# Patient Record
Sex: FEMALE | Race: WHITE | Employment: OTHER
[De-identification: names, ages, dates, MRNs, and addresses within clinical notes are randomized per-mention and may not be internally consistent; named-entity substitution may affect disease eponyms.]

---

## 2017-01-03 RX ORDER — VENLAFAXINE HYDROCHLORIDE 150 MG/1
CAPSULE, EXTENDED RELEASE ORAL
Qty: 90 CAPSULE | Refills: 1 | Status: SHIPPED | OUTPATIENT
Start: 2017-01-03 | End: 2017-03-31 | Stop reason: SDUPTHER

## 2017-01-03 RX ORDER — LEVOTHYROXINE SODIUM 0.05 MG/1
TABLET ORAL
Qty: 90 TABLET | Refills: 1 | Status: SHIPPED | OUTPATIENT
Start: 2017-01-03 | End: 2017-03-31 | Stop reason: SDUPTHER

## 2017-01-04 ENCOUNTER — OFFICE VISIT (OUTPATIENT)
Facility: CLINIC | Age: 62
End: 2017-01-04

## 2017-01-04 VITALS
HEART RATE: 72 BPM | SYSTOLIC BLOOD PRESSURE: 122 MMHG | DIASTOLIC BLOOD PRESSURE: 82 MMHG | WEIGHT: 217.6 LBS | OXYGEN SATURATION: 98 % | BODY MASS INDEX: 39.8 KG/M2 | TEMPERATURE: 98 F

## 2017-01-04 DIAGNOSIS — Z09 HOSPITAL DISCHARGE FOLLOW-UP: Primary | ICD-10-CM

## 2017-01-04 DIAGNOSIS — G47.30 SLEEP APNEA, UNSPECIFIED TYPE: ICD-10-CM

## 2017-01-04 DIAGNOSIS — G47.00 INSOMNIA, UNSPECIFIED TYPE: ICD-10-CM

## 2017-01-04 DIAGNOSIS — M15.9 PRIMARY OSTEOARTHRITIS INVOLVING MULTIPLE JOINTS: ICD-10-CM

## 2017-01-04 DIAGNOSIS — E66.9 OBESITY (BMI 30-39.9): ICD-10-CM

## 2017-01-04 DIAGNOSIS — E03.9 ACQUIRED HYPOTHYROIDISM: ICD-10-CM

## 2017-01-04 DIAGNOSIS — K59.03 THERAPEUTIC OPIOID-INDUCED CONSTIPATION (OIC): ICD-10-CM

## 2017-01-04 DIAGNOSIS — M79.7 FIBROMYALGIA: ICD-10-CM

## 2017-01-04 DIAGNOSIS — K56.600 PARTIAL BOWEL OBSTRUCTION (HCC): ICD-10-CM

## 2017-01-04 DIAGNOSIS — T40.2X5A THERAPEUTIC OPIOID-INDUCED CONSTIPATION (OIC): ICD-10-CM

## 2017-01-04 DIAGNOSIS — F41.9 ANXIETY: ICD-10-CM

## 2017-01-26 PROCEDURE — 99496 TRANSJ CARE MGMT HIGH F2F 7D: CPT | Performed by: FAMILY MEDICINE

## 2017-03-08 ENCOUNTER — TELEPHONE (OUTPATIENT)
Dept: PAIN MANAGEMENT | Age: 62
End: 2017-03-08

## 2017-03-09 ENCOUNTER — TELEPHONE (OUTPATIENT)
Dept: PAIN MANAGEMENT | Age: 62
End: 2017-03-09

## 2017-04-11 RX ORDER — MELOXICAM 15 MG/1
TABLET ORAL
Qty: 90 TABLET | Refills: 1 | Status: SHIPPED | OUTPATIENT
Start: 2017-04-11 | End: 2017-04-28 | Stop reason: SDUPTHER

## 2017-04-21 ENCOUNTER — HOSPITAL ENCOUNTER (OUTPATIENT)
Dept: ULTRASOUND IMAGING | Age: 62
Discharge: HOME | End: 2017-04-21
Payer: MEDICARE

## 2017-04-21 DIAGNOSIS — E04.1 THYROID NODULE: ICD-10-CM

## 2017-04-21 PROCEDURE — 76536 US EXAM OF HEAD AND NECK: CPT

## 2017-04-28 ENCOUNTER — HOSPITAL ENCOUNTER (OUTPATIENT)
Dept: PAIN MANAGEMENT | Age: 62
Discharge: HOME | End: 2017-04-28
Payer: MEDICARE

## 2017-04-28 DIAGNOSIS — E66.01 OBESITY, CLASS III, BMI 40-49.9 (MORBID OBESITY) (HCC): ICD-10-CM

## 2017-04-28 DIAGNOSIS — M79.18 MUSCLE PAIN, LUMBAR: ICD-10-CM

## 2017-04-28 DIAGNOSIS — M79.7 FIBROMYALGIA: ICD-10-CM

## 2017-04-28 DIAGNOSIS — M46.1 SACROILIITIS (HCC): ICD-10-CM

## 2017-04-28 DIAGNOSIS — M54.41 CHRONIC BILATERAL LOW BACK PAIN WITH BILATERAL SCIATICA: Primary | ICD-10-CM

## 2017-04-28 DIAGNOSIS — G89.29 CHRONIC BILATERAL LOW BACK PAIN WITH BILATERAL SCIATICA: Primary | ICD-10-CM

## 2017-04-28 DIAGNOSIS — M54.42 CHRONIC BILATERAL LOW BACK PAIN WITH BILATERAL SCIATICA: Primary | ICD-10-CM

## 2017-04-28 PROCEDURE — 99213 OFFICE O/P EST LOW 20 MIN: CPT | Performed by: NURSE PRACTITIONER

## 2017-04-28 PROCEDURE — 99213 OFFICE O/P EST LOW 20 MIN: CPT

## 2017-04-28 RX ORDER — HYDROCODONE BITARTRATE AND ACETAMINOPHEN 5; 325 MG/1; MG/1
1 TABLET ORAL 2 TIMES DAILY PRN
Qty: 60 TABLET | Refills: 0 | Status: SHIPPED | OUTPATIENT
Start: 2017-04-28 | End: 2017-04-28 | Stop reason: SDUPTHER

## 2017-04-28 RX ORDER — HYDROCODONE BITARTRATE AND ACETAMINOPHEN 5; 325 MG/1; MG/1
1 TABLET ORAL 2 TIMES DAILY PRN
Qty: 60 TABLET | Refills: 0 | Status: SHIPPED | OUTPATIENT
Start: 2017-05-07 | End: 2017-06-02 | Stop reason: SDUPTHER

## 2017-06-02 ENCOUNTER — HOSPITAL ENCOUNTER (OUTPATIENT)
Dept: PAIN MANAGEMENT | Age: 62
Discharge: HOME | End: 2017-06-02
Payer: MEDICARE

## 2017-06-02 DIAGNOSIS — G89.29 CHRONIC BILATERAL LOW BACK PAIN WITH BILATERAL SCIATICA: Primary | ICD-10-CM

## 2017-06-02 DIAGNOSIS — M46.1 SACROILIITIS (HCC): ICD-10-CM

## 2017-06-02 DIAGNOSIS — M54.41 CHRONIC BILATERAL LOW BACK PAIN WITH BILATERAL SCIATICA: Primary | ICD-10-CM

## 2017-06-02 DIAGNOSIS — M79.18 MUSCLE PAIN, LUMBAR: ICD-10-CM

## 2017-06-02 DIAGNOSIS — M79.7 FIBROMYALGIA: ICD-10-CM

## 2017-06-02 DIAGNOSIS — M47.816 LUMBAR FACET ARTHROPATHY: ICD-10-CM

## 2017-06-02 DIAGNOSIS — M54.42 CHRONIC BILATERAL LOW BACK PAIN WITH BILATERAL SCIATICA: Primary | ICD-10-CM

## 2017-06-02 DIAGNOSIS — E66.01 OBESITY, CLASS III, BMI 40-49.9 (MORBID OBESITY) (HCC): ICD-10-CM

## 2017-06-02 PROCEDURE — 99213 OFFICE O/P EST LOW 20 MIN: CPT | Performed by: NURSE PRACTITIONER

## 2017-06-02 PROCEDURE — 99213 OFFICE O/P EST LOW 20 MIN: CPT

## 2017-06-02 PROCEDURE — G0463 HOSPITAL OUTPT CLINIC VISIT: HCPCS

## 2017-06-02 RX ORDER — HYDROCODONE BITARTRATE AND ACETAMINOPHEN 5; 325 MG/1; MG/1
1 TABLET ORAL 2 TIMES DAILY PRN
Qty: 60 TABLET | Refills: 0 | Status: SHIPPED | OUTPATIENT
Start: 2017-06-06 | End: 2017-06-02 | Stop reason: SDUPTHER

## 2017-06-02 RX ORDER — CYCLOBENZAPRINE HCL 5 MG
5 TABLET ORAL DAILY PRN
Qty: 15 TABLET | Refills: 0 | Status: SHIPPED | OUTPATIENT
Start: 2017-06-02 | End: 2019-05-23

## 2017-06-02 RX ORDER — HYDROCODONE BITARTRATE AND ACETAMINOPHEN 5; 325 MG/1; MG/1
1 TABLET ORAL 2 TIMES DAILY PRN
Qty: 60 TABLET | Refills: 0 | Status: SHIPPED | OUTPATIENT
Start: 2017-06-08 | End: 2017-07-07 | Stop reason: SDUPTHER

## 2017-06-13 ENCOUNTER — OFFICE VISIT (OUTPATIENT)
Dept: OBGYN CLINIC | Age: 62
End: 2017-06-13
Payer: MEDICARE

## 2017-06-13 VITALS
SYSTOLIC BLOOD PRESSURE: 122 MMHG | WEIGHT: 220 LBS | DIASTOLIC BLOOD PRESSURE: 76 MMHG | BODY MASS INDEX: 40.48 KG/M2 | HEIGHT: 62 IN | HEART RATE: 78 BPM

## 2017-06-13 DIAGNOSIS — C80.1 CANCER (HCC): ICD-10-CM

## 2017-06-13 DIAGNOSIS — Z01.419 WELL FEMALE EXAM WITH ROUTINE GYNECOLOGICAL EXAM: Primary | ICD-10-CM

## 2017-06-13 PROCEDURE — G0101 CA SCREEN;PELVIC/BREAST EXAM: HCPCS | Performed by: OBSTETRICS & GYNECOLOGY

## 2017-06-13 ASSESSMENT — PATIENT HEALTH QUESTIONNAIRE - PHQ9
2. FEELING DOWN, DEPRESSED OR HOPELESS: 0
SUM OF ALL RESPONSES TO PHQ9 QUESTIONS 1 & 2: 0
SUM OF ALL RESPONSES TO PHQ QUESTIONS 1-9: 0
1. LITTLE INTEREST OR PLEASURE IN DOING THINGS: 0

## 2017-07-07 ENCOUNTER — HOSPITAL ENCOUNTER (OUTPATIENT)
Dept: PAIN MANAGEMENT | Age: 62
Discharge: HOME | End: 2017-07-07
Payer: MEDICARE

## 2017-07-07 DIAGNOSIS — M47.816 LUMBAR FACET ARTHROPATHY: ICD-10-CM

## 2017-07-07 DIAGNOSIS — E66.01 OBESITY, CLASS III, BMI 40-49.9 (MORBID OBESITY) (HCC): ICD-10-CM

## 2017-07-07 DIAGNOSIS — M79.18 MUSCLE PAIN, LUMBAR: ICD-10-CM

## 2017-07-07 DIAGNOSIS — M54.42 CHRONIC BILATERAL LOW BACK PAIN WITH BILATERAL SCIATICA: Primary | ICD-10-CM

## 2017-07-07 DIAGNOSIS — G89.29 CHRONIC BILATERAL LOW BACK PAIN WITH BILATERAL SCIATICA: Primary | ICD-10-CM

## 2017-07-07 DIAGNOSIS — M79.7 FIBROMYALGIA: ICD-10-CM

## 2017-07-07 DIAGNOSIS — M54.41 CHRONIC BILATERAL LOW BACK PAIN WITH BILATERAL SCIATICA: Primary | ICD-10-CM

## 2017-07-07 DIAGNOSIS — M46.1 SACROILIITIS (HCC): ICD-10-CM

## 2017-07-07 PROCEDURE — 80307 DRUG TEST PRSMV CHEM ANLYZR: CPT

## 2017-07-07 PROCEDURE — 99213 OFFICE O/P EST LOW 20 MIN: CPT

## 2017-07-07 PROCEDURE — G0463 HOSPITAL OUTPT CLINIC VISIT: HCPCS

## 2017-07-07 PROCEDURE — 99213 OFFICE O/P EST LOW 20 MIN: CPT | Performed by: NURSE PRACTITIONER

## 2017-07-07 RX ORDER — HYDROCODONE BITARTRATE AND ACETAMINOPHEN 5; 325 MG/1; MG/1
1 TABLET ORAL 2 TIMES DAILY PRN
Qty: 60 TABLET | Refills: 0 | Status: SHIPPED | OUTPATIENT
Start: 2017-07-08 | End: 2017-08-01 | Stop reason: SDUPTHER

## 2017-07-10 LAB
6-ACETYLMORPHINE, UR: NOT DETECTED
7-AMINOCLONAZEPAM, URINE: NOT DETECTED
ALPHA-OH-ALPRAZ, URINE: NOT DETECTED
ALPRAZOLAM, URINE: NOT DETECTED
AMPHETAMINES, URINE: NOT DETECTED
BARBITURATES, URINE: NOT DETECTED
BENZOYLECGONINE, UR: NOT DETECTED
BUPRENORPHINE URINE: NOT DETECTED
CARISOPRODOL, UR: NOT DETECTED
CLONAZEPAM, URINE: NOT DETECTED
CODEINE, URINE: NOT DETECTED
CREATININE URINE: 215.2 MG/DL (ref 20–400)
DIAZEPAM, URINE: NOT DETECTED
DRUGS EXPECTED, UR: NORMAL
EER HI RES INTERP UR: NORMAL
ETHYL GLUCURONIDE UR: NOT DETECTED
FENTANYL URINE: NOT DETECTED
HYDROCODONE, URINE: PRESENT
HYDROMORPHONE, URINE: NOT DETECTED
LORAZEPAM, URINE: NOT DETECTED
MARIJUANA METAB, UR: NOT DETECTED
MDA, UR: NOT DETECTED
MDEA, EVE, UR: NOT DETECTED
MDMA URINE: NOT DETECTED
MEPERIDINE METAB, UR: NOT DETECTED
METHADONE, URINE: NOT DETECTED
METHAMPHETAMINE, URINE: NOT DETECTED
METHYLPHENIDATE: NOT DETECTED
MIDAZOLAM, URINE: NOT DETECTED
MORPHINE URINE: NOT DETECTED
NORBUPRENORPHINE, URINE: NOT DETECTED
NORDIAZEPAM, URINE: NOT DETECTED
NORFENTANYL, URINE: NOT DETECTED
NORHYDROCODONE, URINE: PRESENT
NOROXYCODONE, URINE: NOT DETECTED
NOROXYMORPHONE, URINE: NOT DETECTED
OXAZEPAM, URINE: NOT DETECTED
OXYCODONE URINE: NOT DETECTED
OXYMORPHONE, URINE: NOT DETECTED
PAIN MANAGEMENT DRUG PANEL INTERP, URINE: NORMAL
PAIN MGT DRUG PANEL, HI RES, UR: NORMAL
PCP,URINE: NOT DETECTED
PHENTERMINE, UR: NOT DETECTED
PROPOXYPHENE, URINE: NOT DETECTED
TAPENTADOL, URINE: NOT DETECTED
TAPENTADOL-O-SULFATE, URINE: NOT DETECTED
TEMAZEPAM, URINE: NOT DETECTED
TRAMADOL, URINE: NOT DETECTED
ZOLPIDEM, URINE: NOT DETECTED

## 2017-08-01 ENCOUNTER — HOSPITAL ENCOUNTER (OUTPATIENT)
Dept: PAIN MANAGEMENT | Age: 62
Discharge: HOME | End: 2017-08-01
Payer: MEDICARE

## 2017-08-01 VITALS
HEIGHT: 62 IN | SYSTOLIC BLOOD PRESSURE: 116 MMHG | TEMPERATURE: 98.7 F | HEART RATE: 78 BPM | WEIGHT: 219 LBS | DIASTOLIC BLOOD PRESSURE: 66 MMHG | BODY MASS INDEX: 40.3 KG/M2 | OXYGEN SATURATION: 97 % | RESPIRATION RATE: 16 BRPM

## 2017-08-01 DIAGNOSIS — M47.816 LUMBAR FACET ARTHROPATHY: ICD-10-CM

## 2017-08-01 DIAGNOSIS — M46.1 SACROILIITIS (HCC): ICD-10-CM

## 2017-08-01 DIAGNOSIS — E66.01 OBESITY, CLASS III, BMI 40-49.9 (MORBID OBESITY) (HCC): ICD-10-CM

## 2017-08-01 DIAGNOSIS — M79.7 FIBROMYALGIA: ICD-10-CM

## 2017-08-01 DIAGNOSIS — G89.29 CHRONIC BILATERAL LOW BACK PAIN WITH BILATERAL SCIATICA: Primary | ICD-10-CM

## 2017-08-01 DIAGNOSIS — M54.42 CHRONIC BILATERAL LOW BACK PAIN WITH BILATERAL SCIATICA: Primary | ICD-10-CM

## 2017-08-01 DIAGNOSIS — M54.41 CHRONIC BILATERAL LOW BACK PAIN WITH BILATERAL SCIATICA: Primary | ICD-10-CM

## 2017-08-01 DIAGNOSIS — M79.18 MUSCLE PAIN, LUMBAR: ICD-10-CM

## 2017-08-01 PROCEDURE — 99213 OFFICE O/P EST LOW 20 MIN: CPT

## 2017-08-01 PROCEDURE — G0463 HOSPITAL OUTPT CLINIC VISIT: HCPCS

## 2017-08-01 PROCEDURE — 99213 OFFICE O/P EST LOW 20 MIN: CPT | Performed by: NURSE PRACTITIONER

## 2017-08-01 RX ORDER — HYDROCODONE BITARTRATE AND ACETAMINOPHEN 5; 325 MG/1; MG/1
1 TABLET ORAL 2 TIMES DAILY PRN
Qty: 60 TABLET | Refills: 0 | Status: SHIPPED | OUTPATIENT
Start: 2017-08-13 | End: 2017-09-12 | Stop reason: SDUPTHER

## 2017-08-01 RX ORDER — HYDROCODONE BITARTRATE AND ACETAMINOPHEN 5; 325 MG/1; MG/1
1 TABLET ORAL 2 TIMES DAILY PRN
Qty: 60 TABLET | Refills: 0 | Status: SHIPPED | OUTPATIENT
Start: 2017-08-10 | End: 2017-08-01 | Stop reason: SDUPTHER

## 2017-08-01 ASSESSMENT — ENCOUNTER SYMPTOMS
EYES NEGATIVE: 1
RESPIRATORY NEGATIVE: 1
DIARRHEA: 1
ABDOMINAL PAIN: 1
BACK PAIN: 1

## 2017-09-12 ENCOUNTER — HOSPITAL ENCOUNTER (OUTPATIENT)
Dept: PAIN MANAGEMENT | Age: 62
Discharge: HOME | End: 2017-09-12
Payer: MEDICARE

## 2017-09-12 VITALS
HEIGHT: 62 IN | OXYGEN SATURATION: 99 % | BODY MASS INDEX: 40.3 KG/M2 | SYSTOLIC BLOOD PRESSURE: 135 MMHG | HEART RATE: 81 BPM | DIASTOLIC BLOOD PRESSURE: 86 MMHG | TEMPERATURE: 98.6 F | RESPIRATION RATE: 20 BRPM | WEIGHT: 219 LBS

## 2017-09-12 DIAGNOSIS — M47.816 LUMBAR FACET ARTHROPATHY: ICD-10-CM

## 2017-09-12 DIAGNOSIS — M46.1 SACROILIITIS (HCC): Primary | ICD-10-CM

## 2017-09-12 DIAGNOSIS — E66.01 OBESITY, CLASS III, BMI 40-49.9 (MORBID OBESITY) (HCC): ICD-10-CM

## 2017-09-12 DIAGNOSIS — M51.36 DDD (DEGENERATIVE DISC DISEASE), LUMBAR: ICD-10-CM

## 2017-09-12 PROCEDURE — 99213 OFFICE O/P EST LOW 20 MIN: CPT

## 2017-09-12 PROCEDURE — 99214 OFFICE O/P EST MOD 30 MIN: CPT | Performed by: PAIN MEDICINE

## 2017-09-12 RX ORDER — HYDROCODONE BITARTRATE AND ACETAMINOPHEN 5; 325 MG/1; MG/1
1 TABLET ORAL 2 TIMES DAILY PRN
Qty: 60 TABLET | Refills: 0 | Status: SHIPPED | OUTPATIENT
Start: 2017-09-16 | End: 2017-10-16 | Stop reason: SDUPTHER

## 2017-09-12 ASSESSMENT — ENCOUNTER SYMPTOMS
WHEEZING: 0
SORE THROAT: 0
BOWEL INCONTINENCE: 0
DIARRHEA: 0
SHORTNESS OF BREATH: 0
BACK PAIN: 1

## 2017-09-12 ASSESSMENT — PAIN DESCRIPTION - LOCATION: LOCATION: BACK

## 2017-09-12 ASSESSMENT — ACTIVITIES OF DAILY LIVING (ADL): EFFECT OF PAIN ON DAILY ACTIVITIES: PAIN INCREASES WITH WALKING

## 2017-09-12 ASSESSMENT — PAIN DESCRIPTION - PROGRESSION: CLINICAL_PROGRESSION: NOT CHANGED

## 2017-09-12 ASSESSMENT — PAIN SCALES - GENERAL: PAINLEVEL_OUTOF10: 5

## 2017-09-12 ASSESSMENT — PAIN DESCRIPTION - FREQUENCY: FREQUENCY: CONTINUOUS

## 2017-09-12 ASSESSMENT — PAIN DESCRIPTION - ONSET: ONSET: ON-GOING

## 2017-09-12 ASSESSMENT — PAIN DESCRIPTION - DIRECTION: RADIATING_TOWARDS: LEGS BILATERAL WORSE ON RIGHT

## 2017-09-12 ASSESSMENT — PAIN DESCRIPTION - PAIN TYPE: TYPE: CHRONIC PAIN

## 2017-09-12 ASSESSMENT — PAIN DESCRIPTION - ORIENTATION: ORIENTATION: LOWER;RIGHT;LEFT

## 2017-09-13 ASSESSMENT — ENCOUNTER SYMPTOMS
ABDOMINAL PAIN: 1
CONSTIPATION: 1
SPUTUM PRODUCTION: 0
DOUBLE VISION: 0
COUGH: 0
ORTHOPNEA: 0
BLURRED VISION: 0

## 2017-10-16 ENCOUNTER — HOSPITAL ENCOUNTER (OUTPATIENT)
Dept: PAIN MANAGEMENT | Age: 62
Discharge: HOME | End: 2017-10-16
Payer: MEDICARE

## 2017-10-16 VITALS
RESPIRATION RATE: 20 BRPM | TEMPERATURE: 98.9 F | OXYGEN SATURATION: 98 % | SYSTOLIC BLOOD PRESSURE: 141 MMHG | BODY MASS INDEX: 40.3 KG/M2 | DIASTOLIC BLOOD PRESSURE: 85 MMHG | WEIGHT: 219 LBS | HEIGHT: 62 IN | HEART RATE: 71 BPM

## 2017-10-16 DIAGNOSIS — M79.18 MUSCLE PAIN, LUMBAR: ICD-10-CM

## 2017-10-16 DIAGNOSIS — M79.7 FIBROMYALGIA: ICD-10-CM

## 2017-10-16 DIAGNOSIS — M47.816 LUMBAR FACET ARTHROPATHY: ICD-10-CM

## 2017-10-16 DIAGNOSIS — M54.42 CHRONIC BILATERAL LOW BACK PAIN WITH BILATERAL SCIATICA: ICD-10-CM

## 2017-10-16 DIAGNOSIS — M51.36 DDD (DEGENERATIVE DISC DISEASE), LUMBAR: ICD-10-CM

## 2017-10-16 DIAGNOSIS — M54.41 CHRONIC BILATERAL LOW BACK PAIN WITH BILATERAL SCIATICA: ICD-10-CM

## 2017-10-16 DIAGNOSIS — G89.29 CHRONIC BILATERAL LOW BACK PAIN WITH BILATERAL SCIATICA: ICD-10-CM

## 2017-10-16 DIAGNOSIS — M46.1 SACROILIITIS (HCC): Primary | ICD-10-CM

## 2017-10-16 PROCEDURE — 99213 OFFICE O/P EST LOW 20 MIN: CPT

## 2017-10-16 PROCEDURE — 99213 OFFICE O/P EST LOW 20 MIN: CPT | Performed by: NURSE PRACTITIONER

## 2017-10-16 RX ORDER — MAGNESIUM OXIDE 400 MG/1
TABLET ORAL
Refills: 0 | COMMUNITY
Start: 2017-09-20 | End: 2018-01-29

## 2017-10-16 RX ORDER — HYDROCODONE BITARTRATE AND ACETAMINOPHEN 5; 325 MG/1; MG/1
1 TABLET ORAL 2 TIMES DAILY PRN
Qty: 60 TABLET | Refills: 0 | Status: SHIPPED | OUTPATIENT
Start: 2017-10-21 | End: 2017-11-14 | Stop reason: SDUPTHER

## 2017-10-16 ASSESSMENT — ENCOUNTER SYMPTOMS
GASTROINTESTINAL NEGATIVE: 1
BACK PAIN: 1
RESPIRATORY NEGATIVE: 1
EYES NEGATIVE: 1

## 2017-10-16 NOTE — PROGRESS NOTES
[facility]  Progress  Note  Patient is here today to review medication contract. Chief Complaint: low back pain, right si joint pain    PMH      HPI: Patient is a 63 yo female with low back pain. This is a chronic problem. She has only had one SI joint injection with us on the left side in 9/2016 with 30% relief for 1 month. Considered doing right SI but patient thought it will not be worth it. Before, she received bilateral RFAs from prior pain management clinic with not much relief. She has not started Aquatics yet. She currently takes norco and mobic for pain. She only takes 1-2 norco a day. Patient has  Less constipation and sees GI which prescribed miralax and dulcolax. She is getting 1 BM every other day. Had a bowel obstruction 12/25/2017 which was treated conservatively with bowel rest.  Was attributed to adhesions from prior surgery. Colonoscopy was within normal limits. Last SI joint, patient had flushing for a couple day and is scheduled forright SI joint injection again. Back Pain   This is a chronic problem. The current episode started more than 1 year ago. The problem occurs constantly. The pain is present in the lumbar spine and sacro-iliac. The quality of the pain is described as aching. The pain does not radiate. The pain is at a severity of 4/10. The pain is moderate. The pain is the same all the time. The symptoms are aggravated by standing (walking, bending back ). Associated symptoms include numbness. (Legs) Risk factors include menopause and obesity. She has tried analgesics and heat for the symptoms. The treatment provided mild relief.        Possible side effects, risk of tolerance and or dependence and alternative treatments discussed    Obtaining appropriate analgesic effect of treatment   No signs of potential drug abuse or diversion identified    Treatment goals:  Functional status:  Manage pain, walk better      Aberrancy none  Analgesia  Pain 4  Adverse No    Sexual activity: Yes     Partners: Male     Birth control/ protection: Surgical      Comment: hyst     Other Topics Concern    Not on file     Social History Narrative    No narrative on file       Review of Systems:  Review of Systems   Constitution: Negative. HENT: Negative. Eyes: Negative. Cardiovascular: Negative. Respiratory: Negative. Skin: Negative. Musculoskeletal: Positive for back pain, joint pain and myalgias. Gastrointestinal: Negative. Genitourinary: Negative. Neurological: Positive for numbness. Psychiatric/Behavioral: Positive for depression. The patient is nervous/anxious. Manageable         Physical Exam:  BP (!) 141/85   Pulse 71   Temp 98.9 °F (37.2 °C)   Resp 20   Ht 5' 2\" (1.575 m)   Wt 219 lb (99.3 kg)   LMP 06/13/2010 (Within Months)   SpO2 98%   BMI 40.06 kg/m²     Physical Exam   Constitutional: She is well-developed, well-nourished, and in no distress. obese   Neck: Normal range of motion. Neck supple. Pulmonary/Chest: Effort normal.   Musculoskeletal:        Lumbar back: She exhibits decreased range of motion. Tender right si joint   Neurological:   Reflex Scores:       Patellar reflexes are 0 on the right side and 2+ on the left side. Achilles reflexes are 2+ on the right side and 2+ on the left side. Skin: Skin is warm, dry and intact.    Psychiatric: Affect and judgment normal.       Record/Diagnostics Review:    As above, I did review the imaging  7/10/2017  3:37 PM - Michael, pn Incoming Lab Results From [facility]     Component Results     Component Value Ref Range & Units Status Collected Lab   Pain Management Drug Panel Interp, Urine Consistent   Final 07/07/2017  1:30 PM [facility]   (NOTE)   ________________________________________________________________   DRUGS EXPECTED:   HYDROCODONE [7/6/17]   ________________________________________________________________   CONSISTENT with medications provided:   HYDROCODONE : based on hydrocodone, norhydrocodone   ________________________________________________________________   INTERPRETIVE INFORMATION:Pain Mgt Morris, High Res/EMIT, Ur, Interp   Interpretation depends on accuracy and completeness of patient   medication information submitted by client. 6-Acetylmorphine, Ur Not Detected   Final 07/07/2017  1:30 PM [facility]   7-Aminoclonazepam, Urine Not Detected   Final 07/07/2017  1:30 PM [facility]   Alpha-OH-Alpraz, Urine Not Detected   Final 07/07/2017  1:30 PM [facility]   Alprazolam, Urine Not Detected   Final 07/07/2017  1:30 PM [facility]   Amphetamines, urine Not Detected   Final 07/07/2017  1:30 PM [facility]   Barbiturates, Ur Not Detected   Final 07/07/2017  1:30 PM [facility]   Benzoylecgonine, Ur Not Detected   Final 07/07/2017  1:30 PM [facility]   Buprenorphine Urine Not Detected   Final 07/07/2017  1:30 PM [facility]   Carisoprodol, Ur Not Detected   Final 07/07/2017  1:30 PM [facility]   (NOTE)   The carisoprodol immunoassay has cross-reactivity to carisoprodol   and meprobamate.     Clonazepam, Urine Not Detected   Final 07/07/2017  1:30 PM [facility]   Codeine, Urine Not Detected   Final 07/07/2017  1:30 PM [facility]   MDA, Ur Not Detected   Final 07/07/2017  1:30 PM [facility]   Diazepam, Urine Not Detected   Final 07/07/2017  1:30 PM [facility]   Ethyl Glucuronide Ur Not Detected   Final 07/07/2017  1:30 PM [facility]   Fentanyl, Ur Not Detected   Final 07/07/2017  1:30 PM [facility]   Hydrocodone, Urine Present   Final 07/07/2017  1:30 PM [facility]   Hydromorphone, Urine Not Detected   Final 07/07/2017  1:30 PM [facility]   Lorazepam, Urine Not Detected   Final 07/07/2017  1:30 PM [facility]   Marijuana Metab, Ur Not Detected   Final 07/07/2017  1:30 PM [facility]   MDEA, MENDY, Ur Not Detected   Final 07/07/2017  1:30 PM [facility]   MDMA URINE Not Detected   Final 07/07/2017  1:30 PM [facility]   Meperidine Metab, Ur Not Detected   Final 07/07/2017  1:30 PM [facility]   Methadone, Urine Not Detected   Final 07/07/2017  1:30 PM [facility]   Methamphetamine, Urine Not Detected   Final 07/07/2017  1:30 PM [facility]   Methylphenidate Not Detected   Final 07/07/2017  1:30 PM [facility]   Midazolam, Urine Not Detected   Final 07/07/2017  1:30 PM [facility]   Morphine Urine Not Detected   Final 07/07/2017  1:30 PM [facility]   Norbuprenorphine, Urine Not Detected   Final 07/07/2017  1:30 PM [facility]   Nordiazepam, Urine Not Detected   Final 07/07/2017  1:30 PM [facility]   Norfentanyl, Urine Not Detected   Final 07/07/2017  1:30 PM [facility]   NORHYDROCODONE, URINE Present   Final 07/07/2017  1:30 PM [facility]   Noroxycodone, Urine Not Detected   Final 07/07/2017  1:30 PM [facility]   NOROXYMORPHONE, URINE Not Detected   Final 07/07/2017  1:30 PM [facility]   Oxazepam, Urine Not Detected   Final 07/07/2017  1:30 PM [facility]   Oxycodone Urine Not Detected   Final 07/07/2017  1:30 PM [facility]   Oxymorphone, Urine Not Detected   Final 07/07/2017  1:30 PM [facility]   PCP, Urine Not Detected   Final 07/07/2017  1:30 PM [facility]   Phentermine, Ur Not Detected   Final 07/07/2017  1:30 PM [facility]   Propoxyphene, Urine Not Detected   Final 07/07/2017  1:30 PM [facility]   Tapentadol-O-Sulfate, Urine Not Detected   Final 07/07/2017  1:30 PM [facility]   Tapentadol, Urine Not Detected   Final 07/07/2017  1:30 PM [facility]   Temazepam, Urine Not Detected   Final 07/07/2017  1:30 PM [facility]   Tramadol, Urine Not Detected   Final 07/07/2017  1:30 PM [facility]   Zolpidem, Urine Not Detected   Final 07/07/2017  1:30 PM [facility]   Drugs Expected, Ur   Final 07/07/2017  1:30 PM [facility]   HYDROCODONE EVENING OF [id number]    Creatinine, Ur 215.2  20.0 - 400.0 mg/dL Final 07/07/2017  1:30 PM [facility]   Pain Mgt Drug Panel, Hi Res, Ur See Below   Final 07/07/2017  1:30 PM [facility]   (NOTE)   Methodology: Qualitative Enzyme Immunoassay and Qualitative Liquid   Chromatography-Time of Flight-Mass Spectrometry, Quantitative   Spectrophotometry   The absence of expected drug(s) and/or drug metabolite(s) may   indicate

## 2017-10-27 ENCOUNTER — HOSPITAL ENCOUNTER (OUTPATIENT)
Dept: GENERAL RADIOLOGY | Age: 62
Discharge: HOME | End: 2017-10-27
Payer: MEDICARE

## 2017-10-27 ENCOUNTER — HOSPITAL ENCOUNTER (OUTPATIENT)
Dept: PAIN MANAGEMENT | Age: 62
Discharge: HOME | End: 2017-10-27
Payer: MEDICARE

## 2017-10-27 VITALS
BODY MASS INDEX: 40.3 KG/M2 | RESPIRATION RATE: 16 BRPM | DIASTOLIC BLOOD PRESSURE: 60 MMHG | SYSTOLIC BLOOD PRESSURE: 145 MMHG | WEIGHT: 219 LBS | OXYGEN SATURATION: 99 % | TEMPERATURE: 98 F | HEIGHT: 62 IN | HEART RATE: 68 BPM

## 2017-10-27 DIAGNOSIS — R52 PAIN: ICD-10-CM

## 2017-10-27 DIAGNOSIS — M46.1 SACROILIITIS (HCC): Primary | ICD-10-CM

## 2017-10-27 PROCEDURE — 6360000002 HC RX W HCPCS

## 2017-10-27 PROCEDURE — 3209999900 FLUORO FOR SURGICAL PROCEDURES

## 2017-10-27 PROCEDURE — G0260 INJ FOR SACROILIAC JT ANESTH: HCPCS

## 2017-10-27 PROCEDURE — 6360000004 HC RX CONTRAST MEDICATION

## 2017-10-27 PROCEDURE — 27096 INJECT SACROILIAC JOINT: CPT | Performed by: PAIN MEDICINE

## 2017-10-27 ASSESSMENT — PAIN - FUNCTIONAL ASSESSMENT: PAIN_FUNCTIONAL_ASSESSMENT: 0-10

## 2017-10-27 ASSESSMENT — ACTIVITIES OF DAILY LIVING (ADL): EFFECT OF PAIN ON DAILY ACTIVITIES: PAIN INCREASES W/WALKING & STANDING

## 2017-10-27 ASSESSMENT — PAIN SCALES - GENERAL: PAINLEVEL_OUTOF10: 4

## 2017-10-27 NOTE — PROCEDURES
sedation. Indication for the Procedure: The patient failed conservative management  for pain in low back. The patient is tender over the Right SI joint. Renan's test is positive on the Right side. As patient is not responding to conservative management and pain is interfering with activities of daily living we decided to proceed with SI joint injection. The procedure and risks were discussed with the patient and an informed consent was obtained. Current Pain Assessment  Pain Assessment  Pain Assessment: 0-10  Pain Level: 6  Pain Type: Chronic pain  Pain Location: Back, Leg  Pain Orientation: Right  Pain Radiating Towards: right worse than left leg  Pain Descriptors: Constant, Aching, Sharp, Burning, Radiating  Pain Frequency: Continuous  Pain Onset: On-going  Clinical Progression: Gradually worsening  Effect of Pain on Daily Activities: Pain increases w/walking & standing  Patient's Stated Pain Goal: 2 (To be able to walk & stand w/less pain)  Pain Intervention(s): Medication (see eMar), Rest, Repositioned, Heat applied  Response to Pain Intervention:  (Left SI 2016 30%)   Procedure: The patient's vital signs including BP, EKG and SaO2 were monitored by the RN and they remained stable during the procedure. A meaningful communication was kept up with the patient throughout the procedure. The patient is placed in prone position. Skin over the back was prepped and draped in sterile manner. Then using fluoroscopy the Right sacroiliac joint was identified. Then the angle of the fluoroscopy was adjusted such that the view of the caudal aspect of the joint space was optimized. Then skin and deep tissues over the caudal aspect of the joint were infiltrated with 3 ml of 0.5% Naropin. The #22-gauge, 3-1/2 inch spinal needle was introduced through the skin wheal and directed such that the tip of the needle lies in the joint space.    This was confirmed by injecting 1 ml of Omnipaque-180 through the needle and observing the spread of the contrast along the joint space. Then after negative aspiration a total of 40 mg of triamcinolone with 4 ml of  0.5%  Naropin was injected through the needle. The needle is removed and a Band-Aid was placed over the needle insertion site. The patient tolerated the procedure well and vital signs remained stable. The patient was discharged home in stable condition and will be followed in the pain clinic in the next few weeks or further planning.     Electronically signed by Ashley Lopez MD on 10/27/2017 at 10:33 AM

## 2017-10-30 ENCOUNTER — TELEPHONE (OUTPATIENT)
Dept: PAIN MANAGEMENT | Age: 62
End: 2017-10-30

## 2017-11-14 ENCOUNTER — HOSPITAL ENCOUNTER (OUTPATIENT)
Dept: PAIN MANAGEMENT | Age: 62
Discharge: HOME | End: 2017-11-14
Payer: MEDICARE

## 2017-11-14 VITALS
OXYGEN SATURATION: 97 % | RESPIRATION RATE: 16 BRPM | DIASTOLIC BLOOD PRESSURE: 78 MMHG | SYSTOLIC BLOOD PRESSURE: 122 MMHG | WEIGHT: 219 LBS | BODY MASS INDEX: 40.3 KG/M2 | HEART RATE: 79 BPM | HEIGHT: 62 IN | TEMPERATURE: 98.2 F

## 2017-11-14 DIAGNOSIS — M79.7 FIBROMYALGIA: ICD-10-CM

## 2017-11-14 DIAGNOSIS — M79.18 MUSCLE PAIN, LUMBAR: ICD-10-CM

## 2017-11-14 DIAGNOSIS — M54.41 CHRONIC BILATERAL LOW BACK PAIN WITH BILATERAL SCIATICA: ICD-10-CM

## 2017-11-14 DIAGNOSIS — G89.29 CHRONIC BILATERAL LOW BACK PAIN WITH BILATERAL SCIATICA: ICD-10-CM

## 2017-11-14 DIAGNOSIS — M51.36 DDD (DEGENERATIVE DISC DISEASE), LUMBAR: ICD-10-CM

## 2017-11-14 DIAGNOSIS — M46.1 SACROILIITIS (HCC): Primary | ICD-10-CM

## 2017-11-14 DIAGNOSIS — M47.816 LUMBAR FACET ARTHROPATHY: ICD-10-CM

## 2017-11-14 DIAGNOSIS — M54.42 CHRONIC BILATERAL LOW BACK PAIN WITH BILATERAL SCIATICA: ICD-10-CM

## 2017-11-14 DIAGNOSIS — E66.01 OBESITY, CLASS III, BMI 40-49.9 (MORBID OBESITY) (HCC): ICD-10-CM

## 2017-11-14 PROCEDURE — 99213 OFFICE O/P EST LOW 20 MIN: CPT

## 2017-11-14 PROCEDURE — 99214 OFFICE O/P EST MOD 30 MIN: CPT | Performed by: PAIN MEDICINE

## 2017-11-14 RX ORDER — HYDROCODONE BITARTRATE AND ACETAMINOPHEN 5; 325 MG/1; MG/1
1 TABLET ORAL 2 TIMES DAILY PRN
Qty: 60 TABLET | Refills: 0 | Status: SHIPPED | OUTPATIENT
Start: 2017-11-28 | End: 2017-12-14 | Stop reason: SDUPTHER

## 2017-11-14 ASSESSMENT — ENCOUNTER SYMPTOMS
EYES NEGATIVE: 1
DIARRHEA: 0
CONSTIPATION: 1
DOUBLE VISION: 0
SPUTUM PRODUCTION: 0
BLURRED VISION: 0
ORTHOPNEA: 0
BOWEL INCONTINENCE: 0
ABDOMINAL PAIN: 1
SHORTNESS OF BREATH: 0
COUGH: 0
WHEEZING: 0
BACK PAIN: 1
RESPIRATORY NEGATIVE: 1
SORE THROAT: 0

## 2017-11-14 ASSESSMENT — PAIN DESCRIPTION - PAIN TYPE: TYPE: CHRONIC PAIN

## 2017-11-14 ASSESSMENT — ACTIVITIES OF DAILY LIVING (ADL): EFFECT OF PAIN ON DAILY ACTIVITIES: PAIN INCREASES WITH WALKING AND STANDING

## 2017-11-14 ASSESSMENT — PAIN DESCRIPTION - ORIENTATION: ORIENTATION: RIGHT;LEFT;LOWER

## 2017-11-14 ASSESSMENT — PAIN DESCRIPTION - ONSET: ONSET: ON-GOING

## 2017-11-14 ASSESSMENT — PAIN SCALES - GENERAL: PAINLEVEL_OUTOF10: 4

## 2017-11-14 ASSESSMENT — PAIN DESCRIPTION - FREQUENCY: FREQUENCY: CONTINUOUS

## 2017-11-14 ASSESSMENT — PAIN DESCRIPTION - DESCRIPTORS: DESCRIPTORS: ACHING;BURNING;CONSTANT;RADIATING;SHARP

## 2017-11-14 ASSESSMENT — PAIN DESCRIPTION - PROGRESSION: CLINICAL_PROGRESSION: GRADUALLY IMPROVING

## 2017-11-14 ASSESSMENT — PAIN DESCRIPTION - LOCATION: LOCATION: BACK;LEG

## 2017-11-14 NOTE — PROGRESS NOTES
Negative for bowel incontinence, diarrhea, heartburn and nausea. History of bowel obstruction recently   Genitourinary: Negative. Negative for bladder incontinence, dysuria, frequency and urgency. Musculoskeletal: Positive for back pain. Negative for falls. Skin: Negative. Negative for rash. Neurological: Positive for dizziness. Negative for sensory change, focal weakness, seizures, weakness, numbness and headaches. Endo/Heme/Allergies: Negative. Psychiatric/Behavioral: Positive for depression. Negative for substance abuse and suicidal ideas. The patient is nervous/anxious and has insomnia. GENERAL PHYSICAL EXAM:  Vitals: /78   Pulse 79   Temp 98.2 °F (36.8 °C) (Oral)   Resp 16   Ht 5' 2\" (1.575 m)   Wt 219 lb (99.3 kg)   LMP 06/13/2010 (Within Months)   SpO2 97%   BMI 40.06 kg/m² , Body mass index is 40.06 kg/m². Physical Exam   Constitutional: She is oriented to person, place, and time. She appears well-developed and well-nourished. HENT:   Head: Normocephalic and atraumatic. Eyes: Conjunctivae and EOM are normal. Pupils are equal, round, and reactive to light. Neck: Normal range of motion. Neck supple. Cardiovascular: Normal rate and regular rhythm. Pulmonary/Chest: No respiratory distress. Abdominal: She exhibits no distension. Musculoskeletal: She exhibits tenderness (mostly right SI joint). Neurological: She is alert and oriented to person, place, and time. She displays no atrophy and no tremor. No cranial nerve deficit or sensory deficit. She exhibits normal muscle tone. Coordination normal.   Reflex Scores:       Patellar reflexes are 2+ on the right side and 2+ on the left side. Achilles reflexes are 1+ on the right side and 1+ on the left side. Skin: Skin is warm and dry. No rash noted. No erythema. Psychiatric: She has a normal mood and affect.  Her speech is normal and behavior is normal. Judgment and thought content normal. Making Process : Patient's health history and referral records thoroughly reviewed before focused physical examination and discussion with patient. Over 50% of today's visit is spent on examining the patient and counseling. Level of complexity of date to be reviewed is Moderate. The chart date reviewed include the following: Imaging Reports. Summary of Care. Time spent reviewing with patient the below reports:   Medication safety, Treatment options. Level of diagnosis and management options of this case is multiple: involving the following management options: Interventions as needed, medication management as appropriate, future visits, activity modification, heat/ice as needed, Urine drug screen as required. [x]The patient's questions were answered to the best of my abilities. This note was created using voice recognition software. There may be inaccuracies of transcription  that are inadvertently overlooked prior to the signature. There is any questions about the transcription please contact me. Return in  4 weeks  with  Jemal Jha CNP  for further plan of treatment.          Electronically signed by Lizy Wallace MD on 11/15/2017 at 6:26 AM

## 2017-11-15 ASSESSMENT — ENCOUNTER SYMPTOMS
NAUSEA: 0
HEARTBURN: 0

## 2017-12-07 ENCOUNTER — HOSPITAL ENCOUNTER (OUTPATIENT)
Age: 62
Discharge: HOME | End: 2017-12-07
Payer: MEDICARE

## 2017-12-07 DIAGNOSIS — M15.9 PRIMARY OSTEOARTHRITIS INVOLVING MULTIPLE JOINTS: ICD-10-CM

## 2017-12-07 DIAGNOSIS — E03.9 ACQUIRED HYPOTHYROIDISM: ICD-10-CM

## 2017-12-07 LAB
ALBUMIN SERPL-MCNC: 4.3 G/DL (ref 3.5–5.2)
ALBUMIN/GLOBULIN RATIO: ABNORMAL (ref 1–2.5)
ALP BLD-CCNC: 83 U/L (ref 35–104)
ALT SERPL-CCNC: 17 U/L (ref 5–33)
ANION GAP SERPL CALCULATED.3IONS-SCNC: 14 MMOL/L (ref 9–17)
AST SERPL-CCNC: 18 U/L
BILIRUB SERPL-MCNC: 0.28 MG/DL (ref 0.3–1.2)
BUN BLDV-MCNC: 20 MG/DL (ref 8–23)
BUN/CREAT BLD: 36 (ref 9–20)
CALCIUM SERPL-MCNC: 9.2 MG/DL (ref 8.6–10.4)
CHLORIDE BLD-SCNC: 106 MMOL/L (ref 98–107)
CHOLESTEROL/HDL RATIO: 2.8
CHOLESTEROL: 224 MG/DL
CO2: 26 MMOL/L (ref 20–31)
CREAT SERPL-MCNC: 0.55 MG/DL (ref 0.5–0.9)
GFR AFRICAN AMERICAN: >60 ML/MIN
GFR NON-AFRICAN AMERICAN: >60 ML/MIN
GFR SERPL CREATININE-BSD FRML MDRD: ABNORMAL ML/MIN/{1.73_M2}
GFR SERPL CREATININE-BSD FRML MDRD: ABNORMAL ML/MIN/{1.73_M2}
GLUCOSE BLD-MCNC: 99 MG/DL (ref 70–99)
HCT VFR BLD CALC: 45 % (ref 36–46)
HDLC SERPL-MCNC: 80 MG/DL
HEMOGLOBIN: 14.8 G/DL (ref 12–16)
LDL CHOLESTEROL: 134 MG/DL (ref 0–130)
MCH RBC QN AUTO: 29.9 PG (ref 26–34)
MCHC RBC AUTO-ENTMCNC: 32.8 G/DL (ref 31–37)
MCV RBC AUTO: 91 FL (ref 80–100)
PDW BLD-RTO: 14.5 % (ref 11.5–14.5)
PLATELET # BLD: 234 K/UL (ref 130–400)
PMV BLD AUTO: 7.1 FL (ref 6–12)
POTASSIUM SERPL-SCNC: 4.3 MMOL/L (ref 3.7–5.3)
RBC # BLD: 4.94 M/UL (ref 4–5.2)
SODIUM BLD-SCNC: 146 MMOL/L (ref 135–144)
THYROXINE, FREE: 0.99 NG/DL (ref 0.93–1.7)
TOTAL PROTEIN: 7.1 G/DL (ref 6.4–8.3)
TRIGL SERPL-MCNC: 49 MG/DL
TSH SERPL DL<=0.05 MIU/L-ACNC: 2.84 MIU/L (ref 0.3–5)
VLDLC SERPL CALC-MCNC: ABNORMAL MG/DL (ref 1–30)
WBC # BLD: 4.7 K/UL (ref 3.5–11)

## 2017-12-07 PROCEDURE — 80053 COMPREHEN METABOLIC PANEL: CPT

## 2017-12-07 PROCEDURE — 36415 COLL VENOUS BLD VENIPUNCTURE: CPT

## 2017-12-07 PROCEDURE — 84439 ASSAY OF FREE THYROXINE: CPT

## 2017-12-07 PROCEDURE — 84443 ASSAY THYROID STIM HORMONE: CPT

## 2017-12-07 PROCEDURE — 80061 LIPID PANEL: CPT

## 2017-12-07 PROCEDURE — 85027 COMPLETE CBC AUTOMATED: CPT

## 2017-12-14 ENCOUNTER — HOSPITAL ENCOUNTER (OUTPATIENT)
Dept: PAIN MANAGEMENT | Age: 62
Discharge: HOME | End: 2017-12-14
Payer: MEDICARE

## 2017-12-14 VITALS
HEIGHT: 62 IN | HEART RATE: 77 BPM | RESPIRATION RATE: 16 BRPM | SYSTOLIC BLOOD PRESSURE: 121 MMHG | OXYGEN SATURATION: 97 % | WEIGHT: 220 LBS | TEMPERATURE: 98.6 F | BODY MASS INDEX: 40.48 KG/M2 | DIASTOLIC BLOOD PRESSURE: 81 MMHG

## 2017-12-14 DIAGNOSIS — M51.36 DDD (DEGENERATIVE DISC DISEASE), LUMBAR: ICD-10-CM

## 2017-12-14 DIAGNOSIS — M79.18 MUSCLE PAIN, LUMBAR: ICD-10-CM

## 2017-12-14 DIAGNOSIS — M79.7 FIBROMYALGIA: ICD-10-CM

## 2017-12-14 DIAGNOSIS — M54.41 CHRONIC BILATERAL LOW BACK PAIN WITH BILATERAL SCIATICA: ICD-10-CM

## 2017-12-14 DIAGNOSIS — E66.01 OBESITY, CLASS III, BMI 40-49.9 (MORBID OBESITY) (HCC): ICD-10-CM

## 2017-12-14 DIAGNOSIS — M54.42 CHRONIC BILATERAL LOW BACK PAIN WITH BILATERAL SCIATICA: ICD-10-CM

## 2017-12-14 DIAGNOSIS — M47.816 LUMBAR FACET ARTHROPATHY: ICD-10-CM

## 2017-12-14 DIAGNOSIS — M46.1 SACROILIITIS (HCC): Primary | ICD-10-CM

## 2017-12-14 DIAGNOSIS — G89.29 CHRONIC BILATERAL LOW BACK PAIN WITH BILATERAL SCIATICA: ICD-10-CM

## 2017-12-14 PROCEDURE — 99213 OFFICE O/P EST LOW 20 MIN: CPT | Performed by: NURSE PRACTITIONER

## 2017-12-14 PROCEDURE — 99213 OFFICE O/P EST LOW 20 MIN: CPT

## 2017-12-14 RX ORDER — HYDROCODONE BITARTRATE AND ACETAMINOPHEN 5; 325 MG/1; MG/1
1 TABLET ORAL 2 TIMES DAILY PRN
Qty: 60 TABLET | Refills: 0 | Status: SHIPPED | OUTPATIENT
Start: 2017-12-28 | End: 2018-01-29 | Stop reason: SDUPTHER

## 2017-12-14 ASSESSMENT — ENCOUNTER SYMPTOMS
RESPIRATORY NEGATIVE: 1
CONSTIPATION: 1
BACK PAIN: 1

## 2017-12-14 NOTE — PROGRESS NOTES
 Alcohol use No      Comment: one to two drinks a month    Drug use: No    Sexual activity: Yes     Partners: Male     Birth control/ protection: Surgical      Comment: hyst     Other Topics Concern    Not on file     Social History Narrative    No narrative on file       Review of Systems:  Review of Systems   Constitution: Negative. HENT: Negative. Eyes:        Galsses   Cardiovascular: Negative. Respiratory: Negative. Skin: Negative. Musculoskeletal: Positive for back pain, joint swelling and myalgias. Gastrointestinal: Positive for constipation. Genitourinary: Negative. Neurological: Positive for loss of balance. Psychiatric/Behavioral: Negative. Physical Exam:  /81   Pulse 77   Temp 98.6 °F (37 °C) (Oral)   Resp 16   Ht 5' 2\" (1.575 m)   Wt 220 lb (99.8 kg)   LMP 06/13/2010 (Within Months)   SpO2 97%   BMI 40.24 kg/m²     Physical Exam   Constitutional: She is oriented to person, place, and time and well-developed, well-nourished, and in no distress. obese   Neck: Normal range of motion. Neck supple. Musculoskeletal:        Lumbar back: She exhibits decreased range of motion and tenderness. Neurological: She is alert and oriented to person, place, and time. She has normal strength. Gait normal.   Reflex Scores:       Patellar reflexes are 2+ on the right side and 2+ on the left side. Achilles reflexes are 2+ on the right side and 2+ on the left side. Skin: Skin is warm, dry and intact.    Psychiatric: Judgment normal.       Record/Diagnostics Review:    As above, I did review the imaging  7/10/2017  3:37 PM - Michael, pn Incoming Lab Results From [facility]     Component Results     Component Value Ref Range & Units Status Collected Lab   Pain Management Drug Panel Interp, Urine Consistent   [facility]07[facility]2017  [facility]   ([facility]   ________________________________________________________________   DRUGS EXPECTED:   HYDROCODONE [7/6/17] Meperidine Metab, Ur Not Detected   Final 07/07/2017  1:30 PM [facility]   Methadone, Urine Not Detected   Final 07/07/2017  1:30 PM [facility]   Methamphetamine, Urine Not Detected   Final 07/07/2017  1:30 PM [facility]   Methylphenidate Not Detected   Final 07/07/2017  1:30 PM [facility]   Midazolam, Urine Not Detected   Final 07/07/2017  1:30 PM [facility]   Morphine Urine Not Detected   Final 07/07/2017  1:30 PM [facility]   Norbuprenorphine, Urine Not Detected   Final 07/07/2017  1:30 PM [facility]   Nordiazepam, Urine Not Detected   Final 07/07/2017  1:30 PM [facility]   Norfentanyl, Urine Not Detected   Final 07/07/2017  1:30 PM [facility]   NORHYDROCODONE, URINE Present   Final 07/07/2017  1:30 PM [facility]   Noroxycodone, Urine Not Detected   Final 07/07/2017  1:30 PM [facility]   NOROXYMORPHONE, URINE Not Detected   Final 07/07/2017  1:30 PM [facility]   Oxazepam, Urine Not Detected   Final 07/07/2017  1:30 PM [facility]   Oxycodone Urine Not Detected   Final 07/07/2017  1:30 PM [facility]   Oxymorphone, Urine Not Detected   Final 07/07/2017  1:30 PM [facility]   PCP, Urine Not Detected   Final 07/07/2017  1:30 PM [facility]   Phentermine, Ur Not Detected   Final 07/07/2017  1:30 PM [facility]   Propoxyphene, Urine Not Detected   Final 07/07/2017  1:30 PM [facility]   Tapentadol-O-Sulfate, Urine Not Detected   Final 07/07/2017  1:30 PM [facility]   Tapentadol, Urine Not Detected   Final 07/07/2017  1:30 PM [facility]   Temazepam, Urine Not Detected   Final 07/07/2017  1:30 PM [facility]   Tramadol, Urine Not Detected   Final 07/07/2017  1:30 PM [facility]   Zolpidem, Urine Not Detected   Final 07/07/2017  1:30 PM [facility]   Drugs Expected, Ur   Final 07/07/2017  1:30 PM [facility]   HYDROCODONE EVENING OF [id number]    Creatinine, Ur 215.2  20.0 - 400.0 mg/dL Final 07/07/2017  1:30 PM [facility]   Pain Mgt Drug Panel, Hi Res, Ur See Below   Final 07/07/2017  1:30 PM [facility]   (NOTE)   Methodology: Qualitative Enzyme Immunoassay and Qualitative Liquid   Chromatography-Time of

## 2018-01-23 ENCOUNTER — TELEPHONE (OUTPATIENT)
Dept: ONCOLOGY | Age: 63
End: 2018-01-23

## 2018-01-23 NOTE — TELEPHONE ENCOUNTER
Phone call received from [facility] @ [facility] breast clinic. [facility] stated that they faxed a genetic counselor consult in October and wanted to know if patient has had appt with Rio Lindsay, genetics counselor. After reviewing chart, I do not see that patient had consultation and I do not see order for consult. I called and notified Rio Lindsay of above and she confirmed she never received fax. Hilda Velásquez called [facility] back at [phone].  Wilkins Rinne

## 2018-01-29 ENCOUNTER — HOSPITAL ENCOUNTER (OUTPATIENT)
Dept: PAIN MANAGEMENT | Age: 63
Discharge: HOME | End: 2018-01-29
Payer: MEDICARE

## 2018-01-29 VITALS
RESPIRATION RATE: 16 BRPM | DIASTOLIC BLOOD PRESSURE: 76 MMHG | HEART RATE: 79 BPM | BODY MASS INDEX: 40.48 KG/M2 | TEMPERATURE: 98.1 F | OXYGEN SATURATION: 98 % | WEIGHT: 220 LBS | HEIGHT: 62 IN | SYSTOLIC BLOOD PRESSURE: 132 MMHG

## 2018-01-29 DIAGNOSIS — M54.41 CHRONIC BILATERAL LOW BACK PAIN WITH BILATERAL SCIATICA: ICD-10-CM

## 2018-01-29 DIAGNOSIS — M46.1 SACROILIITIS (HCC): ICD-10-CM

## 2018-01-29 DIAGNOSIS — G89.29 CHRONIC BILATERAL LOW BACK PAIN WITH BILATERAL SCIATICA: ICD-10-CM

## 2018-01-29 DIAGNOSIS — M79.18 MUSCLE PAIN, LUMBAR: ICD-10-CM

## 2018-01-29 DIAGNOSIS — M51.36 DDD (DEGENERATIVE DISC DISEASE), LUMBAR: ICD-10-CM

## 2018-01-29 DIAGNOSIS — M47.816 LUMBAR FACET ARTHROPATHY: ICD-10-CM

## 2018-01-29 DIAGNOSIS — E66.01 OBESITY, CLASS III, BMI 40-49.9 (MORBID OBESITY) (HCC): ICD-10-CM

## 2018-01-29 DIAGNOSIS — M79.7 FIBROMYALGIA: Primary | ICD-10-CM

## 2018-01-29 DIAGNOSIS — M54.42 CHRONIC BILATERAL LOW BACK PAIN WITH BILATERAL SCIATICA: ICD-10-CM

## 2018-01-29 PROCEDURE — 99213 OFFICE O/P EST LOW 20 MIN: CPT | Performed by: NURSE PRACTITIONER

## 2018-01-29 PROCEDURE — 99213 OFFICE O/P EST LOW 20 MIN: CPT

## 2018-01-29 RX ORDER — HYDROCODONE BITARTRATE AND ACETAMINOPHEN 5; 325 MG/1; MG/1
1 TABLET ORAL 2 TIMES DAILY PRN
Qty: 60 TABLET | Refills: 0 | Status: SHIPPED | OUTPATIENT
Start: 2018-01-29 | End: 2018-02-27 | Stop reason: SDUPTHER

## 2018-01-29 ASSESSMENT — ENCOUNTER SYMPTOMS
CONSTIPATION: 1
BACK PAIN: 1
SHORTNESS OF BREATH: 1
COUGH: 1

## 2018-01-29 NOTE — PROGRESS NOTES
identified. (Oarrs done 1-26-18 reviewed 1-29-18 no discrepancy mmeq 10 ESWOPE CNP) KAYLEE Chi)  Medication Contracts: Existing medication contract. KAYLEE Goldberg)  Morphine equivalent dose as reported on OARRS:10Review of OARRS does not show any aberrant prescription behavior. Medication is helping the patient stay active. Patient denies any side effects and reports adequate analgesia. No sign of misuse/abuse. Past Medical History:   Diagnosis Date    Anxiety     Breast cancer ([address]     Breast cancer ([address]     Cancer ([address]     breast, in remission    Chronic back pain 12/3/2013    Depression     DJD (degenerative joint disease)     Family history of breast cancer     MGM    Fibromyalgia     History of hysterectomy     Hypothyroidism     Obesity, Class III, BMI 40-49.9 (morbid obesity) ([facility] 7/22/2014    Osteoarthritis     SVT (supraventricular tachycardia) ([facility]     Unspecified sleep apnea        Past Surgical History:   Procedure Laterality Date    ABDOMEN SURGERY      APPENDECTOMY      BREAST SURGERY      lumpectomy w radiation 2009    CARDIAC CATHETERIZATION      with ablation    COLONOSCOPY  2009    10 years    COLONOSCOPY  08/2017   [facility] FINGER SURGERY  10/2017    trigger finger right hand    HERNIA REPAIR      umbilical    HYSTERECTOMY      total    JOINT REPLACEMENT      Right Knee 2014    KNEE ARTHROSCOPY      bilateral    KNEE SURGERY      x2    LAPAROSCOPIC APPENDECTOMY Right     TONSILLECTOMY      UMBILICAL HERNIA REPAIR N/A        Allergies   Allergen Reactions    Augmentin [Amoxicillin-Pot Clavulanate] Nausea And Vomiting     Projectile vomiting    Sulfa Antibiotics Anaphylaxis    Amoxapine And Related Diarrhea and Nausea And Vomiting     AMOX TR-K- 875-125 mg         Current Outpatient Prescriptions:     HYDROcodone-acetaminophen (NORCO) 5-325 MG per tablet, Take 1 tablet by mouth 2 times daily as needed for Pain for up to 30 days. , Disp: 60 tablet, Rfl: 0    Pseudoephedrine-Guaifenesin (MUCINEX D PO), Take by mouth daily, Disp: , Rfl:     meloxicam (MOBIC) 15 MG tablet, take 1 tablet by mouth once daily, Disp: 90 tablet, Rfl: 2    levothyroxine (SYNTHROID) 50 MCG tablet, take 1 tablet by mouth once daily, Disp: 90 tablet, Rfl: 1    venlafaxine (EFFEXOR XR) 150 MG extended release capsule, take 1 capsule by mouth once daily, Disp: 90 capsule, Rfl: 1    loratadine (CLARITIN) 10 MG tablet, Take 10 mg by mouth daily. , Disp: , Rfl:     docusate sodium (COLACE) 100 MG capsule, Take 100 mg by mouth 2 times daily. , Disp: , Rfl:     flecainide (TAMBOCOR) 50 MG tablet, Take 50 mg by mouth 2 times daily. , Disp: , Rfl:     magnesium oxide (MAG-OX) 400 (241.3 Mg) MG TABS tablet, take 1 tablet by mouth twice a day, Disp: , Rfl: 0    ALPRAZolam (XANAX) 0.25 MG tablet, Take 1 tablet by mouth daily as needed for Anxiety . , Disp: 60 tablet, Rfl: 0    bisacodyl (DULCOLAX) 5 MG EC tablet, Take 1 tablet by mouth daily as needed for Constipation Taking daily, Disp: 90 tablet, Rfl: 1    Polyethylene Glycol 3350 (MIRALAX PO), Take by mouth Taking daily, Disp: , Rfl:     cyclobenzaprine (FLEXERIL) 5 MG tablet, Take 1 tablet by mouth daily as needed for Muscle spasms, Disp: 15 tablet, Rfl: 0    fluticasone (FLONASE) 50 MCG/ACT nasal spray, 1 spray by Nasal route daily, Disp: 1 Bottle, Rfl: 3    Blood Pressure Monitoring (ADULT BLOOD PRESSURE CUFF LG) KIT, 1 kit by Does not apply route as needed (palpitations, bp check, pulse check), Disp: 1 kit, Rfl: 0    diclofenac (FLECTOR) 1.3 % patch, Place 1 patch onto the skin 2 times daily (Patient taking differently: Place 1 patch onto the skin 2 times daily as needed Indications: pain ), Disp: 2 patch, Rfl: 2    Family History   Problem Relation Age of Onset    Cancer Mother     Heart Disease Mother     COPD Father     COPD Brother     Breast Cancer Maternal Grandmother        Social History     Social History    Marital [id number]    Creatinine, Ur 215.2  20.0 - 400.0 mg/dL [facility]07[facility]2017  [facility]   Pain Mgt Drug Panel, Hi Res, Ur See Below   Final 07/07/2017  1:30 PM [facility]   (NOTE)   Methodology: Qualitative Enzyme Immunoassay and Qualitative Liquid   Chromatography-Time of Flight-Mass Spectrometry, Quantitative   Spectrophotometry   The absence of expected drug(s) and/or drug metabolite(s) may   indicate non-compliance, inappropriate timing of specimen   collection relative to drug administration, poor drug absorption,   diluted/adulterated urine, or limitations of testing. The   concentration must be greater than or equal to the cutoff to be   reported as present.  If specific drug concentrations are   required, contact the laboratory within two weeks of specimen   collection to request quantification by a second analytical   technique. Interpretive questions should be directed to the   laboratory. Results based on immunoassay detection that do not match clinical   expectations should be   interpreted with caution. Confirmatory testing by mass   spectrometry for immunoassay-based results is available, if   ordered within two weeks of specimen collection. Additional   charges apply. For medical purposes only; not valid for forensic use. This test was developed and its performance characteristics   determined by [employer]. The U.S. Food and Drug   Administration has not approved or cleared this test; however, FDA   clearance or approval is not currently required for clinical use. The results are not intended to be used as the sole means for   clinical diagnosis or patient management decisions. EER Hi Res Interp Ur See Note   Final 07/07/2017  1:30 PM [facility]   (NOTE)   Access MedTest DX Enhanced Report using either link below:   -Direct access: [email or web address]   -Enter Username, Password: [email or web address]   Username: [email or web address]   Password: [email or web address]   Performed by [employer]

## 2018-02-27 ENCOUNTER — INITIAL CONSULT (OUTPATIENT)
Dept: ONCOLOGY | Age: 63
End: 2018-02-27
Payer: MEDICARE

## 2018-02-27 ENCOUNTER — HOSPITAL ENCOUNTER (OUTPATIENT)
Dept: PAIN MANAGEMENT | Age: 63
Discharge: HOME | End: 2018-02-27
Payer: MEDICARE

## 2018-02-27 VITALS
SYSTOLIC BLOOD PRESSURE: 122 MMHG | WEIGHT: 222 LBS | HEART RATE: 82 BPM | BODY MASS INDEX: 40.85 KG/M2 | HEIGHT: 62 IN | DIASTOLIC BLOOD PRESSURE: 72 MMHG

## 2018-02-27 DIAGNOSIS — M47.816 LUMBAR FACET ARTHROPATHY: ICD-10-CM

## 2018-02-27 DIAGNOSIS — M51.36 DDD (DEGENERATIVE DISC DISEASE), LUMBAR: Primary | ICD-10-CM

## 2018-02-27 DIAGNOSIS — M79.7 FIBROMYALGIA: ICD-10-CM

## 2018-02-27 DIAGNOSIS — Z85.3 HISTORY OF BREAST CANCER: Primary | ICD-10-CM

## 2018-02-27 DIAGNOSIS — Z80.3 FAMILY HISTORY OF BREAST CANCER: ICD-10-CM

## 2018-02-27 DIAGNOSIS — Z80.0 FAMILY HISTORY OF PANCREATIC CANCER: ICD-10-CM

## 2018-02-27 DIAGNOSIS — G89.29 CHRONIC BILATERAL LOW BACK PAIN WITH BILATERAL SCIATICA: ICD-10-CM

## 2018-02-27 DIAGNOSIS — M54.41 CHRONIC BILATERAL LOW BACK PAIN WITH BILATERAL SCIATICA: ICD-10-CM

## 2018-02-27 DIAGNOSIS — M54.42 CHRONIC BILATERAL LOW BACK PAIN WITH BILATERAL SCIATICA: ICD-10-CM

## 2018-02-27 PROCEDURE — 99214 OFFICE O/P EST MOD 30 MIN: CPT | Performed by: NURSE PRACTITIONER

## 2018-02-27 PROCEDURE — 96040 PR GENETIC COUNSELING, EACH 30 MIN: CPT | Performed by: GENETIC COUNSELOR, MS

## 2018-02-27 PROCEDURE — 99213 OFFICE O/P EST LOW 20 MIN: CPT

## 2018-02-27 RX ORDER — HYDROCODONE BITARTRATE AND ACETAMINOPHEN 5; 325 MG/1; MG/1
1 TABLET ORAL 2 TIMES DAILY PRN
Qty: 60 TABLET | Refills: 0 | Status: SHIPPED | OUTPATIENT
Start: 2018-02-28 | End: 2018-05-02 | Stop reason: SDUPTHER

## 2018-02-27 ASSESSMENT — ENCOUNTER SYMPTOMS: BACK PAIN: 1

## 2018-02-27 NOTE — PROGRESS NOTES
, Rfl: 0    Pseudoephedrine-Guaifenesin (MUCINEX D PO), Take by mouth daily, Disp: , Rfl:     bisacodyl (DULCOLAX) 5 MG EC tablet, Take 1 tablet by mouth daily as needed for Constipation Taking daily, Disp: 90 tablet, Rfl: 1    meloxicam (MOBIC) 15 MG tablet, take 1 tablet by mouth once daily, Disp: 90 tablet, Rfl: 2    levothyroxine (SYNTHROID) 50 MCG tablet, take 1 tablet by mouth once daily, Disp: 90 tablet, Rfl: 1    venlafaxine (EFFEXOR XR) 150 MG extended release capsule, take 1 capsule by mouth once daily, Disp: 90 capsule, Rfl: 1    Polyethylene Glycol 3350 (MIRALAX PO), Take by mouth Taking daily, Disp: , Rfl:     cyclobenzaprine (FLEXERIL) 5 MG tablet, Take 1 tablet by mouth daily as needed for Muscle spasms, Disp: 15 tablet, Rfl: 0    fluticasone (FLONASE) 50 MCG/ACT nasal spray, 1 spray by Nasal route daily, Disp: 1 Bottle, Rfl: 3    Blood Pressure Monitoring (ADULT BLOOD PRESSURE CUFF LG) KIT, 1 kit by Does not apply route as needed (palpitations, bp check, pulse check), Disp: 1 kit, Rfl: 0    diclofenac (FLECTOR) 1.3 % patch, Place 1 patch onto the skin 2 times daily (Patient taking differently: Place 1 patch onto the skin 2 times daily as needed Indications: pain ), Disp: 2 patch, Rfl: 2    loratadine (CLARITIN) 10 MG tablet, Take 10 mg by mouth daily. , Disp: , Rfl:     docusate sodium (COLACE) 100 MG capsule, Take 100 mg by mouth 2 times daily. , Disp: , Rfl:     flecainide (TAMBOCOR) 50 MG tablet, Take 50 mg by mouth 2 times daily. , Disp: , Rfl:     Family History   Problem Relation Age of Onset   [facility] Cancer Mother     Heart Disease Mother     COPD Father     COPD Brother     Breast Cancer Maternal Grandmother        Social History     Social History    Marital status:       Spouse name: N/A    Number of children: N/A    Years of education: N/A     Occupational History    SS disability       Social History Main Topics    Smoking status: Never Smoker    Smokeless tobacco:

## 2018-02-28 NOTE — PROGRESS NOTES
Multiple individuals with the same type of cancer (example: breast) or uncommon cancers (example: ovarian, pancreatic, male breast cancer) are also features of hereditary cancers. We discussed that Ms. Sanches's personal history is somewhat concerning for a hereditary predisposition to cancer given that her breast cancer was diagnosed at age 49. In addition, there are several generations of breast and pancreatic cancer in her maternal family. In summary, Ms. [address] (NCCN) guidelines for genetic testing based on her personal history of breast cancer at age 49 and having two maternal relatives with breast and pancreatic cancer. DISCUSSION  We discussed that the BRCA1/2 genes are the most common genes associated with hereditary breast and ovarian cancer. We also discussed that genetic testing is available for multiple other genes related to hereditary breast cancer. Some of these genes are known to carry a significant increased risk for several cancers including colon, breast, uterine, ovarian, stomach, and pancreatic cancer, while some of these genes are believed to have a moderate increased risk for breast and other cancers. We discussed the possibility of finding a mutation in genes with limited information to guide medical management, as well we as the possibility of identifying variants of uncertain significance (VUS). We discussed the risks, benefits, and limitations of genetic testing. Possible test results were discussed as well as potential screening and prevention strategies. Specifically, we discussed increased breast cancer surveillance by mammogram and breast MRI as well as the option for prophylactic mastectomy. Lastly, we discussed that the results of Ms. Sanches's genetic testing may be beneficial in defining her risk for cancer as well as for her family members. SUMMARY & PLAN  1) Ms. Sanches meets the NCCN criteria for genetic

## 2018-04-07 ENCOUNTER — APPOINTMENT (OUTPATIENT)
Dept: GENERAL RADIOLOGY | Age: 63
End: 2018-04-07
Payer: MEDICARE

## 2018-04-07 ENCOUNTER — HOSPITAL ENCOUNTER (EMERGENCY)
Age: 63
Discharge: HOME | End: 2018-04-07
Attending: EMERGENCY MEDICINE
Payer: MEDICARE

## 2018-04-07 VITALS
SYSTOLIC BLOOD PRESSURE: 150 MMHG | HEIGHT: 62 IN | RESPIRATION RATE: 14 BRPM | BODY MASS INDEX: 40.85 KG/M2 | TEMPERATURE: 98.9 F | OXYGEN SATURATION: 100 % | HEART RATE: 85 BPM | WEIGHT: 222 LBS | DIASTOLIC BLOOD PRESSURE: 95 MMHG

## 2018-04-07 DIAGNOSIS — S52.501A CLOSED FRACTURE OF DISTAL END OF RIGHT RADIUS, UNSPECIFIED FRACTURE MORPHOLOGY, INITIAL ENCOUNTER: Primary | ICD-10-CM

## 2018-04-07 PROCEDURE — 99283 EMERGENCY DEPT VISIT LOW MDM: CPT

## 2018-04-07 PROCEDURE — 29125 APPL SHORT ARM SPLINT STATIC: CPT

## 2018-04-07 PROCEDURE — 6370000000 HC RX 637 (ALT 250 FOR IP): Performed by: PHYSICIAN ASSISTANT

## 2018-04-07 PROCEDURE — 73110 X-RAY EXAM OF WRIST: CPT

## 2018-04-07 PROCEDURE — 6360000002 HC RX W HCPCS: Performed by: PHYSICIAN ASSISTANT

## 2018-04-07 PROCEDURE — 96372 THER/PROPH/DIAG INJ SC/IM: CPT

## 2018-04-07 RX ORDER — OXYCODONE HYDROCHLORIDE AND ACETAMINOPHEN 5; 325 MG/1; MG/1
1-2 TABLET ORAL EVERY 6 HOURS PRN
Qty: 15 TABLET | Refills: 0 | Status: SHIPPED | OUTPATIENT
Start: 2018-04-07 | End: 2018-04-14

## 2018-04-07 RX ORDER — LORAZEPAM 1 MG/1
0.5 TABLET ORAL ONCE
Status: COMPLETED | OUTPATIENT
Start: 2018-04-07 | End: 2018-04-07

## 2018-04-07 RX ORDER — MORPHINE SULFATE 4 MG/ML
4 INJECTION, SOLUTION INTRAMUSCULAR; INTRAVENOUS ONCE
Status: COMPLETED | OUTPATIENT
Start: 2018-04-07 | End: 2018-04-07

## 2018-04-07 RX ADMIN — LORAZEPAM 0.5 MG: 1 TABLET ORAL at 17:45

## 2018-04-07 RX ADMIN — MORPHINE SULFATE 4 MG: 4 INJECTION, SOLUTION INTRAMUSCULAR; INTRAVENOUS at 16:52

## 2018-04-07 ASSESSMENT — PAIN DESCRIPTION - ORIENTATION: ORIENTATION: RIGHT

## 2018-04-07 ASSESSMENT — PAIN DESCRIPTION - DESCRIPTORS: DESCRIPTORS: STABBING;SHOOTING

## 2018-04-07 ASSESSMENT — PAIN SCALES - GENERAL
PAINLEVEL_OUTOF10: 10
PAINLEVEL_OUTOF10: 5

## 2018-04-07 ASSESSMENT — PAIN DESCRIPTION - PAIN TYPE: TYPE: ACUTE PAIN

## 2018-04-07 ASSESSMENT — PAIN DESCRIPTION - LOCATION: LOCATION: WRIST

## 2018-04-07 ASSESSMENT — PAIN DESCRIPTION - FREQUENCY: FREQUENCY: CONTINUOUS

## 2018-05-02 ENCOUNTER — HOSPITAL ENCOUNTER (OUTPATIENT)
Dept: PAIN MANAGEMENT | Age: 63
Discharge: HOME | End: 2018-05-02
Payer: MEDICARE

## 2018-05-02 VITALS
DIASTOLIC BLOOD PRESSURE: 84 MMHG | WEIGHT: 222 LBS | TEMPERATURE: 98.8 F | BODY MASS INDEX: 40.85 KG/M2 | HEIGHT: 62 IN | SYSTOLIC BLOOD PRESSURE: 143 MMHG | OXYGEN SATURATION: 96 % | HEART RATE: 80 BPM | RESPIRATION RATE: 16 BRPM

## 2018-05-02 DIAGNOSIS — M47.816 LUMBAR FACET ARTHROPATHY: ICD-10-CM

## 2018-05-02 DIAGNOSIS — M54.42 CHRONIC BILATERAL LOW BACK PAIN WITH BILATERAL SCIATICA: ICD-10-CM

## 2018-05-02 DIAGNOSIS — M54.41 CHRONIC BILATERAL LOW BACK PAIN WITH BILATERAL SCIATICA: ICD-10-CM

## 2018-05-02 DIAGNOSIS — M51.36 DDD (DEGENERATIVE DISC DISEASE), LUMBAR: Primary | ICD-10-CM

## 2018-05-02 DIAGNOSIS — M79.18 MUSCLE PAIN, LUMBAR: ICD-10-CM

## 2018-05-02 DIAGNOSIS — M79.7 FIBROMYALGIA: ICD-10-CM

## 2018-05-02 DIAGNOSIS — G89.29 CHRONIC BILATERAL LOW BACK PAIN WITH BILATERAL SCIATICA: ICD-10-CM

## 2018-05-02 DIAGNOSIS — M46.1 SACROILIITIS (HCC): ICD-10-CM

## 2018-05-02 DIAGNOSIS — E66.01 OBESITY, CLASS III, BMI 40-49.9 (MORBID OBESITY) (HCC): ICD-10-CM

## 2018-05-02 PROCEDURE — 99213 OFFICE O/P EST LOW 20 MIN: CPT | Performed by: NURSE PRACTITIONER

## 2018-05-02 PROCEDURE — 99213 OFFICE O/P EST LOW 20 MIN: CPT

## 2018-05-02 RX ORDER — HYDROCODONE BITARTRATE AND ACETAMINOPHEN 5; 325 MG/1; MG/1
1 TABLET ORAL 2 TIMES DAILY PRN
Qty: 60 TABLET | Refills: 0 | Status: SHIPPED | OUTPATIENT
Start: 2018-05-03 | End: 2018-06-01 | Stop reason: SDUPTHER

## 2018-05-02 RX ORDER — MAGNESIUM OXIDE 400 MG/1
TABLET ORAL
Refills: 0 | COMMUNITY
Start: 2018-04-26 | End: 2020-05-28

## 2018-05-02 ASSESSMENT — ENCOUNTER SYMPTOMS
BACK PAIN: 1
CONSTIPATION: 1

## 2018-06-01 ENCOUNTER — HOSPITAL ENCOUNTER (OUTPATIENT)
Dept: PAIN MANAGEMENT | Age: 63
Discharge: HOME | End: 2018-06-01
Payer: MEDICARE

## 2018-06-01 VITALS
RESPIRATION RATE: 16 BRPM | HEIGHT: 62 IN | OXYGEN SATURATION: 97 % | TEMPERATURE: 98.1 F | SYSTOLIC BLOOD PRESSURE: 148 MMHG | DIASTOLIC BLOOD PRESSURE: 75 MMHG | WEIGHT: 222 LBS | HEART RATE: 71 BPM | BODY MASS INDEX: 40.85 KG/M2

## 2018-06-01 DIAGNOSIS — M54.41 CHRONIC BILATERAL LOW BACK PAIN WITH BILATERAL SCIATICA: ICD-10-CM

## 2018-06-01 DIAGNOSIS — M46.1 SACROILIITIS (HCC): ICD-10-CM

## 2018-06-01 DIAGNOSIS — M54.42 CHRONIC BILATERAL LOW BACK PAIN WITH BILATERAL SCIATICA: ICD-10-CM

## 2018-06-01 DIAGNOSIS — M51.36 DDD (DEGENERATIVE DISC DISEASE), LUMBAR: Primary | ICD-10-CM

## 2018-06-01 DIAGNOSIS — M47.816 LUMBAR FACET ARTHROPATHY: ICD-10-CM

## 2018-06-01 DIAGNOSIS — M79.18 MUSCLE PAIN, LUMBAR: ICD-10-CM

## 2018-06-01 DIAGNOSIS — M79.7 FIBROMYALGIA: ICD-10-CM

## 2018-06-01 DIAGNOSIS — G89.29 CHRONIC BILATERAL LOW BACK PAIN WITH BILATERAL SCIATICA: ICD-10-CM

## 2018-06-01 PROCEDURE — 99213 OFFICE O/P EST LOW 20 MIN: CPT

## 2018-06-01 PROCEDURE — 99213 OFFICE O/P EST LOW 20 MIN: CPT | Performed by: NURSE PRACTITIONER

## 2018-06-01 RX ORDER — HYDROCODONE BITARTRATE AND ACETAMINOPHEN 5; 325 MG/1; MG/1
1 TABLET ORAL 2 TIMES DAILY PRN
Qty: 60 TABLET | Refills: 0 | Status: SHIPPED | OUTPATIENT
Start: 2018-06-02 | End: 2018-06-29 | Stop reason: SDUPTHER

## 2018-06-01 ASSESSMENT — ENCOUNTER SYMPTOMS
RESPIRATORY NEGATIVE: 1
BACK PAIN: 1
CONSTIPATION: 1

## 2018-06-29 ENCOUNTER — HOSPITAL ENCOUNTER (OUTPATIENT)
Dept: PAIN MANAGEMENT | Age: 63
Discharge: HOME | End: 2018-06-29
Payer: MEDICARE

## 2018-06-29 VITALS
TEMPERATURE: 98.8 F | SYSTOLIC BLOOD PRESSURE: 140 MMHG | WEIGHT: 222 LBS | OXYGEN SATURATION: 97 % | HEIGHT: 62 IN | BODY MASS INDEX: 40.85 KG/M2 | HEART RATE: 72 BPM | RESPIRATION RATE: 18 BRPM | DIASTOLIC BLOOD PRESSURE: 72 MMHG

## 2018-06-29 DIAGNOSIS — G89.29 CHRONIC BILATERAL LOW BACK PAIN WITH BILATERAL SCIATICA: Primary | ICD-10-CM

## 2018-06-29 DIAGNOSIS — Z51.81 ENCOUNTER FOR MEDICATION MONITORING: ICD-10-CM

## 2018-06-29 DIAGNOSIS — M46.1 SACROILIITIS (HCC): ICD-10-CM

## 2018-06-29 DIAGNOSIS — M79.18 MUSCLE PAIN, LUMBAR: ICD-10-CM

## 2018-06-29 DIAGNOSIS — M54.42 CHRONIC BILATERAL LOW BACK PAIN WITH BILATERAL SCIATICA: Primary | ICD-10-CM

## 2018-06-29 DIAGNOSIS — E66.01 OBESITY, CLASS III, BMI 40-49.9 (MORBID OBESITY) (HCC): ICD-10-CM

## 2018-06-29 DIAGNOSIS — M54.41 CHRONIC BILATERAL LOW BACK PAIN WITH BILATERAL SCIATICA: Primary | ICD-10-CM

## 2018-06-29 DIAGNOSIS — M47.816 LUMBAR FACET ARTHROPATHY: ICD-10-CM

## 2018-06-29 DIAGNOSIS — M79.7 FIBROMYALGIA: ICD-10-CM

## 2018-06-29 DIAGNOSIS — M51.36 DDD (DEGENERATIVE DISC DISEASE), LUMBAR: ICD-10-CM

## 2018-06-29 PROCEDURE — 80307 DRUG TEST PRSMV CHEM ANLYZR: CPT

## 2018-06-29 PROCEDURE — 99214 OFFICE O/P EST MOD 30 MIN: CPT | Performed by: PAIN MEDICINE

## 2018-06-29 PROCEDURE — 99213 OFFICE O/P EST LOW 20 MIN: CPT

## 2018-06-29 RX ORDER — HYDROCODONE BITARTRATE AND ACETAMINOPHEN 5; 325 MG/1; MG/1
1 TABLET ORAL 2 TIMES DAILY PRN
Qty: 60 TABLET | Refills: 0 | Status: SHIPPED | OUTPATIENT
Start: 2018-07-02 | End: 2018-07-31 | Stop reason: SDUPTHER

## 2018-06-29 ASSESSMENT — PAIN DESCRIPTION - DESCRIPTORS: DESCRIPTORS: ACHING;BURNING;CONSTANT;RADIATING;SHARP

## 2018-06-29 ASSESSMENT — PAIN DESCRIPTION - ORIENTATION: ORIENTATION: RIGHT;LEFT;LOWER

## 2018-06-29 ASSESSMENT — ENCOUNTER SYMPTOMS
EYE DISCHARGE: 0
SINUS PAIN: 1
BOWEL INCONTINENCE: 0
BLURRED VISION: 0
VOMITING: 0
SHORTNESS OF BREATH: 0
PHOTOPHOBIA: 0
ORTHOPNEA: 0
BLOOD IN STOOL: 0
EYE REDNESS: 0
STRIDOR: 0
HEMOPTYSIS: 0
HEARTBURN: 0
CONSTIPATION: 1
SORE THROAT: 0
EYE PAIN: 0
SPUTUM PRODUCTION: 0
DIARRHEA: 0
DOUBLE VISION: 0
BACK PAIN: 1
COUGH: 0
ABDOMINAL PAIN: 0
WHEEZING: 0
NAUSEA: 0

## 2018-06-29 ASSESSMENT — PAIN DESCRIPTION - FREQUENCY: FREQUENCY: CONTINUOUS

## 2018-06-29 ASSESSMENT — ACTIVITIES OF DAILY LIVING (ADL): EFFECT OF PAIN ON DAILY ACTIVITIES: PAIN INCREASES WITH WALKING AND STANDING

## 2018-06-29 ASSESSMENT — PAIN DESCRIPTION - LOCATION: LOCATION: BACK;LEG

## 2018-06-29 ASSESSMENT — PAIN DESCRIPTION - PROGRESSION: CLINICAL_PROGRESSION: GRADUALLY IMPROVING

## 2018-06-29 ASSESSMENT — PAIN DESCRIPTION - PAIN TYPE: TYPE: CHRONIC PAIN

## 2018-06-29 ASSESSMENT — PAIN SCALES - GENERAL: PAINLEVEL_OUTOF10: 4

## 2018-06-29 ASSESSMENT — PAIN DESCRIPTION - ONSET: ONSET: ON-GOING

## 2018-07-04 LAB
6-ACETYLMORPHINE, UR: NOT DETECTED
7-AMINOCLONAZEPAM, URINE: NOT DETECTED
ALPHA-OH-ALPRAZ, URINE: PRESENT
ALPRAZOLAM, URINE: NOT DETECTED
AMPHETAMINES, URINE: NOT DETECTED
BARBITURATES, URINE: NOT DETECTED
BENZOYLECGONINE, UR: NOT DETECTED
BUPRENORPHINE URINE: NOT DETECTED
CARISOPRODOL, UR: NOT DETECTED
CLONAZEPAM, URINE: NOT DETECTED
CODEINE, URINE: NOT DETECTED
CREATININE URINE: 155.6 MG/DL (ref 20–400)
DIAZEPAM, URINE: NOT DETECTED
DRUGS EXPECTED, UR: NORMAL
EER HI RES INTERP UR: NORMAL
ETHYL GLUCURONIDE UR: PRESENT
FENTANYL URINE: NOT DETECTED
HYDROCODONE, URINE: PRESENT
HYDROMORPHONE, URINE: NOT DETECTED
LORAZEPAM, URINE: NOT DETECTED
MARIJUANA METAB, UR: NOT DETECTED
MDA, UR: NOT DETECTED
MDEA, EVE, UR: NOT DETECTED
MDMA URINE: NOT DETECTED
MEPERIDINE METAB, UR: NOT DETECTED
METHADONE, URINE: NOT DETECTED
METHAMPHETAMINE, URINE: NOT DETECTED
METHYLPHENIDATE: NOT DETECTED
MIDAZOLAM, URINE: NOT DETECTED
MORPHINE URINE: NOT DETECTED
NORBUPRENORPHINE, URINE: NOT DETECTED
NORDIAZEPAM, URINE: NOT DETECTED
NORFENTANYL, URINE: NOT DETECTED
NORHYDROCODONE, URINE: PRESENT
NOROXYCODONE, URINE: NOT DETECTED
NOROXYMORPHONE, URINE: NOT DETECTED
OXAZEPAM, URINE: NOT DETECTED
OXYCODONE URINE: NOT DETECTED
OXYMORPHONE, URINE: NOT DETECTED
PAIN MANAGEMENT DRUG PANEL INTERP, URINE: NORMAL
PAIN MGT DRUG PANEL, HI RES, UR: NORMAL
PCP,URINE: NOT DETECTED
PHENTERMINE, UR: NOT DETECTED
PROPOXYPHENE, URINE: NOT DETECTED
TAPENTADOL, URINE: NOT DETECTED
TAPENTADOL-O-SULFATE, URINE: NOT DETECTED
TEMAZEPAM, URINE: NOT DETECTED
TRAMADOL, URINE: NOT DETECTED
ZOLPIDEM, URINE: NOT DETECTED

## 2018-07-31 ENCOUNTER — HOSPITAL ENCOUNTER (OUTPATIENT)
Dept: PAIN MANAGEMENT | Age: 63
Discharge: HOME | End: 2018-07-31
Payer: MEDICARE

## 2018-07-31 VITALS
RESPIRATION RATE: 16 BRPM | SYSTOLIC BLOOD PRESSURE: 138 MMHG | OXYGEN SATURATION: 96 % | HEART RATE: 67 BPM | HEIGHT: 62 IN | BODY MASS INDEX: 40.85 KG/M2 | WEIGHT: 222 LBS | DIASTOLIC BLOOD PRESSURE: 72 MMHG

## 2018-07-31 DIAGNOSIS — Z51.81 ENCOUNTER FOR MEDICATION MONITORING: ICD-10-CM

## 2018-07-31 DIAGNOSIS — M54.42 CHRONIC BILATERAL LOW BACK PAIN WITH BILATERAL SCIATICA: ICD-10-CM

## 2018-07-31 DIAGNOSIS — M47.816 LUMBAR FACET ARTHROPATHY: ICD-10-CM

## 2018-07-31 DIAGNOSIS — M79.7 FIBROMYALGIA: Primary | ICD-10-CM

## 2018-07-31 DIAGNOSIS — M79.18 MUSCLE PAIN, LUMBAR: ICD-10-CM

## 2018-07-31 DIAGNOSIS — M51.36 DDD (DEGENERATIVE DISC DISEASE), LUMBAR: ICD-10-CM

## 2018-07-31 DIAGNOSIS — M54.41 CHRONIC BILATERAL LOW BACK PAIN WITH BILATERAL SCIATICA: ICD-10-CM

## 2018-07-31 DIAGNOSIS — G89.29 CHRONIC BILATERAL LOW BACK PAIN WITH BILATERAL SCIATICA: ICD-10-CM

## 2018-07-31 PROCEDURE — 99213 OFFICE O/P EST LOW 20 MIN: CPT | Performed by: NURSE PRACTITIONER

## 2018-07-31 PROCEDURE — 99213 OFFICE O/P EST LOW 20 MIN: CPT

## 2018-07-31 RX ORDER — HYDROCODONE BITARTRATE AND ACETAMINOPHEN 5; 325 MG/1; MG/1
1 TABLET ORAL 2 TIMES DAILY PRN
Qty: 60 TABLET | Refills: 0 | Status: SHIPPED | OUTPATIENT
Start: 2018-08-10 | End: 2018-09-05 | Stop reason: SDUPTHER

## 2018-07-31 RX ORDER — ALPRAZOLAM 0.25 MG/1
0.25 TABLET ORAL NIGHTLY PRN
COMMUNITY
End: 2019-08-30 | Stop reason: SDUPTHER

## 2018-07-31 ASSESSMENT — ENCOUNTER SYMPTOMS
RESPIRATORY NEGATIVE: 1
CONSTIPATION: 1
BACK PAIN: 1

## 2018-07-31 NOTE — PROGRESS NOTES
Kendal Ramirez PROGRESS NOTE      Patient here today to review MEDICATION CONTRACT    Chief Complaint:low back pain    HPI: She c/o low back pain which has not changed. She has no history lumbar surgery. She had a right SI joint injection done on 10/27/2017 with about 40-45% improvement in the pain. Aquatic therapy did. help, She has fibromylagia, She c/o feet pain and sees a podiatrist. She is not sleeping well lately, She has had no Ed visits. Back Pain   This is a chronic problem. The current episode started more than 1 year ago. The problem occurs constantly. The problem is unchanged. The pain is present in the lumbar spine. The quality of the pain is described as aching (sharp at times). Radiates to: down legs. The pain is at a severity of 5/10. The pain is worse during the day. The symptoms are aggravated by standing and bending (walking). Risk factors include menopause and obesity. She has tried heat for the symptoms. The treatment provided mild relief. Patient denies any new neurological symptoms. No bowel or bladder incontinence, no weakness, and no falling. Treatment goals:live a normal life,   Functional status:       Aberrancy abnormal UDT   Analgesia  Pain 5  Adverse  Effects :BM QOD on miralax  ADL;s :light housework, stretches in am        Pill count: appropriate    Morphine equivalent dose as reported on OARRS:10  Attestation: The Prescription Monitoring Report for this patient was reviewed today. Daralyn Gosselin, APRN - CNP)  Medication Contracts: Existing medication contract. Daralyn Gosselin, APRN - CNP)  Review of OARRS does not show any aberrant prescription behavior. Medication is helping the patient stay active. Patient denies any side effects and reports adequate analgesia. No sign of misuse/abuse.         When was the last UDS:   6-29-18          Was the UDS appropriate:no    INCONSISTENT with medications provided:   Alpha-OH-Alprazolam  patient on xanax, did not prn ., Disp: , Rfl:     [START ON 8/10/2018] HYDROcodone-acetaminophen (NORCO) 5-325 MG per tablet, Take 1 tablet by mouth 2 times daily as needed for Pain for up to 30 days. ., Disp: 60 tablet, Rfl: 0    meloxicam (MOBIC) 15 MG tablet, take 1 tablet by mouth once daily, Disp: 90 tablet, Rfl: 2    magnesium oxide (MAG-OX) 400 MG tablet, take 1 tablet by mouth twice a day if needed, Disp: , Rfl: 0    venlafaxine (EFFEXOR XR) 150 MG extended release capsule, take 1 capsule by mouth once daily, Disp: 90 capsule, Rfl: 1    levothyroxine (SYNTHROID) 50 MCG tablet, take 1 tablet by mouth once daily, Disp: 90 tablet, Rfl: 1    Polyethylene Glycol 3350 (MIRALAX PO), Take by mouth Taking daily, Disp: , Rfl:     loratadine (CLARITIN) 10 MG tablet, Take 10 mg by mouth daily. , Disp: , Rfl:     flecainide (TAMBOCOR) 50 MG tablet, Take 50 mg by mouth 2 times daily. , Disp: , Rfl:     Carboxymeth-Glyc-Polysorb PF (REFRESH OPTIVE SHWETA-3) 0.5-1-0.5 % SOLN, Instill 1 drop to eye 2 (two) times a day., Disp: , Rfl:     Elastic Bandages & Supports (LUMBAR BACK BRACE/SUPPORT PAD) MISC, 1 each by Does not apply route daily as needed (lumbar support), Disp: 1 each, Rfl: 0    Pseudoephedrine-Guaifenesin (MUCINEX D PO), Take by mouth daily, Disp: , Rfl:     bisacodyl (DULCOLAX) 5 MG EC tablet, Take 1 tablet by mouth daily as needed for Constipation Taking daily, Disp: 90 tablet, Rfl: 1    cyclobenzaprine (FLEXERIL) 5 MG tablet, Take 1 tablet by mouth daily as needed for Muscle spasms, Disp: 15 tablet, Rfl: 0    fluticasone (FLONASE) 50 MCG/ACT nasal spray, 1 spray by Nasal route daily, Disp: 1 Bottle, Rfl: 3    Blood Pressure Monitoring (ADULT BLOOD PRESSURE CUFF LG) KIT, 1 kit by Does not apply route as needed (palpitations, bp check, pulse check), Disp: 1 kit, Rfl: 0    diclofenac (FLECTOR) 1.3 % patch, Place 1 patch onto the skin 2 times daily (Patient taking differently: Place 1 patch onto the skin 2 times daily as needed back: She exhibits decreased range of motion and tenderness. Back:    Tender right lumbar paraspinal muscles   Neurological: She is alert and oriented to person, place, and time. Gait abnormal.   Reflex Scores:       Patellar reflexes are 2+ on the right side and 2+ on the left side. Achilles reflexes are 2+ on the right side and 2+ on the left side. Skin: Skin is warm, dry and intact. Pain all over   Psychiatric: Affect and judgment normal. She exhibits a depressed mood. Assessment:  Problem List Items Addressed This Visit     Fibromyalgia - Primary    Relevant Medications    HYDROcodone-acetaminophen (NORCO) 5-325 MG per tablet (Start on 8/10/2018)    Lumbar facet arthropathy    Relevant Medications    HYDROcodone-acetaminophen (NORCO) 5-325 MG per tablet (Start on 8/10/2018)    Muscle pain, lumbar    Relevant Medications    HYDROcodone-acetaminophen (NORCO) 5-325 MG per tablet (Start on 8/10/2018)    DDD (degenerative disc disease), lumbar    Relevant Medications    HYDROcodone-acetaminophen (NORCO) 5-325 MG per tablet (Start on 8/10/2018)    Encounter for medication monitoring      Other Visit Diagnoses     Chronic bilateral low back pain with bilateral sciatica        Relevant Medications    HYDROcodone-acetaminophen (NORCO) 5-325 MG per tablet (Start on 8/10/2018)          Treatment Plan:  DISCUSSION: Treatment options discussed with patient and all questions answered to patient's satisfaction.      Possible side effects, risk of tolerance and or dependence and alternative treatments discussed    Obtaining appropriate analgesic effect of treatment   No signs of potential drug abuse or diversion identified    [x] Ill effects of being on chronic pain medications such as sleep disturbances, respiratory depression, hormonal changes, withdrawal symptoms, chronic opioid dependence and tolerance as well as risk of taking opioids with Benzodiazepines and taking opioids along with alcohol,

## 2018-09-05 ENCOUNTER — HOSPITAL ENCOUNTER (OUTPATIENT)
Dept: PAIN MANAGEMENT | Age: 63
Discharge: HOME | End: 2018-09-05
Payer: MEDICARE

## 2018-09-05 VITALS
DIASTOLIC BLOOD PRESSURE: 92 MMHG | BODY MASS INDEX: 40.85 KG/M2 | SYSTOLIC BLOOD PRESSURE: 147 MMHG | HEART RATE: 74 BPM | WEIGHT: 222 LBS | HEIGHT: 62 IN | OXYGEN SATURATION: 98 % | TEMPERATURE: 97.9 F | RESPIRATION RATE: 16 BRPM

## 2018-09-05 DIAGNOSIS — M51.36 DDD (DEGENERATIVE DISC DISEASE), LUMBAR: ICD-10-CM

## 2018-09-05 DIAGNOSIS — M54.42 CHRONIC BILATERAL LOW BACK PAIN WITH BILATERAL SCIATICA: ICD-10-CM

## 2018-09-05 DIAGNOSIS — M46.1 SACROILIITIS (HCC): ICD-10-CM

## 2018-09-05 DIAGNOSIS — M47.816 LUMBAR FACET ARTHROPATHY: ICD-10-CM

## 2018-09-05 DIAGNOSIS — M79.18 MUSCLE PAIN, LUMBAR: ICD-10-CM

## 2018-09-05 DIAGNOSIS — M54.41 CHRONIC BILATERAL LOW BACK PAIN WITH BILATERAL SCIATICA: ICD-10-CM

## 2018-09-05 DIAGNOSIS — Z51.81 ENCOUNTER FOR MEDICATION MONITORING: ICD-10-CM

## 2018-09-05 DIAGNOSIS — M79.7 FIBROMYALGIA: Primary | ICD-10-CM

## 2018-09-05 DIAGNOSIS — G89.29 CHRONIC BILATERAL LOW BACK PAIN WITH BILATERAL SCIATICA: ICD-10-CM

## 2018-09-05 PROCEDURE — 99213 OFFICE O/P EST LOW 20 MIN: CPT

## 2018-09-05 PROCEDURE — 99213 OFFICE O/P EST LOW 20 MIN: CPT | Performed by: NURSE PRACTITIONER

## 2018-09-05 RX ORDER — HYDROCODONE BITARTRATE AND ACETAMINOPHEN 5; 325 MG/1; MG/1
1 TABLET ORAL 2 TIMES DAILY PRN
Qty: 24 TABLET | Refills: 0 | Status: SHIPPED | OUTPATIENT
Start: 2018-09-12 | End: 2018-09-24 | Stop reason: SDUPTHER

## 2018-09-05 ASSESSMENT — ENCOUNTER SYMPTOMS
RESPIRATORY NEGATIVE: 1
BACK PAIN: 1

## 2018-09-05 NOTE — PROGRESS NOTES
cross-reactivity to carisoprodol   and meprobamate.     Clonazepam, Urine Not Detected   Final 06/29/2018  2:42 PM [facility]   Codeine, Urine Not Detected   Final 06/29/2018  2:42 PM [facility]   MDA, Ur Not Detected   Final 06/29/2018  2:42 PM [facility]   Diazepam, Urine Not Detected   Final 06/29/2018  2:42 PM [facility]   Ethyl Glucuronide Ur Present   Final 06/29/2018  2:42 PM [facility]   Fentanyl, Ur Not Detected   Final 06/29/2018  2:42 PM [facility]   Hydrocodone, Urine Present   Final 06/29/2018  2:42 PM [facility]   Hydromorphone, Urine Not Detected   Final 06/29/2018  2:42 PM [facility]   Lorazepam, Urine Not Detected   Final 06/29/2018  2:42 PM [facility]   Marijuana Metab, Ur Not Detected   Final 06/29/2018  2:42 PM [facility]   MDEA, MENDY, Ur Not Detected   Final 06/29/2018  2:42 PM [facility]   MDMA, Urine Not Detected   Final 06/29/2018  2:42 PM [facility]   Meperidine Metab, Ur Not Detected   Final 06/29/2018  2:42 PM [facility]   Methadone, Urine Not Detected   Final 06/29/2018  2:42 PM [facility]   Methamphetamine, Urine Not Detected   Final 06/29/2018  2:42 PM [facility]   Methylphenidate Not Detected   Final 06/29/2018  2:42 PM [facility]   Midazolam, Urine Not Detected   Final 06/29/2018  2:42 PM [facility]   Morphine Urine Not Detected   Final 06/29/2018  2:42 PM [facility]   Norbuprenorphine, Urine Not Detected   Final 06/29/2018  2:42 PM [facility]   Nordiazepam, Urine Not Detected   Final 06/29/2018  2:42 PM [facility]   Norfentanyl, Urine Not Detected   Final 06/29/2018  2:42 PM [facility]   NORHYDROCODONE, URINE Present   Final 06/29/2018  2:42 PM [facility]   Noroxycodone, Urine Not Detected   Final 06/29/2018  2:42 PM [facility]   NOROXYMORPHONE, URINE Not Detected   Final 06/29/2018  2:42 PM [facility]   Oxazepam, Urine Not Detected   Final 06/29/2018  2:42 PM [facility]   Oxycodone Urine Not Detected   Final 06/29/2018  2:42 PM [facility]   Oxymorphone, Urine Not Detected   Final 06/29/2018  2:42 PM [facility]   PCP, Urine Not Detected   Final 06/29/2018  2:42 PM [facility]   Phentermine, Ur Not Detected   Final 06/29/2018  2:42 PM [facility] Propoxyphene, Urine Not Detected   Final 06/29/2018  2:42 PM [facility]   Tapentadol-O-Sulfate, Urine Not Detected   Final 06/29/2018  2:42 PM [facility]   Tapentadol, Urine Not Detected   Final 06/29/2018  2:42 PM [facility]   Temazepam, Urine Not Detected   Final 06/29/2018  2:42 PM [facility]   Tramadol, Urine Not Detected   Final 06/29/2018  2:42 PM [facility]   Zolpidem, Urine Not Detected   Final 06/29/2018  2:42 PM [facility]   Drugs Expected, Ur   Final 06/29/2018  2:42  [facility]   HYDROCODONE 5/325 AT [address]    Creatinine, Ur 155.6  20.0 - 400.0 mg/dL Final 06/29/2018  2:42 PM [facility]   Pain Mgt Drug Panel, Hi Res, Ur See Below   Final 06/29/2018  2:42 PM [facility]   (NOTE)   Methodology: Qualitative Enzyme Immunoassay and Qualitative Liquid   Chromatography-Time of Flight-Mass Spectrometry or Tandem Mass   Spectrometry, Quantitative Spectrophotometry   The absence of expected drug(s) and/or drug metabolite(s) may   indicate non-compliance, inappropriate timing of specimen   collection relative to drug administration, poor drug absorption,   diluted/adulterated urine, or limitations of testing. The   concentration must be greater than or equal to the cutoff to be   reported as present.  If specific drug concentrations are   required, contact the laboratory within two weeks of specimen   collection to request quantification by a second analytical   technique. Interpretive questions should be directed to the   laboratory. Results based on immunoassay detection that do not match clinical   expectations should be   interpreted with caution. Confirmatory testing by mass   spectrometry for immunoassay-based results is available, if   ordered within two weeks of specimen collection. Additional   charges apply. For medical purposes only; not valid for forensic use. This test was developed and its performance characteristics   determined by [employer]. The [place]  Food and Drug   Administration has not approved or cleared this place, and time and well-developed, well-nourished, and in no distress. overweight   HENT:   Head: Normocephalic. Eyes: Pupils are equal, round, and reactive to light. Neck: Normal range of motion. Neck supple. Pulmonary/Chest: Effort normal.   Musculoskeletal:        Cervical back: She exhibits tenderness. Lumbar back: She exhibits tenderness. Legs:  Scar right knee, replacement    Neurological: She is alert and oriented to person, place, and time. She has normal strength. Gait normal.   Reflex Scores:       Patellar reflexes are 0 on the right side and 2+ on the left side. Achilles reflexes are 2+ on the right side and 2+ on the left side. Skin: Skin is warm, dry and intact. Psychiatric: Affect and judgment normal.         Assessment:  Problem List Items Addressed This Visit     Chronic back pain    Relevant Medications    HYDROcodone-acetaminophen (NORCO) 5-325 MG per tablet (Start on 9/12/2018)    Fibromyalgia - Primary    Relevant Medications    HYDROcodone-acetaminophen (NORCO) 5-325 MG per tablet (Start on 9/12/2018)    Lumbar facet arthropathy (HCC)    Relevant Medications    HYDROcodone-acetaminophen (NORCO) 5-325 MG per tablet (Start on 9/12/2018)    Muscle pain, lumbar    Relevant Medications    HYDROcodone-acetaminophen (NORCO) 5-325 MG per tablet (Start on 9/12/2018)    Sacroiliitis ([address]    Relevant Medications    HYDROcodone-acetaminophen ([address]) 5-325 MG per tablet (Start on 9/12/2018)    DDD (degenerative disc disease), lumbar    Relevant Medications    HYDROcodone-acetaminophen (NORCO) 5-325 MG per tablet (Start on 9/12/2018)    Encounter for medication monitoring          Treatment Plan:  DISCUSSION: Treatment options discussed with patient and all questions answered to patient's satisfaction.      Possible side effects, risk of tolerance and or dependence and alternative treatments discussed    Obtaining appropriate analgesic effect of treatment   No signs of

## 2018-10-23 ENCOUNTER — HOSPITAL ENCOUNTER (OUTPATIENT)
Dept: PAIN MANAGEMENT | Age: 63
Discharge: HOME | End: 2018-10-23
Payer: MEDICARE

## 2018-10-23 VITALS
WEIGHT: 222 LBS | OXYGEN SATURATION: 96 % | BODY MASS INDEX: 40.85 KG/M2 | RESPIRATION RATE: 20 BRPM | HEIGHT: 62 IN | HEART RATE: 81 BPM | TEMPERATURE: 98.3 F

## 2018-10-23 DIAGNOSIS — G89.29 CHRONIC BILATERAL LOW BACK PAIN WITH BILATERAL SCIATICA: ICD-10-CM

## 2018-10-23 DIAGNOSIS — M79.7 FIBROMYALGIA: Primary | ICD-10-CM

## 2018-10-23 DIAGNOSIS — M46.1 SACROILIITIS (HCC): ICD-10-CM

## 2018-10-23 DIAGNOSIS — M54.42 CHRONIC BILATERAL LOW BACK PAIN WITH BILATERAL SCIATICA: ICD-10-CM

## 2018-10-23 DIAGNOSIS — M47.816 LUMBAR FACET ARTHROPATHY: ICD-10-CM

## 2018-10-23 DIAGNOSIS — M51.36 DDD (DEGENERATIVE DISC DISEASE), LUMBAR: ICD-10-CM

## 2018-10-23 DIAGNOSIS — M54.41 CHRONIC BILATERAL LOW BACK PAIN WITH BILATERAL SCIATICA: ICD-10-CM

## 2018-10-23 DIAGNOSIS — Z51.81 ENCOUNTER FOR MEDICATION MONITORING: ICD-10-CM

## 2018-10-23 PROCEDURE — 80307 DRUG TEST PRSMV CHEM ANLYZR: CPT

## 2018-10-23 PROCEDURE — 99213 OFFICE O/P EST LOW 20 MIN: CPT | Performed by: NURSE PRACTITIONER

## 2018-10-23 PROCEDURE — 99213 OFFICE O/P EST LOW 20 MIN: CPT

## 2018-10-23 RX ORDER — AMOXICILLIN 500 MG/1
CAPSULE ORAL
Refills: 0 | COMMUNITY
Start: 2018-10-19 | End: 2018-11-28

## 2018-10-23 RX ORDER — HYDROCODONE BITARTRATE AND ACETAMINOPHEN 5; 325 MG/1; MG/1
1 TABLET ORAL 2 TIMES DAILY PRN
Qty: 60 TABLET | Refills: 0 | Status: SHIPPED | OUTPATIENT
Start: 2018-10-24 | End: 2018-11-20 | Stop reason: SDUPTHER

## 2018-10-23 ASSESSMENT — ENCOUNTER SYMPTOMS
BACK PAIN: 1
SORE THROAT: 1
CONSTIPATION: 1
COUGH: 1
SHORTNESS OF BREATH: 1

## 2018-10-23 NOTE — PROGRESS NOTES
cleared this test; however, FDA   clearance or approval is not currently required for clinical use. The results are not intended to be used as the sole means for   clinical diagnosis or patient management decisions. EER Hi Res Interp Ur See Note    Final 06/29/2018  2:42 PM [facility]   (NOTE)   Access [facility] Enhanced Report using either link below:   -Direct access: [email or web address]   -Enter Username, Password: [email or web address]   Username: [email or web address]   Password: [email or web address]   Performed by [address] [phone]   www. Shawn De Jesus MD, Lab.  Director            Past Medical History:   Diagnosis Date    Anxiety     Breast cancer ([address]     Breast cancer ([address]     Cancer ([address]     breast, in remission    Chronic back pain 12/3/2013    Depression     DJD (degenerative joint disease)     Family history of breast cancer     MGM    Fibromyalgia     Fibromyalgia     History of hysterectomy     Hypothyroidism     Obesity, Class III, BMI 40-49.9 (morbid obesity) ([address] 7/22/2014    Osteoarthritis     SVT (supraventricular tachycardia) ([address]     Unspecified sleep apnea        Past Surgical History:   Procedure Laterality Date    ABDOMEN SURGERY      APPENDECTOMY      BREAST SURGERY      lumpectomy w radiation 2009    CARDIAC CATHETERIZATION      with ablation    COLONOSCOPY  2009    10 years    COLONOSCOPY  08/2017   [facility] FINGER SURGERY  10/2017    trigger finger right hand    HERNIA REPAIR      umbilical    HYSTERECTOMY      total    JOINT REPLACEMENT      Right Knee 2014    KNEE ARTHROSCOPY      bilateral    KNEE SURGERY      x2    LAPAROSCOPIC APPENDECTOMY Right     TONSILLECTOMY      UMBILICAL HERNIA REPAIR N/A     WRIST SURGERY  04/12/2018    plate and screws right wrist       Allergies   Allergen Reactions    Augmentin [Amoxicillin-Pot Clavulanate] Nausea And Vomiting     Projectile vomiting    Sulfa nocturia. Neurological: Positive for loss of balance. Psychiatric/Behavioral: Negative. Physical Exam:  Pulse 81   Temp 98.3 °F (36.8 °C) (Oral)   Resp 20   Ht 5' 2\" (1.575 m)   Wt 222 lb (100.7 kg)   LMP 06/13/2010 (Within Months)   SpO2 96%   BMI 40.60 kg/m²     Physical Exam   Constitutional: She appears well-developed. overweight   Neck: Normal range of motion. Neck supple. Pulmonary/Chest: Effort normal.   Musculoskeletal:        Lumbar back: She exhibits decreased range of motion and tenderness. Neurological: She is alert. Reflex Scores:       Patellar reflexes are 0 on the right side and 2+ on the left side. Achilles reflexes are 2+ on the right side and 2+ on the left side. Skin: Skin is warm, dry and intact. Area of pain   Psychiatric: Her speech is normal and behavior is normal. Judgment and thought content normal. Cognition and memory are normal.         Assessment:    Problem List Items Addressed This Visit     Fibromyalgia - Primary    Relevant Medications    HYDROcodone-acetaminophen (NORCO) 5-325 MG per tablet (Start on 10/24/2018)    Lumbar facet arthropathy    Relevant Medications    HYDROcodone-acetaminophen (NORCO) 5-325 MG per tablet (Start on 10/24/2018)    Sacroiliitis ([address]    Relevant Medications    HYDROcodone-acetaminophen (Psychiatric) 5-325 MG per tablet (Start on 10/24/2018)    DDD (degenerative disc disease), lumbar    Relevant Medications    HYDROcodone-acetaminophen (NORCO) 5-325 MG per tablet (Start on 10/24/2018)    Encounter for medication monitoring      Other Visit Diagnoses     Chronic bilateral low back pain with bilateral sciatica        Relevant Medications    HYDROcodone-acetaminophen (Psychiatric) 5-325 MG per tablet (Start on 10/24/2018)            Treatment Plan:  DISCUSSION: Treatment options discussed withpatient and all questions answered to patient's satisfaction.      Possible side effects, risk of tolerance and or dependence and alternative treatments discussed    Obtaining appropriate analgesic effect of treatment   No signs of potential drug abuse or diversion identified    [x] Ill effects of being on chronic pain medications such as sleepdisturbances, respiratory depression, hormonal changes, withdrawal symptoms, chronic opioid dependence and tolerance as well as risk of taking opioids with Benzodiazepines and taking opioids along with alcohol,  werediscussed with patient. I had asked the patient to minimize medication use and utilize pain medications only for uncontrolled rest pain or pain with exertional activities. I advised patient not to self-escalate painmedications without consulting with us. At each of patient's future visits we will try to taper pain medications, while adjusting the adjunct medications, and re-evaluating for Physical Therapy to improve spinal andjoint strength. We will continue to have discussions to decrease pain medications as tolerated. Counseled patient on effects their pain medication and /or their medical condition mayhave on their  ability to drive or operate machinery. Instructed not to drive or operate machinery if drowsy     I also discussed with the patient regarding the dangers of combining narcotic pain medication with tranquilizers, alcohol or illegal drugs or taking the medication any way other thanprescribed. The dangers were discussed  including respiratory depression and death. Patient was told to tell  all  physicians regarding the medications he is getting from pain clinic. Patient is warned not to take any unprescribed medications over-the-countermedications that can depress breathing . Patient is advised to talk to the pharmacist or physicians if planning to take any over-the-counter medications before  takeing them.  Patient is strongly advised to avoidtranquilizers or  relaxants, illegal drugs  or any medications that can depress breathing  Patient is also advised to tell us if there

## 2018-10-27 LAB
6-ACETYLMORPHINE, UR: NOT DETECTED
7-AMINOCLONAZEPAM, URINE: NOT DETECTED
ALPHA-OH-ALPRAZ, URINE: NOT DETECTED
ALPRAZOLAM, URINE: NOT DETECTED
AMPHETAMINES, URINE: NOT DETECTED
BARBITURATES, URINE: NOT DETECTED
BENZOYLECGONINE, UR: NOT DETECTED
BUPRENORPHINE URINE: NOT DETECTED
CARISOPRODOL, UR: NOT DETECTED
CLONAZEPAM, URINE: NOT DETECTED
CODEINE, URINE: NOT DETECTED
CREATININE URINE: 224.9 MG/DL (ref 20–400)
DIAZEPAM, URINE: NOT DETECTED
DRUGS EXPECTED, UR: NORMAL
EER HI RES INTERP UR: NORMAL
ETHYL GLUCURONIDE UR: NOT DETECTED
FENTANYL URINE: NOT DETECTED
HYDROCODONE, URINE: PRESENT
HYDROMORPHONE, URINE: NOT DETECTED
LORAZEPAM, URINE: NOT DETECTED
MARIJUANA METAB, UR: NOT DETECTED
MDA, UR: NOT DETECTED
MDEA, EVE, UR: NOT DETECTED
MDMA URINE: NOT DETECTED
MEPERIDINE METAB, UR: NOT DETECTED
METHADONE, URINE: NOT DETECTED
METHAMPHETAMINE, URINE: NOT DETECTED
METHYLPHENIDATE: NOT DETECTED
MIDAZOLAM, URINE: NOT DETECTED
MORPHINE URINE: NOT DETECTED
NORBUPRENORPHINE, URINE: NOT DETECTED
NORDIAZEPAM, URINE: NOT DETECTED
NORFENTANYL, URINE: NOT DETECTED
NORHYDROCODONE, URINE: PRESENT
NOROXYCODONE, URINE: NOT DETECTED
NOROXYMORPHONE, URINE: NOT DETECTED
OXAZEPAM, URINE: NOT DETECTED
OXYCODONE URINE: NOT DETECTED
OXYMORPHONE, URINE: NOT DETECTED
PAIN MANAGEMENT DRUG PANEL INTERP, URINE: NORMAL
PAIN MGT DRUG PANEL, HI RES, UR: NORMAL
PCP,URINE: NOT DETECTED
PHENTERMINE, UR: NOT DETECTED
PROPOXYPHENE, URINE: NOT DETECTED
TAPENTADOL, URINE: NOT DETECTED
TAPENTADOL-O-SULFATE, URINE: NOT DETECTED
TEMAZEPAM, URINE: NOT DETECTED
TRAMADOL, URINE: NOT DETECTED
ZOLPIDEM, URINE: NOT DETECTED

## 2018-11-20 ENCOUNTER — HOSPITAL ENCOUNTER (OUTPATIENT)
Dept: PAIN MANAGEMENT | Age: 63
Discharge: HOME | End: 2018-11-20
Payer: MEDICARE

## 2018-11-20 VITALS
BODY MASS INDEX: 41.22 KG/M2 | OXYGEN SATURATION: 98 % | SYSTOLIC BLOOD PRESSURE: 135 MMHG | HEIGHT: 62 IN | WEIGHT: 224 LBS | DIASTOLIC BLOOD PRESSURE: 80 MMHG | TEMPERATURE: 98.4 F | HEART RATE: 84 BPM | RESPIRATION RATE: 16 BRPM

## 2018-11-20 DIAGNOSIS — M47.816 LUMBAR FACET ARTHROPATHY: ICD-10-CM

## 2018-11-20 DIAGNOSIS — M51.36 DDD (DEGENERATIVE DISC DISEASE), LUMBAR: ICD-10-CM

## 2018-11-20 DIAGNOSIS — M79.7 FIBROMYALGIA: ICD-10-CM

## 2018-11-20 PROCEDURE — 99213 OFFICE O/P EST LOW 20 MIN: CPT

## 2018-11-20 PROCEDURE — 99213 OFFICE O/P EST LOW 20 MIN: CPT | Performed by: ANESTHESIOLOGY

## 2018-11-20 RX ORDER — HYDROCODONE BITARTRATE AND ACETAMINOPHEN 5; 325 MG/1; MG/1
1 TABLET ORAL 2 TIMES DAILY PRN
Qty: 60 TABLET | Refills: 0 | Status: SHIPPED | OUTPATIENT
Start: 2018-11-23 | End: 2018-12-21 | Stop reason: SDUPTHER

## 2018-11-20 ASSESSMENT — PAIN DESCRIPTION - LOCATION: LOCATION: BACK;LEG

## 2018-11-20 ASSESSMENT — ENCOUNTER SYMPTOMS
ALLERGIC/IMMUNOLOGIC NEGATIVE: 1
BACK PAIN: 1
CONSTIPATION: 1
EYES NEGATIVE: 1
RESPIRATORY NEGATIVE: 1

## 2018-11-20 ASSESSMENT — PAIN DESCRIPTION - PROGRESSION: CLINICAL_PROGRESSION: GRADUALLY WORSENING

## 2018-11-20 ASSESSMENT — PAIN DESCRIPTION - ORIENTATION: ORIENTATION: RIGHT;LEFT;LOWER

## 2018-11-20 ASSESSMENT — PAIN DESCRIPTION - PAIN TYPE: TYPE: CHRONIC PAIN

## 2018-11-20 ASSESSMENT — ACTIVITIES OF DAILY LIVING (ADL): EFFECT OF PAIN ON DAILY ACTIVITIES: PAIN INCREASES WITH WALKING AND STANDING

## 2018-11-20 ASSESSMENT — PAIN DESCRIPTION - ONSET: ONSET: ON-GOING

## 2018-11-20 ASSESSMENT — PAIN SCALES - GENERAL: PAINLEVEL_OUTOF10: 4

## 2018-11-20 ASSESSMENT — PAIN DESCRIPTION - FREQUENCY: FREQUENCY: CONTINUOUS

## 2018-12-21 ENCOUNTER — HOSPITAL ENCOUNTER (OUTPATIENT)
Dept: PAIN MANAGEMENT | Age: 63
Discharge: HOME | End: 2018-12-21
Payer: MEDICARE

## 2018-12-21 VITALS
HEIGHT: 62 IN | RESPIRATION RATE: 16 BRPM | HEART RATE: 71 BPM | WEIGHT: 221 LBS | OXYGEN SATURATION: 97 % | BODY MASS INDEX: 40.67 KG/M2 | DIASTOLIC BLOOD PRESSURE: 74 MMHG | TEMPERATURE: 99 F | SYSTOLIC BLOOD PRESSURE: 127 MMHG

## 2018-12-21 DIAGNOSIS — M54.41 CHRONIC BILATERAL LOW BACK PAIN WITH BILATERAL SCIATICA: ICD-10-CM

## 2018-12-21 DIAGNOSIS — M54.42 CHRONIC BILATERAL LOW BACK PAIN WITH BILATERAL SCIATICA: ICD-10-CM

## 2018-12-21 DIAGNOSIS — M79.7 FIBROMYALGIA: Primary | ICD-10-CM

## 2018-12-21 DIAGNOSIS — G89.29 CHRONIC BILATERAL LOW BACK PAIN WITH BILATERAL SCIATICA: ICD-10-CM

## 2018-12-21 DIAGNOSIS — Z51.81 ENCOUNTER FOR MEDICATION MONITORING: ICD-10-CM

## 2018-12-21 DIAGNOSIS — M46.1 SACROILIITIS (HCC): ICD-10-CM

## 2018-12-21 DIAGNOSIS — M51.36 DDD (DEGENERATIVE DISC DISEASE), LUMBAR: ICD-10-CM

## 2018-12-21 DIAGNOSIS — M47.816 LUMBAR FACET ARTHROPATHY: ICD-10-CM

## 2018-12-21 PROCEDURE — 99213 OFFICE O/P EST LOW 20 MIN: CPT

## 2018-12-21 PROCEDURE — 99213 OFFICE O/P EST LOW 20 MIN: CPT | Performed by: NURSE PRACTITIONER

## 2018-12-21 RX ORDER — HYDROCODONE BITARTRATE AND ACETAMINOPHEN 5; 325 MG/1; MG/1
1 TABLET ORAL 2 TIMES DAILY PRN
Qty: 60 TABLET | Refills: 0 | Status: SHIPPED | OUTPATIENT
Start: 2018-12-23 | End: 2019-01-18 | Stop reason: SDUPTHER

## 2018-12-21 ASSESSMENT — ENCOUNTER SYMPTOMS
BACK PAIN: 1
CONSTIPATION: 1
RESPIRATORY NEGATIVE: 1

## 2018-12-21 NOTE — PROGRESS NOTES
[facility] PROGRESS NOTE      Patient here today to review Medication Agreement    Chief Complaint: low back pain      HPI: She c/o low back pain  . No history lumbar surgery. Aquatics did help. She had si join tnjection in 2017 which helped for a short time. She sees a chiropractor which helps , heat and electrical stimulation helps. She states her sleep varies. She has fibromyalgia. She is going to have a  Dexa scan . Back Pain   This is a chronic problem. The current episode started more than 1 year ago. The problem occurs intermittently. The pain is present in the lumbar spine. The quality of the pain is described as aching. The pain is at a severity of 5/10. The pain is moderate. The pain is the same all the time. The symptoms are aggravated by standing (walking). Risk factors include menopause and obesity. She has tried heat, chiropractic manipulation and analgesics for the symptoms. The treatment provided mild relief. Patient denies any new neurological symptoms. No bowel or bladder incontinence, no weakness, and no falling. Treatment goals:  Functional status: function       Aberrancy:none    Analgesia:pain 5    Adverse  Effects :miralax daily  BM qod    ADL;s :housework, stretching        Pill count: appropriate    Morphine equivalent dose as reported on OARRS:30  Attestation: The Prescription Monitoring Report for this patient was reviewed today. KAYLEE Cerda CNP)  Documentation: No signs of potential drug abuse or diversion identified., Obtaining appropriate analgesic effect of treatment. KAYLEE Cerda CNP)  Medication Contracts: Existing medication contract. KAYLEE Cerda CNP)  Review ofOARRS does not show any aberrant prescription behavior. Medication is helping the patient stay active. Patient denies any side effects and reports adequate analgesia.  No sign of misuse/abuse.                 When was the last UDS: 10-23-18  Appropriate: Hydromorphone, Urine Not Detected   Final 10/23/2018  2:00 PM [facility]   Lorazepam, Urine Not Detected   Final 10/23/2018  2:00 PM [facility]   Marijuana Metab, Ur Not Detected   Final 10/23/2018  2:00 PM [facility]   MDEA, MENDY, Ur Not Detected   Final 10/23/2018  2:00 PM [facility]   MDMA, Urine Not Detected   Final 10/23/2018  2:00 PM [facility]   Meperidine Metab, Ur Not Detected   Final 10/23/2018  2:00 PM [facility]   Methadone, Urine Not Detected   Final 10/23/2018  2:00 PM [facility]   Methamphetamine, Urine Not Detected   Final 10/23/2018  2:00 PM [facility]   Methylphenidate Not Detected   Final 10/23/2018  2:00 PM [facility]   Midazolam, Urine Not Detected   Final 10/23/2018  2:00 PM [facility]   Morphine Urine Not Detected   Final 10/23/2018  2:00 PM [facility]   Norbuprenorphine, Urine Not Detected   Final 10/23/2018  2:00 PM [facility]   Nordiazepam, Urine Not Detected   Final 10/23/2018  2:00 PM [facility]   Norfentanyl, Urine Not Detected   Final 10/23/2018  2:00 PM [facility]   NORHYDROCODONE, URINE Present   Final 10/23/2018  2:00 PM [facility]   Noroxycodone, Urine Not Detected   Final 10/23/2018  2:00 PM [facility]   NOROXYMORPHONE, URINE Not Detected   Final 10/23/2018  2:00 PM [facility]   Oxazepam, Urine Not Detected   Final 10/23/2018  2:00 PM [facility]   Oxycodone Urine Not Detected   Final 10/23/2018  2:00 PM [facility]   Oxymorphone, Urine Not Detected   Final 10/23/2018  2:00 PM [facility]   PCP, Urine Not Detected   Final 10/23/2018  2:00 PM [facility]   Phentermine, Ur Not Detected   Final 10/23/2018  2:00 PM [facility]   Propoxyphene, Urine Not Detected   Final 10/23/2018  2:00 PM [facility]   Tapentadol-O-Sulfate, Urine Not Detected   Final 10/23/2018  2:00 PM [facility]   Tapentadol, Urine Not Detected   Final 10/23/2018  2:00 PM [facility]   Temazepam, Urine Not Detected   Final 10/23/2018  2:00 PM [facility]   Tramadol, Urine Not Detected   Final 10/23/2018  2:00 PM [facility]   Zolpidem, Urine Not Detected   Final 10/23/2018  2:00 PM [facility]   Drugs Expected, Ur   Final 10/23/2018  2:00  [address] [facility]   HYDROCODONE ON daily, Disp: 60 tablet, Rfl: 0    venlafaxine (EFFEXOR XR) 150 MG extended release capsule, take 1 capsule by mouth once daily, Disp: 90 capsule, Rfl: 1    levothyroxine (SYNTHROID) 50 MCG tablet, take 1 tablet by mouth once daily, Disp: 90 tablet, Rfl: 1    meloxicam (MOBIC) 15 MG tablet, take 1 tablet by mouth once daily, Disp: 90 tablet, Rfl: 2    magnesium oxide (MAG-OX) 400 MG tablet, take 1 tablet by mouth twice a day if needed, Disp: , Rfl: 0    flecainide (TAMBOCOR) 50 MG tablet, Take 50 mg by mouth 2 times daily. , Disp: , Rfl:     ALPRAZolam (XANAX) 0.25 MG tablet, Take 0.25 mg by mouth nightly as needed for Sleep. prn ., Disp: , Rfl:     Carboxymeth-Glyc-Polysorb PF (REFRESH OPTIVE SHWETA-3) 0.5-1-0.5 % SOLN, Instill 1 drop to eye 2 (two) times a day., Disp: , Rfl:     Elastic Bandages & Supports (LUMBAR BACK BRACE/SUPPORT PAD) MISC, 1 each by Does not apply route daily as needed (lumbar support), Disp: 1 each, Rfl: 0    Pseudoephedrine-Guaifenesin (MUCINEX D PO), Take by mouth daily, Disp: , Rfl:     bisacodyl (DULCOLAX) 5 MG EC tablet, Take 1 tablet by mouth daily as needed for Constipation Taking daily, Disp: 90 tablet, Rfl: 1    Polyethylene Glycol 3350 (MIRALAX PO), Take by mouth Taking daily, Disp: , Rfl:     cyclobenzaprine (FLEXERIL) 5 MG tablet, Take 1 tablet by mouth daily as needed for Muscle spasms, Disp: 15 tablet, Rfl: 0    fluticasone (FLONASE) 50 MCG/ACT nasal spray, 1 spray by Nasal route daily, Disp: 1 Bottle, Rfl: 3    Blood Pressure Monitoring (ADULT BLOOD PRESSURE CUFF LG) KIT, 1 kit by Does not apply route as needed (palpitations, bp check, pulse check), Disp: 1 kit, Rfl: 0    diclofenac (FLECTOR) 1.3 % patch, Place 1 patch onto the skin 2 times daily (Patient taking differently: Place 1 patch onto the skin 2 times daily as needed Indications: pain ), Disp: 2 patch, Rfl: 2    docusate sodium (COLACE) 100 MG capsule, Take 100 mg by mouth 2 times daily. , Disp: , Rfl: Family History   Problem Relation Age of Onset    Cancer Mother         pancreatic cancer 78    Heart Disease Mother     COPD Father     COPD Brother     Breast Cancer Maternal Grandmother         dx unknown age    [facility] Colon Cancer Maternal Grandfather         66-80s        Social History     Social History    Marital status:      Spouse name: N/A    Number of children: N/A    Years of education: N/A     Occupational History    SS disability       Social History Main Topics    Smoking status: Never Smoker    Smokeless tobacco: Never Used    Alcohol use No      Comment: one to two drinks a month    Drug use: No    Sexual activity: Yes     Partners: Male     Birth control/ protection: Surgical      Comment: hyst     Other Topics Concern    Not on file     Social History Narrative    No narrative on file       Review of Systems:  Review of Systems   Constitution: Negative. HENT: Negative. Eyes:        Glasses   Cardiovascular: Negative. Respiratory: Negative. Endocrine: Negative. Hematologic/Lymphatic: Bruises/bleeds easily. Skin: Negative. Musculoskeletal: Positive for back pain and myalgias. Gastrointestinal: Positive for constipation. Genitourinary: Positive for nocturia. Neurological: Positive for loss of balance. Psychiatric/Behavioral: Negative. Physical Exam:  /74   Pulse 71   Temp 99 °F (37.2 °C) (Oral)   Resp 16   Ht 5' 2\" (1.575 m)   Wt 221 lb (100.2 kg)   LMP 06/13/2010 (Within Months)   SpO2 97%   BMI 40.42 kg/m²     Physical Exam   Constitutional: She appears well-developed. overweight   HENT:   Head: Normocephalic. Neck: Normal range of motion. Neck supple. Pulmonary/Chest: Effort normal.   Musculoskeletal:        Left shoulder: She exhibits tenderness. Cervical back: She exhibits tenderness. Lumbar back: She exhibits tenderness. Back:    Tender right si joint   Neurological: She is alert.    Reflex

## 2019-01-09 ENCOUNTER — HOSPITAL ENCOUNTER (OUTPATIENT)
Dept: MAMMOGRAPHY | Age: 64
Discharge: HOME | End: 2019-01-11
Payer: MEDICARE

## 2019-01-09 DIAGNOSIS — Z78.0 POSTMENOPAUSAL: ICD-10-CM

## 2019-01-09 PROCEDURE — 77080 DXA BONE DENSITY AXIAL: CPT

## 2019-01-15 ENCOUNTER — HOSPITAL ENCOUNTER (OUTPATIENT)
Age: 64
Discharge: HOME | End: 2019-01-15
Payer: MEDICARE

## 2019-01-15 DIAGNOSIS — Z13.220 SCREENING CHOLESTEROL LEVEL: ICD-10-CM

## 2019-01-15 DIAGNOSIS — E03.9 ACQUIRED HYPOTHYROIDISM: ICD-10-CM

## 2019-01-15 DIAGNOSIS — B27.00 EBV (EPSTEIN-BARR VIRUS) VIREMIA: ICD-10-CM

## 2019-01-15 LAB
ALBUMIN SERPL-MCNC: 4.2 G/DL (ref 3.5–5.2)
ALBUMIN/GLOBULIN RATIO: 1.5 (ref 1–2.5)
ALP BLD-CCNC: 83 U/L (ref 35–104)
ALT SERPL-CCNC: 17 U/L (ref 5–33)
ANION GAP SERPL CALCULATED.3IONS-SCNC: 13 MMOL/L (ref 9–17)
AST SERPL-CCNC: 18 U/L
BILIRUB SERPL-MCNC: 0.3 MG/DL (ref 0.3–1.2)
BUN BLDV-MCNC: 22 MG/DL (ref 8–23)
BUN/CREAT BLD: NORMAL (ref 9–20)
CALCIUM SERPL-MCNC: 8.8 MG/DL (ref 8.6–10.4)
CHLORIDE BLD-SCNC: 105 MMOL/L (ref 98–107)
CHOLESTEROL/HDL RATIO: 2.8
CHOLESTEROL: 178 MG/DL
CO2: 26 MMOL/L (ref 20–31)
CREAT SERPL-MCNC: 0.54 MG/DL (ref 0.5–0.9)
GFR AFRICAN AMERICAN: >60 ML/MIN
GFR NON-AFRICAN AMERICAN: >60 ML/MIN
GFR SERPL CREATININE-BSD FRML MDRD: NORMAL ML/MIN/{1.73_M2}
GFR SERPL CREATININE-BSD FRML MDRD: NORMAL ML/MIN/{1.73_M2}
GLUCOSE BLD-MCNC: 97 MG/DL (ref 70–99)
HCT VFR BLD CALC: 43.9 % (ref 36.3–47.1)
HDLC SERPL-MCNC: 64 MG/DL
HEMOGLOBIN: 14.1 G/DL (ref 11.9–15.1)
LDL CHOLESTEROL: 104 MG/DL (ref 0–130)
MCH RBC QN AUTO: 29.7 PG (ref 25.2–33.5)
MCHC RBC AUTO-ENTMCNC: 32.1 G/DL (ref 28.4–34.8)
MCV RBC AUTO: 92.6 FL (ref 82.6–102.9)
NRBC AUTOMATED: 0 PER 100 WBC
PDW BLD-RTO: 13.3 % (ref 11.8–14.4)
PLATELET # BLD: 189 K/UL (ref 138–453)
PMV BLD AUTO: 10.1 FL (ref 8.1–13.5)
POTASSIUM SERPL-SCNC: 4.5 MMOL/L (ref 3.7–5.3)
RBC # BLD: 4.74 M/UL (ref 3.95–5.11)
SODIUM BLD-SCNC: 144 MMOL/L (ref 135–144)
THYROXINE, FREE: 0.94 NG/DL (ref 0.93–1.7)
TOTAL PROTEIN: 7 G/DL (ref 6.4–8.3)
TRIGL SERPL-MCNC: 52 MG/DL
TSH SERPL DL<=0.05 MIU/L-ACNC: 4.19 MIU/L (ref 0.3–5)
VLDLC SERPL CALC-MCNC: NORMAL MG/DL (ref 1–30)
WBC # BLD: 4.1 K/UL (ref 3.5–11.3)

## 2019-01-15 PROCEDURE — 36415 COLL VENOUS BLD VENIPUNCTURE: CPT

## 2019-01-15 PROCEDURE — 80053 COMPREHEN METABOLIC PANEL: CPT

## 2019-01-15 PROCEDURE — 84443 ASSAY THYROID STIM HORMONE: CPT

## 2019-01-15 PROCEDURE — 80061 LIPID PANEL: CPT

## 2019-01-15 PROCEDURE — 85027 COMPLETE CBC AUTOMATED: CPT

## 2019-01-15 PROCEDURE — 84439 ASSAY OF FREE THYROXINE: CPT

## 2019-01-18 ENCOUNTER — HOSPITAL ENCOUNTER (OUTPATIENT)
Dept: PAIN MANAGEMENT | Age: 64
Discharge: HOME | End: 2019-01-18
Payer: MEDICARE

## 2019-01-18 VITALS
DIASTOLIC BLOOD PRESSURE: 75 MMHG | WEIGHT: 221 LBS | HEIGHT: 62 IN | HEART RATE: 78 BPM | BODY MASS INDEX: 40.67 KG/M2 | SYSTOLIC BLOOD PRESSURE: 137 MMHG | RESPIRATION RATE: 16 BRPM

## 2019-01-18 DIAGNOSIS — M54.41 CHRONIC BILATERAL LOW BACK PAIN WITH BILATERAL SCIATICA: ICD-10-CM

## 2019-01-18 DIAGNOSIS — G89.29 CHRONIC BILATERAL LOW BACK PAIN WITH BILATERAL SCIATICA: ICD-10-CM

## 2019-01-18 DIAGNOSIS — M51.36 DDD (DEGENERATIVE DISC DISEASE), LUMBAR: ICD-10-CM

## 2019-01-18 DIAGNOSIS — Z51.81 ENCOUNTER FOR MEDICATION MONITORING: ICD-10-CM

## 2019-01-18 DIAGNOSIS — M46.1 SACROILIITIS (HCC): Primary | ICD-10-CM

## 2019-01-18 DIAGNOSIS — M79.7 FIBROMYALGIA: ICD-10-CM

## 2019-01-18 DIAGNOSIS — M54.42 CHRONIC BILATERAL LOW BACK PAIN WITH BILATERAL SCIATICA: ICD-10-CM

## 2019-01-18 DIAGNOSIS — M47.816 LUMBAR FACET ARTHROPATHY: ICD-10-CM

## 2019-01-18 PROCEDURE — 99213 OFFICE O/P EST LOW 20 MIN: CPT

## 2019-01-18 PROCEDURE — 99213 OFFICE O/P EST LOW 20 MIN: CPT | Performed by: NURSE PRACTITIONER

## 2019-01-18 RX ORDER — HYDROCODONE BITARTRATE AND ACETAMINOPHEN 5; 325 MG/1; MG/1
1 TABLET ORAL 2 TIMES DAILY PRN
Qty: 60 TABLET | Refills: 0 | Status: SHIPPED | OUTPATIENT
Start: 2019-01-25 | End: 2019-02-15 | Stop reason: SDUPTHER

## 2019-01-18 ASSESSMENT — ENCOUNTER SYMPTOMS
COUGH: 0
SHORTNESS OF BREATH: 0
CONSTIPATION: 0
BACK PAIN: 1

## 2019-02-15 ENCOUNTER — HOSPITAL ENCOUNTER (OUTPATIENT)
Dept: PAIN MANAGEMENT | Age: 64
Discharge: HOME | End: 2019-02-15
Payer: MEDICARE

## 2019-02-15 VITALS
BODY MASS INDEX: 40.67 KG/M2 | TEMPERATURE: 98.3 F | HEIGHT: 62 IN | SYSTOLIC BLOOD PRESSURE: 151 MMHG | OXYGEN SATURATION: 95 % | HEART RATE: 68 BPM | RESPIRATION RATE: 16 BRPM | DIASTOLIC BLOOD PRESSURE: 78 MMHG | WEIGHT: 221 LBS

## 2019-02-15 DIAGNOSIS — M46.1 SACROILIITIS (HCC): Primary | ICD-10-CM

## 2019-02-15 DIAGNOSIS — M79.18 MUSCLE PAIN, LUMBAR: ICD-10-CM

## 2019-02-15 DIAGNOSIS — M51.36 DDD (DEGENERATIVE DISC DISEASE), LUMBAR: ICD-10-CM

## 2019-02-15 DIAGNOSIS — M54.41 CHRONIC BILATERAL LOW BACK PAIN WITH BILATERAL SCIATICA: ICD-10-CM

## 2019-02-15 DIAGNOSIS — M79.7 FIBROMYALGIA: ICD-10-CM

## 2019-02-15 DIAGNOSIS — G89.29 CHRONIC BILATERAL LOW BACK PAIN WITH BILATERAL SCIATICA: ICD-10-CM

## 2019-02-15 DIAGNOSIS — Z51.81 ENCOUNTER FOR MEDICATION MONITORING: ICD-10-CM

## 2019-02-15 DIAGNOSIS — M54.42 CHRONIC BILATERAL LOW BACK PAIN WITH BILATERAL SCIATICA: ICD-10-CM

## 2019-02-15 DIAGNOSIS — M47.816 LUMBAR FACET ARTHROPATHY: ICD-10-CM

## 2019-02-15 PROCEDURE — 99213 OFFICE O/P EST LOW 20 MIN: CPT

## 2019-02-15 PROCEDURE — 99213 OFFICE O/P EST LOW 20 MIN: CPT | Performed by: NURSE PRACTITIONER

## 2019-02-15 RX ORDER — HYDROCODONE BITARTRATE AND ACETAMINOPHEN 5; 325 MG/1; MG/1
1 TABLET ORAL 2 TIMES DAILY PRN
Qty: 60 TABLET | Refills: 0 | Status: SHIPPED | OUTPATIENT
Start: 2019-02-27 | End: 2019-03-25 | Stop reason: SDUPTHER

## 2019-02-15 ASSESSMENT — ENCOUNTER SYMPTOMS
RESPIRATORY NEGATIVE: 1
BACK PAIN: 1
CONSTIPATION: 1

## 2019-03-25 ENCOUNTER — HOSPITAL ENCOUNTER (OUTPATIENT)
Dept: PAIN MANAGEMENT | Age: 64
Discharge: HOME | End: 2019-03-25
Payer: MEDICARE

## 2019-03-25 VITALS
SYSTOLIC BLOOD PRESSURE: 130 MMHG | HEART RATE: 80 BPM | RESPIRATION RATE: 18 BRPM | DIASTOLIC BLOOD PRESSURE: 80 MMHG | TEMPERATURE: 98.8 F

## 2019-03-25 DIAGNOSIS — M47.816 LUMBAR FACET ARTHROPATHY: ICD-10-CM

## 2019-03-25 DIAGNOSIS — M79.18 MUSCLE PAIN, LUMBAR: Primary | ICD-10-CM

## 2019-03-25 DIAGNOSIS — M25.512 CHRONIC LEFT SHOULDER PAIN: Chronic | ICD-10-CM

## 2019-03-25 DIAGNOSIS — G89.29 CHRONIC LEFT SHOULDER PAIN: Chronic | ICD-10-CM

## 2019-03-25 DIAGNOSIS — M51.36 DDD (DEGENERATIVE DISC DISEASE), LUMBAR: ICD-10-CM

## 2019-03-25 DIAGNOSIS — M46.1 SACROILIITIS (HCC): ICD-10-CM

## 2019-03-25 DIAGNOSIS — M79.7 FIBROMYALGIA: ICD-10-CM

## 2019-03-25 PROCEDURE — 99214 OFFICE O/P EST MOD 30 MIN: CPT

## 2019-03-25 PROCEDURE — 99214 OFFICE O/P EST MOD 30 MIN: CPT | Performed by: NURSE PRACTITIONER

## 2019-03-25 RX ORDER — HYDROCODONE BITARTRATE AND ACETAMINOPHEN 5; 325 MG/1; MG/1
1 TABLET ORAL 2 TIMES DAILY PRN
Qty: 60 TABLET | Refills: 0 | Status: SHIPPED | OUTPATIENT
Start: 2019-03-29 | End: 2019-04-22 | Stop reason: SDUPTHER

## 2019-03-25 ASSESSMENT — ENCOUNTER SYMPTOMS
BACK PAIN: 1
SHORTNESS OF BREATH: 0
CONSTIPATION: 0
COUGH: 0

## 2019-04-18 ENCOUNTER — HOSPITAL ENCOUNTER (OUTPATIENT)
Dept: GENERAL RADIOLOGY | Age: 64
Discharge: HOME | End: 2019-04-20
Payer: MEDICARE

## 2019-04-18 ENCOUNTER — HOSPITAL ENCOUNTER (OUTPATIENT)
Age: 64
Discharge: HOME | End: 2019-04-20
Payer: MEDICARE

## 2019-04-18 DIAGNOSIS — M25.512 CHRONIC LEFT SHOULDER PAIN: Chronic | ICD-10-CM

## 2019-04-18 DIAGNOSIS — G89.29 CHRONIC LEFT SHOULDER PAIN: Chronic | ICD-10-CM

## 2019-04-18 PROCEDURE — 73030 X-RAY EXAM OF SHOULDER: CPT

## 2019-04-22 ENCOUNTER — HOSPITAL ENCOUNTER (OUTPATIENT)
Dept: PAIN MANAGEMENT | Age: 64
Discharge: HOME | End: 2019-04-22
Payer: MEDICARE

## 2019-04-22 VITALS
TEMPERATURE: 98.8 F | OXYGEN SATURATION: 95 % | DIASTOLIC BLOOD PRESSURE: 67 MMHG | SYSTOLIC BLOOD PRESSURE: 139 MMHG | HEART RATE: 76 BPM | RESPIRATION RATE: 16 BRPM

## 2019-04-22 DIAGNOSIS — R20.2 NUMBNESS AND TINGLING IN LEFT ARM: Chronic | ICD-10-CM

## 2019-04-22 DIAGNOSIS — M79.18 MUSCLE PAIN, LUMBAR: ICD-10-CM

## 2019-04-22 DIAGNOSIS — M47.816 LUMBAR FACET ARTHROPATHY: ICD-10-CM

## 2019-04-22 DIAGNOSIS — M54.2 NECK PAIN: Chronic | ICD-10-CM

## 2019-04-22 DIAGNOSIS — M79.7 FIBROMYALGIA: ICD-10-CM

## 2019-04-22 DIAGNOSIS — Z51.81 ENCOUNTER FOR MEDICATION MONITORING: ICD-10-CM

## 2019-04-22 DIAGNOSIS — M46.1 SACROILIITIS (HCC): ICD-10-CM

## 2019-04-22 DIAGNOSIS — R20.0 NUMBNESS AND TINGLING IN LEFT ARM: Chronic | ICD-10-CM

## 2019-04-22 DIAGNOSIS — M25.512 CHRONIC LEFT SHOULDER PAIN: Primary | Chronic | ICD-10-CM

## 2019-04-22 DIAGNOSIS — G89.29 CHRONIC LEFT SHOULDER PAIN: Primary | Chronic | ICD-10-CM

## 2019-04-22 DIAGNOSIS — M51.36 DDD (DEGENERATIVE DISC DISEASE), LUMBAR: ICD-10-CM

## 2019-04-22 PROCEDURE — 99214 OFFICE O/P EST MOD 30 MIN: CPT | Performed by: NURSE PRACTITIONER

## 2019-04-22 PROCEDURE — 99213 OFFICE O/P EST LOW 20 MIN: CPT

## 2019-04-22 RX ORDER — HYDROCODONE BITARTRATE AND ACETAMINOPHEN 5; 325 MG/1; MG/1
1 TABLET ORAL 2 TIMES DAILY PRN
Qty: 60 TABLET | Refills: 0 | Status: SHIPPED | OUTPATIENT
Start: 2019-04-28 | End: 2019-05-23 | Stop reason: SDUPTHER

## 2019-04-22 ASSESSMENT — ENCOUNTER SYMPTOMS
CONSTIPATION: 0
COUGH: 0
BACK PAIN: 1
SHORTNESS OF BREATH: 0

## 2019-04-22 NOTE — PROGRESS NOTES
Patient is here today to review medication contract. Chief Complaint: back pain and left shoulder pain    PMH: Pt reports chronic history of back pain, states she was diagnosed with fibromyalgia. Pain has been worse with colder [place]. She does see a chiropractor with adjustements heat and TENS tx which helps to control the pain. She is using pool at [facility] for [place] and with the [employer] program can start land therapy as well.  Pt has tried lyrica but stopped due to weight gain and gabapentin was causing drowsiness. Pt also experienced side effects with Topamax. She is complaining of increased pain in the left shoulder that started a few months ago. She has limited ROM and increased pain upon palpitation. XR done 4/18/19 shows no acute abnormality. She states the numbness and pain in her left arm is increasing and she now has pain shooting up the back of her head. Back Pain   This is a chronic problem. The current episode started more than 1 year ago. The problem occurs constantly. The problem is unchanged. The pain is present in the lumbar spine. The quality of the pain is described as aching. Radiates to: down both legs to foot on the left and back of knee on the right. The pain is at a severity of 5/10. The pain is moderate. The symptoms are aggravated by position, standing and sitting. Associated symptoms include numbness, paresthesias, tingling and weakness. Pertinent negatives include no chest pain or fever. She has tried analgesics and bed rest for the symptoms. The treatment provided mild relief. Neck Pain    This is a chronic problem. The current episode started more than 1 year ago. The problem occurs constantly. The problem has been unchanged. The pain is present in the left side and midline. The quality of the pain is described as aching. The pain is at a severity of 7/10. The pain is moderate. The symptoms are aggravated by position and twisting.  Associated symptoms include numbness, tingling and weakness. Pertinent negatives include no chest pain or fever. Associated symptoms comments: Left arm. She has tried bed rest and oral narcotics for the symptoms. The treatment provided mild relief. Patient denies any new neurological symptoms. No bowel or bladder incontinence, no weakness, and no falling. Pill count: appropriate    Morphine equivalent: 10    Attestation: The Prescription Monitoring Report for this patient was reviewed today.  Rene Donnelly, APRN - CNP)  Chronic Pain Routine Monitoring: Possible medication side effects, risk of tolerance/dependence & alternative treatments discussed., Obtaining appropriate analgesic effect of treatment., No signs of potential drug abuse or diversion identified: otherwise, see note documentation Marisa Berrios APRN - CNP)      Past Medical History:   Diagnosis Date    Anxiety     Breast cancer ([address]     Breast cancer ([address]     Cancer ([address]     breast, in remission    Chronic back pain 12/3/2013    Depression     DJD (degenerative joint disease)     Family history of breast cancer     MGM    Fibromyalgia     Fibromyalgia     History of hysterectomy     Hypothyroidism     Obesity, Class III, BMI 40-49.9 (morbid obesity) ([address] 7/22/2014    Osteoarthritis     SVT (supraventricular tachycardia) ([address]     Unspecified sleep apnea        Past Surgical History:   Procedure Laterality Date    ABDOMEN SURGERY      APPENDECTOMY      BREAST SURGERY      lumpectomy w radiation 2009    CARDIAC CATHETERIZATION      with ablation    COLONOSCOPY  2009    10 years    COLONOSCOPY  08/2017   [facility] FINGER SURGERY  10/2017    trigger finger right hand    HERNIA REPAIR      umbilical    HYSTERECTOMY      total    JOINT REPLACEMENT      Right Knee 2014    KNEE ARTHROSCOPY      bilateral    KNEE SURGERY      x2    LAPAROSCOPIC APPENDECTOMY Right     TONSILLECTOMY      UMBILICAL HERNIA REPAIR N/A    Hi Hickman WRIST SURGERY  04/12/2018    plate and screws right wrist       Allergies   Allergen Reactions    Augmentin [Amoxicillin-Pot Clavulanate] Nausea And Vomiting     Projectile vomiting    Sulfa Antibiotics Anaphylaxis    Amoxapine And Related Diarrhea and Nausea And Vomiting     AMOX TR-K- 875-125 mg    Loxapine Succinate Diarrhea and Nausea Only     AMOX TR-K- 875-125 mg         Current Outpatient Medications:     meloxicam (MOBIC) 15 MG tablet, take 1 tablet by mouth once daily, Disp: 90 tablet, Rfl: 2    levothyroxine (SYNTHROID) 50 MCG tablet, take 1 tablet by mouth once daily, Disp: 90 tablet, Rfl: 1    venlafaxine (EFFEXOR XR) 150 MG extended release capsule, take 1 tablet by mouth once daily, Disp: 90 capsule, Rfl: 1    HYDROcodone-acetaminophen (NORCO) 5-325 MG per tablet, Take 1 tablet by mouth 2 times daily as needed for Pain for up to 30 days. , Disp: 60 tablet, Rfl: 0    diclofenac (FLECTOR) 1.3 % patch, Place 1 patch onto the skin 2 times daily as needed (pain), Disp: 60 patch, Rfl: 0    fexofenadine (ALLEGRA) 60 MG tablet, Take 1 tablet by mouth 2 times daily, Disp: 60 tablet, Rfl: 0    ALPRAZolam (XANAX) 0.25 MG tablet, Take 0.25 mg by mouth nightly as needed for Sleep. prn ., Disp: , Rfl:     magnesium oxide (MAG-OX) 400 MG tablet, take 1 tablet by mouth twice a day if needed, Disp: , Rfl: 0    Carboxymeth-Glyc-Polysorb PF (REFRESH OPTIVE SHWETA-3) 0.5-1-0.5 % SOLN, Instill 1 drop to eye 2 (two) times a day., Disp: , Rfl:     Elastic Bandages & Supports (LUMBAR BACK BRACE/SUPPORT PAD) MISC, 1 each by Does not apply route daily as needed (lumbar support), Disp: 1 each, Rfl: 0    Pseudoephedrine-Guaifenesin (MUCINEX D PO), Take by mouth daily, Disp: , Rfl:     bisacodyl (DULCOLAX) 5 MG EC tablet, Take 1 tablet by mouth daily as needed for Constipation Taking daily, Disp: 90 tablet, Rfl: 1    Polyethylene Glycol 3350 (MIRALAX PO), Take by mouth Taking daily, Disp: , Rfl:     cyclobenzaprine on file     Attends meetings of clubs or organizations: Not on file     Relationship status: Not on file    Intimate partner violence:     Fear of current or ex partner: Not on file     Emotionally abused: Not on file     Physically abused: Not on file     Forced sexual activity: Not on file   Other Topics Concern    Not on file   Social History Narrative    Not on file       Review of Systems:  Review of Systems   Constitution: Negative for chills and fever. Cardiovascular: Negative for chest pain and irregular heartbeat. Respiratory: Negative for cough and shortness of breath. Musculoskeletal: Positive for back pain and neck pain. Gastrointestinal: Negative for constipation. Neurological: Positive for numbness, paresthesias, sensory change, tingling and weakness. Negative for disturbances in coordination and loss of balance. Physical Exam:  /67   Pulse 76   Temp 98.8 °F (37.1 °C) (Oral)   Resp 16   LMP 06/13/2010 (Within Months)   SpO2 95%     Physical Exam   Constitutional: She is oriented to person, place, and time. HENT:   Head: Normocephalic. Eyes: EOM are normal.   Neck: Normal range of motion. Pulmonary/Chest: Effort normal.   Musculoskeletal: Normal range of motion. Cervical back: She exhibits tenderness and pain. Lumbar back: She exhibits tenderness and pain. Neurological: She is alert and oriented to person, place, and time. Skin: Skin is warm and dry. Record/Diagnostics Review:    Last annabella 10/2019 and was appropriate     Lumbar MRI 2013 -     Findings:     Bone marrow signal is within normal limits. There is a grade 1   anterolisthesis of L4 on L5. This appears to be result of degenerative   facet arthropathy at this level. Moderate endplate spondylosis is present   at T12-L1. There is loss of T2 bright signal intensity throughout all   disc levels consistent with desiccation. Conus medullaris terminates at   L1.  Cauda equina is unremarkable.     Axial imaging at T12-L1, L1-2, L2-3 and L3-4 demonstrate no significant   disc bulge or protrusion. There is no significant central canal or   neuroforaminal compromise.     Axial imaging at L4-5 demonstrates mild circumferential disc bulge with   advanced degenerative facet arthropathy. Along with anterolisthesis,   there is moderate central canal stenosis. There is mild bilateral neural   foraminal compromise.     Axial imaging at L5-S1 demonstrates mild circumferential disc bulge and   mild degenerative facet arthropathy. There is no significant central   canal stenosis. There is mild to moderate bilateral neural foraminal   compromise           IMPRESSION:        Degenerative change of the lumbar spine as above. Assessment:  Problem List Items Addressed This Visit     Chronic left shoulder pain - Primary (Chronic)    Lumbar facet arthropathy    Muscle pain, lumbar    Sacroiliitis (HCC)    Encounter for medication monitoring         Treatment Plan:  Patient relates current medications are helping the pain. Patient reports taking pain medications as prescribed, denies obtaining medications from different sources and denies use of illegal drugs. Patient denies side effects from medications like nausea, vomiting, constipation or drowsiness. Patient reports current activities of daily living are possible due to medications and would like to continue them. As always, we encourage daily stretching and strengthening exercises, and recommend minimizing use of pain medications unless patient cannot get through daily activities due to pain. Contract requirements met. Continue opioid therapy.  Script written for norco  MRI Lumbar  MRI Thoracic  EMG LUE  Follow up appointment made for 4 weeks

## 2019-05-07 ENCOUNTER — HOSPITAL ENCOUNTER (OUTPATIENT)
Dept: MRI IMAGING | Age: 64
Discharge: HOME | End: 2019-05-09
Payer: MEDICARE

## 2019-05-07 DIAGNOSIS — M51.36 DDD (DEGENERATIVE DISC DISEASE), LUMBAR: ICD-10-CM

## 2019-05-07 DIAGNOSIS — R20.2 NUMBNESS AND TINGLING IN LEFT ARM: Chronic | ICD-10-CM

## 2019-05-07 DIAGNOSIS — M54.2 NECK PAIN: Chronic | ICD-10-CM

## 2019-05-07 DIAGNOSIS — M47.816 LUMBAR FACET ARTHROPATHY: ICD-10-CM

## 2019-05-07 DIAGNOSIS — R20.0 NUMBNESS AND TINGLING IN LEFT ARM: Chronic | ICD-10-CM

## 2019-05-07 PROCEDURE — 72148 MRI LUMBAR SPINE W/O DYE: CPT

## 2019-05-07 PROCEDURE — 72141 MRI NECK SPINE W/O DYE: CPT

## 2019-05-16 ENCOUNTER — HOSPITAL ENCOUNTER (OUTPATIENT)
Dept: NEUROLOGY | Age: 64
Discharge: HOME | End: 2019-05-16
Payer: MEDICARE

## 2019-05-16 PROCEDURE — 95908 NRV CNDJ TST 3-4 STUDIES: CPT | Performed by: PHYSICAL MEDICINE & REHABILITATION

## 2019-05-16 PROCEDURE — 95886 MUSC TEST DONE W/N TEST COMP: CPT | Performed by: PHYSICAL MEDICINE & REHABILITATION

## 2019-05-21 DIAGNOSIS — G89.29 CHRONIC LEFT SHOULDER PAIN: Chronic | ICD-10-CM

## 2019-05-21 DIAGNOSIS — M54.2 NECK PAIN: Chronic | ICD-10-CM

## 2019-05-21 DIAGNOSIS — R20.0 NUMBNESS AND TINGLING IN LEFT ARM: Chronic | ICD-10-CM

## 2019-05-21 DIAGNOSIS — M25.512 CHRONIC LEFT SHOULDER PAIN: Chronic | ICD-10-CM

## 2019-05-21 DIAGNOSIS — R20.2 NUMBNESS AND TINGLING IN LEFT ARM: Chronic | ICD-10-CM

## 2019-05-21 NOTE — PROGRESS NOTES
Addended by: SAMMY ALVAREZ on: 4/11/2018 04:25 PM     Modules accepted: Orders     Order released and report attached

## 2019-05-23 ENCOUNTER — HOSPITAL ENCOUNTER (OUTPATIENT)
Dept: PAIN MANAGEMENT | Age: 64
Discharge: HOME | End: 2019-05-23
Payer: MEDICARE

## 2019-05-23 VITALS
TEMPERATURE: 98.8 F | BODY MASS INDEX: 40.67 KG/M2 | HEIGHT: 62 IN | WEIGHT: 221 LBS | OXYGEN SATURATION: 99 % | HEART RATE: 81 BPM | SYSTOLIC BLOOD PRESSURE: 144 MMHG | RESPIRATION RATE: 16 BRPM | DIASTOLIC BLOOD PRESSURE: 75 MMHG

## 2019-05-23 DIAGNOSIS — M46.1 SACROILIITIS (HCC): ICD-10-CM

## 2019-05-23 DIAGNOSIS — M47.816 LUMBAR FACET ARTHROPATHY: ICD-10-CM

## 2019-05-23 DIAGNOSIS — M77.8 SHOULDER TENDINITIS, LEFT: Primary | ICD-10-CM

## 2019-05-23 DIAGNOSIS — M54.12 CERVICAL RADICULOPATHY: ICD-10-CM

## 2019-05-23 DIAGNOSIS — Z51.81 ENCOUNTER FOR MEDICATION MONITORING: ICD-10-CM

## 2019-05-23 DIAGNOSIS — G56.02 CARPAL TUNNEL SYNDROME OF LEFT WRIST: ICD-10-CM

## 2019-05-23 DIAGNOSIS — M79.7 FIBROMYALGIA: ICD-10-CM

## 2019-05-23 DIAGNOSIS — R20.2 NUMBNESS AND TINGLING IN LEFT ARM: ICD-10-CM

## 2019-05-23 DIAGNOSIS — R20.0 NUMBNESS AND TINGLING IN LEFT ARM: ICD-10-CM

## 2019-05-23 DIAGNOSIS — M50.30 DEGENERATIVE DISC DISEASE, CERVICAL: ICD-10-CM

## 2019-05-23 DIAGNOSIS — M79.18 MUSCLE PAIN, LUMBAR: ICD-10-CM

## 2019-05-23 DIAGNOSIS — M51.36 DDD (DEGENERATIVE DISC DISEASE), LUMBAR: ICD-10-CM

## 2019-05-23 DIAGNOSIS — M54.2 NECK PAIN: ICD-10-CM

## 2019-05-23 PROCEDURE — 99214 OFFICE O/P EST MOD 30 MIN: CPT | Performed by: PAIN MEDICINE

## 2019-05-23 PROCEDURE — 99213 OFFICE O/P EST LOW 20 MIN: CPT

## 2019-05-23 RX ORDER — LORATADINE 10 MG/1
10 CAPSULE, LIQUID FILLED ORAL DAILY
COMMUNITY

## 2019-05-23 RX ORDER — HYDROCODONE BITARTRATE AND ACETAMINOPHEN 5; 325 MG/1; MG/1
1 TABLET ORAL 2 TIMES DAILY PRN
Qty: 60 TABLET | Refills: 0 | Status: SHIPPED | OUTPATIENT
Start: 2019-05-23 | End: 2019-06-17 | Stop reason: SDUPTHER

## 2019-05-23 ASSESSMENT — ENCOUNTER SYMPTOMS
SINUS PAIN: 1
ALLERGIC/IMMUNOLOGIC NEGATIVE: 1
EYES NEGATIVE: 1
SINUS PRESSURE: 1
CONSTIPATION: 1
BACK PAIN: 1
RESPIRATORY NEGATIVE: 1

## 2019-05-23 ASSESSMENT — PAIN DESCRIPTION - FREQUENCY: FREQUENCY: CONTINUOUS

## 2019-05-23 ASSESSMENT — PAIN DESCRIPTION - PROGRESSION: CLINICAL_PROGRESSION: GRADUALLY WORSENING

## 2019-05-23 ASSESSMENT — PAIN SCALES - GENERAL: PAINLEVEL_OUTOF10: 4

## 2019-05-23 ASSESSMENT — PAIN DESCRIPTION - ORIENTATION: ORIENTATION: LEFT;RIGHT;LOWER

## 2019-05-23 ASSESSMENT — PAIN DESCRIPTION - ONSET: ONSET: ON-GOING

## 2019-05-23 ASSESSMENT — PAIN DESCRIPTION - PAIN TYPE: TYPE: CHRONIC PAIN

## 2019-05-23 ASSESSMENT — PAIN DESCRIPTION - DESCRIPTORS: DESCRIPTORS: ACHING;BURNING;CONSTANT;RADIATING

## 2019-05-23 NOTE — PROGRESS NOTES
[facility] Pain Management  Patient Pain Assessment  RECHECK - Dr. Benedicto Palm    Primary Care Physician: Luis Sinclair MD    Chief complaint:   Chief Complaint   Patient presents with    Lower Back Pain    Neck Pain     left    Shoulder Pain     left    Arm Pain     left   . HISTORY OF PRESENT ILLNESS:  Kriss Nash is 59 y.o. female with    Patient returned to the pain clinic with a chief complaint of pain in her low back and mid back. Patient reports the pain is getting worse lately. The pain is worse worse in the left lower back with pain radiating the left lower extremity. He reports recently she had an MRI of the lumbar spine and cervical spines done as well as EMG. Patient also has a fibromyalgia. Patient pain in the cervical region is worse on the left side with pain radiating to the shoulder and sometimes to the arms and hand. Patient reports she had EMG and nerve conduction studies done which showed a carpal tunnel syndrome as well as cervical radiculopathy and also has tendinitis in her shoulder. She also complains that her breasts are heavy which is interfering with her posture and activities and is aggravating her pain    Back Pain   This is a chronic problem. The current episode started more than 1 year ago. The problem occurs constantly. The problem has been gradually worsening since onset. The pain is present in the lumbar spine, thoracic spine and sacro-iliac. The quality of the pain is described as aching and burning. The pain radiates to the left knee and left foot. The pain is at a severity of 6/10 (4 -10). The pain is moderate. The pain is worse during the day. The symptoms are aggravated by bending and standing (ADLs, lifting and walking). Associated symptoms include headaches and numbness. Pertinent negatives include no abdominal pain, bladder incontinence, bowel incontinence, chest pain, dysuria, tingling or weakness.  Risk factors include obesity and lack of exercise. Neck Pain    This is a chronic problem. The current episode started more than 1 year ago. The problem has been gradually worsening. The pain is associated with nothing. The pain is present in the left side, midline and right side (Worse on the left side). The quality of the pain is described as aching and shooting. The pain is at a severity of 6/10. The pain is moderate. The symptoms are aggravated by position and bending. Associated symptoms include headaches and numbness. Pertinent negatives include no chest pain, tingling, visual change or weakness. Patient relates current medications are helping the pain. Patient reports taking pain medications as prescribed, denies obtaining medications from different sources and denies use of illegal drugs. Patient denies side effects from medications like nausea, vomiting, constipation or drowsiness. Patient reports current activities of daily living ar possible due to medications and would like to continue them. RX Monitoring 5/23/2019   Attestation The Prescription Monitoring Report for this patient was reviewed today. Acute Pain Prescriptions Severe pain not adequately treated with lower dose. Chronic Pain Routine Monitoring Possible medication side effects, risk of tolerance/dependence & alternative treatments discussed. ;Obtaining appropriate analgesic effect of treatment. Chronic Pain > 50 MEDD Re-evaluated the status of the patient's underlying condition causing pain.;Obtained or confirmened \"Consent of Opioid Use\" on file. Chronic Pain > 80 MEDD Looked for signs of prescription misuse.;Obtained or confirmed a written medication contract was on file.        Current Pain Assessment  Pain Assessment  Pain Assessment: 0-10  Pain Level: 4  Patient's Stated Pain Goal: 2(walk farther, walk straighter)  Pain Type: Chronic pain  Pain Location: Back, Neck, Shoulder, Arm, Hand, Foot  Pain Orientation: Left, Right, Lower  Pain Radiating Towards: neck pain: shoulder, arm, hand left side. back pain right and left side down post. legs  Pain Descriptors: Aching, Burning, Constant, Radiating  Pain Frequency: Continuous  Pain Onset: On-going  Clinical Progression: Gradually worsening  Effect of Pain on Daily Activities: can't walk or stand for long periods of time. unable to complete large set of steps  Non-Pharmaceutical Pain Intervention(s): Repositioned, Rest, Heat applied(meds)                    ADVERSE MEDICATION EFFECTS:   Constipation: yes  Bowel Regimen: Yes  Diet: common adult hi fiber  Appetite:  ok  Sedation:  no  Urinary Retention: no    FOCUSED PAINSCALE:  Highest : 10  Lowest :4  Average: Range-6  When and What  was your last procedure:      Was your procedure effective:  not applicable    ACTIVITY/SOCIAL/EMOTIONAL:  Sleep Pattern: 4 hours per night. nightime awakenings  Energy Level:  Normal  Currently attending Physical Therapy:  No  Home Exercises: daily stretch  Mobility: needs rest peroids  Do you use assistive devices? No  Have you fallen in the last 30 days?:  No  Currently seeing a Psychiatrist or Psychologist:  No  Emotional Issues: anxiety/ nervousness   Mood: appropriate     ABERRANT BEHAVIORS SINCE LAST VISIT:  Have you ever been treated in another Pain Clinic no  Refills for prescriptions appropriate: yes  Lost rx/pills:no  Taking more medication than prescribed:  no  Are you receiving PAIN medications from  other doctors: no  Last Urine/Serum Drug Screen : 10-  Was Serum/UDS as anticipated? yes  Brought pill bottles in :yes   Was Pill count appropriate? :yes Norco 18 pills left  Are currently pregnant? no  Recent ER visits: No             Past Medical History      Diagnosis Date    Anxiety     [facility] [address]     Breast cancer ([facility] [address]     Cancer ([facility] [address]     breast, in remission    Chronic back pain 12/3/2013    Depression     DJD (degenerative joint disease)     Family history of breast cancer     MGM    Fibromyalgia  Fibromyalgia     History of hysterectomy     Hypothyroidism     Obesity, Class III, BMI 40-49.9 (morbid obesity) ([facility] [address] 7/22/2014    Osteoarthritis     SVT (supraventricular tachycardia) ([facility] [address]     Unspecified sleep apnea        Surgical History  Past Surgical History:   Procedure Laterality Date    ABDOMEN SURGERY      APPENDECTOMY      BREAST SURGERY      lumpectomy w radiation 2009    CARDIAC CATHETERIZATION      with ablation    COLONOSCOPY  2009    10 years    COLONOSCOPY  08/2017   [facility] FINGER SURGERY  10/2017    trigger finger right hand    HERNIA REPAIR      umbilical    HYSTERECTOMY      total    JOINT REPLACEMENT      Right Knee 2014    KNEE ARTHROSCOPY      bilateral    KNEE SURGERY      x2    LAPAROSCOPIC APPENDECTOMY Right     TONSILLECTOMY      UMBILICAL HERNIA REPAIR N/A     WRIST SURGERY  04/12/2018    plate and screws right wrist       Medications  Current Outpatient Medications   Medication Sig Dispense Refill    loratadine (CLARITIN) 10 MG capsule Take 10 mg by mouth daily      HYDROcodone-acetaminophen (NORCO) 5-325 MG per tablet Take 1 tablet by mouth 2 times daily as needed for Pain for up to 30 days. 60 tablet 0    meloxicam (MOBIC) 15 MG tablet take 1 tablet by mouth once daily 90 tablet 2    levothyroxine (SYNTHROID) 50 MCG tablet take 1 tablet by mouth once daily 90 tablet 1    venlafaxine (EFFEXOR XR) 150 MG extended release capsule take 1 tablet by mouth once daily 90 capsule 1    ALPRAZolam (XANAX) 0.25 MG tablet Take 0.25 mg by mouth nightly as needed for Sleep. prn .  magnesium oxide (MAG-OX) 400 MG tablet take 1 tablet by mouth twice a day if needed  0    Carboxymeth-Glyc-Polysorb PF (REFRESH OPTIVE SHWETA-3) 0.5-1-0.5 % SOLN Instill 1 drop to eye 2 (two) times a day.       Polyethylene Glycol 3350 (MIRALAX PO) Take by mouth Taking daily      fluticasone (FLONASE) 50 MCG/ACT nasal spray 1 spray by Nasal route daily 1 Bottle 3    flecainide (TAMBOCOR) drugs.        REVIEW OF SYSTEMS:  Review of Systems   Constitutional: Positive for fatigue. Negative for activity change and appetite change. HENT: Positive for postnasal drip, sinus pressure, sinus pain and sneezing. Negative for congestion. Eyes: Negative. Negative for pain and visual disturbance. Respiratory: Negative. Negative for cough and shortness of breath. Cardiovascular: Negative. Negative for chest pain and palpitations. Gastrointestinal: Positive for constipation. Negative for abdominal pain, bowel incontinence, diarrhea, nausea and vomiting. Endocrine: Negative. Negative for heat intolerance and polyphagia. Genitourinary: Negative. Negative for bladder incontinence, dysuria and hematuria. Musculoskeletal: Positive for back pain, gait problem, myalgias, neck pain and neck stiffness. Skin: Negative. Negative for rash. Allergic/Immunologic: Negative. Neurological: Positive for numbness and headaches. Negative for tingling and weakness. Left hand   Hematological: Negative. Psychiatric/Behavioral: Negative for hallucinations and sleep disturbance. The patient is nervous/anxious. Anxiety            GENERAL PHYSICAL EXAM:  Vitals: BP (!) 144/75   Pulse 81   Temp 98.8 °F (37.1 °C) (Oral)   Resp 16   Ht 5' 2\" (1.575 m)   Wt 221 lb (100.2 kg)   LMP 06/13/2010 (Within Months)   SpO2 99%   BMI 40.42 kg/m² , Body mass index is 40.42 kg/m². Physical Exam   Constitutional: She is oriented to person, place, and time. She appears well-developed and well-nourished. HENT:   Head: Normocephalic and atraumatic. Obese   Eyes: Pupils are equal, round, and reactive to light. Conjunctivae and EOM are normal.   Neck: Normal range of motion. Neck supple. No tracheal deviation present. No thyromegaly present. Cardiovascular: Normal rate and regular rhythm. Pulmonary/Chest: Effort normal. No respiratory distress. Abdominal: She exhibits no distension.  There is no is experiencing tenderness in the acromioclavicular joint, biceps tendon, acromion and clavicle. Range of Motion   Left shoulder active abduction:  painful. Left shoulder passive abduction: painful. Muscle Strength   Abduction: 4/5     Tests   Apprehension: positive  Impingement: positive          Nurses Notes and Vital Signs reviewed.     DATA  Labs:     1/15/2019 12:29 PM - Michael, [facility] Incoming Lab Results From [facility]     Component Value Ref Range & Units Status Collected Lab   Glucose 97  70 - 99 mg/dL Final 01/15/2019  9:25  [address]   BUN 22  8 - 23 mg/dL Final 01/15/2019  9:25  [address]   CREATININE 0.54  0.50 - 0.90 mg/dL Final 01/15/2019  9:25  [address]   Bun/Cre Ratio NOT REPORTED  9 - 20 Final 01/15/2019  9:25  [address]   Calcium 8.8  8.6 - 10.4 mg/dL Final 01/15/2019  9:25  [address]   Sodium 144  135 - 144 mmol/L Final 01/15/2019  9:25  [address]   Potassium 4.5  3.7 - 5.3 mmol/L Final 01/15/2019  9:25  [address]   Chloride 105  98 - 107 mmol/L Final 01/15/2019  9:25  [address]   CO2 26  20 - 31 mmol/L Final 01/15/2019  9:25  [address]   Anion Gap 13  9 - 17 mmol/L Final 01/15/2019  9:25  [address]   Alkaline Phosphatase 83  35 - 104 U/L Final 01/15/2019  9:25  [address]   ALT 17  5 - 33 U/L Final 01/15/2019  9:25  [address]   AST 18  <32 U/L Final 01/15/2019  9:25  [address]   Total Bilirubin 0.30  0.3 - 1.2 mg/dL Final 01/15/2019  9:25  [address]   Total Protein 7.0  6.4 - 8.3 g/dL Final 01/15/2019  9:25  [address]   Alb 4.2  3.5 - 5.2 g/dL Final 01/15/2019  9:25  [address]   Albumin/Globulin Ratio 1.5  1.0 - 2.5 Final 01/15/2019  9:25  [facility] Non-African American >60  >60 mL/min Final 01/15/2019  9:25  [address]   GFR African American >60  >60 mL/min Final 01/15/2019  9:25  [address]   GFR Comment        Final         Imaging:  Radiology Images and Reports reviewed where indicated and necessary  MRI of the lumbar  and cervical spines:     EXAMINATION:   MRI OF THE CERVICAL SPINE WITHOUT CONTRAST; MRI OF THE LUMBAR SPINE WITHOUT   CONTRAST 5/7/2019 4:25 pm; 5/7/2019 4:24 pm       TECHNIQUE:   Multiplanar multisequence MRI of the cervical spine was performed without the   administration of intravenous contrast.; Multiplanar multisequence MRI of the   lumbar spine was performed without the administration of intravenous contrast.       COMPARISON:   None.       HISTORY:   ORDERING SYSTEM PROVIDED HISTORY: Neck pain; ORDERING SYSTEM PROVIDED   HISTORY: Lumbar facet arthropathy       FINDINGS:   Cervical spine       BONES/ALIGNMENT: Straightening of the normal lordotic curvature is noted. There is no fracture.       SPINAL CORD: No focal cord signal hyperintensity is noted.       SOFT TISSUES: Small cervical nodes are present.       C2-C3: Minimal disc bulging is noted.  Mild facet hypertrophic changes are   noted       C3-C4: Mild disc bulging is noted.  Small left proximal foraminal protrusion   is noted.  Endplate, uncovertebral and facet hypertrophic changes are noted.    Moderate left foraminal narrowing is noted.       C4-C5: Mild-to-moderate diffuse disc bulging is noted. Candelario Skeens is a   questionable small proximal foraminal protrusion on the right.  Endplate,   uncovertebral and facet hypertrophic changes are noted.  Severe right and   moderate left foraminal narrowing is noted.  Ventral cord flattening is   noted.  Mild narrowing of the canal is noted       C5-C6: Mild disc bulging is noted diffusely. Candelario Skeens is a small proximal   foraminal protrusion on the left.  Endplate, uncovertebral and facet   hypertrophic changes are noted.  Minimal right and moderate left foraminal   narrowing is noted       C6-C7: Mild disc bulging is noted diffusely.  Endplate, uncovertebral and   facet hypertrophic changes are noted.  These are greater on the left. Moderate to severe left and mild-to-moderate right foraminal narrowing is   noted.       C7-T1: Very minimal disc bulging is noted.  Endplate, uncovertebral and facet   hypertrophic changes are noted. Genie Jordan is a suggested small left foraminal   protrusion.  There mild-to-moderate left foraminal narrowing.       MRI lumbar spine:       Bones: Mild lordotic curvature is noted.  There is no acute fracture. Minimal edema in the left L5 pedicle is noted.  Minimal T12 retrolisthesis is   noted.  Minimal L5 retrolisthesis is noted degenerative endplate signal   changes are greatest at T12-L1.       Soft tissues: No retroperitoneal mass lesion is noted.       Cord: Conus terminates at L1-L2 and is normal in appearance.       T12-L1: Uncovering of the disc is noted.  Minimal disc bulging is noted.    There is a small proximal foraminal protrusion on the right.       L1-2: Facet and ligament hypertrophic changes are noted       L2-3: Mild disc bulging is noted diffusely.  Annular tear is noted.  Left   foraminal broad-based protrusion is noted.  Minimal right and moderate left   foraminal narrowing is noted.  Facet and ligament hypertrophic changes are   noted.  Mild narrowing of the canal is noted       L3-4: Minimal disc bulging is noted diffusely.  Foraminal protrusions are   noted, left greater than right.  Mild right and mild-to-moderate left   foraminal narrowing is noted.  Facet and ligament hypertrophic changes are   noted.  Severe narrowing of the canal is noted       Mild disc bulging is noted with annular tear.  There is a small central and   right paracentral protrusion with minimal upward migration of extruded disc   material.  Mild to moderate proximal foraminal narrowing on the right is   noted.  Facet and ligament hypertrophic changes are noted.  Severe narrowing   of the canal is noted       L5-S1: Mild disc bulging is noted diffusely with annular tear.  Endplate   hypertrophic changes are noted.  Proximal foraminal narrowing is noted   bilaterally.  There is a questionable small proximal foraminal protrusion on   the left.  Bilateral facet hypertrophic changes are noted.           Impression   Cervical spine:       Multilevel degenerative disc disease in the cervical spine as described.  See   above for details of each level.       Lumbar spine:       Multilevel degenerative disc disease in the lower thoracic spine and the   lumbar spine as described.  See above for details of each level       Patient Active Problem List   Diagnosis    Hypothyroidism    Osteoarthritis    DJD (degenerative joint disease)    Sleep apnea    [facility]    Anxiety    Fibromyalgia    Chronic back pain    EBV (Brandon-Barr virus) viremia    Chronic sinusitis    Breast CA [facility]    Obesity, Class III, BMI 40-49.9 (morbid obesity) ([facility]    Leg edema, right    SVT (supraventricular tachycardia) ([facility]    Lumbar facet arthropathy    Muscle pain, lumbar    Sacroiliitis ([address]    Gynecomastia, female    Partial bowel obstruction ([address]    JUDSON BSO 2010 for benign Adnexal Mass per pt.  Family history of breast cancer    DDD (degenerative disc disease), lumbar    Encounter for medication monitoring    Chronic left shoulder pain    Neck pain    Numbness and tingling in left arm        ASSESSMENT     Hole is a 59 y.o. female with     1. Shoulder tendinitis, left    2. Lumbar facet arthropathy    3. Muscle pain, lumbar    4. Sacroiliitis ([address]    5. Encounter for medication monitoring    6. Neck pain    7. Numbness and tingling in left arm    8. DDD (degenerative disc disease), lumbar    9. Carpal tunnel syndrome of left wrist    10. Fibromyalgia    11.  Degenerative disc disease, depress breathing . Patient is advised to talk to the pharmacist or physicians if planning to take any over-the-counter medications before  takeing them. Patient is strongly advised to avoid tranquilizers or  Relaxants for any medications that can depress breathing or recreational drugs. Patient is also advised to tell us if there is any changes in his medications from other physicians. We discussed the same at today's visit and have not been to implement it, as the patient's pain is not under control with current medications. Patient is having increased pain in her left shoulder. The movements of the shoulder joint are restricted and painful. Patient is not able to function because of the pain in her shoulder. Conservative measures including activity modification exercises under medications are not helping the pain. Once we decided to try left shoulder joint injection to decrease the pain. The procedure risks as well as alternative therapies were discussed with the patient and the patient agreed to undergo the procedure. She will be scheduled for left shoulder joint injection in the near future. Orders Placed This Encounter   Procedures    Fluoro For Surgical Procedures     Standing Status:   Future     Standing Expiration Date:   5/23/2020    PT aquatic therapy     TBD by Pain Clinic MD     Standing Status:   Future     Standing Expiration Date:   5/22/2020    PT eval and treat     TBD by Pain Clinic MD     Standing Status:   Future     Standing Expiration Date:   5/22/2020    RI ARTHROCENTESIS ASPIR&/INJ MAJOR JT/BURSA W/O US       Decision Making Process : Patient's health history and referral records thoroughly reviewed before focused physical examination and discussion with patient. I have spent 25 Over 50% of today's visit is spent on examining the patient and counseling and coordinating the care. Level of complexity of date to be reviewed is Moderate.  The chart date reviewed include the following: Imaging Reports. Summary of Care. Time spent reviewing with patient the below reports:   Medication safety, Treatment options. Level of diagnosis and management options of this case is multiple: involving the following management options: Interventions as needed, medication management as appropriate, future visits, activity modification, heat/ice as needed, Urine drug screen as required. [x]The patient's questions were answered to the best of my abilities. This note was created using voice recognition software. There may be inaccuracies of transcription  that are inadvertently overlooked prior to the signature. There is any questions about the transcription please contact me. Return in  4 weeks  with  CNP  for further plan of treatment.          Electronically signed by Sancho Neal MD on 5/28/2019 at 5:10 AM

## 2019-05-28 ASSESSMENT — ENCOUNTER SYMPTOMS
VOMITING: 0
SHORTNESS OF BREATH: 0
DIARRHEA: 0
VISUAL CHANGE: 0
ABDOMINAL PAIN: 0
EYE PAIN: 0
BOWEL INCONTINENCE: 0
NAUSEA: 0
COUGH: 0

## 2019-06-17 ENCOUNTER — HOSPITAL ENCOUNTER (OUTPATIENT)
Dept: PAIN MANAGEMENT | Age: 64
Discharge: HOME | End: 2019-06-17
Payer: MEDICARE

## 2019-06-17 VITALS
DIASTOLIC BLOOD PRESSURE: 92 MMHG | OXYGEN SATURATION: 98 % | TEMPERATURE: 99.8 F | SYSTOLIC BLOOD PRESSURE: 143 MMHG | HEART RATE: 78 BPM

## 2019-06-17 DIAGNOSIS — M79.7 FIBROMYALGIA: ICD-10-CM

## 2019-06-17 DIAGNOSIS — R20.2 NUMBNESS AND TINGLING IN LEFT ARM: Chronic | ICD-10-CM

## 2019-06-17 DIAGNOSIS — N62 GYNECOMASTIA, FEMALE: ICD-10-CM

## 2019-06-17 DIAGNOSIS — M54.2 NECK PAIN: Chronic | ICD-10-CM

## 2019-06-17 DIAGNOSIS — M51.36 DDD (DEGENERATIVE DISC DISEASE), LUMBAR: ICD-10-CM

## 2019-06-17 DIAGNOSIS — M79.18 MUSCLE PAIN, LUMBAR: ICD-10-CM

## 2019-06-17 DIAGNOSIS — M77.8 SHOULDER TENDINITIS, LEFT: Primary | ICD-10-CM

## 2019-06-17 DIAGNOSIS — M47.816 LUMBAR FACET ARTHROPATHY: ICD-10-CM

## 2019-06-17 DIAGNOSIS — Z51.81 ENCOUNTER FOR MEDICATION MONITORING: ICD-10-CM

## 2019-06-17 DIAGNOSIS — M46.1 SACROILIITIS (HCC): ICD-10-CM

## 2019-06-17 DIAGNOSIS — R20.0 NUMBNESS AND TINGLING IN LEFT ARM: Chronic | ICD-10-CM

## 2019-06-17 PROCEDURE — 99213 OFFICE O/P EST LOW 20 MIN: CPT

## 2019-06-17 PROCEDURE — 99213 OFFICE O/P EST LOW 20 MIN: CPT | Performed by: NURSE PRACTITIONER

## 2019-06-17 RX ORDER — HYDROCODONE BITARTRATE AND ACETAMINOPHEN 5; 325 MG/1; MG/1
1 TABLET ORAL 2 TIMES DAILY PRN
Qty: 60 TABLET | Refills: 0 | Status: SHIPPED | OUTPATIENT
Start: 2019-07-05 | End: 2019-07-29 | Stop reason: SDUPTHER

## 2019-06-17 ASSESSMENT — ENCOUNTER SYMPTOMS
CONSTIPATION: 0
BACK PAIN: 1
SHORTNESS OF BREATH: 0
COUGH: 0

## 2019-06-17 NOTE — PROGRESS NOTES
Patient is here today to review medication contract. Chief Complaint: left shoulder pain    PMH: Pt reports chronic history of back pain, states she was diagnosed with fibromyalgia. Pain has been worse with colder [place]. She does see a chiropractor with adjustements heat and TENS tx which helps to control the pain. She is using pool at [facility] for [employer] and with the [employer] program can start land therapy as well.  Pt has tried lyrica but stopped due to weight gain and gabapentin was causing drowsiness. Pt also experienced side effects with Topamax. She is complaining of increased pain in the left shoulder that started a few months ago. She has limited ROM and increased pain upon palpitation. XR done 4/18/19 shows no acute abnormality. Cervical MRI shows Multilevel degenerative disc disease in the cervical spine. She followed up with Dr. Matt Salcedo last month and reviewed her MRI - he discussed left shoulder injection with patient - she is still considering this option. She was given an order for aquatic PT but states she does pool therapy with [employer] program already. A letter was dictated to her PCP from Dr. Matt Salcedo recommending breast reduction surgery. Back Pain   This is a chronic problem. The current episode started more than 1 year ago. The problem occurs intermittently. The problem is unchanged. The pain is present in the lumbar spine and thoracic spine. The quality of the pain is described as aching. The pain is at a severity of 4/10. The pain is mild. The symptoms are aggravated by position and standing. Pertinent negatives include no chest pain or fever. She has tried analgesics for the symptoms. The treatment provided mild relief. Neck Pain    This is a chronic problem. The current episode started more than 1 year ago. The problem occurs constantly. The problem has been unchanged. The pain is present in the midline and left side.  The quality of the pain is described as aching. The pain is at a severity of 5/10. The pain is moderate. The symptoms are aggravated by position and twisting. Pertinent negatives include no chest pain or fever. She has tried bed rest and oral narcotics for the symptoms. The treatment provided mild relief. Shoulder Pain    The pain is present in the left shoulder. This is a chronic problem. The current episode started more than 1 year ago. The problem occurs constantly. The problem has been unchanged. The quality of the pain is described as aching. The pain is at a severity of 5/10. The pain is moderate. Pertinent negatives include no fever. The symptoms are aggravated by activity. She has tried movement, rest and oral narcotics for the symptoms. The treatment provided mild relief. Patient denies any new neurological symptoms. No bowel or bladder incontinence, no weakness, and no falling. Pill count: appropriate    Morphine equivalent: 10    Periodic Controlled Substance Monitoring: Possible medication side effects, risk of tolerance/dependence & alternative treatments discussed., No signs of potential drug abuse or diversion identified. , Assessed functional status., Obtaining appropriate analgesic effect of treatment.  Darylene Player, APRN - CNP)      Past Medical History:   Diagnosis Date    Anxiety     Breast cancer ([address]     Breast cancer ([address]     Cancer ([address]     breast, in remission    Chronic back pain 12/3/2013    Depression     DJD (degenerative joint disease)     Family history of breast cancer     MGM    Fibromyalgia     Fibromyalgia     History of hysterectomy     Hypothyroidism     Obesity, Class III, BMI 40-49.9 (morbid obesity) ([address] 7/22/2014    Osteoarthritis     SVT (supraventricular tachycardia) ([address]     Unspecified sleep apnea        Past Surgical History:   Procedure Laterality Date    ABDOMEN SURGERY      APPENDECTOMY      BREAST SURGERY      lumpectomy w radiation 2009    CARDIAC CATHETERIZATION      with ablation    COLONOSCOPY  2009    10 years    COLONOSCOPY  08/2017   [facility] FINGER SURGERY  10/2017    trigger finger right hand    HERNIA REPAIR      umbilical    HYSTERECTOMY      total    JOINT REPLACEMENT      Right Knee 2014    KNEE ARTHROSCOPY      bilateral    KNEE SURGERY      x2    LAPAROSCOPIC APPENDECTOMY Right     TONSILLECTOMY      UMBILICAL HERNIA REPAIR N/A     WRIST SURGERY  04/12/2018    plate and screws right wrist       Allergies   Allergen Reactions    Augmentin [Amoxicillin-Pot Clavulanate] Nausea And Vomiting     Projectile vomiting    Sulfa Antibiotics Anaphylaxis    Amoxapine And Related Diarrhea and Nausea And Vomiting     AMOX TR-K- 875-125 mg    Loxapine Succinate Diarrhea and Nausea Only     AMOX TR-K- 875-125 mg         Current Outpatient Medications:     loratadine (CLARITIN) 10 MG capsule, Take 10 mg by mouth daily, Disp: , Rfl:     HYDROcodone-acetaminophen (NORCO) 5-325 MG per tablet, Take 1 tablet by mouth 2 times daily as needed for Pain for up to 30 days. , Disp: 60 tablet, Rfl: 0    meloxicam (MOBIC) 15 MG tablet, take 1 tablet by mouth once daily, Disp: 90 tablet, Rfl: 2    levothyroxine (SYNTHROID) 50 MCG tablet, take 1 tablet by mouth once daily, Disp: 90 tablet, Rfl: 1    venlafaxine (EFFEXOR XR) 150 MG extended release capsule, take 1 tablet by mouth once daily, Disp: 90 capsule, Rfl: 1    diclofenac (FLECTOR) 1.3 % patch, Place 1 patch onto the skin 2 times daily as needed (pain), Disp: 60 patch, Rfl: 0    ALPRAZolam (XANAX) 0.25 MG tablet, Take 0.25 mg by mouth nightly as needed for Sleep. prn ., Disp: , Rfl:     magnesium oxide (MAG-OX) 400 MG tablet, take 1 tablet by mouth twice a day if needed, Disp: , Rfl: 0    Carboxymeth-Glyc-Polysorb PF (REFRESH OPTIVE SHWETA-3) 0.5-1-0.5 % SOLN, Instill 1 drop to eye 2 (two) times a day., Disp: , Rfl:     Polyethylene Glycol 3350 (MIRALAX PO), Take by mouth Taking daily, Disp: , Rfl:    file       Review of Systems:  Review of Systems   Constitution: Negative for chills and fever. Cardiovascular: Negative for chest pain and irregular heartbeat. Respiratory: Negative for cough and shortness of breath. Musculoskeletal: Positive for back pain, joint pain and neck pain. Gastrointestinal: Negative for constipation. Neurological: Negative for disturbances in coordination and loss of balance. Physical Exam:  BP (!) 143/92   Pulse 78   Temp 99.8 °F (37.7 °C) (Oral)   LMP 06/13/2010 (Within Months)   SpO2 98%     Physical Exam   Constitutional: She is oriented to person, place, and time. HENT:   Head: Normocephalic. Eyes: EOM are normal.   Neck: Normal range of motion. Pulmonary/Chest: Effort normal.   Musculoskeletal:        Left shoulder: She exhibits decreased range of motion and pain. Cervical back: She exhibits tenderness and pain. Thoracic back: She exhibits tenderness and pain. Lumbar back: She exhibits pain. Neurological: She is alert and oriented to person, place, and time. Skin: Skin is warm and dry. Record/Diagnostics Review:    Last annabella 10/2018 and was appropriate     Assessment:  Problem List Items Addressed This Visit     Neck pain (Chronic)    Numbness and tingling in left arm (Chronic)    Lumbar facet arthropathy    Muscle pain, lumbar    Sacroiliitis (HCC)    Gynecomastia, female    DDD (degenerative disc disease), lumbar    Encounter for medication monitoring    Shoulder tendinitis, left - Primary           Treatment Plan:  Patient relates current medications are helping the pain. Patient reports taking pain medications as prescribed, denies obtaining medications from different sources and denies use of illegal drugs. Patient denies side effects from medications like nausea, vomiting, constipation or drowsiness. Patient reports current activities of daily living are possible due to medications and would like to continue them.      As always, we encourage daily stretching and strengthening exercises, and recommend minimizing use of pain medications unless patient cannot get through daily activities due to pain. Contract requirements met. Continue opioid therapy.  Script written for norco  Follow up appointment made for 4 weeks

## 2019-07-29 ENCOUNTER — HOSPITAL ENCOUNTER (OUTPATIENT)
Dept: PAIN MANAGEMENT | Age: 64
Discharge: HOME | End: 2019-07-29
Payer: MEDICARE

## 2019-07-29 VITALS
DIASTOLIC BLOOD PRESSURE: 73 MMHG | SYSTOLIC BLOOD PRESSURE: 146 MMHG | OXYGEN SATURATION: 97 % | HEART RATE: 79 BPM | TEMPERATURE: 98.6 F | RESPIRATION RATE: 18 BRPM

## 2019-07-29 DIAGNOSIS — G89.29 CHRONIC LEFT SHOULDER PAIN: Primary | Chronic | ICD-10-CM

## 2019-07-29 DIAGNOSIS — M79.7 FIBROMYALGIA: ICD-10-CM

## 2019-07-29 DIAGNOSIS — M54.2 NECK PAIN: Chronic | ICD-10-CM

## 2019-07-29 DIAGNOSIS — M47.816 LUMBAR FACET ARTHROPATHY: ICD-10-CM

## 2019-07-29 DIAGNOSIS — Z51.81 ENCOUNTER FOR MEDICATION MONITORING: ICD-10-CM

## 2019-07-29 DIAGNOSIS — M25.512 CHRONIC LEFT SHOULDER PAIN: Primary | Chronic | ICD-10-CM

## 2019-07-29 DIAGNOSIS — R20.2 NUMBNESS AND TINGLING IN LEFT ARM: Chronic | ICD-10-CM

## 2019-07-29 DIAGNOSIS — M15.9 PRIMARY OSTEOARTHRITIS INVOLVING MULTIPLE JOINTS: ICD-10-CM

## 2019-07-29 DIAGNOSIS — R20.0 NUMBNESS AND TINGLING IN LEFT ARM: Chronic | ICD-10-CM

## 2019-07-29 DIAGNOSIS — M79.18 MUSCLE PAIN, LUMBAR: ICD-10-CM

## 2019-07-29 DIAGNOSIS — M51.36 DDD (DEGENERATIVE DISC DISEASE), LUMBAR: ICD-10-CM

## 2019-07-29 PROCEDURE — 99213 OFFICE O/P EST LOW 20 MIN: CPT

## 2019-07-29 PROCEDURE — 99213 OFFICE O/P EST LOW 20 MIN: CPT | Performed by: NURSE PRACTITIONER

## 2019-07-29 RX ORDER — HYDROCODONE BITARTRATE AND ACETAMINOPHEN 5; 325 MG/1; MG/1
1 TABLET ORAL 2 TIMES DAILY PRN
Qty: 60 TABLET | Refills: 0 | Status: SHIPPED | OUTPATIENT
Start: 2019-08-04 | End: 2019-08-30 | Stop reason: SDUPTHER

## 2019-07-29 ASSESSMENT — ENCOUNTER SYMPTOMS
BACK PAIN: 1
CONSTIPATION: 0
SHORTNESS OF BREATH: 0
COUGH: 0

## 2019-07-29 NOTE — PROGRESS NOTES
Projectile vomiting    Sulfa Antibiotics Anaphylaxis    Amoxapine And Related Diarrhea and Nausea And Vomiting     AMOX TR-K- 875-125 mg    Loxapine Succinate Diarrhea and Nausea Only     AMOX TR-K- 875-125 mg         Current Outpatient Medications:     HYDROcodone-acetaminophen (NORCO) 5-325 MG per tablet, Take 1 tablet by mouth 2 times daily as needed for Pain for up to 30 days. , Disp: 60 tablet, Rfl: 0    loratadine (CLARITIN) 10 MG capsule, Take 10 mg by mouth daily, Disp: , Rfl:     meloxicam (MOBIC) 15 MG tablet, take 1 tablet by mouth once daily, Disp: 90 tablet, Rfl: 2    levothyroxine (SYNTHROID) 50 MCG tablet, take 1 tablet by mouth once daily, Disp: 90 tablet, Rfl: 1    venlafaxine (EFFEXOR XR) 150 MG extended release capsule, take 1 tablet by mouth once daily, Disp: 90 capsule, Rfl: 1    ALPRAZolam (XANAX) 0.25 MG tablet, Take 0.25 mg by mouth nightly as needed for Sleep. prn ., Disp: , Rfl:     magnesium oxide (MAG-OX) 400 MG tablet, take 1 tablet by mouth twice a day if needed, Disp: , Rfl: 0    Carboxymeth-Glyc-Polysorb PF (REFRESH OPTIVE SHWETA-3) 0.5-1-0.5 % SOLN, Instill 1 drop to eye 2 (two) times a day., Disp: , Rfl:     Polyethylene Glycol 3350 (MIRALAX PO), Take by mouth Taking daily, Disp: , Rfl:     fluticasone (FLONASE) 50 MCG/ACT nasal spray, 1 spray by Nasal route daily, Disp: 1 Bottle, Rfl: 3    flecainide (TAMBOCOR) 50 MG tablet, Take 50 mg by mouth 2 times daily. , Disp: , Rfl:     diclofenac (FLECTOR) 1.3 % patch, Place 1 patch onto the skin 2 times daily as needed (pain), Disp: 60 patch, Rfl: 0    Family History   Problem Relation Age of Onset    Cancer Mother         pancreatic cancer 78    Heart Disease Mother     COPD Father     COPD Brother     Breast Cancer Maternal Grandmother         dx unknown age    Husain Colon Cancer Maternal Grandfather         66-80s        Social History     Socioeconomic History    Marital status:       Spouse name: Not on file    Exam   Constitutional: She is oriented to person, place, and time. HENT:   Head: Normocephalic. Eyes: EOM are normal.   Neck: Normal range of motion. Pulmonary/Chest: Effort normal.   Musculoskeletal:        Left shoulder: She exhibits decreased range of motion and pain. Lumbar back: She exhibits tenderness and pain. Arms:  Neurological: She is alert and oriented to person, place, and time. Skin: Skin is warm and dry. Record/Diagnostics Review:    Last annabella 10/2018 and was appropriate    Left shoulder XR 2019 -       FINDINGS:  Glenohumeral joint is normally aligned.  No evidence of acute fracture or  dislocation.  No abnormal periarticular calcifications.  The AC joint is  unremarkable in appearance.     Visualized lung is unremarkable.        Impression  No acute abnormality.     MRI Lumbar and cervical 2019 -     Cervical spine:     Multilevel degenerative disc disease in the cervical spine as described.  See  above for details of each level.     Lumbar spine:     Multilevel degenerative disc disease in the lower thoracic spine and the  lumbar spine as described.  See above for details of each level      Assessment:  Problem List Items Addressed This Visit     Chronic left shoulder pain - Primary (Chronic)    Relevant Medications    HYDROcodone-acetaminophen (NORCO) 5-325 MG per tablet (Start on 8/4/2019)    Neck pain (Chronic)    Numbness and tingling in left arm (Chronic)    DJD (degenerative joint disease)    Relevant Medications    HYDROcodone-acetaminophen (NORCO) 5-325 MG per tablet (Start on 8/4/2019)    Fibromyalgia    Relevant Medications    HYDROcodone-acetaminophen ([address]) 5-325 MG per tablet (Start on 8/4/2019)    Lumbar facet arthropathy    Relevant Medications    HYDROcodone-acetaminophen ([address]) 5-325 MG per tablet (Start on 8/4/2019)    Muscle pain, lumbar    Relevant Medications    HYDROcodone-acetaminophen (NORCO) 5-325 MG per tablet (Start on 8/4/2019)    DDD

## 2019-08-30 ENCOUNTER — HOSPITAL ENCOUNTER (OUTPATIENT)
Dept: PAIN MANAGEMENT | Age: 64
Discharge: HOME | End: 2019-08-30
Payer: MEDICARE

## 2019-08-30 VITALS
SYSTOLIC BLOOD PRESSURE: 154 MMHG | HEART RATE: 75 BPM | RESPIRATION RATE: 16 BRPM | TEMPERATURE: 98.6 F | DIASTOLIC BLOOD PRESSURE: 81 MMHG

## 2019-08-30 DIAGNOSIS — M46.1 SACROILIITIS (HCC): ICD-10-CM

## 2019-08-30 DIAGNOSIS — Z51.81 ENCOUNTER FOR MEDICATION MONITORING: Primary | ICD-10-CM

## 2019-08-30 DIAGNOSIS — M79.7 FIBROMYALGIA: ICD-10-CM

## 2019-08-30 DIAGNOSIS — R20.2 NUMBNESS AND TINGLING IN LEFT ARM: Chronic | ICD-10-CM

## 2019-08-30 DIAGNOSIS — M79.18 MUSCLE PAIN, LUMBAR: ICD-10-CM

## 2019-08-30 DIAGNOSIS — R20.0 NUMBNESS AND TINGLING IN LEFT ARM: Chronic | ICD-10-CM

## 2019-08-30 DIAGNOSIS — M47.816 LUMBAR FACET ARTHROPATHY: ICD-10-CM

## 2019-08-30 DIAGNOSIS — M51.36 DDD (DEGENERATIVE DISC DISEASE), LUMBAR: ICD-10-CM

## 2019-08-30 DIAGNOSIS — M54.2 NECK PAIN: Chronic | ICD-10-CM

## 2019-08-30 PROCEDURE — 99214 OFFICE O/P EST MOD 30 MIN: CPT | Performed by: NURSE PRACTITIONER

## 2019-08-30 PROCEDURE — 99214 OFFICE O/P EST MOD 30 MIN: CPT

## 2019-08-30 RX ORDER — HYDROCODONE BITARTRATE AND ACETAMINOPHEN 5; 325 MG/1; MG/1
1 TABLET ORAL 2 TIMES DAILY PRN
Qty: 60 TABLET | Refills: 0 | Status: SHIPPED | OUTPATIENT
Start: 2019-09-07 | End: 2019-10-04 | Stop reason: SDUPTHER

## 2019-08-30 RX ORDER — AMITRIPTYLINE HYDROCHLORIDE 10 MG/1
10 TABLET, FILM COATED ORAL NIGHTLY PRN
Qty: 10 TABLET | Refills: 0 | Status: SHIPPED | OUTPATIENT
Start: 2019-08-30 | End: 2020-02-24 | Stop reason: ALTCHOICE

## 2019-08-30 ASSESSMENT — ENCOUNTER SYMPTOMS
BACK PAIN: 1
SHORTNESS OF BREATH: 0
CONSTIPATION: 0
COUGH: 0

## 2019-08-30 NOTE — PROGRESS NOTES
Patient is here today to review medication contract. Chief Complaint: left shoulder and low back pain    PMH: Pt reports chronic history of back pain, states she was diagnosed with fibromyalgia. Pain has been worse with colder [place]. She does see a chiropractor with adjustements heat and TENS tx which helps to control the pain. She is using pool at [facility] for [employer] and with the [employer] program can start land therapy as well.  Pt has tried lyrica but stopped due to weight gain and gabapentin was causing drowsiness. Pt also experienced side effects with Topamax. She has limited ROM left shoulder.  XR done 4/18/19 shows no acute abnormality. Cervical MRI shows Multilevel degenerative disc disease in the cervical spine. She followed up with Dr. Silvia Samaniego in May and reviewed her MRI - he discussed left shoulder injection with patient - she is still considering this option.  She was given an order for aquatic PT but states she does pool therapy with [employer] [facility] already. A letter was dictated to her PCP from Dr. Silvia Samaniego recommending breast reduction surgery. Today, she complains of increased pain shooting down her right leg to the foot. This pain is affecting her sleep. She states her sciatic nerve does \"flare up\" occasionally. She has had s/e with Lyrica, gabapentin and Topamax. Back Pain   This is a chronic problem. The current episode started more than 1 year ago. The problem occurs constantly. The problem is unchanged. The pain is present in the lumbar spine. The quality of the pain is described as shooting and aching. Radiates to: down right leg to foot. The pain is at a severity of 4/10. The pain is mild. The symptoms are aggravated by position and standing (walking). Associated symptoms include tingling. Pertinent negatives include no chest pain or fever. She has tried analgesics, bed rest, heat and home exercises for the symptoms.  The treatment provided mild norco  Start Elavil HS PRN   Follow up appointment made for 4 weeks

## 2019-10-04 DIAGNOSIS — M51.36 DDD (DEGENERATIVE DISC DISEASE), LUMBAR: ICD-10-CM

## 2019-10-04 DIAGNOSIS — M79.7 FIBROMYALGIA: ICD-10-CM

## 2019-10-04 DIAGNOSIS — M47.816 LUMBAR FACET ARTHROPATHY: ICD-10-CM

## 2019-10-04 RX ORDER — HYDROCODONE BITARTRATE AND ACETAMINOPHEN 5; 325 MG/1; MG/1
1 TABLET ORAL 2 TIMES DAILY PRN
Qty: 40 TABLET | Refills: 0 | Status: SHIPPED | OUTPATIENT
Start: 2019-10-07 | End: 2019-12-17

## 2019-10-28 ENCOUNTER — HOSPITAL ENCOUNTER (OUTPATIENT)
Dept: PAIN MANAGEMENT | Age: 64
Discharge: HOME | End: 2019-10-28
Payer: MEDICARE

## 2019-10-28 VITALS
OXYGEN SATURATION: 97 % | HEART RATE: 92 BPM | DIASTOLIC BLOOD PRESSURE: 77 MMHG | RESPIRATION RATE: 16 BRPM | SYSTOLIC BLOOD PRESSURE: 133 MMHG

## 2019-10-28 DIAGNOSIS — N62 GYNECOMASTIA, FEMALE: ICD-10-CM

## 2019-10-28 DIAGNOSIS — M79.7 FIBROMYALGIA: ICD-10-CM

## 2019-10-28 DIAGNOSIS — G89.29 CHRONIC LEFT SHOULDER PAIN: Chronic | ICD-10-CM

## 2019-10-28 DIAGNOSIS — Z51.81 ENCOUNTER FOR MEDICATION MONITORING: ICD-10-CM

## 2019-10-28 DIAGNOSIS — E66.01 OBESITY, CLASS III, BMI 40-49.9 (MORBID OBESITY) (HCC): ICD-10-CM

## 2019-10-28 DIAGNOSIS — M77.8 SHOULDER TENDINITIS, LEFT: Primary | ICD-10-CM

## 2019-10-28 DIAGNOSIS — M47.816 LUMBAR FACET ARTHROPATHY: ICD-10-CM

## 2019-10-28 DIAGNOSIS — M25.512 CHRONIC LEFT SHOULDER PAIN: Chronic | ICD-10-CM

## 2019-10-28 DIAGNOSIS — M54.2 NECK PAIN: Chronic | ICD-10-CM

## 2019-10-28 DIAGNOSIS — M46.1 SACROILIITIS (HCC): ICD-10-CM

## 2019-10-28 DIAGNOSIS — R20.0 NUMBNESS AND TINGLING IN LEFT ARM: Chronic | ICD-10-CM

## 2019-10-28 DIAGNOSIS — M79.18 MUSCLE PAIN, LUMBAR: ICD-10-CM

## 2019-10-28 DIAGNOSIS — M51.36 DDD (DEGENERATIVE DISC DISEASE), LUMBAR: ICD-10-CM

## 2019-10-28 DIAGNOSIS — R20.2 NUMBNESS AND TINGLING IN LEFT ARM: Chronic | ICD-10-CM

## 2019-10-28 PROCEDURE — 80307 DRUG TEST PRSMV CHEM ANLYZR: CPT

## 2019-10-28 PROCEDURE — 99213 OFFICE O/P EST LOW 20 MIN: CPT | Performed by: NURSE PRACTITIONER

## 2019-10-28 PROCEDURE — 99213 OFFICE O/P EST LOW 20 MIN: CPT

## 2019-10-28 RX ORDER — HYDROCODONE BITARTRATE AND ACETAMINOPHEN 5; 325 MG/1; MG/1
1 TABLET ORAL 2 TIMES DAILY PRN
Qty: 60 TABLET | Refills: 0 | Status: SHIPPED | OUTPATIENT
Start: 2019-10-28 | End: 2019-11-25 | Stop reason: SDUPTHER

## 2019-10-28 ASSESSMENT — ENCOUNTER SYMPTOMS
COUGH: 0
SHORTNESS OF BREATH: 0
BACK PAIN: 1
CONSTIPATION: 0

## 2019-11-01 LAB
6-ACETYLMORPHINE, UR: NOT DETECTED
7-AMINOCLONAZEPAM, URINE: NOT DETECTED
ALPHA-OH-ALPRAZ, URINE: NOT DETECTED
ALPRAZOLAM, URINE: NOT DETECTED
AMPHETAMINES, URINE: NOT DETECTED
BARBITURATES, URINE: NOT DETECTED
BENZOYLECGONINE, UR: NOT DETECTED
BUPRENORPHINE URINE: NOT DETECTED
CARISOPRODOL, UR: NOT DETECTED
CLONAZEPAM, URINE: NOT DETECTED
CODEINE, URINE: NOT DETECTED
CREATININE URINE: 70.1 MG/DL (ref 20–400)
DIAZEPAM, URINE: NOT DETECTED
DRUGS EXPECTED, UR: NORMAL
EER HI RES INTERP UR: NORMAL
ETHYL GLUCURONIDE UR: NOT DETECTED
FENTANYL URINE: NOT DETECTED
HYDROCODONE, URINE: PRESENT
HYDROMORPHONE, URINE: NOT DETECTED
LORAZEPAM, URINE: NOT DETECTED
MARIJUANA METAB, UR: NOT DETECTED
MDA, UR: NOT DETECTED
MDEA, EVE, UR: NOT DETECTED
MDMA URINE: NOT DETECTED
MEPERIDINE METAB, UR: NOT DETECTED
METHADONE, URINE: NOT DETECTED
METHAMPHETAMINE, URINE: NOT DETECTED
METHYLPHENIDATE: NOT DETECTED
MIDAZOLAM, URINE: NOT DETECTED
MORPHINE URINE: NOT DETECTED
NORBUPRENORPHINE, URINE: NOT DETECTED
NORDIAZEPAM, URINE: NOT DETECTED
NORFENTANYL, URINE: NOT DETECTED
NORHYDROCODONE, URINE: PRESENT
NOROXYCODONE, URINE: NOT DETECTED
NOROXYMORPHONE, URINE: NOT DETECTED
OXAZEPAM, URINE: NOT DETECTED
OXYCODONE URINE: NOT DETECTED
OXYMORPHONE, URINE: NOT DETECTED
PAIN MANAGEMENT DRUG PANEL INTERP, URINE: NORMAL
PAIN MGT DRUG PANEL, HI RES, UR: NORMAL
PCP,URINE: NOT DETECTED
PHENTERMINE, UR: NOT DETECTED
PROPOXYPHENE, URINE: NOT DETECTED
TAPENTADOL, URINE: NOT DETECTED
TAPENTADOL-O-SULFATE, URINE: NOT DETECTED
TEMAZEPAM, URINE: NOT DETECTED
TRAMADOL, URINE: NOT DETECTED
ZOLPIDEM, URINE: NOT DETECTED

## 2019-11-25 ENCOUNTER — HOSPITAL ENCOUNTER (OUTPATIENT)
Dept: PAIN MANAGEMENT | Age: 64
Discharge: HOME | End: 2019-11-25
Payer: MEDICARE

## 2019-11-25 VITALS
OXYGEN SATURATION: 96 % | SYSTOLIC BLOOD PRESSURE: 137 MMHG | HEART RATE: 82 BPM | RESPIRATION RATE: 16 BRPM | DIASTOLIC BLOOD PRESSURE: 86 MMHG

## 2019-11-25 DIAGNOSIS — M79.7 FIBROMYALGIA: ICD-10-CM

## 2019-11-25 DIAGNOSIS — M51.36 DDD (DEGENERATIVE DISC DISEASE), LUMBAR: ICD-10-CM

## 2019-11-25 DIAGNOSIS — G89.29 CHRONIC LEFT SHOULDER PAIN: Primary | Chronic | ICD-10-CM

## 2019-11-25 DIAGNOSIS — M47.816 LUMBAR FACET ARTHROPATHY: ICD-10-CM

## 2019-11-25 DIAGNOSIS — M79.18 MUSCLE PAIN, LUMBAR: ICD-10-CM

## 2019-11-25 DIAGNOSIS — M77.8 SHOULDER TENDINITIS, LEFT: ICD-10-CM

## 2019-11-25 DIAGNOSIS — M25.512 CHRONIC LEFT SHOULDER PAIN: Primary | Chronic | ICD-10-CM

## 2019-11-25 DIAGNOSIS — N62 GYNECOMASTIA, FEMALE: ICD-10-CM

## 2019-11-25 DIAGNOSIS — M46.1 SACROILIITIS (HCC): ICD-10-CM

## 2019-11-25 DIAGNOSIS — Z51.81 ENCOUNTER FOR MEDICATION MONITORING: ICD-10-CM

## 2019-11-25 PROCEDURE — 99213 OFFICE O/P EST LOW 20 MIN: CPT | Performed by: NURSE PRACTITIONER

## 2019-11-25 PROCEDURE — 99213 OFFICE O/P EST LOW 20 MIN: CPT

## 2019-11-25 RX ORDER — HYDROCODONE BITARTRATE AND ACETAMINOPHEN 5; 325 MG/1; MG/1
1 TABLET ORAL 2 TIMES DAILY PRN
Qty: 60 TABLET | Refills: 0 | Status: SHIPPED | OUTPATIENT
Start: 2019-11-27 | End: 2019-12-17 | Stop reason: SDUPTHER

## 2019-11-25 ASSESSMENT — ENCOUNTER SYMPTOMS
COUGH: 0
SHORTNESS OF BREATH: 0
BACK PAIN: 1
CONSTIPATION: 0

## 2019-12-17 ENCOUNTER — HOSPITAL ENCOUNTER (OUTPATIENT)
Dept: PAIN MANAGEMENT | Age: 64
Discharge: HOME | End: 2019-12-17
Payer: MEDICARE

## 2019-12-17 VITALS
BODY MASS INDEX: 40.67 KG/M2 | RESPIRATION RATE: 16 BRPM | OXYGEN SATURATION: 98 % | TEMPERATURE: 99.3 F | WEIGHT: 221 LBS | HEIGHT: 62 IN | HEART RATE: 81 BPM | DIASTOLIC BLOOD PRESSURE: 82 MMHG | SYSTOLIC BLOOD PRESSURE: 147 MMHG

## 2019-12-17 DIAGNOSIS — M47.816 LUMBAR FACET ARTHROPATHY: ICD-10-CM

## 2019-12-17 DIAGNOSIS — M77.8 SHOULDER TENDINITIS, LEFT: Primary | ICD-10-CM

## 2019-12-17 DIAGNOSIS — Z51.81 ENCOUNTER FOR MEDICATION MONITORING: ICD-10-CM

## 2019-12-17 DIAGNOSIS — M79.7 FIBROMYALGIA: ICD-10-CM

## 2019-12-17 DIAGNOSIS — M54.12 CERVICAL RADICULOPATHY: ICD-10-CM

## 2019-12-17 DIAGNOSIS — M47.812 CERVICAL SPONDYLOSIS: ICD-10-CM

## 2019-12-17 DIAGNOSIS — M50.30 DEGENERATIVE DISC DISEASE, CERVICAL: ICD-10-CM

## 2019-12-17 DIAGNOSIS — G89.29 CHRONIC LEFT SHOULDER PAIN: ICD-10-CM

## 2019-12-17 DIAGNOSIS — M25.512 CHRONIC LEFT SHOULDER PAIN: ICD-10-CM

## 2019-12-17 DIAGNOSIS — M51.36 DDD (DEGENERATIVE DISC DISEASE), LUMBAR: ICD-10-CM

## 2019-12-17 PROCEDURE — 99214 OFFICE O/P EST MOD 30 MIN: CPT | Performed by: PAIN MEDICINE

## 2019-12-17 PROCEDURE — 99213 OFFICE O/P EST LOW 20 MIN: CPT

## 2019-12-17 RX ORDER — HYDROCODONE BITARTRATE AND ACETAMINOPHEN 5; 325 MG/1; MG/1
1 TABLET ORAL 2 TIMES DAILY PRN
Qty: 60 TABLET | Refills: 0 | Status: SHIPPED | OUTPATIENT
Start: 2019-12-27 | End: 2020-01-22 | Stop reason: SDUPTHER

## 2019-12-17 ASSESSMENT — PAIN SCALES - GENERAL: PAINLEVEL_OUTOF10: 5

## 2019-12-17 ASSESSMENT — PAIN DESCRIPTION - FREQUENCY: FREQUENCY: CONTINUOUS

## 2019-12-17 ASSESSMENT — PAIN DESCRIPTION - ONSET: ONSET: ON-GOING

## 2019-12-17 ASSESSMENT — PAIN DESCRIPTION - PROGRESSION: CLINICAL_PROGRESSION: NOT CHANGED

## 2019-12-17 ASSESSMENT — ENCOUNTER SYMPTOMS
CONSTIPATION: 1
RESPIRATORY NEGATIVE: 1
ALLERGIC/IMMUNOLOGIC NEGATIVE: 1
BACK PAIN: 1
EYES NEGATIVE: 1

## 2019-12-17 ASSESSMENT — PAIN DESCRIPTION - PAIN TYPE: TYPE: CHRONIC PAIN

## 2019-12-17 ASSESSMENT — PAIN DESCRIPTION - DESCRIPTORS: DESCRIPTORS: ACHING;BURNING;CONSTANT;RADIATING

## 2019-12-17 ASSESSMENT — PAIN DESCRIPTION - ORIENTATION: ORIENTATION: RIGHT;LEFT;LOWER;UPPER

## 2019-12-18 ASSESSMENT — ENCOUNTER SYMPTOMS
ABDOMINAL PAIN: 0
SHORTNESS OF BREATH: 0
VOMITING: 0
NAUSEA: 0
EYE PAIN: 0
VISUAL CHANGE: 0
PHOTOPHOBIA: 0
BOWEL INCONTINENCE: 0
COUGH: 0

## 2020-01-21 ENCOUNTER — HOSPITAL ENCOUNTER (OUTPATIENT)
Dept: MAMMOGRAPHY | Age: 65
Discharge: HOME | End: 2020-01-23
Payer: MEDICARE

## 2020-01-21 PROCEDURE — 77063 BREAST TOMOSYNTHESIS BI: CPT

## 2020-01-22 ENCOUNTER — HOSPITAL ENCOUNTER (OUTPATIENT)
Dept: PAIN MANAGEMENT | Age: 65
Discharge: HOME | End: 2020-01-22
Payer: MEDICARE

## 2020-01-22 VITALS
HEIGHT: 62 IN | HEART RATE: 74 BPM | SYSTOLIC BLOOD PRESSURE: 136 MMHG | WEIGHT: 225 LBS | BODY MASS INDEX: 41.41 KG/M2 | DIASTOLIC BLOOD PRESSURE: 74 MMHG | TEMPERATURE: 98.9 F

## 2020-01-22 PROCEDURE — 99213 OFFICE O/P EST LOW 20 MIN: CPT

## 2020-01-22 PROCEDURE — 99213 OFFICE O/P EST LOW 20 MIN: CPT | Performed by: NURSE PRACTITIONER

## 2020-01-22 PROCEDURE — 80307 DRUG TEST PRSMV CHEM ANLYZR: CPT

## 2020-01-22 RX ORDER — HYDROCODONE BITARTRATE AND ACETAMINOPHEN 5; 325 MG/1; MG/1
1 TABLET ORAL 2 TIMES DAILY PRN
Qty: 60 TABLET | Refills: 0 | Status: SHIPPED | OUTPATIENT
Start: 2020-01-28 | End: 2020-02-24 | Stop reason: SDUPTHER

## 2020-01-22 ASSESSMENT — ENCOUNTER SYMPTOMS
CONSTIPATION: 1
RESPIRATORY NEGATIVE: 1
BACK PAIN: 1

## 2020-01-22 NOTE — PROGRESS NOTES
Amphetamines, urine Not Detected   Final 10/28/2019  4:00 PM [facility]   Barbiturates, Ur Not Detected   Final 10/28/2019  4:00 PM [facility]   Benzoylecgonine, Ur Not Detected   Final 10/28/2019  4:00 PM [facility]   Buprenorphine Urine Not Detected   Final 10/28/2019  4:00 PM [facility]   Carisoprodol, Ur Not Detected   Final 10/28/2019  4:00 PM [facility]   (NOTE)   The carisoprodol immunoassay has cross-reactivity to carisoprodol   and meprobamate.     Clonazepam, Urine Not Detected   Final 10/28/2019  4:00 PM [facility]   Codeine, Urine Not Detected   Final 10/28/2019  4:00 PM [facility]   MDA, Ur Not Detected   Final 10/28/2019  4:00 PM [facility]   Diazepam, Urine Not Detected   Final 10/28/2019  4:00 PM [facility]   Ethyl Glucuronide Ur Not Detected   Final 10/28/2019  4:00 PM [facility]   Fentanyl, Ur Not Detected   Final 10/28/2019  4:00 PM [facility]   Hydrocodone, Urine Present   Final 10/28/2019  4:00 PM [facility]   Hydromorphone, Urine Not Detected   Final 10/28/2019  4:00 PM [facility]   Lorazepam, Urine Not Detected   Final 10/28/2019  4:00 PM [facility]   Marijuana Metab, Ur Not Detected   Final 10/28/2019  4:00 PM [facility]   MDEA, MENDY, Ur Not Detected   Final 10/28/2019  4:00 PM [facility]   MDMA, Urine Not Detected   Final 10/28/2019  4:00 PM [facility]   Meperidine Metab, Ur Not Detected   Final 10/28/2019  4:00 PM [facility]   Methadone, Urine Not Detected   Final 10/28/2019  4:00 PM [facility]   Methamphetamine, Urine Not Detected   Final 10/28/2019  4:00 PM [facility]   Methylphenidate Not Detected   Final 10/28/2019  4:00 PM [facility]   Midazolam, Urine Not Detected   Final 10/28/2019  4:00 PM [facility]   Morphine Urine Not Detected   Final 10/28/2019  4:00 PM [facility]   Norbuprenorphine, Urine Not Detected   Final 10/28/2019  4:00 PM [facility]   Nordiazepam, Urine Not Detected   Final 10/28/2019  4:00 PM [facility]   Norfentanyl, Urine Not Detected   Final 10/28/2019  4:00 PM [facility]   NORHYDROCODONE, URINE Present   Final 10/28/2019  4:00 PM [facility]   Noroxycodone, Urine Not Detected   Final 10/28/2019  4:00 PM [facility]   NOROXYMORPHONE, ARTHROSCOPY      bilateral    KNEE SURGERY      x2    LAPAROSCOPIC APPENDECTOMY Right     TONSILLECTOMY      UMBILICAL HERNIA REPAIR N/A     WRIST SURGERY  04/12/2018    plate and screws right wrist       Allergies   Allergen Reactions    Augmentin [Amoxicillin-Pot Clavulanate] Nausea And Vomiting     Projectile vomiting    Sulfa Antibiotics Anaphylaxis    Amoxapine And Related Diarrhea and Nausea And Vomiting     AMOX TR-K- 875-125 mg    Loxapine Succinate Diarrhea and Nausea Only     AMOX TR-K- 875-125 mg         Current Outpatient Medications:     [START ON 1/28/2020] HYDROcodone-acetaminophen (NORCO) 5-325 MG per tablet, Take 1 tablet by mouth 2 times daily as needed for Pain for up to 30 days. , Disp: 60 tablet, Rfl: 0    meloxicam (MOBIC) 15 MG tablet, take 1 tablet by mouth once daily, Disp: 90 tablet, Rfl: 2    venlafaxine (EFFEXOR XR) 150 MG extended release capsule, take 1 capsule by mouth once daily, Disp: 90 capsule, Rfl: 1    levothyroxine (SYNTHROID) 50 MCG tablet, take 1 tablet by mouth once daily, Disp: 90 tablet, Rfl: 1    loratadine (CLARITIN) 10 MG capsule, Take 10 mg by mouth daily, Disp: , Rfl:     magnesium oxide (MAG-OX) 400 MG tablet, take 1 tablet by mouth twice a day if needed, Disp: , Rfl: 0    flecainide (TAMBOCOR) 50 MG tablet, Take 50 mg by mouth 2 times daily. , Disp: , Rfl:     amitriptyline (ELAVIL) 10 MG tablet, Take 1 tablet by mouth nightly as needed for Sleep, Disp: 10 tablet, Rfl: 0    Carboxymeth-Glyc-Polysorb PF (REFRESH OPTIVE SHWETA-3) 0.5-1-0.5 % SOLN, Instill 1 drop to eye 2 (two) times a day., Disp: , Rfl:     Polyethylene Glycol 3350 (MIRALAX PO), Take by mouth Taking daily, Disp: , Rfl:     Family History   Problem Relation Age of Onset    Cancer Mother         pancreatic cancer 78    Heart Disease Mother     COPD Father     COPD Brother     Breast Cancer Maternal Grandmother         dx unknown age    Murphy Mary Colon Cancer Maternal Grandfather death. Patient was told to tell  all  physicians regarding the medications he is getting from pain clinic. Patient is warned not to take any unprescribed medications over-the-countermedications that can depress breathing . Patient is advised to talk to the pharmacist or physicians if planning to take any over-the-counter medications before  takeing them. Patient is strongly advised to avoid tranquilizers or  relaxants, illegal drugs  or any medications that can depress breathing  Patient is also advised to tell us if there is any changes in their medications from other physicians.     1. UDT today, had hydrocodone at 1: 15 pm today    TREATMENT OPTIONS:     Return in 4 weeks  Medication Agreement Requirements Met  Continue Opioid therapy  Script written for hydrocodone  Follow up appointment made

## 2020-01-26 LAB
6-ACETYLMORPHINE, UR: NOT DETECTED
7-AMINOCLONAZEPAM, URINE: NOT DETECTED
ALPHA-OH-ALPRAZ, URINE: NOT DETECTED
ALPRAZOLAM, URINE: NOT DETECTED
AMPHETAMINES, URINE: NOT DETECTED
BARBITURATES, URINE: NOT DETECTED
BENZOYLECGONINE, UR: NOT DETECTED
BUPRENORPHINE URINE: NOT DETECTED
CARISOPRODOL, UR: NOT DETECTED
CLONAZEPAM, URINE: NOT DETECTED
CODEINE, URINE: NOT DETECTED
CREATININE URINE: 36.8 MG/DL (ref 20–400)
DIAZEPAM, URINE: NOT DETECTED
DRUGS EXPECTED, UR: NORMAL
EER HI RES INTERP UR: NORMAL
ETHYL GLUCURONIDE UR: NOT DETECTED
FENTANYL URINE: NOT DETECTED
HYDROCODONE, URINE: NOT DETECTED
HYDROMORPHONE, URINE: NOT DETECTED
LORAZEPAM, URINE: NOT DETECTED
MARIJUANA METAB, UR: NOT DETECTED
MDA, UR: NOT DETECTED
MDEA, EVE, UR: NOT DETECTED
MDMA URINE: NOT DETECTED
MEPERIDINE METAB, UR: NOT DETECTED
METHADONE, URINE: NOT DETECTED
METHAMPHETAMINE, URINE: NOT DETECTED
METHYLPHENIDATE: NOT DETECTED
MIDAZOLAM, URINE: NOT DETECTED
MORPHINE URINE: NOT DETECTED
NORBUPRENORPHINE, URINE: NOT DETECTED
NORDIAZEPAM, URINE: NOT DETECTED
NORFENTANYL, URINE: NOT DETECTED
NORHYDROCODONE, URINE: PRESENT
NOROXYCODONE, URINE: NOT DETECTED
NOROXYMORPHONE, URINE: NOT DETECTED
OXAZEPAM, URINE: NOT DETECTED
OXYCODONE URINE: NOT DETECTED
OXYMORPHONE, URINE: NOT DETECTED
PAIN MANAGEMENT DRUG PANEL INTERP, URINE: NORMAL
PAIN MGT DRUG PANEL, HI RES, UR: NORMAL
PCP,URINE: NOT DETECTED
PHENTERMINE, UR: NOT DETECTED
PROPOXYPHENE, URINE: NOT DETECTED
TAPENTADOL, URINE: NOT DETECTED
TAPENTADOL-O-SULFATE, URINE: NOT DETECTED
TEMAZEPAM, URINE: NOT DETECTED
TRAMADOL, URINE: NOT DETECTED
ZOLPIDEM, URINE: NOT DETECTED

## 2020-02-24 ENCOUNTER — HOSPITAL ENCOUNTER (OUTPATIENT)
Dept: PAIN MANAGEMENT | Age: 65
Discharge: HOME | End: 2020-02-24
Payer: MEDICARE

## 2020-02-24 VITALS
OXYGEN SATURATION: 96 % | HEART RATE: 74 BPM | SYSTOLIC BLOOD PRESSURE: 155 MMHG | RESPIRATION RATE: 16 BRPM | DIASTOLIC BLOOD PRESSURE: 75 MMHG

## 2020-02-24 PROCEDURE — 99214 OFFICE O/P EST MOD 30 MIN: CPT | Performed by: NURSE PRACTITIONER

## 2020-02-24 PROCEDURE — 99214 OFFICE O/P EST MOD 30 MIN: CPT

## 2020-02-24 RX ORDER — HYDROCODONE BITARTRATE AND ACETAMINOPHEN 5; 325 MG/1; MG/1
1 TABLET ORAL 2 TIMES DAILY PRN
Qty: 60 TABLET | Refills: 0 | Status: SHIPPED | OUTPATIENT
Start: 2020-03-02 | End: 2020-03-27 | Stop reason: SDUPTHER

## 2020-02-24 RX ORDER — MELOXICAM 15 MG/1
TABLET ORAL
Qty: 30 TABLET | Refills: 0 | Status: SHIPPED | OUTPATIENT
Start: 2020-02-24 | End: 2020-09-21 | Stop reason: SDUPTHER

## 2020-02-24 ASSESSMENT — ENCOUNTER SYMPTOMS
COUGH: 0
CONSTIPATION: 0
BACK PAIN: 1
SHORTNESS OF BREATH: 0

## 2020-02-24 NOTE — PROGRESS NOTES
Patient is here today to review medication contract. Chief Complaint: back pain    PMH: Pt reports chronic history of back pain, states she was diagnosed with fibromyalgia. Pain has been worse with colder [place]. She does see a chiropractor with adjustements heat and TENS tx which helps to control the pain.  Pt has tried lyrica but stopped due to weight gain and gabapentin was causing drowsiness. Pt also experienced side effects with Topamax. She has limited ROM left shoulder.  XR done 4/18/19 shows no acute abnormality. Cervical MRI shows Multilevel degenerative disc disease in the cervical spine. She has been falling recently. C/o occasional dizziness. Will follow up with PCP next month. TENS unit ordered per patient request.     Back Pain   This is a chronic problem. The current episode started more than 1 year ago. The problem occurs constantly. The problem is unchanged. The pain is present in the lumbar spine. The quality of the pain is described as aching. The pain does not radiate. The pain is at a severity of 5/10. The pain is moderate. The symptoms are aggravated by position and standing (walking). Pertinent negatives include no chest pain or fever. Patient denies any new neurological symptoms. No bowel or bladder incontinence, no weakness, and no falling. Pill count: appropriate    Morphine equivalent: 10    Controlled Substance Monitoring:    Acute and Chronic Pain Monitoring:   RX Monitoring 2/24/2020   Attestation -   Acute Pain Prescriptions -   Periodic Controlled Substance Monitoring Possible medication side effects, risk of tolerance/dependence & alternative treatments discussed. ;No signs of potential drug abuse or diversion identified. ;Assessed functional status. ;Obtaining appropriate analgesic effect of treatment.    Chronic Pain > 50 MEDD -   Chronic Pain > 80 MEDD -         Past Medical History:   Diagnosis Date    Anxiety     [facility] [facility]     Breast cancer ([facility] [facility]     Cancer ([facility] [address]     breast, in remission    Chronic back pain 12/3/2013    Depression     DJD (degenerative joint disease)     Family history of breast cancer     MGM    Fibromyalgia     Fibromyalgia     History of hysterectomy     Hypothyroidism     Obesity, Class III, BMI 40-49.9 (morbid obesity) ([facility] [address] [facility] 7/22/2014    Osteoarthritis     SVT (supraventricular tachycardia) ([facility] [address] [facility]     Unspecified sleep apnea        Past Surgical History:   Procedure Laterality Date    ABDOMEN SURGERY      APPENDECTOMY      BREAST SURGERY      lumpectomy w radiation 2009    CARDIAC CATHETERIZATION      with ablation    COLONOSCOPY  2009    10 years    COLONOSCOPY  08/2017   Keshawn Parody FINGER SURGERY  10/2017    trigger finger right hand    HERNIA REPAIR      umbilical    HYSTERECTOMY      total    JOINT REPLACEMENT      Right Knee 2014    KNEE ARTHROSCOPY      bilateral    KNEE SURGERY      x2    LAPAROSCOPIC APPENDECTOMY Right     TONSILLECTOMY      UMBILICAL HERNIA REPAIR N/A     WRIST SURGERY  04/12/2018    plate and screws right wrist       Allergies   Allergen Reactions    Augmentin [Amoxicillin-Pot Clavulanate] Nausea And Vomiting     Projectile vomiting    Sulfa Antibiotics Anaphylaxis    Amoxapine And Related Diarrhea and Nausea And Vomiting     AMOX TR-K- 875-125 mg    Loxapine Succinate Diarrhea and Nausea Only     AMOX TR-K- 875-125 mg         Current Outpatient Medications:     venlafaxine (EFFEXOR XR) 150 MG extended release capsule, take 1 capsule by mouth once daily, Disp: 90 capsule, Rfl: 1    HYDROcodone-acetaminophen (NORCO) 5-325 MG per tablet, Take 1 tablet by mouth 2 times daily as needed for Pain for up to 30 days. , Disp: 60 tablet, Rfl: 0    meloxicam (MOBIC) 15 MG tablet, take 1 tablet by mouth once daily, Disp: 90 tablet, Rfl: 2    levothyroxine (SYNTHROID) 50 MCG tablet, take 1 tablet by mouth once daily, Disp: 90 tablet, Rfl: 1    loratadine (CLARITIN) 10 MG capsule, Take 10 mg by mouth on file     Forced sexual activity: Not on file   Other Topics Concern    Not on file   Social History Narrative    Not on file       Review of Systems:  Review of Systems   Constitution: Negative for chills and fever. Cardiovascular: Negative for chest pain and irregular heartbeat. Respiratory: Negative for cough and shortness of breath. Musculoskeletal: Positive for back pain. Gastrointestinal: Negative for constipation. Neurological: Positive for dizziness and loss of balance. Negative for disturbances in coordination. Physical Exam:  BP (!) 155/75   Pulse 74   Resp 16   LMP 06/13/2010 (Within Months)   SpO2 96%     Physical Exam  HENT:      Head: Normocephalic. Neck:      Musculoskeletal: Normal range of motion. Pulmonary:      Effort: Pulmonary effort is normal.   Musculoskeletal: Normal range of motion. Lumbar back: She exhibits tenderness and pain. Skin:     General: Skin is warm and dry. Neurological:      Mental Status: She is alert and oriented to person, place, and time.          Record/Diagnostics Review:    Last annabella 1/2020 and was appropriate     Assessment:  Problem List Items Addressed This Visit     Chronic left shoulder pain (Chronic)    Relevant Medications    HYDROcodone-acetaminophen (NORCO) 5-325 MG per tablet (Start on 3/2/2020)    meloxicam (MOBIC) 15 MG tablet    Neck pain (Chronic)    Numbness and tingling in left arm (Chronic)    DJD (degenerative joint disease)    Relevant Medications    HYDROcodone-acetaminophen (NORCO) 5-325 MG per tablet (Start on 3/2/2020)    meloxicam (MOBIC) 15 MG tablet    Obesity, Class III, BMI 40-49.9 (morbid obesity) ([facility] [address]    Fibromyalgia    Relevant Medications    HYDROcodone-acetaminophen (NORCO) 5-325 MG per tablet (Start on 3/2/2020)    Lumbar facet arthropathy    Relevant Medications    HYDROcodone-acetaminophen (NORCO) 5-325 MG per tablet (Start on 3/2/2020)    Muscle pain, lumbar    Relevant Medications

## 2020-02-25 ENCOUNTER — HOSPITAL ENCOUNTER (OUTPATIENT)
Age: 65
Discharge: HOME | End: 2020-02-25
Payer: MEDICARE

## 2020-02-25 LAB
ALBUMIN SERPL-MCNC: 4.1 G/DL (ref 3.5–5.2)
ALBUMIN/GLOBULIN RATIO: 1.5 (ref 1–2.5)
ALP BLD-CCNC: 81 U/L (ref 35–104)
ALT SERPL-CCNC: 16 U/L (ref 5–33)
ANION GAP SERPL CALCULATED.3IONS-SCNC: 14 MMOL/L (ref 9–17)
AST SERPL-CCNC: 19 U/L
BILIRUB SERPL-MCNC: 0.28 MG/DL (ref 0.3–1.2)
BUN BLDV-MCNC: 23 MG/DL (ref 8–23)
BUN/CREAT BLD: ABNORMAL (ref 9–20)
CALCIUM SERPL-MCNC: 8.7 MG/DL (ref 8.6–10.4)
CHLORIDE BLD-SCNC: 107 MMOL/L (ref 98–107)
CO2: 22 MMOL/L (ref 20–31)
CREAT SERPL-MCNC: 0.55 MG/DL (ref 0.5–0.9)
GFR AFRICAN AMERICAN: >60 ML/MIN
GFR NON-AFRICAN AMERICAN: >60 ML/MIN
GFR SERPL CREATININE-BSD FRML MDRD: ABNORMAL ML/MIN/{1.73_M2}
GFR SERPL CREATININE-BSD FRML MDRD: ABNORMAL ML/MIN/{1.73_M2}
GLUCOSE BLD-MCNC: 90 MG/DL (ref 70–99)
HCT VFR BLD CALC: 43.3 % (ref 36.3–47.1)
HEMOGLOBIN: 13.6 G/DL (ref 11.9–15.1)
MCH RBC QN AUTO: 29.7 PG (ref 25.2–33.5)
MCHC RBC AUTO-ENTMCNC: 31.4 G/DL (ref 28.4–34.8)
MCV RBC AUTO: 94.5 FL (ref 82.6–102.9)
NRBC AUTOMATED: 0 PER 100 WBC
PDW BLD-RTO: 13.8 % (ref 11.8–14.4)
PLATELET # BLD: 200 K/UL (ref 138–453)
PMV BLD AUTO: 9.9 FL (ref 8.1–13.5)
POTASSIUM SERPL-SCNC: 4.4 MMOL/L (ref 3.7–5.3)
RBC # BLD: 4.58 M/UL (ref 3.95–5.11)
SODIUM BLD-SCNC: 143 MMOL/L (ref 135–144)
TOTAL PROTEIN: 6.9 G/DL (ref 6.4–8.3)
TSH SERPL DL<=0.05 MIU/L-ACNC: 4.29 MIU/L (ref 0.3–5)
WBC # BLD: 5 K/UL (ref 3.5–11.3)

## 2020-02-25 PROCEDURE — 36415 COLL VENOUS BLD VENIPUNCTURE: CPT

## 2020-02-25 PROCEDURE — 80053 COMPREHEN METABOLIC PANEL: CPT

## 2020-02-25 PROCEDURE — 84443 ASSAY THYROID STIM HORMONE: CPT

## 2020-02-25 PROCEDURE — 85027 COMPLETE CBC AUTOMATED: CPT

## 2020-03-27 RX ORDER — HYDROCODONE BITARTRATE AND ACETAMINOPHEN 5; 325 MG/1; MG/1
1 TABLET ORAL 2 TIMES DAILY PRN
Qty: 60 TABLET | Refills: 0 | Status: SHIPPED | OUTPATIENT
Start: 2020-04-03 | End: 2020-04-27 | Stop reason: SDUPTHER

## 2020-04-27 ENCOUNTER — HOSPITAL ENCOUNTER (OUTPATIENT)
Dept: PAIN MANAGEMENT | Age: 65
Discharge: HOME | End: 2020-04-27
Payer: MEDICARE

## 2020-04-27 PROCEDURE — 99213 OFFICE O/P EST LOW 20 MIN: CPT | Performed by: NURSE PRACTITIONER

## 2020-04-27 PROCEDURE — 99213 OFFICE O/P EST LOW 20 MIN: CPT

## 2020-04-27 RX ORDER — HYDROCODONE BITARTRATE AND ACETAMINOPHEN 5; 325 MG/1; MG/1
1 TABLET ORAL 2 TIMES DAILY PRN
Qty: 60 TABLET | Refills: 0 | Status: SHIPPED | OUTPATIENT
Start: 2020-05-03 | End: 2020-05-28 | Stop reason: SDUPTHER

## 2020-04-27 ASSESSMENT — ENCOUNTER SYMPTOMS
BACK PAIN: 1
EYES NEGATIVE: 1
CONSTIPATION: 1
RESPIRATORY NEGATIVE: 1

## 2020-04-27 NOTE — PROGRESS NOTES
Noroxycodone, Urine Not Detected   Final 01/22/2020  1:45 PM [facility]   NOROXYMORPHONE, URINE Not Detected   Final 01/22/2020  1:45 PM [facility]   Oxazepam, Urine Not Detected   Final 01/22/2020  1:45 PM [facility]   Oxycodone Urine Not Detected   Final 01/22/2020  1:45 PM [facility]   Oxymorphone, Urine Not Detected   Final 01/22/2020  1:45 PM [facility]   PCP, Urine Not Detected   Final 01/22/2020  1:45 PM [facility]   Phentermine, Ur Not Detected   Final 01/22/2020  1:45 PM [facility]   Propoxyphene, Urine Not Detected   Final 01/22/2020  1:45 PM [facility]   Tapentadol-O-Sulfate, Urine Not Detected   Final 01/22/2020  1:45 PM [facility]   Tapentadol, Urine Not Detected   Final 01/22/2020  1:45 PM [facility]   Temazepam, Urine Not Detected   Final 01/22/2020  1:45 PM [facility]   Tramadol, Urine Not Detected   Final 01/22/2020  1:45 PM [facility]   Zolpidem, Urine Not Detected   Final 01/22/2020  1:45 PM [facility]   Drugs Expected, Ur   Final 01/22/2020  1:45  [address] [facility]   HYDROCODONE 1/22/20 AT 1315    Creatinine, Ur 36.8  20.0 - 400.0 mg/dL Final 01/22/2020  1:45 PM [facility]   Pain Mgt Drug Panel, Hi Res, Ur See Below   Final 01/22/2020  1:45 PM [facility]   (NOTE)   Methodology: Qualitative Enzyme Immunoassay and Qualitative Liquid   Chromatography-Time of Flight-Mass Spectrometry or Tandem Mass   Spectrometry, Quantitative Spectrophotometry   The absence of expected drug(s) and/or drug metabolite(s) may   indicate non-compliance, inappropriate timing of specimen   collection relative to drug administration, poor drug absorption,   diluted/adulterated urine, or limitations of testing. The   concentration must be greater than or equal to the cutoff to be   reported as present.  If specific drug concentrations are   required, contact the laboratory within two weeks of specimen   collection to request quantification by a second analytical   technique. Interpretive questions should be directed to the   laboratory.    Results based on immunoassay detection that do not match ablation    COLONOSCOPY  2009    10 years    COLONOSCOPY  08/2017   Emanuel Wang FINGER SURGERY  10/2017    trigger finger right hand    HERNIA REPAIR      umbilical    HYSTERECTOMY      total    JOINT REPLACEMENT      Right Knee 2014    KNEE ARTHROSCOPY      bilateral    KNEE SURGERY      x2    LAPAROSCOPIC APPENDECTOMY Right     TONSILLECTOMY      UMBILICAL HERNIA REPAIR N/A     WRIST SURGERY  04/12/2018    plate and screws right wrist       Allergies   Allergen Reactions    Augmentin [Amoxicillin-Pot Clavulanate] Nausea And Vomiting     Projectile vomiting    Sulfa Antibiotics Anaphylaxis    Amoxapine And Related Diarrhea and Nausea And Vomiting     AMOX TR-K- 875-125 mg    Loxapine Succinate Diarrhea and Nausea Only     AMOX TR-K- 875-125 mg         Current Outpatient Medications:     HYDROcodone-acetaminophen (NORCO) 5-325 MG per tablet, Take 1 tablet by mouth 2 times daily as needed for Pain for up to 30 days. , Disp: 60 tablet, Rfl: 0    levothyroxine (SYNTHROID) 50 MCG tablet, Take 1 tablet by mouth once daily, Disp: 90 tablet, Rfl: 1    meloxicam (MOBIC) 15 MG tablet, take 1 tablet by mouth once daily, Disp: 30 tablet, Rfl: 0    venlafaxine (EFFEXOR XR) 150 MG extended release capsule, take 1 capsule by mouth once daily, Disp: 90 capsule, Rfl: 1    loratadine (CLARITIN) 10 MG capsule, Take 10 mg by mouth daily, Disp: , Rfl:     magnesium oxide (MAG-OX) 400 MG tablet, take 1 tablet by mouth twice a day if needed, Disp: , Rfl: 0    Polyethylene Glycol 3350 (MIRALAX PO), Take by mouth Taking daily, Disp: , Rfl:     flecainide (TAMBOCOR) 50 MG tablet, Take 50 mg by mouth 2 times daily. , Disp: , Rfl:     Tens Unit MISC, by Does not apply route, Disp: 1 each, Rfl: 0    Family History   Problem Relation Age of Onset    Cancer Mother         pancreatic cancer 78    Heart Disease Mother     COPD Father     COPD Brother     Breast Cancer Maternal Grandmother         dx unknown age    Emanuel Wang Colon Cancer Maternal Grandfather         66-80s        Social History     Socioeconomic History    Marital status:      Spouse name: Not on file    Number of children: Not on file    Years of education: Not on file    Highest education level: Not on file   Occupational History    Occupation: [employer] disability    Social Needs    Financial resource strain: Not on file    Food insecurity     Worry: Not on file     Inability: Not on file    Transportation needs     Medical: Not on file     Non-medical: Not on file   Tobacco Use    Smoking status: Never Smoker    Smokeless tobacco: Never Used   Substance and Sexual Activity    Alcohol use: No     Alcohol/week: 0.0 standard drinks     Comment: one to two drinks a month    Drug use: No    Sexual activity: Yes     Partners: Male     Birth control/protection: Surgical     Comment: hyst   Lifestyle    Physical activity     Days per week: Not on file     Minutes per session: Not on file    Stress: Not on file   Relationships    Social connections     Talks on phone: Not on file     Gets together: Not on file     Attends Scientologist service: Not on file     Active member of club or organization: Not on file     Attends meetings of clubs or organizations: Not on file     Relationship status: Not on file    Intimate partner violence     Fear of current or ex partner: Not on file     Emotionally abused: Not on file     Physically abused: Not on file     Forced sexual activity: Not on file   Other Topics Concern    Not on file   Social History Narrative    Not on file         Review of Systems:  Review of Systems   Constitution: Negative. HENT: Negative. Eyes: Negative. Cardiovascular: Negative. Respiratory: Negative. Endocrine: Negative. Hematologic/Lymphatic: Negative. Skin: Negative. Musculoskeletal: Positive for back pain, joint pain and neck pain. Gastrointestinal: Positive for constipation. Genitourinary: Negative.     Neurological: Positive risk of exposure to COVID-19 and provide necessary medical care. Location: patient at home, provider at [facility] pain clinic    Services were provided through a video synchronous discussion virtually to substitute for in-person encounter. --KAYLEE Posey CNP on 4/27/2020 at 2:32 PM    An electronic signature was used to authenticate this note.     TREATMENT OPTIONS:       Medication Agreement Requirements Met  Continue Opioid therapy  Script written for hydrocodone  Follow up appointment made

## 2020-05-28 ENCOUNTER — HOSPITAL ENCOUNTER (OUTPATIENT)
Dept: PAIN MANAGEMENT | Age: 65
Discharge: HOME | End: 2020-05-28
Payer: MEDICARE

## 2020-05-28 PROCEDURE — 99213 OFFICE O/P EST LOW 20 MIN: CPT | Performed by: NURSE PRACTITIONER

## 2020-05-28 PROCEDURE — 99213 OFFICE O/P EST LOW 20 MIN: CPT

## 2020-05-28 RX ORDER — LANOLIN ALCOHOL/MO/W.PET/CERES
CREAM (GRAM) TOPICAL
COMMUNITY
Start: 2020-05-26 | End: 2020-05-28 | Stop reason: SDUPTHER

## 2020-05-28 RX ORDER — HYDROCODONE BITARTRATE AND ACETAMINOPHEN 5; 325 MG/1; MG/1
1 TABLET ORAL 2 TIMES DAILY PRN
Qty: 60 TABLET | Refills: 0 | Status: SHIPPED | OUTPATIENT
Start: 2020-06-02 | End: 2020-06-29 | Stop reason: SDUPTHER

## 2020-05-28 ASSESSMENT — ENCOUNTER SYMPTOMS
CONSTIPATION: 1
BACK PAIN: 1
EYES NEGATIVE: 1
RESPIRATORY NEGATIVE: 1

## 2020-05-28 NOTE — PROGRESS NOTES
Allergen Reactions    Augmentin [Amoxicillin-Pot Clavulanate] Nausea And Vomiting     Projectile vomiting    Sulfa Antibiotics Anaphylaxis    Amoxapine And Related Diarrhea and Nausea And Vomiting     AMOX TR-K- 875-125 mg    Loxapine Succinate Diarrhea and Nausea Only     AMOX TR-K- 875-125 mg         Current Outpatient Medications:     HYDROcodone-acetaminophen (NORCO) 5-325 MG per tablet, Take 1 tablet by mouth 2 times daily as needed for Pain for up to 30 days. , Disp: 60 tablet, Rfl: 0    levothyroxine (SYNTHROID) 50 MCG tablet, Take 1 tablet by mouth once daily, Disp: 90 tablet, Rfl: 1    meloxicam (MOBIC) 15 MG tablet, take 1 tablet by mouth once daily, Disp: 30 tablet, Rfl: 0    venlafaxine (EFFEXOR XR) 150 MG extended release capsule, take 1 capsule by mouth once daily, Disp: 90 capsule, Rfl: 1    loratadine (CLARITIN) 10 MG capsule, Take 10 mg by mouth daily, Disp: , Rfl:     flecainide (TAMBOCOR) 50 MG tablet, Take 50 mg by mouth 2 times daily. , Disp: , Rfl:     Tens Unit MISC, by Does not apply route, Disp: 1 each, Rfl: 0    Polyethylene Glycol 3350 (MIRALAX PO), Take by mouth Taking daily, Disp: , Rfl:     Family History   Problem Relation Age of Onset    Cancer Mother         pancreatic cancer 78    Heart Disease Mother     COPD Father     COPD Brother     Breast Cancer Maternal Grandmother         dx unknown age    [facility] Colon Cancer Maternal Grandfather         66-80s        Social History     Socioeconomic History    Marital status:       Spouse name: Not on file    Number of children: Not on file    Years of education: Not on file    Highest education level: Not on file   Occupational History    Occupation: [employer] disability    Social Needs    Financial resource strain: Not on file    Food insecurity     Worry: Not on file     Inability: Not on file    Transportation needs     Medical: Not on file     Non-medical: Not on file   Tobacco Use    Smoking status: Never Smoker HYDROcodone-acetaminophen (NORCO) 5-325 MG per tablet (Start on 6/2/2020)    Neck pain (Chronic)    Muscle pain, lumbar    Relevant Medications    HYDROcodone-acetaminophen (NORCO) 5-325 MG per tablet (Start on 6/2/2020)    Lumbar facet arthropathy    Relevant Medications    HYDROcodone-acetaminophen (NORCO) 5-325 MG per tablet (Start on 6/2/2020)    Fibromyalgia    Relevant Medications    HYDROcodone-acetaminophen ([address]) 5-325 MG per tablet (Start on 6/2/2020)    DDD (degenerative disc disease), lumbar    Relevant Medications    HYDROcodone-acetaminophen (NORCO) 5-325 MG per tablet (Start on 6/2/2020)    Chronic left shoulder pain (Chronic)    Relevant Medications    HYDROcodone-acetaminophen (NORCO) 5-325 MG per tablet (Start on 6/2/2020)              Treatment Plan:  DISCUSSION: Treatment options discussed withpatient and all questions answered to patient's satisfaction. Possible side effects, risk of tolerance and or dependence and alternative treatments discussed    Obtaining appropriate analgesic effect of treatment   No signs of potential drug abuse or diversion identified    [x] Ill effects of being on chronic pain medications such as sleep disturbances, respiratory depression, hormonal changes, withdrawal symptoms, chronic opioid dependence and tolerance as well as risk of taking opioids with Benzodiazepines and taking opioids along with alcohol,  werediscussed with patient. I had asked the patient to minimize medication use and utilize pain medications only for uncontrolled rest pain or pain with exertional activities. I advised patient not to self-escalate painmedications without consulting with us. At each of patient's future visits we will try to taper pain medications, while adjusting the adjunct medications, and re-evaluating for Physical Therapy to improve spinal andjoint strength. We will continue to have discussions to decrease pain medications as tolerated.       Counseled patient on effects their pain medication and /or their medical condition mayhave on their  ability to drive or operate machinery. Instructed not to drive or operate machinery if drowsy     I also discussed with the patient regarding the dangers of combining narcotic pain medication with tranquilizers, alcohol or illegal drugs or taking the medication any way other than prescribed. The dangers were discussed  including respiratory depression and death. Patient was told to tell  all  physicians regarding the medications he is getting from pain clinic. Patient is warned not to take any unprescribed medications over-the-countermedications that can depress breathing . Patient is advised to talk to the pharmacist or physicians if planning to take any over-the-counter medications before  takeing them. Patient is strongly advised to avoid tranquilizers or  relaxants, illegal drugs  or any medications that can depress breathing  Patient is also advised to tell us if there is any changes in their medications from other physicians.     Location:  patient  at   home    , provider working from home    TREATMENT OPTIONS:       Medication Agreement Requirements Met  Continue Opioid therapy  Script written for  hydrocodone  Follow up appointment made

## 2020-06-29 ENCOUNTER — HOSPITAL ENCOUNTER (OUTPATIENT)
Dept: PAIN MANAGEMENT | Age: 65
Discharge: HOME | End: 2020-06-29
Payer: MEDICARE

## 2020-06-29 PROCEDURE — 99213 OFFICE O/P EST LOW 20 MIN: CPT | Performed by: NURSE PRACTITIONER

## 2020-06-29 PROCEDURE — 99213 OFFICE O/P EST LOW 20 MIN: CPT

## 2020-06-29 RX ORDER — HYDROCODONE BITARTRATE AND ACETAMINOPHEN 5; 325 MG/1; MG/1
1 TABLET ORAL 2 TIMES DAILY PRN
Qty: 60 TABLET | Refills: 0 | Status: SHIPPED | OUTPATIENT
Start: 2020-07-02 | End: 2020-07-29 | Stop reason: SDUPTHER

## 2020-06-29 ASSESSMENT — ENCOUNTER SYMPTOMS
BACK PAIN: 1
EYES NEGATIVE: 1
CONSTIPATION: 1
RESPIRATORY NEGATIVE: 1

## 2020-06-29 NOTE — PROGRESS NOTES
[facility] PROGRESS NOTE      Patient    Video visit for    review Medication Agreement    Chief Complaint:  Low back pain      HPI: She c/o low back pain which has not changed. She has no history of lumbar surgery,  She states is sleeping well. She goes in swimming pool to exercise. She is considering accupuncture as she states her insurance co will cover it, She had recent injection left knee by her Orthopaedic surgeon which helped. No Ed visits. Back Pain   This is a chronic problem. The current episode started more than 1 month ago. The problem occurs intermittently. The problem is unchanged. The pain is present in the lumbar spine. The quality of the pain is described as aching. The pain does not radiate. The pain is at a severity of 4/10. The pain is moderate. The pain is the same all the time. The symptoms are aggravated by standing. Risk factors include menopause (osteopenia). She has tried heat for the symptoms. Patient denies any new neurological symptoms. No bowel or bladder incontinence, no weakness, and no falling. Any new diagnostic workup: [x] no  [] yes [] Xray [] CT scan [] MRI [] DEXA scan     [] Other                    Treatment goals:  Functional status:to function       Aberrancy:   Any alcoholic beverages   no    Any illegal drugs  no       Analgesia:4    Adverse  Effects  constipation  ADL;s :  stretching      Pill count: appropriate    Morphine equivalent dose as reported on [place]AR[place]:10  Periodic Controlled Substance Monitoring: Possible medication side effects, risk of tolerance/dependence & alternative treatments discussed., No signs of potential drug abuse or diversion identified. , Assessed functional status., Obtaining appropriate analgesic effect of treatment. KAYLEE Dover - CNP)  Review of[place]ARRS does not show any aberrant prescription behavior. Medication is helping the patient stay active.  Patient denies any side effects and reports adequate  Transportation needs     Medical: Not on file     Non-medical: Not on file   Tobacco Use    Smoking status: Never Smoker    Smokeless tobacco: Never Used   Substance and Sexual Activity    Alcohol use: No     Alcohol/week: 0.0 standard drinks     Comment: one to two drinks a month    Drug use: No    Sexual activity: Yes     Partners: Male     Birth control/protection: Surgical     Comment: hyst   Lifestyle    Physical activity     Days per week: Not on file     Minutes per session: Not on file    Stress: Not on file   Relationships    Social connections     Talks on phone: Not on file     Gets together: Not on file     Attends Caodaism service: Not on file     Active member of club or organization: Not on file     Attends meetings of clubs or organizations: Not on file     Relationship status: Not on file    Intimate partner violence     Fear of current or ex partner: Not on file     Emotionally abused: Not on file     Physically abused: Not on file     Forced sexual activity: Not on file   Other Topics Concern    Not on file   Social History Narrative    Not on file         Review of Systems:  Review of Systems   Constitution: Negative. HENT: Negative. Eyes: Negative. Cardiovascular: Negative. Respiratory: Negative. Endocrine: Negative. Hematologic/Lymphatic: Negative. Musculoskeletal: Positive for back pain and joint pain. Gastrointestinal: Positive for constipation. Genitourinary: Negative. Neurological: Positive for dizziness. Psychiatric/Behavioral: Negative. Physical Exam:  LMP 06/13/2010 (Within Months)     Physical Exam  HENT:      Head: Normocephalic. Skin:         Psychiatric:         Mood and Affect: Mood normal.         Thought Content:  Thought content normal.           Assessment:    Problem List Items Addressed This Visit     Lumbar facet arthropathy - Primary    Relevant Medications    HYDROcodone-acetaminophen (NORCO) 5-325 MG per tablet (Start illegal drugs or taking the medication any way other than prescribed. The dangers were discussed  including respiratory depression and death. Patient was told to tell  all  physicians regarding the medications he is getting from pain clinic. Patient is warned not to take any unprescribed medications over-the-countermedications that can depress breathing . Patient is advised to talk to the pharmacist or physicians if planning to take any over-the-counter medications before  takeing them. Patient is strongly advised to avoid tranquilizers or  relaxants, illegal drugs  or any medications that can depress breathing  Patient is also advised to tell us if there is any changes in their medications from other physicians. Location:  patient  at  home     , provider working from home  1.  Tens unit, for low back pain    TREATMENT OPTIONS:     Script tens unit  Medication Agreement Requirements Met  Continue Opioid therapy  Script written for  hydrocodone  Follow up appointment made

## 2020-07-29 ENCOUNTER — HOSPITAL ENCOUNTER (OUTPATIENT)
Dept: PAIN MANAGEMENT | Age: 65
Discharge: HOME | End: 2020-07-29
Payer: MEDICARE

## 2020-07-29 PROCEDURE — 99213 OFFICE O/P EST LOW 20 MIN: CPT | Performed by: NURSE PRACTITIONER

## 2020-07-29 PROCEDURE — 99213 OFFICE O/P EST LOW 20 MIN: CPT

## 2020-07-29 RX ORDER — THIAMINE HCL 100 MG
TABLET ORAL 2 TIMES DAILY
COMMUNITY
End: 2020-09-28

## 2020-07-29 RX ORDER — HYDROCODONE BITARTRATE AND ACETAMINOPHEN 5; 325 MG/1; MG/1
1 TABLET ORAL 2 TIMES DAILY PRN
Qty: 60 TABLET | Refills: 0 | Status: SHIPPED | OUTPATIENT
Start: 2020-08-01 | End: 2020-08-26 | Stop reason: SDUPTHER

## 2020-07-29 ASSESSMENT — ENCOUNTER SYMPTOMS
EYES NEGATIVE: 1
RESPIRATORY NEGATIVE: 1
BACK PAIN: 1
CONSTIPATION: 1

## 2020-07-29 NOTE — PROGRESS NOTES
[address] PROGRESS NOTE      Patient  Video visit to  review Medication Agreement    Chief Complaint:Back pain      She c/o back pain which is more painful today, Her pain is up and down. She reports the pain radiates down her legs at time. No history of lumbar surgery, PT made her pain worse. She is going to get a tens unit. She states has osteopenia and is to start on Vitamin D and calcium. She has fibromyalgia and has muscle aches. She states not sleeping well, She has referral to pulmonologist.  No Ed visits. Back Pain   This is a chronic problem. The current episode started more than 1 year ago. The problem occurs constantly. The problem has been waxing and waning since onset. The pain is present in the lumbar spine. The quality of the pain is described as aching. Radiates to: legs at times. The pain is at a severity of 4/10. The pain is moderate. The pain is the same all the time. The symptoms are aggravated by standing and bending (walking). Risk factors include menopause (osteopenia). She has tried heat and analgesics for the symptoms. The treatment provided mild relief. Patient denies any new neurological symptoms. No bowel or bladder incontinence, no weakness, and no falling. Any new diagnostic workup: [x] no  [] yes [] Xray [] CT scan [] MRI [] DEXA scan     [] Other                    Treatment goals:  Functional status: control pain do ADL    Aberrancy:   Any alcoholic beverages       Any illegal drugs         Analgesia:4      Adverse  Effects :none    ADL;s :goes in pool      Pill count: appropriate fill date 8-1-2020    Morphine equivalent dose as reported on [place]ARRS: 10  Periodic Controlled Substance Monitoring: Possible medication side effects, risk of tolerance/dependence & alternative treatments discussed., No signs of potential drug abuse or diversion identified. , Assessed functional status., Obtaining appropriate analgesic effect of treatment.  (Alyssa Ramirez, APRN - CNP)  Review of[place]ARRS does not show any aberrant prescription behavior. Medication is helping the patient stay active. Patient denies any side effects and reports adequate analgesia. No sign of misuse/abuse.         When was thelast UDS:  1-           Was the UDS appropriate:yes      Record/Diagnostics Review:      As above, I did review the imaging  1/26/2020 11:08 AM - Michael, [facility] Incoming Lab Results From [facility]     Component  Value  Ref Range & Units  Status  Collected  Lab    Pain Management Drug Panel Interp, Urine  Consistent   Final  01/22/2020  1:45 PM  [facility]    (NOTE)   ________________________________________________________________   DRUGS EXPECTED:   HYDROCODONE [1/22/20]   ________________________________________________________________   CONSISTENT with medications provided:   HYDROCODONE : based on norhydrocodone   ________________________________________________________________   INTERPRETIVE INFORMATION: [facility] [place], [place] [facility]/EMIT, Ur,                            Interp   Interpretation depends on accuracy and com             Past Medical History:   Diagnosis Date    Anxiety     Breast cancer ([address]     [facility]     Cancer ([facility]     breast, in remission    Chronic back pain 12/3/2013    Depression     DJD (degenerative joint disease)     Family history of breast cancer     MGM    Fibromyalgia     Fibromyalgia     History of hysterectomy     Hypothyroidism     Obesity, Class III, BMI 40-49.9 (morbid obesity) ([address] 7/22/2014    Osteoarthritis     SVT (supraventricular tachycardia) ([address]     Unspecified sleep apnea        Past Surgical History:   Procedure Laterality Date    ABDOMEN SURGERY      APPENDECTOMY      BREAST SURGERY      lumpectomy w radiation 2009    CARDIAC CATHETERIZATION      with ablation    COLONOSCOPY  2009    10 years    COLONOSCOPY  08/2017   [facility] FINGER SURGERY  10/2017    trigger finger right hand    HERNIA REPAIR      umbilical    Colon Cancer Maternal Grandfather         66-80s        Social History     Socioeconomic History    Marital status:      Spouse name: Not on file    Number of children: Not on file    Years of education: Not on file    Highest education level: Not on file   Occupational History    Occupation: [employer] disability    Social Needs    Financial resource strain: Not on file    Food insecurity     Worry: Not on file     Inability: Not on file    Transportation needs     Medical: Not on file     Non-medical: Not on file   Tobacco Use    Smoking status: Never Smoker    Smokeless tobacco: Never Used   Substance and Sexual Activity    Alcohol use: No     Alcohol/week: 0.0 standard drinks     Comment: one to two drinks a month    Drug use: No    Sexual activity: Yes     Partners: Male     Birth control/protection: Surgical     Comment: hyst   Lifestyle    Physical activity     Days per week: Not on file     Minutes per session: Not on file    Stress: Not on file   Relationships    Social connections     Talks on phone: Not on file     Gets together: Not on file     Attends Nondenominational service: Not on file     Active member of club or organization: Not on file     Attends meetings of clubs or organizations: Not on file     Relationship status: Not on file    Intimate partner violence     Fear of current or ex partner: Not on file     Emotionally abused: Not on file     Physically abused: Not on file     Forced sexual activity: Not on file   Other Topics Concern    Not on file   Social History Narrative    Not on file         Review of Systems:  Review of Systems   HENT: Negative. Eyes: Negative. Respiratory: Negative. Endocrine: Negative. Hematologic/Lymphatic: Bruises/bleeds easily. Skin: Positive for dry skin. Musculoskeletal: Positive for back pain, joint pain and myalgias. Gastrointestinal: Positive for constipation. Genitourinary: Negative. Neurological: Negative. Psychiatric/Behavioral: Negative. Physical Exam:  LMP 06/13/2010 (Within Months)     Physical Exam  HENT:      Head: Normocephalic. Pulmonary:      Effort: Pulmonary effort is normal.   Skin:         Psychiatric:         Mood and Affect: Mood normal.         Behavior: Behavior normal.         Thought Content: Thought content normal.           Assessment:      Problem List Items Addressed This Visit     Sacroiliitis (HCC)    Numbness and tingling in left arm (Chronic)    Neck pain - Primary (Chronic)    Muscle pain, lumbar    Lumbar facet arthropathy    Encounter for medication monitoring    DDD (degenerative disc disease), lumbar    Chronic left shoulder pain (Chronic)            Treatment Plan:  DISCUSSION: Treatment options discussed withpatient and all questions answered to patient's satisfaction. Possible side effects, risk of tolerance and or dependence and alternative treatments discussed    Obtaining appropriate analgesic effect of treatment   No signs of potential drug abuse or diversion identified    [x] Ill effects of being on chronic pain medications such as sleep disturbances, respiratory depression, hormonal changes, withdrawal symptoms, chronic opioid dependence and tolerance as well as risk of taking opioids with Benzodiazepines and taking opioids along with alcohol,  werediscussed with patient. I had asked the patient to minimize medication use and utilize pain medications only for uncontrolled rest pain or pain with exertional activities. I advised patient not to self-escalate painmedications without consulting with us. At each of patient's future visits we will try to taper pain medications, while adjusting the adjunct medications, and re-evaluating for Physical Therapy to improve spinal andjoint strength. We will continue to have discussions to decrease pain medications as tolerated.       Counseled patient on effects their pain medication and /or their medical condition [facility] on their  ability to drive or operate machinery. Instructed not to drive or operate machinery if drowsy     I also discussed with the patient regarding the dangers of combining narcotic pain medication with tranquilizers, alcohol or illegal drugs or taking the medication any way other than prescribed. The dangers were discussed  including respiratory depression and death. Patient was told to tell  all  physicians regarding the medications he is getting from pain clinic. Patient is warned not to take any unprescribed medications over-the-countermedications that can depress breathing . Patient is advised to talk to the pharmacist or physicians if planning to take any over-the-counter medications before  takeing them. Patient is strongly advised to avoid tranquilizers or  relaxants, illegal drugs  or any medications that can depress breathing  Patient is also advised to tell us if there is any changes in their medications from other physicians.     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

## 2020-08-25 ASSESSMENT — ENCOUNTER SYMPTOMS
EYES NEGATIVE: 1
BACK PAIN: 1
EYE PAIN: 0
ABDOMINAL PAIN: 0
VOMITING: 0
ALLERGIC/IMMUNOLOGIC NEGATIVE: 1
RESPIRATORY NEGATIVE: 1
NAUSEA: 0
COUGH: 0
PHOTOPHOBIA: 0
SHORTNESS OF BREATH: 0
VISUAL CHANGE: 0
BOWEL INCONTINENCE: 0
CONSTIPATION: 1

## 2020-08-25 NOTE — PROGRESS NOTES
Arlette Virgen is a 62 y.o. female evaluated on 8/26/2020. Modality of virtual service provided -via telephone   Consent:  Patient and/or health care decision maker is aware that that patient may receive a bill for this telephone service, depending on one's insurance coverage, and has provided verbal consent to proceed: Yes    Patient identification was verified at the start of the visit: Yes    Chief complaint: Arlette Virgen is 62 y.o.,  female, with  No chief complaint on file. .    Patient is complaining of pain involving the low back area with pain radiating to both lower extremities worse on the right side. She reports her pain is worse in her right hip region with pain radiating towards her pelvic area. Patient also has pain in her cervical region with pain radiating to the left shoulder to the left arm. Patient also has allover body pain from fibromyalgia. She also reports her pain on the left side is getting somewhat worse. Back Pain   This is a chronic problem. The current episode started more than 1 year ago. The problem occurs constantly. The problem has been gradually worsening since onset. The pain is present in the lumbar spine, thoracic spine and sacro-iliac. The quality of the pain is described as aching and burning. Radiates to: Both lower extremities worse on the right side  The pain is at a severity of 5/10 (3-7). The pain is moderate. The pain is worse during the day. The symptoms are aggravated by bending, standing, position and twisting (ADLs, lifting and walking). Pertinent negatives include no abdominal pain, bladder incontinence, bowel incontinence, chest pain, dysuria, headaches, tingling or weakness. Risk factors include obesity and lack of exercise. Neck Pain    This is a chronic problem. The current episode started more than 1 year ago. The problem has been gradually worsening. The pain is associated with nothing.  The pain is present in the left side, midline and right side (Worse on the left side radiating towards the left arm). The quality of the pain is described as aching and shooting. The pain is at a severity of 5/10 (3-7). The pain is moderate. The symptoms are aggravated by position, bending and swallowing (Neck movements). Pertinent negatives include no chest pain, headaches, photophobia, tingling, visual change or weakness. Alleviating factors:heat and rest   Lifestyle changes experienced with pain: Wakes from sleep, Prevents or [id number] ADLs, Increases w/activity. Increases w/prolonged sitting/standing/walking  Mood changes,nnone  Physical therapy did not help the pain. Are you under psychological counseling at present: No  Goals for treatment include:  Decrease in pain  Enjoy daily and recreational activities, return to previous status. Last procedure was      Patient relates current medications are helping the pain. Patient reports taking pain medications as prescribed, denies obtaining medications from different sources and denies use of illegal drugs. Patient denies side effects from medications like nausea, vomiting, constipation or drowsiness. Patient reports current activities of daily living ar possible due to medications and would like to continue them.        ACTIVITY/SOCIAL/EMOTIONAL:  Sleep Pattern: 6 hours per night. nightime awakenings  Home Exercises: daily Stretching  Activity:not significantly changed  Emotional Issues: normal.   Currently seeing a Psychiatrist or Psychologist:  No     ADVERSE MEDICATION EFFECTS:   Nausea and vomiting: no   Constipation: no-Undercontrol-: yes  Dizziness/drowsy/sleepy--no  Urinary Retention: no    ABERRANT BEHAVIORS SINCE LAST VISIT  Lost rx/pills:------------------------------------------ no  Taking  medication as prescribed: ----------- yes  Urine Drug Screen ---------------------------------  yes  Recent ER visits: -------------------------------------No  Pill count is appropriate: ---------------------------yes   Refills for prescriptions appropriate:---------- yes      Past Medical History:   Diagnosis Date    Anxiety     [facility]     Breast cancer ([facility]     Cancer ([facility]     breast, in remission    Chronic back pain 12/3/2013    Depression     DJD (degenerative joint disease)     Family history of breast cancer     MGM    Fibromyalgia     Fibromyalgia     History of hysterectomy     Hypothyroidism     Obesity, Class III, BMI 40-49.9 (morbid obesity) ([facility] 7/22/2014    Osteoarthritis     SVT (supraventricular tachycardia) ([facility]     Unspecified sleep apnea        Past Surgical History:   Procedure Laterality Date    ABDOMEN SURGERY      APPENDECTOMY      BREAST SURGERY      lumpectomy w radiation 2009    CARDIAC CATHETERIZATION      with ablation    COLONOSCOPY  2009    10 years    COLONOSCOPY  08/2017   [facility] FINGER SURGERY  10/2017    trigger finger right hand    HERNIA REPAIR      umbilical    HYSTERECTOMY      total    JOINT REPLACEMENT      Right Knee 2014    KNEE ARTHROSCOPY      bilateral    KNEE SURGERY      x2    LAPAROSCOPIC APPENDECTOMY Right     TONSILLECTOMY      UMBILICAL HERNIA REPAIR N/A     WRIST SURGERY  04/12/2018    plate and screws right wrist       Family History   Problem Relation Age of Onset    Cancer Mother         pancreatic cancer 78    Heart Disease Mother     COPD Father     COPD Brother     Breast Cancer Maternal Grandmother         dx unknown age    [facility] Colon Cancer Maternal Grandfather         66-80s        Social History     Socioeconomic History    Marital status:       Spouse name: None    Number of children: None    Years of education: None    Highest education level: None   Occupational History    Occupation: [employer] disability    Social Needs    Financial resource strain: None    Food insecurity     Worry: None     Inability: None    Transportation needs     Medical: None     Non-medical: None   Tobacco Use    Smoking status: Never Smoker    Smokeless tobacco: Never Used   Substance and Sexual Activity    Alcohol use: No     Alcohol/week: 0.0 standard drinks     Comment: one to two drinks a month    Drug use: No    Sexual activity: Yes     Partners: Male     Birth control/protection: Surgical     Comment: hyst   Lifestyle    Physical activity     Days per week: None     Minutes per session: None    Stress: None   Relationships    Social connections     Talks on phone: None     Gets together: None     Attends Cheondoism service: None     Active member of club or organization: None     Attends meetings of clubs or organizations: None     Relationship status: None    Intimate partner violence     Fear of current or ex partner: None     Emotionally abused: None     Physically abused: None     Forced sexual activity: None   Other Topics Concern    None   Social History Narrative    None       Allergies   Allergen Reactions    Augmentin [Amoxicillin-Pot Clavulanate] Nausea And Vomiting     Projectile vomiting    Sulfa Antibiotics Anaphylaxis    Amoxapine And Related Diarrhea and Nausea And Vomiting     AMOX TR-K- 875-125 mg    Loxapine Succinate Diarrhea and Nausea Only     AMOX TR-K- 875-125 mg       Current Outpatient Medications on File Prior to Encounter   Medication Sig Dispense Refill    Magnesium 500 MG TABS Take by mouth 2 times daily      Tens Unit MISC by Does not apply route For low back pain 1 each 0    levothyroxine (SYNTHROID) 50 MCG tablet Take 1 tablet by mouth once daily 90 tablet 1    Tens Unit MISC by Does not apply route 1 each 0    meloxicam (MOBIC) 15 MG tablet take 1 tablet by mouth once daily 30 tablet 0    venlafaxine (EFFEXOR XR) 150 MG extended release capsule take 1 capsule by mouth once daily 90 capsule 1    loratadine (CLARITIN) 10 MG capsule Take 10 mg by mouth daily      Polyethylene Glycol 3350 (MIRALAX PO) Take by mouth Taking daily      flecainide (TAMBOCOR) 50 MG are noted. There is a suggested small left foraminal    protrusion. There mild-to-moderate left foraminal narrowing. MRI lumbar spine:        Bones: Mild lordotic curvature is noted. There is no acute fracture. Minimal edema in the left L5 pedicle is noted. Minimal T12 retrolisthesis is    noted. Minimal L5 retrolisthesis is noted degenerative endplate signal    changes are greatest at T12-L1. Soft tissues: No retroperitoneal mass lesion is noted. Cord: Conus terminates at L1-L2 and is normal in appearance. T12-L1: Uncovering of the disc is noted. Minimal disc bulging is noted. There is a small proximal foraminal protrusion on the right. L1-2: Facet and ligament hypertrophic changes are noted        L2-3: Mild disc bulging is noted diffusely. Annular tear is noted. Left    foraminal broad-based protrusion is noted. Minimal right and moderate left    foraminal narrowing is noted. Facet and ligament hypertrophic changes are    noted. Mild narrowing of the canal is noted        L3-4: Minimal disc bulging is noted diffusely. Foraminal protrusions are    noted, left greater than right. Mild right and mild-to-moderate left    foraminal narrowing is noted. Facet and ligament hypertrophic changes are    noted. Severe narrowing of the canal is noted        Mild disc bulging is noted with annular tear. There is a small central and    right paracentral protrusion with minimal upward migration of extruded disc    material.  Mild to moderate proximal foraminal narrowing on the right is    noted. Facet and ligament hypertrophic changes are noted. Severe narrowing    of the canal is noted        L5-S1: Mild disc bulging is noted diffusely with annular tear. Endplate    hypertrophic changes are noted. Proximal foraminal narrowing is noted    bilaterally. There is a questionable small proximal foraminal protrusion on    the left.   Bilateral facet hypertrophic changes are noted.            Impression    Cervical spine:        Multilevel degenerative disc disease in the cervical spine as described. See    above for details of each level. Lumbar spine:        Multilevel degenerative disc disease in the lower thoracic spine and the    lumbar spine as described. See above for details of each level        Clinical  impression:  1. DDD (degenerative disc disease), lumbar    2. Lumbar facet arthropathy    3. Degenerative disc disease, cervical    4. Cervical radiculopathy    5. Cervical spondylosis    6. Sacroiliitis ([address]    7. Fibromyalgia    8. Primary osteoarthritis involving multiple joints    9. Shoulder tendinitis, left    10. Chronic left shoulder pain    11. Muscle pain, lumbar    12. Encounter for medication monitoring        Plan of care: We will continue current pain medications  Current medications are being tolerated without any Adverse side effects. Orders Placed This Encounter   Medications    HYDROcodone-acetaminophen (NORCO) 5-325 MG per tablet     Sig: Take 1 tablet by mouth 2 times daily as needed for Pain for up to 30 days. Dispense:  60 tablet     Refill:  0     Reduce doses taken as pain becomes manageable     Urine drug screens have been appropriate. No aberrant activity noted. Analgesia is achieved. Activities of daily living are possible because of medications. Safe use of medications explained to patient. PDMP Monitoring:    Last PDMP [facility] as Reviewed [facility]):  Review User Review Instant Review Result   Elizabeth Alert 8/26/2020  1:46 PM Reviewed PDMP [1]     Counselling/Preventive measures for pain  Control:    [x]  Spine strengthening exercises are discussed with patient in detail. [x] Ill effects of being on chronic pain medications such as sleep disturbances, hormonal changes, withdrawal symptoms,  chronic opioid dependence and tolerance were discussed with patient.  I had asked the patient to minimize medication use and utilize pain medications only for uncontrolled rest pain or pain with exertional activities. I advised patient not to self escalate pain medications without consulting with us. At each of patient's future visits we will try to taper pain medications, while adjusting the adjunct medications, and re-evaluating for Physical Therapy to improve spinal and joint strength. We will continue to have discussions to decrease pain medications as tolerated. I also discussed with the patient regarding the dangers of combining narcotic pain medication with tranquilizers, alcohol or illegal drugs or taking the medication any other than prescribed. The dangers including the respiratory depression and death. Patient was told to tell  to all  physicians regarding the medications he is getting from pain clinic. Patient is warned not to take any unprescribed medications over-the-counter medications that can depress breathing . Patient is advised to talk to the pharmacist or physicians if planning to take any over-the-counter medications before  takeing them. Patient is strongly advised to avoid tranquilizers or  Relaxants for any medications that can depress breathing or recreational drugs. Patient is also advised to tell us if there is any changes in his medications from other physicians. We discussed the same at today's visit and have not been to implement it, as the patient's pain is not under control with current medications. MRI with the patient. The treatment options were also discussed including trying a lumbar epidural steroid injections versus surgery. Patient at this time is not sure. Hence we will continue on the current medications for now. She is also increased exercise on a regular basis. Decision Making Process : Patient's health history and referral records thoroughly reviewed before focused physical examination and discussion with patient. I have spent 20 mins.   Over 50% of today's visit is spent on examining the medical advice provided: Total Time: minutes: 21-30 minutes    I affirm this is a Patient Initiated Episode with an Established Patient who has not had a related appointment within my department in the past 7 days or scheduled within the next 24 hours.     Electronically signed by Charles Campuzano MD on 8/27/2020 at 6:04 AM

## 2020-08-26 ENCOUNTER — HOSPITAL ENCOUNTER (OUTPATIENT)
Dept: PAIN MANAGEMENT | Age: 65
Discharge: HOME | End: 2020-08-26
Payer: MEDICARE

## 2020-08-26 PROCEDURE — 99443 PR PHYS/QHP TELEPHONE EVALUATION 21-30 MIN: CPT | Performed by: PAIN MEDICINE

## 2020-08-26 PROCEDURE — 99213 OFFICE O/P EST LOW 20 MIN: CPT

## 2020-08-26 RX ORDER — HYDROCODONE BITARTRATE AND ACETAMINOPHEN 5; 325 MG/1; MG/1
1 TABLET ORAL 2 TIMES DAILY PRN
Qty: 60 TABLET | Refills: 0 | Status: SHIPPED | OUTPATIENT
Start: 2020-08-31 | End: 2020-09-28 | Stop reason: SDUPTHER

## 2020-09-28 ENCOUNTER — HOSPITAL ENCOUNTER (OUTPATIENT)
Dept: PAIN MANAGEMENT | Age: 65
Discharge: HOME | End: 2020-09-28
Payer: MEDICARE

## 2020-09-28 PROCEDURE — 99213 OFFICE O/P EST LOW 20 MIN: CPT | Performed by: NURSE PRACTITIONER

## 2020-09-28 PROCEDURE — 99213 OFFICE O/P EST LOW 20 MIN: CPT

## 2020-09-28 RX ORDER — LANOLIN ALCOHOL/MO/W.PET/CERES
CREAM (GRAM) TOPICAL
COMMUNITY
Start: 2020-08-20 | End: 2021-11-30

## 2020-09-28 RX ORDER — HYDROCODONE BITARTRATE AND ACETAMINOPHEN 5; 325 MG/1; MG/1
1 TABLET ORAL 2 TIMES DAILY PRN
Qty: 60 TABLET | Refills: 0 | Status: SHIPPED | OUTPATIENT
Start: 2020-09-30 | End: 2020-10-28 | Stop reason: SDUPTHER

## 2020-09-28 RX ORDER — IBUPROFEN 200 MG
CAPSULE ORAL
COMMUNITY
End: 2021-01-29

## 2020-09-28 ASSESSMENT — ENCOUNTER SYMPTOMS
SNORING: 1
BACK PAIN: 1
CONSTIPATION: 1
EYES NEGATIVE: 1

## 2020-09-28 NOTE — PROGRESS NOTES
Kendal 89 PROGRESS NOTE      Patient video visit to review Medication Agreement    Chief Complaint: low back pain    She c/o low back, The pain is not radiating. She has no history of lumbar surgery,She saw a surgeon about her back pain about 8 years ago and surgery was discussed, She is trying to avoid surgery, She has been more active moving to another apartment. She has fibromyalgia. She states her sleep is usually restful. She is going to have a sleep study done,She states she does snore. , and wakes up trying to catch her breath. No Ed visits. Back Pain   This is a chronic problem. The problem occurs constantly. The problem has been gradually worsening since onset. The pain is present in the lumbar spine. The quality of the pain is described as aching. The pain is at a severity of 5/10. The pain is moderate. Worse during: afternoon. Exacerbated by: actiivty. Risk factors include menopause (osteopenia). She has tried heat and analgesics (rest) for the symptoms. The treatment provided mild relief. Patient denies any new neurological symptoms. No bowel or bladder incontinence, no weakness, and no falling. Any new diagnostic workup: [x] no  [] yes [] Xray [] CT scan [] MRI [] DEXA scan     [] Other                    Treatment goals:  Functional status: continue exercising      Aberrancy:   Any alcoholic beverages  no     Any illegal drugs   no      Analgesia:5      Adverse  Effects :constipation, takes miralax, BM qod    ADL;s :home exercises, was swimming      Pill count: appropriate   Fill date 9-    Morphine equivalent dose as reported on OARRS:10  Periodic Controlled Substance Monitoring: Obtaining appropriate analgesic effect of treatment. , Possible medication side effects, risk of tolerance/dependence & alternative treatments discussed., No signs of potential drug abuse or diversion identified. , Assessed functional status.  Jonny Anand, APRN - CNP)  Review ofOARRS does not show any aberrant prescription behavior. Medication is helping the patient stay active. Patient denies any side effects and reports adequate analgesia. No sign of misuse/abuse. When was thelast UDS:   1-          Was the UDS appropriate:yes    Record/Diagnostics Review:      As above, I did review the imaging    1/26/2020 11:08 AM - Michael, Darionpn Incoming Lab Results From [facility]     Component  Value  Ref Range & Units  Status  Collected  Lab    Pain Management Drug Panel Interp, Urine  Consistent   Final  01/22/2020  1:45 PM  [facility]    (NOTE)   ________________________________________________________________   DRUGS EXPECTED:   HYDROCODONE [1/22/20]   ________________________________________________________________   CONSISTENT with medications provided:   HYDROCODONE : based on norhydrocodone   ________________________________________________________________   INTERPRETIVE INFORMATION: [facility] [place], [place] Spec/EMIT, Ur,                            Interp   Interpretation depends on accuracy and completeness of patient   medication information submitted by client.     6-Acetylmorphine, Ur  Not Detected   Final  01/22/2020  1:45 PM  [facility]           Past Medical History:   Diagnosis Date    Anxiety     [facility] [address]     Breast cancer [facility] [address]     Cancer ([facility] [address]     breast, in remission    Chronic back pain 12/3/2013    Depression     DJD (degenerative joint disease)     Family history of breast cancer     MGM    Fibromyalgia     Fibromyalgia     History of hysterectomy     Hypothyroidism     Obesity, Class III, BMI 40-49.9 (morbid obesity) ([address] 7/22/2014    Osteoarthritis     SVT (supraventricular tachycardia) ([address]     Unspecified sleep apnea        Past Surgical History:   Procedure Laterality Date    ABDOMEN SURGERY      APPENDECTOMY      BREAST SURGERY      lumpectomy w radiation 2009    CARDIAC CATHETERIZATION      with ablation    COLONOSCOPY  2009    10 years    Heart Disease Mother     COPD Father     COPD Brother     Breast Cancer Maternal Grandmother         dx unknown age    [facility] Colon Cancer Maternal Grandfather         66-80s        Social History     Socioeconomic History    Marital status:      Spouse name: Not on file    Number of children: Not on file    Years of education: Not on file    Highest education level: Not on file   Occupational History    Occupation: [employer] disability    Social Needs    Financial resource strain: Not on file    Food insecurity     Worry: Not on file     Inability: Not on file    Transportation needs     Medical: Not on file     Non-medical: Not on file   Tobacco Use    Smoking status: Never Smoker    Smokeless tobacco: Never Used   Substance and Sexual Activity    Alcohol use: No     Alcohol/week: 0.0 standard drinks     Comment: one to two drinks a month    Drug use: No    Sexual activity: Yes     Partners: Male     Birth control/protection: Surgical     Comment: hyst   Lifestyle    Physical activity     Days per week: Not on file     Minutes per session: Not on file    Stress: Not on file   Relationships    Social connections     Talks on phone: Not on file     Gets together: Not on file     Attends Cheondoism service: Not on file     Active member of club or organization: Not on file     Attends meetings of clubs or organizations: Not on file     Relationship status: Not on file    Intimate partner violence     Fear of current or ex partner: Not on file     Emotionally abused: Not on file     Physically abused: Not on file     Forced sexual activity: Not on file   Other Topics Concern    Not on file   Social History Narrative    Not on file         Review of Systems:  Review of Systems   Constitution: Negative. HENT: Negative. Eyes: Negative. Cardiovascular: Negative. Respiratory: Positive for snoring. Endocrine: Negative. Hematologic/Lymphatic: Negative. Skin: Positive for dry skin. Musculoskeletal: Positive for back pain, joint pain and myalgias. Gastrointestinal: Positive for constipation. Genitourinary: Negative. Neurological: Positive for loss of balance. Psychiatric/Behavioral: Negative. Physical Exam:  LMP 06/13/2010 (Within Months)     Physical Exam  HENT:      Head: Normocephalic. Skin:         Neurological:      Mental Status: She is alert and oriented to person, place, and time. Psychiatric:         Mood and Affect: Mood normal.         Thought Content: Thought content normal.           Assessment:      Problem List Items Addressed This Visit     Lumbar facet arthropathy - Primary    Fibromyalgia    DDD (degenerative disc disease), lumbar    Chronic left shoulder pain (Chronic)            Treatment Plan:  DISCUSSION: Treatment options discussed withpatient and all questions answered to patient's satisfaction. Possible side effects, risk of tolerance and or dependence and alternative treatments discussed    Obtaining appropriate analgesic effect of treatment   No signs of potential drug abuse or diversion identified    [x] Ill effects of being on chronic pain medications such as sleep disturbances, respiratory depression, hormonal changes, withdrawal symptoms, chronic opioid dependence and tolerance as well as risk of taking opioids with Benzodiazepines and taking opioids along with alcohol,  werediscussed with patient. I had asked the patient to minimize medication use and utilize pain medications only for uncontrolled rest pain or pain with exertional activities. I advised patient not to self-escalate painmedications without consulting with us. At each of patient's future visits we will try to taper pain medications, while adjusting the adjunct medications, and re-evaluating for Physical Therapy to improve spinal andjoint strength. We will continue to have discussions to decrease pain medications as tolerated.       Counseled patient on effects their pain medication and /or their medical condition mayhave on their  ability to drive or operate machinery. Instructed not to drive or operate machinery if drowsy     I also discussed with the patient regarding the dangers of combining narcotic pain medication with tranquilizers, alcohol or illegal drugs or taking the medication any way other than prescribed. The dangers were discussed  including respiratory depression and death. Patient was told to tell  all  physicians regarding the medications he is getting from pain clinic. Patient is warned not to take any unprescribed medications over-the-countermedications that can depress breathing . Patient is advised to talk to the pharmacist or physicians if planning to take any over-the-counter medications before  takeing them. Patient is strongly advised to avoid tranquilizers or  relaxants, illegal drugs  or any medications that can depress breathing  Patient is also advised to tell us if there is any changes in their medications from other physicians.     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

## 2020-10-15 ENCOUNTER — HOSPITAL ENCOUNTER (OUTPATIENT)
Dept: SLEEP CENTER | Age: 65
Discharge: HOME | End: 2020-10-17
Payer: MEDICARE

## 2020-10-15 VITALS
OXYGEN SATURATION: 98 % | BODY MASS INDEX: 42.14 KG/M2 | RESPIRATION RATE: 26 BRPM | TEMPERATURE: 98 F | HEIGHT: 62 IN | HEART RATE: 66 BPM | WEIGHT: 229 LBS

## 2020-10-15 PROCEDURE — 95810 POLYSOM 6/> YRS 4/> PARAM: CPT

## 2020-10-27 LAB — STATUS: NORMAL

## 2020-10-28 ENCOUNTER — HOSPITAL ENCOUNTER (OUTPATIENT)
Dept: PAIN MANAGEMENT | Age: 65
Discharge: HOME | End: 2020-10-28
Payer: MEDICARE

## 2020-10-28 PROCEDURE — 99442 PR PHYS/QHP TELEPHONE EVALUATION 11-20 MIN: CPT | Performed by: NURSE PRACTITIONER

## 2020-10-28 PROCEDURE — 99213 OFFICE O/P EST LOW 20 MIN: CPT

## 2020-10-28 RX ORDER — HYDROCODONE BITARTRATE AND ACETAMINOPHEN 5; 325 MG/1; MG/1
1 TABLET ORAL 2 TIMES DAILY PRN
Qty: 60 TABLET | Refills: 0 | Status: SHIPPED | OUTPATIENT
Start: 2020-11-03 | End: 2020-11-19 | Stop reason: SDUPTHER

## 2020-10-28 ASSESSMENT — ENCOUNTER SYMPTOMS
GASTROINTESTINAL NEGATIVE: 1
RESPIRATORY NEGATIVE: 1
EYES NEGATIVE: 1

## 2020-10-28 NOTE — PROGRESS NOTES
Kendal 89 PROGRESS NOTE      Patient phone call to review Medication Agreement    Chief Complaint:left hand pain    She had surgery last week  for  left carpal tunnel release. She is using  a tens unit. She states still has some swelling, in her hand. She c/o nerve pain in her thumb and index finger, burning. Numbness of hand is improving. She is not sleeping well due to pain. Hand Pain    The incident occurred more than 1 week ago. There was no injury mechanism. The pain is present in the left hand and left wrist. The quality of the pain is described as aching. The pain is at a severity of 4/10. The pain is moderate. The pain has been constant since the incident. Associated symptoms include numbness. Exacerbated by: laying down. She has tried ice (pain medication, tens unit) for the symptoms. The treatment provided mild relief. Patient denies any new neurological symptoms. No bowel or bladder incontinence, no weakness, and no falling. Any new diagnostic workup: [x] no  [] yes [] Xray [] CT scan [] MRI [] DEXA scan     [] Other                    Treatment goals:  Functional status: for surgical site to heal      Aberrancy:   Any alcoholic beverages  no     Any illegal drugs  no       Analgesia:4    Adverse  Effects :none    ADL;s :activity limited        Pill count: appropriate fill date 10- states # 16 left from surgeon of hydrocoonde can take 4 tabs a day and # 5 tabs hydrocodone tabs left prescribed by pain clinic which she can take 2  A day will fill 11-3-2020    Morphine equivalent dose as reported on OARRS:10  Periodic Controlled Substance Monitoring: Possible medication side effects, risk of tolerance/dependence & alternative treatments discussed., No signs of potential drug abuse or diversion identified. , Assessed functional status., Obtaining appropriate analgesic effect of treatment.  Erwin Bertrand, APRN - CNP)  Review ofOARRS does not show any aberrant prescription behavior. Medication is helping the patient stay active. Patient denies any side effects and reports adequate analgesia. No sign of misuse/abuse. When was thelast UDS: 1-            Was the UDS appropriate:yes    Record/Diagnostics Review:      As above, I did review the imaging    1/26/2020 11:08 AM - Michael, Darionpn Incoming Lab Results From [email or web address]     Component  Value  Ref Range & Units  Status  Collected  Lab    Pain Management Drug Panel Interp, Urine  Consistent   Final  01/22/2020  1:45 PM  [facility]    (NOTE)   ________________________________________________________________   DRUGS EXPECTED:   HYDROCODONE [1/22/20]   ________________________________________________________________   CONSISTENT with medications provided:   HYDROCODONE : based on norhydrocodone   ________________________________________________________________   INTERPRETIVE INFORMATION: Pain Mgt [place], [place] Spec/EMIT, Ur,                            Interp   Interpretation depends on accuracy and completeness of patient   medication information submitted by client.           Past Medical History:   Diagnosis Date    Anxiety     [facility] ([facility] [address]     Breast cancer ([facility] [address]     Cancer ([facility] [address]     breast, in remission    Chronic back pain 12/3/2013    Depression     DJD (degenerative joint disease)     Family history of breast cancer     MGM    Fibromyalgia     Fibromyalgia     History of hysterectomy     Hypothyroidism     Obesity, Class III, BMI 40-49.9 (morbid obesity) ([address] 7/22/2014    Osteoarthritis     SVT (supraventricular tachycardia) ([address]     Unspecified sleep apnea        Past Surgical History:   Procedure Laterality Date    ABDOMEN SURGERY      APPENDECTOMY      BREAST SURGERY      lumpectomy w radiation 2009    CARDIAC CATHETERIZATION      with ablation    CARPAL TUNNEL RELEASE Left 10/22/2020    COLONOSCOPY  2009    10 years    COLONOSCOPY  08/2017   Megan Her FINGER SURGERY  10/2017    trigger finger right hand    HERNIA REPAIR      umbilical    HYSTERECTOMY      total    JOINT REPLACEMENT      Right Knee 2014    KNEE ARTHROSCOPY      bilateral    KNEE SURGERY      x2    LAPAROSCOPIC APPENDECTOMY Right     TONSILLECTOMY      UMBILICAL HERNIA REPAIR N/A     WRIST SURGERY  04/12/2018    plate and screws right wrist       Allergies   Allergen Reactions    Augmentin [Amoxicillin-Pot Clavulanate] Nausea And Vomiting     Projectile vomiting    Sulfa Antibiotics Anaphylaxis    Amoxapine And Related Diarrhea and Nausea And Vomiting     AMOX TR-K- 875-125 mg    Loxapine Succinate Diarrhea and Nausea Only     AMOX TR-K- 875-125 mg         Current Outpatient Medications:     meloxicam (MOBIC) 15 MG tablet, take 1 tablet by mouth once daily, Disp: 30 tablet, Rfl: 2    venlafaxine (EFFEXOR XR) 150 MG extended release capsule, take 1 capsule by mouth once daily, Disp: 90 capsule, Rfl: 1    magnesium oxide (MAG-OX) 400 (240 Mg) MG tablet, take 1 tablet by mouth twice a day, Disp: , Rfl:     Calcium Carb-Cholecalciferol (CALCIUM 600 + D) 600-200 MG-UNIT TABS, Take by mouth, Disp: , Rfl:     HYDROcodone-acetaminophen (NORCO) 5-325 MG per tablet, Take 1 tablet by mouth 2 times daily as needed for Pain for up to 30 days. , Disp: 60 tablet, Rfl: 0    levothyroxine (SYNTHROID) 50 MCG tablet, take 1 tablet by mouth once daily, Disp: 90 tablet, Rfl: 1    loratadine (CLARITIN) 10 MG capsule, Take 10 mg by mouth daily, Disp: , Rfl:     flecainide (TAMBOCOR) 50 MG tablet, Take 50 mg by mouth 2 times daily. , Disp: , Rfl:     Tens Unit MISC, by Does not apply route For low back pain, Disp: 1 each, Rfl: 0    Tens Unit MISC, by Does not apply route, Disp: 1 each, Rfl: 0    Polyethylene Glycol 3350 (MIRALAX PO), Take by mouth Taking daily, Disp: , Rfl:     Family History   Problem Relation Age of Onset    Cancer Mother         pancreatic cancer 78    Heart Disease Mother     COPD Father     COPD Brother     Breast Cancer Maternal Grandmother         dx unknown age    [facility] Colon Cancer Maternal Grandfather         66-80s        Social History     Socioeconomic History    Marital status:      Spouse name: Not on file    Number of children: Not on file    Years of education: Not on file    Highest education level: Not on file   Occupational History    Occupation: [employer] disability    Social Needs    Financial resource strain: Not on file    Food insecurity     Worry: Not on file     Inability: Not on file    Transportation needs     Medical: Not on file     Non-medical: Not on file   Tobacco Use    Smoking status: Never Smoker    Smokeless tobacco: Never Used   Substance and Sexual Activity    Alcohol use: No     Alcohol/week: 0.0 standard drinks     Comment: one to two drinks a month    Drug use: No    Sexual activity: Yes     Partners: Male     Birth control/protection: Surgical     Comment: hyst   Lifestyle    Physical activity     Days per week: Not on file     Minutes per session: Not on file    Stress: Not on file   Relationships    Social connections     Talks on phone: Not on file     Gets together: Not on file     Attends Cheondoism service: Not on file     Active member of club or organization: Not on file     Attends meetings of clubs or organizations: Not on file     Relationship status: Not on file    Intimate partner violence     Fear of current or ex partner: Not on file     Emotionally abused: Not on file     Physically abused: Not on file     Forced sexual activity: Not on file   Other Topics Concern    Not on file   Social History Narrative    Not on file         Review of Systems:  Review of Systems   Constitution: Negative. HENT: Negative. Eyes: Negative. Cardiovascular: Negative. Respiratory: Negative. Endocrine: Negative. Hematologic/Lymphatic: Bruises/bleeds easily. Skin: Negative. Musculoskeletal: Positive for joint pain and joint swelling. Gastrointestinal: Negative. Genitourinary: Negative. Neurological: Positive for numbness. Psychiatric/Behavioral: Negative. Physical Exam:  LMP 06/13/2010 (Within Months)     Physical Exam  Musculoskeletal:        Hands:    Neurological:      Mental Status: She is alert and oriented to person, place, and time. Psychiatric:         Mood and Affect: Mood normal.         Thought Content: Thought content normal.           Assessment:    Problem List Items Addressed This Visit     Shoulder tendinitis, left - Primary    Sacroiliitis ([address]    Relevant Medications    HYDROcodone-acetaminophen (NORCO) 5-325 MG per tablet (Start on 11/3/2020)    Neck pain (Chronic)    Lumbar facet arthropathy    Relevant Medications    HYDROcodone-acetaminophen (NORCO) 5-325 MG per tablet (Start on 11/3/2020)    Fibromyalgia    Relevant Medications    HYDROcodone-acetaminophen ([place]) 5-325 MG per tablet (Start on 11/3/2020)    Encounter for medication monitoring    DDD (degenerative disc disease), lumbar    Relevant Medications    HYDROcodone-acetaminophen (NORCO) 5-325 MG per tablet (Start on 11/3/2020)    Chronic left shoulder pain (Chronic)    Relevant Medications    HYDROcodone-acetaminophen (NORCO) 5-325 MG per tablet (Start on 11/3/2020)            Treatment Plan:  DISCUSSION: Treatment options discussed withpatient and all questions answered to patient's satisfaction. Possible side effects, risk of tolerance and or dependence and alternative treatments discussed    Obtaining appropriate analgesic effect of treatment   No signs of potential drug abuse or diversion identified    [x] Ill effects of being on chronic pain medications such as sleep disturbances, respiratory depression, hormonal changes, withdrawal symptoms, chronic opioid dependence and tolerance as well as risk of taking opioids with Benzodiazepines and taking opioids along with alcohol,  werediscussed with patient.  I had asked the patient to minimize medication use and utilize pain medications only for uncontrolled rest pain or pain with exertional activities. I advised patient not to self-escalate painmedications without consulting with us. At each of patient's future visits we will try to taper pain medications, while adjusting the adjunct medications, and re-evaluating for Physical Therapy to improve spinal andjoint strength. We will continue to have discussions to decrease pain medications as tolerated. Counseled patient on effects their pain medication and /or their medical condition mayhave on their  ability to drive or operate machinery. Instructed not to drive or operate machinery if drowsy     I also discussed with the patient regarding the dangers of combining narcotic pain medication with tranquilizers, alcohol or illegal drugs or taking the medication any way other than prescribed. The dangers were discussed  including respiratory depression and death. Patient was told to tell  all  physicians regarding the medications he is getting from pain clinic. Patient is warned not to take any unprescribed medications over-the-countermedications that can depress breathing . Patient is advised to talk to the pharmacist or physicians if planning to take any over-the-counter medications before  takeing them. Patient is strongly advised to avoid tranquilizers or  relaxants, illegal drugs  or any medications that can depress breathing  Patient is also advised to tell us if there is any changes in their medications from other physicians.     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

## 2020-11-03 PROBLEM — M19.90 DJD (DEGENERATIVE JOINT DISEASE): Status: RESOLVED | Noted: 2020-11-03 | Resolved: 2020-11-03

## 2020-11-18 ENCOUNTER — HOSPITAL ENCOUNTER (OUTPATIENT)
Dept: PREADMISSION TESTING | Age: 65
Discharge: HOME | End: 2020-11-22
Payer: MEDICARE

## 2020-11-18 PROCEDURE — U0003 INFECTIOUS AGENT DETECTION BY NUCLEIC ACID (DNA OR RNA); SEVERE ACUTE RESPIRATORY SYNDROME CORONAVIRUS 2 (SARS-COV-2) (CORONAVIRUS DISEASE [COVID-19]), AMPLIFIED PROBE TECHNIQUE, MAKING USE OF HIGH THROUGHPUT TECHNOLOGIES AS DESCRIBED BY CMS-2020-01-R: HCPCS

## 2020-11-19 ENCOUNTER — HOSPITAL ENCOUNTER (OUTPATIENT)
Dept: PAIN MANAGEMENT | Age: 65
Discharge: HOME | End: 2020-11-19
Payer: MEDICARE

## 2020-11-19 PROCEDURE — 99213 OFFICE O/P EST LOW 20 MIN: CPT | Performed by: NURSE PRACTITIONER

## 2020-11-19 PROCEDURE — 99213 OFFICE O/P EST LOW 20 MIN: CPT

## 2020-11-19 RX ORDER — HYDROCODONE BITARTRATE AND ACETAMINOPHEN 5; 325 MG/1; MG/1
1 TABLET ORAL 2 TIMES DAILY PRN
Qty: 60 TABLET | Refills: 0 | Status: SHIPPED | OUTPATIENT
Start: 2020-12-03 | End: 2020-12-22 | Stop reason: SDUPTHER

## 2020-11-19 ASSESSMENT — ENCOUNTER SYMPTOMS
BACK PAIN: 1
COUGH: 0
SHORTNESS OF BREATH: 0
CONSTIPATION: 0

## 2020-11-19 NOTE — PROGRESS NOTES
Patient completed a video visit today to review medication contract. Chief Complaint: back pain    PMH  Pt reports chronic history of back pain, states she was diagnosed with fibromyalgia. Pain has been worse with colder [place]. She does see a chiropractor with adjustements heat and TENS tx which helps to control the pain.  Pt has tried lyrica but stopped due to weight gain and gabapentin was causing drowsiness. Pt also experienced side effects with Topamax. She has limited ROM left shoulder.  XR done 4/18/19 shows no acute abnormality. Cervical MRI shows Multilevel degenerative disc disease in the cervical spine. TENS unit ordered per patient request. She was having pain in her left had following CPS surgery - this has somewhat resolved. Back Pain   This is a chronic problem. The current episode started more than 1 year ago. The problem occurs constantly. The problem is unchanged. The pain is present in the lumbar spine. The quality of the pain is described as aching. The pain does not radiate. The pain is at a severity of 4/10. The pain is mild. The symptoms are aggravated by position and standing (walking). Pertinent negatives include no chest pain, fever, numbness, paresthesias or tingling. She has tried analgesics, bed rest and heat for the symptoms. The treatment provided mild relief. Patient denies any new neurological symptoms. No bowel or bladder incontinence, no weakness, and no falling. Pill count: appropriate due 12/3    Morphine equivalent: 10    Periodic Controlled Substance Monitoring: Possible medication side effects, risk of tolerance/dependence & alternative treatments discussed., No signs of potential drug abuse or diversion identified., Obtaining appropriate analgesic effect of treatment.  Ashlye Armstrong, APRN - CNP)      Past Medical History:   Diagnosis Date    Anxiety     [facility]     [facility] ([facility]     Cancer ([facility]     breast, in remission    Chronic back pain 12/3/2013    Depression     DJD (degenerative joint disease)     Family history of breast cancer     MGM    Fibromyalgia     Fibromyalgia     History of hysterectomy     Hypothyroidism     Obesity, Class III, BMI 40-49.9 (morbid obesity) ([facility] 7/22/2014    Osteoarthritis     SVT (supraventricular tachycardia) ([facility]     Unspecified sleep apnea        Past Surgical History:   Procedure Laterality Date    ABDOMEN SURGERY      APPENDECTOMY      BREAST SURGERY      lumpectomy w radiation 2009    CARDIAC CATHETERIZATION      with ablation    CARPAL TUNNEL RELEASE Left 10/22/2020    COLONOSCOPY  2009    10 years    COLONOSCOPY  08/2017    FINGER SURGERY  10/2017    trigger finger right hand    HERNIA REPAIR      umbilical    HYSTERECTOMY      total    JOINT REPLACEMENT      Right Knee 2014    KNEE ARTHROSCOPY      bilateral    KNEE SURGERY      x2    LAPAROSCOPIC APPENDECTOMY Right     TONSILLECTOMY      UMBILICAL HERNIA REPAIR N/A     WRIST SURGERY  04/12/2018    plate and screws right wrist       Allergies   Allergen Reactions    Augmentin [Amoxicillin-Pot Clavulanate] Nausea And Vomiting     Projectile vomiting    Sulfa Antibiotics Anaphylaxis    Amoxapine And Related Diarrhea and Nausea And Vomiting     AMOX TR-K- 875-125 mg    Loxapine Succinate Diarrhea and Nausea Only     AMOX TR-K- 875-125 mg         Current Outpatient Medications:     HYDROcodone-acetaminophen (NORCO) 5-325 MG per tablet, Take 1 tablet by mouth 2 times daily as needed for Pain for up to 30 days. , Disp: 60 tablet, Rfl: 0    meloxicam (MOBIC) 15 MG tablet, take 1 tablet by mouth once daily, Disp: 30 tablet, Rfl: 2    venlafaxine (EFFEXOR XR) 150 MG extended release capsule, take 1 capsule by mouth once daily, Disp: 90 capsule, Rfl: 1    magnesium oxide (MAG-OX) 400 (240 Mg) MG tablet, take 1 tablet by mouth twice a day, Disp: , Rfl:     Calcium Carb-Cholecalciferol (CALCIUM 600 + D) 600-200 MG-UNIT TABS, Take by mouth, Disp: , Rfl:     levothyroxine (SYNTHROID) 50 MCG tablet, take 1 tablet by mouth once daily, Disp: 90 tablet, Rfl: 1    Tens Unit MISC, by Does not apply route For low back pain, Disp: 1 each, Rfl: 0    Tens Unit MISC, by Does not apply route, Disp: 1 each, Rfl: 0    loratadine (CLARITIN) 10 MG capsule, Take 10 mg by mouth daily, Disp: , Rfl:     Polyethylene Glycol 3350 (MIRALAX PO), Take by mouth Taking daily, Disp: , Rfl:     flecainide (TAMBOCOR) 50 MG tablet, Take 50 mg by mouth 2 times daily. , Disp: , Rfl:     Family History   Problem Relation Age of Onset    Cancer Mother         pancreatic cancer 78    Heart Disease Mother     COPD Father     COPD Brother     Breast Cancer Maternal Grandmother         dx unknown age    [facility] Colon Cancer Maternal Grandfather         66-80s        Social History     Socioeconomic History    Marital status:       Spouse name: Not on file    Number of children: Not on file    Years of education: Not on file    Highest education level: Not on file   Occupational History    Occupation: [employer] disability    Social Needs    Financial resource strain: Not on file    Food insecurity     Worry: Not on file     Inability: Not on file    Transportation needs     Medical: Not on file     Non-medical: Not on file   Tobacco Use    Smoking status: Never Smoker    Smokeless tobacco: Never Used   Substance and Sexual Activity    Alcohol use: No     Alcohol/week: 0.0 standard drinks     Comment: one to two drinks a month    Drug use: No    Sexual activity: Yes     Partners: Male     Birth control/protection: Surgical     Comment: [facility]   Lifestyle    Physical activity     Days per week: Not on file     Minutes per session: Not on file    Stress: Not on file   Relationships    Social connections     Talks on phone: Not on file     Gets together: Not on file     Attends [employer] service: Not on file     Active member of club or organization: Not on file     Attends meetings of clubs or organizations: Not on file     Relationship status: Not on file    Intimate partner violence     Fear of current or ex partner: Not on file     Emotionally abused: Not on file     Physically abused: Not on file     Forced sexual activity: Not on file   Other Topics Concern    Not on file   Social History Narrative    Not on file       Review of Systems:  Review of Systems   Constitution: Negative for chills and fever. Cardiovascular: Negative for chest pain and palpitations. Respiratory: Negative for cough and shortness of breath. Musculoskeletal: Positive for back pain. Gastrointestinal: Negative for constipation. Neurological: Negative for disturbances in coordination, loss of balance, numbness, paresthesias and tingling. Physical Exam:  LMP 06/13/2010 (Within Months)     Physical Exam  HENT:      Head: Normocephalic. Pulmonary:      Effort: Pulmonary effort is normal.   Neurological:      Mental Status: She is alert. Psychiatric:         Mood and Affect: Mood normal.         Behavior: Behavior normal.         Record/Diagnostics Review:    Last annabella 1/2020 and was appropriate     Assessment:  Problem List Items Addressed This Visit     Fibromyalgia    Relevant Medications    HYDROcodone-acetaminophen (NORCO) 5-325 MG per tablet (Start on 12/3/2020)    Lumbar facet arthropathy    Relevant Medications    HYDROcodone-acetaminophen (NORCO) 5-325 MG per tablet (Start on 12/3/2020)    Sacroiliitis ([facility] - Primary    Relevant Medications    HYDROcodone-acetaminophen (Lesa Nate) 5-325 MG per tablet (Start on 12/3/2020)    DDD (degenerative disc disease), lumbar    Relevant Medications    HYDROcodone-acetaminophen (NORCO) 5-325 MG per tablet (Start on 12/3/2020)    Encounter for medication monitoring             Treatment Plan:  Patient relates current medications are helping the pain.  Patient reports taking pain medications as prescribed, denies obtaining used to authenticate this note.

## 2020-11-20 LAB — SARS-COV-2, NAA: NOT DETECTED

## 2020-11-22 ENCOUNTER — HOSPITAL ENCOUNTER (OUTPATIENT)
Dept: SLEEP CENTER | Age: 65
Discharge: HOME | End: 2020-11-24
Payer: MEDICARE

## 2020-11-22 VITALS — TEMPERATURE: 96.2 F

## 2020-11-22 PROCEDURE — 95811 POLYSOM 6/>YRS CPAP 4/> PARM: CPT

## 2020-12-06 LAB — STATUS: NORMAL

## 2020-12-15 ENCOUNTER — HOSPITAL ENCOUNTER (OUTPATIENT)
Dept: PAIN MANAGEMENT | Age: 65
Discharge: HOME | End: 2020-12-15
Payer: MEDICARE

## 2020-12-15 PROCEDURE — 99441 PR PHYS/QHP TELEPHONE EVALUATION 5-10 MIN: CPT | Performed by: NURSE PRACTITIONER

## 2020-12-15 PROCEDURE — 99213 OFFICE O/P EST LOW 20 MIN: CPT

## 2020-12-15 ASSESSMENT — ENCOUNTER SYMPTOMS
BACK PAIN: 1
CONSTIPATION: 1
EYES NEGATIVE: 1
RESPIRATORY NEGATIVE: 1

## 2020-12-15 NOTE — PROGRESS NOTES
[facility] PROGRESS NOTE      Patient  completed []  video visit   [x]   phone call:   8   Minutes :   to  review Medication Agreement    Location:  Provider:  working from home      , patient at home     Chief Complaint: low back pain    She c/o right lower back pain which has not changed, The pain does not radiate,She states felt like they were giving out on her last week. She states pain better since r;eft carpal tunnel surgery, this past October, She has had no Ed visits. Back Pain  This is a chronic problem. The problem occurs constantly. The problem is unchanged. The pain is present in the lumbar spine. The quality of the pain is described as aching (dull). The pain does not radiate. The pain is at a severity of 4/10. The pain is moderate. The pain is the same all the time. Exacerbated by: walking. She has tried analgesics for the symptoms.              Treatment goals:  Functional status: to keep doing things      Aberrancy:   Any alcoholic beverages   no         Any illegal drugs   no      Analgesia:      4               Adverse  Effects :constipation, takes miralax      ADL;s :stretching     Data:    When was thelast UDS:  1-           Was the UDS appropriate:yes      Record/Diagnostics Review:      As above, I did review the imaging     1/26/2020 11:08 AM - Michael, M[facility]pn Incoming Lab Results From [facility] assistant    Component Value Ref Range & Units Status Collected Lab   Pain Management Drug Panel Interp, Urine Consistent   Final 01/22/2020  1:45 PM [facility]   (NOTE)   ________________________________________________________________   DRUGS EXPECTED:   HYDROCODONE [1/22/20]   ________________________________________________________________   CONSISTENT with medications provided:   HYDROCODONE : based on norhydrocodone   ________________________________________________________________   INTERPRETIVE INFORMATION: Pain Mgt [place], [place] Spec/EMIT, Ur,                            Interp Interpretation depends on accuracy and completeness of patient   medication information submitted by client. 6-Acetylmorphine, Ur Not Detected   Final                       Pill count: appropriate  Fill date 1-3-2020    Morphine equivalent dose as reported on OARRS:10     Review ofOARRS does not show any aberrant prescription behavior. Medication is helping the patient stay active. Patient denies any side effects and reports adequate analgesia. No sign of misuse/abuse.             Past Medical History:   Diagnosis Date    Anxiety     [facility]     Breast cancer ([facility]     Cancer ([facility]     breast, in remission    Chronic back pain 12/3/2013    Depression     DJD (degenerative joint disease)     Family history of breast cancer     MGM    Fibromyalgia     Fibromyalgia     History of hysterectomy     Hypothyroidism     Obesity, Class III, BMI 40-49.9 (morbid obesity) ([facility] 7/22/2014    Osteoarthritis     SVT (supraventricular tachycardia) ([facility]     Unspecified sleep apnea        Past Surgical History:   Procedure Laterality Date    ABDOMEN SURGERY      APPENDECTOMY      BREAST SURGERY      lumpectomy w radiation 2009    CARDIAC CATHETERIZATION      with ablation    CARPAL TUNNEL RELEASE Left 10/22/2020    COLONOSCOPY  2009    10 years    COLONOSCOPY  08/2017    FINGER SURGERY  10/2017    trigger finger right hand    HERNIA REPAIR      umbilical    HYSTERECTOMY      total    JOINT REPLACEMENT      Right Knee 2014    KNEE ARTHROSCOPY      bilateral    KNEE SURGERY      x2    LAPAROSCOPIC APPENDECTOMY Right     TONSILLECTOMY      UMBILICAL HERNIA REPAIR N/A     WRIST SURGERY  04/12/2018    plate and screws right wrist       Allergies   Allergen Reactions    Augmentin [Amoxicillin-Pot Clavulanate] Nausea And Vomiting     Projectile vomiting    Sulfa Antibiotics Anaphylaxis    Amoxapine And Related Diarrhea and Nausea And Vomiting     AMOX TR-K- 875-125 mg  Loxapine Succinate Diarrhea and Nausea Only     AMOX TR-K- 875-125 mg         Current Outpatient Medications:     HYDROcodone-acetaminophen (NORCO) 5-325 MG per tablet, Take 1 tablet by mouth 2 times daily as needed for Pain for up to 30 days. , Disp: 60 tablet, Rfl: 0    meloxicam (MOBIC) 15 MG tablet, take 1 tablet by mouth once daily, Disp: 30 tablet, Rfl: 2    venlafaxine (EFFEXOR XR) 150 MG extended release capsule, take 1 capsule by mouth once daily, Disp: 90 capsule, Rfl: 1    magnesium oxide (MAG-OX) 400 (240 Mg) MG tablet, take 1 tablet by mouth twice a day, Disp: , Rfl:     Calcium Carb-Cholecalciferol (CALCIUM 600 + D) 600-200 MG-UNIT TABS, Take by mouth, Disp: , Rfl:     levothyroxine (SYNTHROID) 50 MCG tablet, take 1 tablet by mouth once daily, Disp: 90 tablet, Rfl: 1    loratadine (CLARITIN) 10 MG capsule, Take 10 mg by mouth daily, Disp: , Rfl:     flecainide (TAMBOCOR) 50 MG tablet, Take 50 mg by mouth 2 times daily. , Disp: , Rfl:     Tens Unit MISC, by Does not apply route For low back pain, Disp: 1 each, Rfl: 0    Tens Unit MISC, by Does not apply route, Disp: 1 each, Rfl: 0    Polyethylene Glycol 3350 (MIRALAX PO), Take by mouth Taking daily, Disp: , Rfl:     Family History   Problem Relation Age of Onset    Cancer Mother         pancreatic cancer 78    Heart Disease Mother     COPD Father     COPD Brother     Breast Cancer Maternal Grandmother         dx unknown age    [facility] Colon Cancer Maternal Grandfather         66-80s        Social History     Socioeconomic History    Marital status:       Spouse name: Not on file    Number of children: Not on file    Years of education: Not on file    Highest education level: Not on file   Occupational History    Occupation: [employer] disability    Social Needs    Financial resource strain: Not on file    Food insecurity     Worry: Not on file     Inability: Not on file    Transportation needs     Medical: Not on file I also discussed with the patient regarding the dangers of combining narcotic pain medication with tranquilizers, alcohol or illegal drugs or taking the medication any way other than prescribed. The dangers were discussed  including respiratory depression and death. Patient was told to tell  all  physicians regarding the medications he is getting from pain clinic. Patient is warned not to take any unprescribed medications over-the-countermedications that can depress breathing . Patient is advised to talk to the pharmacist or physicians if planning to take any over-the-counter medications before  takeing them. Patient is strongly advised to avoid tranquilizers or  relaxants, illegal drugs  or any medications that can depress breathing  Patient is also advised to tell us if there is any changes in their medications from other physicians.     1. Will e-prescribe closer to fill date      TREATMENT OPTIONS:       Medication Agreement Requirements Met  Continue Opioid therapy  Follow up appointment made

## 2020-12-22 RX ORDER — HYDROCODONE BITARTRATE AND ACETAMINOPHEN 5; 325 MG/1; MG/1
1 TABLET ORAL 2 TIMES DAILY PRN
Qty: 60 TABLET | Refills: 0 | Status: SHIPPED | OUTPATIENT
Start: 2021-01-04 | End: 2021-01-29 | Stop reason: SDUPTHER

## 2021-01-29 ENCOUNTER — HOSPITAL ENCOUNTER (OUTPATIENT)
Dept: PAIN MANAGEMENT | Age: 66
Discharge: HOME | End: 2021-01-29
Payer: MEDICARE

## 2021-01-29 DIAGNOSIS — Z51.81 ENCOUNTER FOR MEDICATION MONITORING: ICD-10-CM

## 2021-01-29 DIAGNOSIS — M15.9 PRIMARY OSTEOARTHRITIS INVOLVING MULTIPLE JOINTS: ICD-10-CM

## 2021-01-29 DIAGNOSIS — M47.816 LUMBAR FACET ARTHROPATHY: ICD-10-CM

## 2021-01-29 DIAGNOSIS — M51.36 DDD (DEGENERATIVE DISC DISEASE), LUMBAR: ICD-10-CM

## 2021-01-29 DIAGNOSIS — M79.7 FIBROMYALGIA: ICD-10-CM

## 2021-01-29 DIAGNOSIS — M77.8 SHOULDER TENDINITIS, LEFT: Primary | ICD-10-CM

## 2021-01-29 DIAGNOSIS — M79.18 MUSCLE PAIN, LUMBAR: ICD-10-CM

## 2021-01-29 PROCEDURE — 99213 OFFICE O/P EST LOW 20 MIN: CPT | Performed by: NURSE PRACTITIONER

## 2021-01-29 PROCEDURE — 99213 OFFICE O/P EST LOW 20 MIN: CPT

## 2021-01-29 RX ORDER — HYDROCODONE BITARTRATE AND ACETAMINOPHEN 5; 325 MG/1; MG/1
1 TABLET ORAL 2 TIMES DAILY PRN
Qty: 60 TABLET | Refills: 0 | Status: SHIPPED | OUTPATIENT
Start: 2021-02-08 | End: 2021-03-02 | Stop reason: SDUPTHER

## 2021-01-29 ASSESSMENT — ENCOUNTER SYMPTOMS
EYES NEGATIVE: 1
BACK PAIN: 1
GASTROINTESTINAL NEGATIVE: 1

## 2021-01-29 NOTE — PROGRESS NOTES
[facility] 89 PROGRESS NOTE      Patient  completed [x]  video visit   []   phone call:      Minutes :   [x]    to  review Medication Agreement    []  Follow up after procedure   []  Discuss treatment options    Location:  Provider:  working from    [x]    home    []   [facility] - STEVEN GUSTAFSON , patient at home   Chief Complaint:  Back pain    She c/o low back pain which does not radiate. She states her pain is unchanged, She has  history of lumbar surgery, PT did not help her when she did it. She uses a tens unit prn which helps. She states her  sleep is fair, she sleeps with a pillow between her legs, She  Is able to do  her housework, She does stretching. She has osteopenia and takes vitamin D with calcium. Her last Dexa Scan was 1/201 9. She has had no ED visits,    Back Pain  This is a chronic problem. The problem occurs constantly. The problem is unchanged. The pain is present in the lumbar spine. The quality of the pain is described as aching (dull). The pain does not radiate. The pain is at a severity of 4/10. The pain is moderate. The pain is the same all the time. The symptoms are aggravated by standing and bending. Risk factors: osteopenia. She has tried analgesics and heat for the symptoms. Treatment goals:  Functional status:    To function      Aberrancy:   Any alcoholic beverages   no         Any illegal drugs no         Analgesia:4                Adverse  Effects :takes miralax      ADL;s :stretches, housework    Data:    When was thelast UDS:    1-         Was the UDS appropriate: yes       Record/Diagnostics Review:      As above, I did review the imaging     1/26/2020 11:08 AM - MichaelRadha mayer Incoming Lab Results From [facility]    Component Value Ref Range & Units Status Collected Lab   Pain Management Drug Panel Interp, Urine Consistent   Final 01/22/2020  1:45 PM [facility]   (NOTE)   ________________________________________________________________   DRUGS EXPECTED: HYDROCODONE [1/22/20]   ________________________________________________________________   CONSISTENT with medications provided:   HYDROCODONE : based on norhydrocodone   ________________________________________________________________   INTERPRETIVE INFORMATION: Pain Mgt [place], [place] Spec/EMIT, Ur,                            Interp   Interpretation depends on accuracy and completeness of patient   medication information submitted by client.     6-Acetylmorphine, Ur Not Detected   F               EXAMINATION:   BONE DENSITOMETRY       1/9/2019 1:12 pm       TECHNIQUE:   A bone density dual x-ray absorptiometry (DEXA) scan was performed of the   lumbar spine and left hip  on a [employer] system.       COMPARISON:   None recent.       HISTORY:   ORDERING SYSTEM PROVIDED HISTORY: Postmenopausal   TECHNOLOGIST PROVIDED HISTORY:   postmenopausal       FINDINGS:   LUMBAR SPINE:       The bone mineral density in the lumbar spine including the L1-L4 levels is   measured at 1.167 g/cm2, which corresponds to a T-score of -0.1 and a Z-score   of 0.1.  This is within the normal range by WHO criteria.       LEFT HIP:       The bone mineral density in the total hip is measured at 0.869 g/cm2   corresponding to a T-score of -1.1 and a Z-score of -0.9.  This is within the   osteopenia range by WHO criteria.       The bone mineral density of the femoral neck is measured at 0.767 g/cm2   corresponding to a T-score of -2.0 and a Z-score of -1.4.  This is within the   osteopenia range by WHO criteria.       FRAX 10 year probability of fracture:       - major osteoporotic fracture: 15.1%       - hip fracture: 2.0%           Impression   Osteopenia by WHO criteria.         EXAMINATION:   MRI OF THE CERVICAL SPINE WITHOUT CONTRAST; MRI OF THE LUMBAR SPINE WITHOUT   CONTRAST 5/7/2019 4:25 pm; 5/7/2019 4:24 pm       TECHNIQUE:   Multiplanar multisequence MRI of the cervical spine was performed without the administration of intravenous contrast.; Multiplanar multisequence MRI of the   lumbar spine was performed without the administration of intravenous contrast.       COMPARISON:   None.       HISTORY:   ORDERING SYSTEM PROVIDED HISTORY: Neck pain; ORDERING SYSTEM PROVIDED   HISTORY: Lumbar facet arthropathy       FINDINGS:   Cervical spine       BONES/ALIGNMENT: Straightening of the normal lordotic curvature is noted. There is no fracture.       SPINAL CORD: No focal cord signal hyperintensity is noted.       SOFT TISSUES: Small cervical nodes are present.       C2-C3: Minimal disc bulging is noted.  Mild facet hypertrophic changes are   noted       C3-C4: Mild disc bulging is noted.  Small left proximal foraminal protrusion   is noted.  Endplate, uncovertebral and facet hypertrophic changes are noted. Moderate left foraminal narrowing is noted.       C4-C5: Mild-to-moderate diffuse disc bulging is noted. Gamez Hoop is a   questionable small proximal foraminal protrusion on the right.  Endplate,   uncovertebral and facet hypertrophic changes are noted.  Severe right and   moderate left foraminal narrowing is noted.  Ventral cord flattening is   noted.  Mild narrowing of the canal is noted       C5-C6: Mild disc bulging is noted diffusely. Gamez Hoop is a small proximal   foraminal protrusion on the left.  Endplate, uncovertebral and facet   hypertrophic changes are noted.  Minimal right and moderate left foraminal   narrowing is noted       C6-C7: Mild disc bulging is noted diffusely.  Endplate, uncovertebral and   facet hypertrophic changes are noted.  These are greater on the left.    Moderate to severe left and mild-to-moderate right foraminal narrowing is   noted.       C7-T1: Very minimal disc bulging is noted.  Endplate, uncovertebral and facet   hypertrophic changes are noted. Gamez Hoop is a suggested small left foraminal   protrusion.  There mild-to-moderate left foraminal narrowing.       MRI lumbar spine:     Bones: Mild lordotic curvature is noted. Vanessa Abed is no acute fracture. Minimal edema in the left L5 pedicle is noted.  Minimal T12 retrolisthesis is   noted.  Minimal L5 retrolisthesis is noted degenerative endplate signal   changes are greatest at T12-L1.       Soft tissues: No retroperitoneal mass lesion is noted.       Cord: Conus terminates at L1-L2 and is normal in appearance.       T12-L1: Uncovering of the disc is noted.  Minimal disc bulging is noted.    There is a small proximal foraminal protrusion on the right.       L1-2: Facet and ligament hypertrophic changes are noted       L2-3: Mild disc bulging is noted diffusely.  Annular tear is noted.  Left   foraminal broad-based protrusion is noted.  Minimal right and moderate left   foraminal narrowing is noted.  Facet and ligament hypertrophic changes are   noted.  Mild narrowing of the canal is noted       L3-4: Minimal disc bulging is noted diffusely.  Foraminal protrusions are   noted, left greater than right.  Mild right and mild-to-moderate left   foraminal narrowing is noted.  Facet and ligament hypertrophic changes are   noted.  Severe narrowing of the canal is noted       Mild disc bulging is noted with annular tear.  There is a small central and   right paracentral protrusion with minimal upward migration of extruded disc   material.  Mild to moderate proximal foraminal narrowing on the right is   noted.  Facet and ligament hypertrophic changes are noted.  Severe narrowing   of the canal is noted       L5-S1: Mild disc bulging is noted diffusely with annular tear.  Endplate   hypertrophic changes are noted.  Proximal foraminal narrowing is noted   bilaterally.  There is a questionable small proximal foraminal protrusion on   the left.  Bilateral facet hypertrophic changes are noted.           Impression   Cervical spine:       Multilevel degenerative disc disease in the cervical spine as described.  See   above for details of each level.     Lumbar spine:       Multilevel degenerative disc disease in the lower thoracic spine and the   lumbar spine as described.  See above for details of each level                   Pill count: appropriate  fill date : extra tabs , 20 hydrocoodne tabs, 10 days worth, will fill on 2-8-2021     Morphine MEQ per OARRS: 10     Review ofOARRS does not show any aberrant prescription behavior. Medication is helping the patient stay active. Patient denies any side effects and reports adequate analgesia. No sign of misuse/abuse.             Past Medical History:   Diagnosis Date    Anxiety     Breast cancer ([address]     [facility]     Cancer ([facility]     breast, in remission    Chronic back pain 12/3/2013    Depression     DJD (degenerative joint disease)     Family history of breast cancer     MGM    Fibromyalgia     Fibromyalgia     History of hysterectomy     Hypothyroidism     Obesity, Class III, BMI 40-49.9 (morbid obesity) ([facility] 7/22/2014    Osteoarthritis     SVT [facility]supraventricular tachycardia) ([facility]     Unspecified sleep apnea        Past Surgical History:   Procedure Laterality Date    ABDOMEN SURGERY      APPENDECTOMY      BREAST SURGERY      lumpectomy w radiation 2009    CARDIAC CATHETERIZATION      with ablation    CARPAL TUNNEL RELEASE Left 10/22/2020    COLONOSCOPY  2009    10 years    COLONOSCOPY  08/2017    FINGER SURGERY  10/2017    trigger finger right hand    HERNIA REPAIR      umbilical    HYSTERECTOMY      total    JOINT REPLACEMENT      Right Knee 2014    KNEE ARTHROSCOPY      bilateral    KNEE SURGERY      x2    LAPAROSCOPIC APPENDECTOMY Right     TONSILLECTOMY      UMBILICAL HERNIA REPAIR N/A     WRIST SURGERY  04/12/2018    plate and screws right wrist       Allergies   Allergen Reactions    Augmentin [Amoxicillin-Pot Clavulanate] Nausea And Vomiting     Projectile vomiting    Sulfa Antibiotics Anaphylaxis    Amoxapine And Related Diarrhea and Nausea And Vomiting AMOX TR-K- 875-125 mg    Loxapine Succinate Diarrhea and Nausea Only     AMOX TR-K- 875-125 mg         Current Outpatient Medications:     calcium carbonate-vitamin D (CALTRATE) 600-400 MG-UNIT TABS per tab, Take by mouth, Disp: , Rfl:     levothyroxine (SYNTHROID) 50 MCG tablet, take 1 tablet by mouth once daily, Disp: 90 tablet, Rfl: 1    meloxicam (MOBIC) 15 MG tablet, take 1 tablet by mouth once daily, Disp: 90 tablet, Rfl: 1    venlafaxine (EFFEXOR XR) 150 MG extended release capsule, take 1 capsule by mouth once daily, Disp: 90 capsule, Rfl: 1    HYDROcodone-acetaminophen (NORCO) 5-325 MG per tablet, Take 1 tablet by mouth 2 times daily as needed for Pain for up to 30 days. , Disp: 60 tablet, Rfl: 0    magnesium oxide (MAG-OX) 400 (240 Mg) MG tablet, take 1 tablet by mouth twice a day, Disp: , Rfl:     loratadine (CLARITIN) 10 MG capsule, Take 10 mg by mouth daily, Disp: , Rfl:     Polyethylene Glycol 3350 (MIRALAX PO), Take by mouth Taking daily, Disp: , Rfl:     flecainide (TAMBOCOR) 50 MG tablet, Take 50 mg by mouth 2 times daily. , Disp: , Rfl:     Tens Unit MISC, by Does not apply route For low back pain, Disp: 1 each, Rfl: 0    Tens Unit MISC, by Does not apply route, Disp: 1 each, Rfl: 0    Family History   Problem Relation Age of Onset    Cancer Mother         pancreatic cancer 78    Heart Disease Mother     COPD Father     COPD Brother     Breast Cancer Maternal Grandmother         dx unknown age    Husain Colon Cancer Maternal Grandfather         66-80s        Social History     Socioeconomic History    Marital status:       Spouse name: Not on file    Number of children: Not on file    Years of education: Not on file    Highest education level: Not on file   Occupational History    Occupation: [employer] disability    Social Needs    Financial resource strain: Not on file    Food insecurity     Worry: Not on file     Inability: Not on file   [employer] needs Medical: Not on file     Non-medical: Not on file   Tobacco Use    Smoking status: Never Smoker    Smokeless tobacco: Never Used   Substance and Sexual Activity    Alcohol use: No     Alcohol/week: 0.0 standard drinks     Comment: one to two drinks a month    Drug use: No    Sexual activity: Yes     Partners: Male     Birth control/protection: Surgical     Comment: hyst   Lifestyle    Physical activity     Days per week: Not on file     Minutes per session: Not on file    Stress: Not on file   Relationships    Social connections     Talks on phone: Not on file     Gets together: Not on file     Attends Orthodoxy service: Not on file     Active member of club or organization: Not on file     Attends meetings of clubs or organizations: Not on file     Relationship status: Not on file    Intimate partner violence     Fear of current or ex partner: Not on file     Emotionally abused: Not on file     Physically abused: Not on file     Forced sexual activity: Not on file   Other Topics Concern    Not on file   Social History Narrative    Not on file         Review of Systems:  Review of Systems   Constitution: Negative. HENT: Negative. Eyes: Negative. Cardiovascular: Negative. Endocrine: Negative. Hematologic/Lymphatic: Bruises/bleeds easily. Skin: Positive for dry skin. Musculoskeletal: Positive for back pain, joint pain and myalgias. Gastrointestinal: Negative. Genitourinary: Negative. Neurological: Negative. Psychiatric/Behavioral: Negative. Physical Exam:  LMP 06/13/2010 (Within Months)     Physical Exam  HENT:      Head: Normocephalic. Pulmonary:      Effort: Pulmonary effort is normal.   Skin:         Neurological:      Mental Status: She is alert and oriented to person, place, and time. Psychiatric:         Mood and Affect: Mood normal.         Thought Content:  Thought content normal.           Assessment:      Problem List Items Addressed This Visit Shoulder tendinitis, left - Primary    Osteoarthritis    Relevant Medications    HYDROcodone-acetaminophen (NORCO) 5-325 MG per tablet (Start on 2/8/2021)    Muscle pain, lumbar    Relevant Medications    HYDROcodone-acetaminophen (NORCO) 5-325 MG per tablet (Start on 2/8/2021)    Lumbar facet arthropathy    Relevant Medications    HYDROcodone-acetaminophen (NORCO) 5-325 MG per tablet (Start on 2/8/2021)    Fibromyalgia    Relevant Medications    HYDROcodone-acetaminophen ([address]) 5-325 MG per tablet (Start on 2/8/2021)    Encounter for medication monitoring    DDD (degenerative disc disease), lumbar    Relevant Medications    HYDROcodone-acetaminophen ([address]) 5-325 MG per tablet (Start on 2/8/2021)            Treatment Plan:  DISCUSSION: Treatment options discussed withpatient and all questions answered to patient's satisfaction. Possible side effects, risk of tolerance and or dependence and alternative treatments discussed    Obtaining appropriate analgesic effect of treatment   No signs of potential drug abuse or diversion identified    [x] Ill effects of being on chronic pain medications such as sleep disturbances, respiratory depression, hormonal changes, withdrawal symptoms, chronic opioid dependence and tolerance as well as risk of taking opioids with Benzodiazepines and taking opioids along with alcohol,  werediscussed with patient. I had asked the patient to minimize medication use and utilize pain medications only for uncontrolled rest pain or pain with exertional activities. I advised patient not to self-escalate painmedications without consulting with us. At each of patient's future visits we will try to taper pain medications, while adjusting the adjunct medications, and re-evaluating for Physical Therapy to improve spinal andjoint strength. We will continue to have discussions to decrease pain medications as tolerated. Counseled patient on effects their pain medication and /or their medical condition mayhave on their  ability to drive or operate machinery. Instructed not to drive or operate machinery if drowsy     I also discussed with the patient regarding the dangers of combining narcotic pain medication with tranquilizers, alcohol or illegal drugs or taking the medication any way other than prescribed. The dangers were discussed  including respiratory depression and death. Patient was told to tell  all  physicians regarding the medications he is getting from pain clinic. Patient is warned not to take any unprescribed medications over-the-countermedications that can depress breathing . Patient is advised to talk to the pharmacist or physicians if planning to take any over-the-counter medications before  takeing them. Patient is strongly advised to avoid tranquilizers or  relaxants, illegal drugs  or any medications that can depress breathing  Patient is also advised to tell us if there is any changes in their medications from other physicians.             TREATMENT OPTIONS:       Medication Agreement Requirements Met  Continue Opioid therapy  Script written for  hydrocodone  Follow up appointment made

## 2021-03-02 ENCOUNTER — HOSPITAL ENCOUNTER (OUTPATIENT)
Dept: PAIN MANAGEMENT | Age: 66
Discharge: HOME | End: 2021-03-02
Payer: MEDICARE

## 2021-03-02 DIAGNOSIS — M50.30 DEGENERATIVE DISC DISEASE, CERVICAL: ICD-10-CM

## 2021-03-02 DIAGNOSIS — M47.812 CERVICAL SPONDYLOSIS: ICD-10-CM

## 2021-03-02 DIAGNOSIS — G89.29 CHRONIC LEFT SHOULDER PAIN: ICD-10-CM

## 2021-03-02 DIAGNOSIS — Z51.81 ENCOUNTER FOR MEDICATION MONITORING: ICD-10-CM

## 2021-03-02 DIAGNOSIS — M15.9 PRIMARY OSTEOARTHRITIS INVOLVING MULTIPLE JOINTS: ICD-10-CM

## 2021-03-02 DIAGNOSIS — M47.816 LUMBAR FACET ARTHROPATHY: ICD-10-CM

## 2021-03-02 DIAGNOSIS — M51.36 DDD (DEGENERATIVE DISC DISEASE), LUMBAR: Primary | ICD-10-CM

## 2021-03-02 DIAGNOSIS — M54.12 CERVICAL RADICULOPATHY: ICD-10-CM

## 2021-03-02 DIAGNOSIS — M77.8 SHOULDER TENDINITIS, LEFT: ICD-10-CM

## 2021-03-02 DIAGNOSIS — M79.18 MUSCLE PAIN, LUMBAR: ICD-10-CM

## 2021-03-02 DIAGNOSIS — M79.7 FIBROMYALGIA: ICD-10-CM

## 2021-03-02 DIAGNOSIS — M25.512 CHRONIC LEFT SHOULDER PAIN: ICD-10-CM

## 2021-03-02 PROCEDURE — 99213 OFFICE O/P EST LOW 20 MIN: CPT

## 2021-03-02 PROCEDURE — 99214 OFFICE O/P EST MOD 30 MIN: CPT | Performed by: PAIN MEDICINE

## 2021-03-02 RX ORDER — HYDROCODONE BITARTRATE AND ACETAMINOPHEN 5; 325 MG/1; MG/1
1 TABLET ORAL 2 TIMES DAILY PRN
Qty: 60 TABLET | Refills: 0 | Status: SHIPPED | OUTPATIENT
Start: 2021-03-10 | End: 2021-04-06 | Stop reason: SDUPTHER

## 2021-03-02 ASSESSMENT — ENCOUNTER SYMPTOMS
ALLERGIC/IMMUNOLOGIC NEGATIVE: 1
VOMITING: 0
RESPIRATORY NEGATIVE: 1
SHORTNESS OF BREATH: 0
ABDOMINAL PAIN: 0
CONSTIPATION: 1
EYE PAIN: 0
NAUSEA: 0
BOWEL INCONTINENCE: 0
COUGH: 0
BACK PAIN: 1
EYES NEGATIVE: 1

## 2021-03-02 NOTE — PROGRESS NOTES
Yvette Dorman is a 60 y.o. female evaluated on 3/2/2021. Modality of virtual service provided -via Video+audio   Consent:  Patient and/or health care decision maker is aware that that patient may receive a bill for this telephone service, depending on one's insurance coverage, and has provided verbal consent to proceed: Yes    Patient identification was verified at the start of the visit: Yes    Chief complaint: Yvette Dorman is 60 y.o.,  female, with  with chief complaint of pain involving some kind of back pain and allover body pain as well as neck pain. .    Patient is complaining of pain involving the low back area with pain radiating to both lower extremities worse on the right compared to the left patient reports her shoulder pain is  is somewhat better. She is using TENS unit to help the pain. Her neck pain is completely gone and she has minimal pain in the neck at this time. She also complains of allover body pain from fibromyalgia. Back Pain  This is a chronic problem. The current episode started more than 1 year ago. The problem occurs constantly. The problem has been gradually worsening since onset. The pain is present in the lumbar spine, thoracic spine and sacro-iliac. The quality of the pain is described as aching and burning. Radiates to: Both lower extremities worse on the right side  The pain is at a severity of 5/10 (2-8). The pain is moderate. The pain is worse during the day. The symptoms are aggravated by bending, standing, position and twisting (ADLs, lifting and walking). Pertinent negatives include no abdominal pain, bladder incontinence, bowel incontinence, chest pain, dysuria, fever, tingling or weakness. Risk factors include obesity and lack of exercise.       Alleviating factors:heat, rest and TENS unit and stretching  Lifestyle changes experienced with pain: Prevents or limits ADLs, Increases w/activity.  , Increases w/prolonged sitting/standing/walking  Mood changes,none  Patient currently unemployed. Retired  Physical therapy did not help the pain. Are you under psychological counseling at present: Yes  Goals for treatment include:  Decrease in pain  Enjoy daily and recreational activities, return to previous status. Patient relates current medications are helping the pain. Patient reports taking pain medications as prescribed, denies obtaining medications from different sources and denies use of illegal drugs. Patient denies side effects from medications like nausea, vomiting, constipation or drowsiness. Patient reports current activities of daily living ar possible due to medications and would like to continue them. ACTIVITY/SOCIAL/EMOTIONAL:  Sleep Pattern: 9 hours per night.  generally restful sleep-=-has sleep apnea trying to get used to sleep apnea machine  Home Exercises: daily Chair exercises and balance exercises  Activity:unchanged  Emotional Issues: normal.   Currently seeing a Psychiatrist or Psychologist:  No     ADVERSE MEDICATION EFFECTS:   Nausea and vomiting: no   Constipation: yes-Undercontrol-: yes-takes MiraLAX  Dizziness/drowsy/sleepy--no  Urinary Retention: no    ABERRANT BEHAVIORS SINCE LAST VISIT  Lost rx/pills:------------------------------------------ no  Taking  medication as prescribed: ----------- yes  Urine Drug Screen ---------------------------------  yes             Date-------------------------------------------------1/26/20              Results as expected ---------------------yes    Recent ER visits: -------------------------------------No  Pill count is appropriate: ---------------------------no   Refills for prescriptions appropriate:---------- yes      Past Medical History:   Diagnosis Date    Anxiety     Breast cancer ([address]     Breast cancer ([facility]     Cancer ([facility]     breast, in remission    Chronic back pain 12/3/2013    Depression     DJD (degenerative joint disease)     Family history of breast cancer     MGM  Fibromyalgia     Fibromyalgia     History of hysterectomy     Hypothyroidism     Obesity, Class III, BMI 40-49.9 (morbid obesity) ([facility] 7/22/2014    Osteoarthritis     SVT (supraventricular tachycardia) ([facility]     Unspecified sleep apnea        Past Surgical History:   Procedure Laterality Date    ABDOMEN SURGERY      APPENDECTOMY      BREAST SURGERY      lumpectomy w radiation 2009    CARDIAC CATHETERIZATION      with ablation    CARPAL TUNNEL RELEASE Left 10/22/2020    COLONOSCOPY  2009    10 years    COLONOSCOPY  08/2017    FINGER SURGERY  10/2017    trigger finger right hand    HERNIA REPAIR      umbilical    HYSTERECTOMY      total    JOINT REPLACEMENT      Right Knee 2014    KNEE ARTHROSCOPY      bilateral    KNEE SURGERY      x2    LAPAROSCOPIC APPENDECTOMY Right     TONSILLECTOMY      UMBILICAL HERNIA REPAIR N/A     WRIST SURGERY  04/12/2018    plate and screws right wrist       Family History   Problem Relation Age of Onset    Cancer Mother         pancreatic cancer 78    Heart Disease Mother     COPD Father     COPD Brother     Breast Cancer Maternal Grandmother         dx unknown age    [facility] Colon Cancer Maternal Grandfather         66-80s        Social History     Socioeconomic History    Marital status:       Spouse name: None    Number of children: None    Years of education: None    Highest education level: None   Occupational History    Occupation: [employer] disability    Social Needs    Financial resource strain: None    Food insecurity     Worry: None     Inability: None    Transportation needs     Medical: None     Non-medical: None   Tobacco Use    Smoking status: Never Smoker    Smokeless tobacco: Never Used   Substance and Sexual Activity    Alcohol use: No     Alcohol/week: 0.0 standard drinks     Comment: one to two drinks a month    Drug use: No    Sexual activity: Yes     Partners: Male     Birth control/protection: Surgical     Comment: marga Lifestyle    Physical activity     Days per week: None     Minutes per session: None    Stress: None   Relationships    Social connections     Talks on phone: None     Gets together: None     Attends [employer] service: None     Active member of club or organization: None     Attends meetings of clubs or organizations: None     Relationship status: None    Intimate partner violence     Fear of current or ex partner: None     Emotionally abused: None     Physically abused: None     Forced sexual activity: None   Other Topics Concern    None   Social History Narrative    None       Allergies   Allergen Reactions    Augmentin [Amoxicillin-Pot Clavulanate] Nausea And Vomiting     Projectile vomiting    Sulfa Antibiotics Anaphylaxis    Amoxapine And Related Diarrhea and Nausea And Vomiting     AMOX TR-K- 875-125 mg    Loxapine Succinate Diarrhea and Nausea Only     AMOX TR-K- 875-125 mg       Current Outpatient Medications on File Prior to Encounter   Medication Sig Dispense Refill    calcium carbonate-vitamin D (CALTRATE) 600-400 MG-UNIT TABS per tab Take by mouth      levothyroxine (SYNTHROID) 50 MCG tablet take 1 tablet by mouth once daily 90 tablet 1    meloxicam (MOBIC) 15 MG tablet take 1 tablet by mouth once daily 90 tablet 1    venlafaxine (EFFEXOR XR) 150 MG extended release capsule take 1 capsule by mouth once daily 90 capsule 1    magnesium oxide (MAG-OX) 400 (240 Mg) MG tablet take 1 tablet by mouth twice a day      Tens Unit MISC by Does not apply route For low back pain 1 each 0    Tens Unit MISC by Does not apply route 1 each 0    loratadine (CLARITIN) 10 MG capsule Take 10 mg by mouth daily      Polyethylene Glycol 3350 (MIRALAX PO) Take by mouth Taking daily      flecainide (TAMBOCOR) 50 MG tablet Take 50 mg by mouth 2 times daily. No current facility-administered medications on file prior to encounter. Review of Systems   Constitutional: Negative.   Negative for activity change, chills, fatigue, fever and unexpected weight change. HENT: Negative. Negative for congestion, hearing loss, sore throat and voice change. Eyes: Negative. Negative for photophobia, pain and visual disturbance. Respiratory: Negative. Negative for cough and shortness of breath. Cardiovascular: Negative. Negative for chest pain and palpitations. Gastrointestinal: Positive for constipation. Negative for abdominal pain, bowel incontinence, nausea and vomiting. Endocrine: Negative. Negative for cold intolerance and polyuria. Genitourinary: Negative. Negative for bladder incontinence, dysuria and hematuria. Musculoskeletal: Positive for arthralgias and back pain. Skin: Negative. Negative for rash. Allergic/Immunologic: Negative. Negative for immunocompromised state. Neurological: Negative. Negative for tingling, syncope and weakness. Hematological: Negative. Psychiatric/Behavioral: Positive for sleep disturbance. Negative for behavioral problems, self-injury and suicidal ideas. The patient is not nervous/anxious. Physical Exam  Constitutional:       Appearance: Normal appearance. Neurological:      Mental Status: She is alert and oriented to person, place, and time.    Psychiatric:         Mood and Affect: Mood normal.            DATA:  L1/26/2020 11:08 AM - Michael, Darionpn Incoming Lab Results From [facility]    Component Value Ref Range & Units Status Collected Lab   Pain Management Drug Panel Interp, Urine Consistent   Final 01/22/2020  1:45 PM [facility]   (NOTE)   ________________________________________________________________   DRUGS EXPECTED:   HYDROCODONE [1/22/20]   ________________________________________________________________   CONSISTENT with medications provided:   HYDROCODONE : based on norhydrocodone   ________________________________________________________________      AB.:    X-Ray reports:  EXAMINATION:    MRI OF THE CERVICAL SPINE WITHOUT CONTRAST; MRI OF THE LUMBAR SPINE WITHOUT    CONTRAST 5/7/2019 4:25 pm; 5/7/2019 4:24 pm        TECHNIQUE:    Multiplanar multisequence MRI of the cervical spine was performed without the    administration of intravenous contrast.; Multiplanar multisequence MRI of the    lumbar spine was performed without the administration of intravenous contrast.        COMPARISON:    None. HISTORY:    ORDERING SYSTEM PROVIDED HISTORY: Neck pain; ORDERING SYSTEM PROVIDED    HISTORY: Lumbar facet arthropathy        FINDINGS:    Cervical spine        BONES/ALIGNMENT: Straightening of the normal lordotic curvature is noted. There is no fracture. SPINAL CORD: No focal cord signal hyperintensity is noted. SOFT TISSUES: Small cervical nodes are present. C2-C3: Minimal disc bulging is noted. Mild facet hypertrophic changes are    noted        C3-C4: Mild disc bulging is noted. Small left proximal foraminal protrusion    is noted. Endplate, uncovertebral and facet hypertrophic changes are noted. Moderate left foraminal narrowing is noted. C4-C5: Mild-to-moderate diffuse disc bulging is noted. There is a    questionable small proximal foraminal protrusion on the right. Endplate,    uncovertebral and facet hypertrophic changes are noted. Severe right and    moderate left foraminal narrowing is noted. Ventral cord flattening is    noted. Mild narrowing of the canal is noted        C5-C6: Mild disc bulging is noted diffusely. There is a small proximal    foraminal protrusion on the left. Endplate, uncovertebral and facet    hypertrophic changes are noted. Minimal right and moderate left foraminal    narrowing is noted        C6-C7: Mild disc bulging is noted diffusely. Endplate, uncovertebral and    facet hypertrophic changes are noted. These are greater on the left. Moderate to severe left and mild-to-moderate right foraminal narrowing is    noted. C7-T1: Very minimal disc bulging is noted. Endplate, uncovertebral and facet    hypertrophic changes are noted. There is a suggested small left foraminal    protrusion. There mild-to-moderate left foraminal narrowing. MRI lumbar spine:        Bones: Mild lordotic curvature is noted. There is no acute fracture. Minimal edema in the left L5 pedicle is noted. Minimal T12 retrolisthesis is    noted. Minimal L5 retrolisthesis is noted degenerative endplate signal    changes are greatest at T12-L1. Soft tissues: No retroperitoneal mass lesion is noted. Cord: Conus terminates at L1-L2 and is normal in appearance. T12-L1: Uncovering of the disc is noted. Minimal disc bulging is noted. There is a small proximal foraminal protrusion on the right. L1-2: Facet and ligament hypertrophic changes are noted        L2-3: Mild disc bulging is noted diffusely. Annular tear is noted. Left    foraminal broad-based protrusion is noted. Minimal right and moderate left    foraminal narrowing is noted. Facet and ligament hypertrophic changes are    noted. Mild narrowing of the canal is noted        L3-4: Minimal disc bulging is noted diffusely. Foraminal protrusions are    noted, left greater than right. Mild right and mild-to-moderate left    foraminal narrowing is noted. Facet and ligament hypertrophic changes are    noted. Severe narrowing of the canal is noted        Mild disc bulging is noted with annular tear. There is a small central and    right paracentral protrusion with minimal upward migration of extruded disc    material.  Mild to moderate proximal foraminal narrowing on the right is    noted. Facet and ligament hypertrophic changes are noted. Severe narrowing    of the canal is noted        L5-S1: Mild disc bulging is noted diffusely with annular tear. Endplate    hypertrophic changes are noted. Proximal foraminal narrowing is noted    bilaterally.   There is a questionable small proximal foraminal protrusion on    the left. Bilateral facet hypertrophic changes are noted. Impression    Cervical spine:        Multilevel degenerative disc disease in the cervical spine as described. See    above for details of each level. Lumbar spine:        Multilevel degenerative disc disease in the lower thoracic spine and the    lumbar spine as described. See above for details of each level        Clinical  impression:  1. DDD (degenerative disc disease), lumbar    2. Lumbar facet arthropathy    3. Degenerative disc disease, cervical    4. Cervical spondylosis    5. Cervical radiculopathy    6. Chronic left shoulder pain    7. Shoulder tendinitis, left    8. Muscle pain, lumbar    9. Fibromyalgia    10. Primary osteoarthritis involving multiple joints    11. Encounter for medication monitoring        Plan of care: We will continue current pain medications  Current medications are being tolerated without any Adverse side effects. Orders Placed This Encounter   Medications    HYDROcodone-acetaminophen (NORCO) 5-325 MG per tablet     Sig: Take 1 tablet by mouth 2 times daily as needed for Pain for up to 30 days. Dispense:  60 tablet     Refill:  0     Reduce doses taken as pain becomes manageable     Urine drug screens have been appropriate. No aberrant activity noted. Analgesia is achieved. Activities of daily living are possible because of medications. Safe use of medications explained to patient. PDMP Monitoring:    Last PDMP Familia Soto as Reviewed [facility]):  Review User Review Instant Review Result   Carlos Hill 3/2/2021  8:25 AM Reviewed PDMP [1]     Counselling/Preventive measures for pain  Control:    [x]  Spine strengthening exercises are discussed with patient in detail. [x] Ill effects of being on chronic pain medications such as sleep disturbances, hormonal changes, withdrawal symptoms,  chronic opioid dependence and tolerance were discussed with patient.  I had asked the patient to minimize medication use and utilize pain medications only for uncontrolled rest pain or pain with exertional activities. I advised patient not to self escalate pain medications without consulting with us. At each of patient's future visits we will try to taper pain medications, while adjusting the adjunct medications, and re-evaluating for Physical Therapy to improve spinal and joint strength. We will continue to have discussions to decrease pain medications as tolerated. I also discussed with the patient regarding the dangers of combining narcotic pain medication with tranquilizers, alcohol or illegal drugs or taking the medication any other than prescribed. The dangers including the respiratory depression and death. Patient was told to tell  to all  physicians regarding the medications he is getting from pain clinic. Patient is warned not to take any unprescribed medications over-the-counter medications that can depress breathing . Patient is advised to talk to the pharmacist or physicians if planning to take any over-the-counter medications before  takeing them. Patient is strongly advised to avoid tranquilizers or  Relaxants for any medications that can depress breathing or recreational drugs. Patient is also advised to tell us if there is any changes in his medications from other physicians. We discussed the same at today's visit and have not been to implement it, as the patient's pain is not under control with current medications. Decision Making Process : Patient's health history and referral records thoroughly reviewed before focused physical examination and discussion with patient. I have spent 20 mins. Over 50% of today's visit is spent on examining the patient and counseling and coordinating the care. Level of complexity of date to be reviewed is Moderate. The chart date reviewed include the following: Imaging Reports. Summary of Care.      Time spent reviewing with patient the below reports: Medication safety, Treatment options. Level of diagnosis and management options of this case is multiple: involving the following management options: Interventions as needed, medication management as appropriate, future visits, activity modification, heat/ice as needed, Urine drug screen as required. [x]The patient's questions were answered to the best of my abilities. This note was created using voice recognition software. There may be inaccuracies of transcription  that are inadvertently overlooked prior to the signature. There is any questions about the transcription please contact me. Return in  4 weeks  with physician / CNP  for further plan of treatment. Due to the COVID-19 pandemic and the appropriate interventions by Mat Espinoza, our non-urgent pain management patients will not be seen in the office at this time for their protection and the protection of our staff. To offer continuity of care, their prescriptions will be escribed this month after a careful chart review and review of their OARRS report  Pursuant to the emergency declaration under the [employer] [facility], 1135 waiver authority and the [employer] Act, this Virtual Visit was conducted, with patient's consent, to reduce the patient's risk of exposure to COVID-19 and provide continuity of care for an established patient. Services were provided through a video synchronous discussion virtually to substitute for in-person appointment. \"  Documentation:  I communicated with the patient and/or health care decision maker about plan of care  Details of this discussion including any medical advice provided:   Total Time: minutes: 21-30 minutes    I affirm this is a Patient Initiated Episode with an Established Patient who has not had a related appointment within my department in the past 7 days or scheduled within the next 24

## 2021-03-03 ASSESSMENT — ENCOUNTER SYMPTOMS
VOICE CHANGE: 0
SORE THROAT: 0
PHOTOPHOBIA: 0

## 2021-03-12 ENCOUNTER — HOSPITAL ENCOUNTER (OUTPATIENT)
Age: 66
Discharge: HOME | End: 2021-03-12
Payer: MEDICARE

## 2021-03-12 DIAGNOSIS — E78.5 DYSLIPIDEMIA: ICD-10-CM

## 2021-03-12 DIAGNOSIS — E03.9 ACQUIRED HYPOTHYROIDISM: ICD-10-CM

## 2021-03-12 LAB
ABSOLUTE EOS #: 0.19 K/UL (ref 0–0.44)
ABSOLUTE IMMATURE GRANULOCYTE: <0.03 K/UL (ref 0–0.3)
ABSOLUTE LYMPH #: 1.67 K/UL (ref 1.1–3.7)
ABSOLUTE MONO #: 0.35 K/UL (ref 0.1–1.2)
ALBUMIN SERPL-MCNC: 4.3 G/DL (ref 3.5–5.2)
ALBUMIN/GLOBULIN RATIO: 1.7 (ref 1–2.5)
ALP BLD-CCNC: 79 U/L (ref 35–104)
ALT SERPL-CCNC: 14 U/L (ref 5–33)
ANION GAP SERPL CALCULATED.3IONS-SCNC: 11 MMOL/L (ref 9–17)
AST SERPL-CCNC: 22 U/L
BASOPHILS # BLD: 1 % (ref 0–2)
BASOPHILS ABSOLUTE: 0.06 K/UL (ref 0–0.2)
BILIRUB SERPL-MCNC: 0.26 MG/DL (ref 0.3–1.2)
BUN BLDV-MCNC: 21 MG/DL (ref 8–23)
BUN/CREAT BLD: ABNORMAL (ref 9–20)
CALCIUM SERPL-MCNC: 9 MG/DL (ref 8.6–10.4)
CHLORIDE BLD-SCNC: 107 MMOL/L (ref 98–107)
CO2: 26 MMOL/L (ref 20–31)
CREAT SERPL-MCNC: 0.64 MG/DL (ref 0.5–0.9)
DIFFERENTIAL TYPE: NORMAL
EOSINOPHILS RELATIVE PERCENT: 4 % (ref 1–4)
GFR AFRICAN AMERICAN: >60 ML/MIN
GFR NON-AFRICAN AMERICAN: >60 ML/MIN
GFR SERPL CREATININE-BSD FRML MDRD: ABNORMAL ML/MIN/{1.73_M2}
GFR SERPL CREATININE-BSD FRML MDRD: ABNORMAL ML/MIN/{1.73_M2}
GLUCOSE BLD-MCNC: 107 MG/DL (ref 70–99)
HCT VFR BLD CALC: 43.5 % (ref 36.3–47.1)
HEMOGLOBIN: 13.6 G/DL (ref 11.9–15.1)
IMMATURE GRANULOCYTES: 0 %
LYMPHOCYTES # BLD: 37 % (ref 24–43)
MCH RBC QN AUTO: 29.6 PG (ref 25.2–33.5)
MCHC RBC AUTO-ENTMCNC: 31.3 G/DL (ref 28.4–34.8)
MCV RBC AUTO: 94.6 FL (ref 82.6–102.9)
MONOCYTES # BLD: 8 % (ref 3–12)
NRBC AUTOMATED: 0 PER 100 WBC
PDW BLD-RTO: 13.3 % (ref 11.8–14.4)
PLATELET # BLD: 188 K/UL (ref 138–453)
PLATELET ESTIMATE: NORMAL
PMV BLD AUTO: 10.2 FL (ref 8.1–13.5)
POTASSIUM SERPL-SCNC: 4.5 MMOL/L (ref 3.7–5.3)
RBC # BLD: 4.6 M/UL (ref 3.95–5.11)
RBC # BLD: NORMAL 10*6/UL
SEG NEUTROPHILS: 50 % (ref 36–65)
SEGMENTED NEUTROPHILS ABSOLUTE COUNT: 2.27 K/UL (ref 1.5–8.1)
SODIUM BLD-SCNC: 144 MMOL/L (ref 135–144)
THYROXINE, FREE: 1.03 NG/DL (ref 0.93–1.7)
TOTAL PROTEIN: 6.8 G/DL (ref 6.4–8.3)
TSH SERPL DL<=0.05 MIU/L-ACNC: 4.64 MIU/L (ref 0.3–5)
WBC # BLD: 4.6 K/UL (ref 3.5–11.3)
WBC # BLD: NORMAL 10*3/UL

## 2021-03-12 PROCEDURE — 85025 COMPLETE CBC W/AUTO DIFF WBC: CPT

## 2021-03-12 PROCEDURE — 84439 ASSAY OF FREE THYROXINE: CPT

## 2021-03-12 PROCEDURE — 36415 COLL VENOUS BLD VENIPUNCTURE: CPT

## 2021-03-12 PROCEDURE — 80053 COMPREHEN METABOLIC PANEL: CPT

## 2021-03-12 PROCEDURE — 84443 ASSAY THYROID STIM HORMONE: CPT

## 2021-04-06 ENCOUNTER — HOSPITAL ENCOUNTER (OUTPATIENT)
Dept: PAIN MANAGEMENT | Age: 66
Discharge: HOME | End: 2021-04-06
Payer: MEDICARE

## 2021-04-06 DIAGNOSIS — M54.2 NECK PAIN: Chronic | ICD-10-CM

## 2021-04-06 DIAGNOSIS — M79.7 FIBROMYALGIA: ICD-10-CM

## 2021-04-06 DIAGNOSIS — M51.36 DDD (DEGENERATIVE DISC DISEASE), LUMBAR: ICD-10-CM

## 2021-04-06 DIAGNOSIS — M46.1 SACROILIITIS (HCC): Primary | ICD-10-CM

## 2021-04-06 DIAGNOSIS — Z51.81 ENCOUNTER FOR MEDICATION MONITORING: ICD-10-CM

## 2021-04-06 DIAGNOSIS — M47.816 LUMBAR FACET ARTHROPATHY: ICD-10-CM

## 2021-04-06 PROCEDURE — 99442 PR PHYS/QHP TELEPHONE EVALUATION 11-20 MIN: CPT | Performed by: NURSE PRACTITIONER

## 2021-04-06 PROCEDURE — 99213 OFFICE O/P EST LOW 20 MIN: CPT

## 2021-04-06 RX ORDER — AMOXICILLIN 500 MG/1
CAPSULE ORAL
COMMUNITY
Start: 2021-03-08

## 2021-04-06 RX ORDER — HYDROCODONE BITARTRATE AND ACETAMINOPHEN 5; 325 MG/1; MG/1
1 TABLET ORAL 2 TIMES DAILY PRN
Qty: 60 TABLET | Refills: 0 | Status: SHIPPED | OUTPATIENT
Start: 2021-04-09 | End: 2021-05-06 | Stop reason: SDUPTHER

## 2021-04-06 ASSESSMENT — ENCOUNTER SYMPTOMS
CONSTIPATION: 1
BACK PAIN: 1
RESPIRATORY NEGATIVE: 1

## 2021-04-06 NOTE — PROGRESS NOTES
________________________________________________________________   DRUGS EXPECTED:   HYDROCODONE [1/22/20]   ________________________________________________________________   CONSISTENT with medications provided:   HYDROCODONE : based on norhydrocodone   ________________________________________________________________   INTERPRETIVE INFORMATION: [facility] [place], [place] [facility]/EMIT, Ur,                            Interp   Interpretation depends on accuracy and completeness        EXAMINATION:   MRI OF THE CERVICAL SPINE WITHOUT CONTRAST; MRI OF THE LUMBAR SPINE WITHOUT   CONTRAST 5/7/2019 4:25 pm; 5/7/2019 4:24 pm       TECHNIQUE:   Multiplanar multisequence MRI of the cervical spine was performed without the   administration of intravenous contrast.; Multiplanar multisequence MRI of the   lumbar spine was performed without the administration of intravenous contrast.       COMPARISON:   None.       HISTORY:   ORDERING SYSTEM PROVIDED HISTORY: Neck pain; ORDERING SYSTEM PROVIDED   HISTORY: Lumbar facet arthropathy       FINDINGS:   Cervical spine       BONES/ALIGNMENT: Straightening of the normal lordotic curvature is noted. There is no fracture.       SPINAL CORD: No focal cord signal hyperintensity is noted.       SOFT TISSUES: Small cervical nodes are present.       C2-C3: Minimal disc bulging is noted.  Mild facet hypertrophic changes are   noted       C3-C4: Mild disc bulging is noted.  Small left proximal foraminal protrusion   is noted.  Endplate, uncovertebral and facet hypertrophic changes are noted.    Moderate left foraminal narrowing is noted.       C4-C5: Mild-to-moderate diffuse disc bulging is noted. Mitzi Deni is a   questionable small proximal foraminal protrusion on the right.  Endplate,   uncovertebral and facet hypertrophic changes are noted.  Severe right and   moderate left foraminal narrowing is noted.  Ventral cord flattening is   noted.  Mild narrowing of the canal is noted       C5-C6: Mild disc bulging is noted diffusely. Merian Rouge is a small proximal   foraminal protrusion on the left.  Endplate, uncovertebral and facet   hypertrophic changes are noted.  Minimal right and moderate left foraminal   narrowing is noted       C6-C7: Mild disc bulging is noted diffusely.  Endplate, uncovertebral and   facet hypertrophic changes are noted.  These are greater on the left. Moderate to severe left and mild-to-moderate right foraminal narrowing is   noted.       C7-T1: Very minimal disc bulging is noted.  Endplate, uncovertebral and facet   hypertrophic changes are noted. Merian Rouge is a suggested small left foraminal   protrusion.  There mild-to-moderate left foraminal narrowing.       MRI lumbar spine:       Bones: Mild lordotic curvature is noted.  There is no acute fracture. Minimal edema in the left L5 pedicle is noted.  Minimal T12 retrolisthesis is   noted.  Minimal L5 retrolisthesis is noted degenerative endplate signal   changes are greatest at T12-L1.       Soft tissues: No retroperitoneal mass lesion is noted.       Cord: Conus terminates at L1-L2 and is normal in appearance.       T12-L1: Uncovering of the disc is noted.  Minimal disc bulging is noted.    There is a small proximal foraminal protrusion on the right.       L1-2: Facet and ligament hypertrophic changes are noted       L2-3: Mild disc bulging is noted diffusely.  Annular tear is noted.  Left   foraminal broad-based protrusion is noted.  Minimal right and moderate left   foraminal narrowing is noted.  Facet and ligament hypertrophic changes are   noted.  Mild narrowing of the canal is noted       L3-4: Minimal disc bulging is noted diffusely.  Foraminal protrusions are   noted, left greater than right.  Mild right and mild-to-moderate left   foraminal narrowing is noted.  Facet and ligament hypertrophic changes are   noted.  Severe narrowing of the canal is noted       Mild disc bulging is noted with annular tear.  There is a small central and right paracentral protrusion with minimal upward migration of extruded disc   material.  Mild to moderate proximal foraminal narrowing on the right is   noted.  Facet and ligament hypertrophic changes are noted.  Severe narrowing   of the canal is noted       L5-S1: Mild disc bulging is noted diffusely with annular tear.  Endplate   hypertrophic changes are noted.  Proximal foraminal narrowing is noted   bilaterally.  There is a questionable small proximal foraminal protrusion on   the left.  Bilateral facet hypertrophic changes are noted.           Impression   Cervical spine:       Multilevel degenerative disc disease in the cervical spine as described.  See   above for details of each level.       Lumbar spine:       Multilevel degenerative disc disease in the lower thoracic spine and the   lumbar spine as described.  See above for details of each level                             Pill count: appropriate    fill date :4-9-2021  Morphine equivalent dose as reported on [place]:10  Periodic Controlled Substance Monitoring: Possible medication side effects, risk of tolerance/dependence & alternative treatments discussed., No signs of potential drug abuse or diversion identified. , Assessed functional status., Obtaining appropriate analgesic effect of treatment. Flakita Mei, APRN - CNP)  Review of[place]ARRS does not show any aberrant prescription behavior. Medication is helping the patient stay active. Patient denies any side effects and reports adequate analgesia. No sign of misuse/abuse.             Past Medical History:   Diagnosis Date    Anxiety     Breast cancer ([address]     Breast cancer ([address]     Cancer ([address]     breast, in remission    Chronic back pain 12/3/2013    Depression     DJD (degenerative joint disease)     Family history of breast cancer     MGM    Fibromyalgia     Fibromyalgia     History of hysterectomy     Hypothyroidism     Obesity, Class III, BMI 40-49.9 (morbid obesity) ([address] 7/22/2014    Osteoarthritis     SVT (supraventricular tachycardia) (HCC)     Unspecified sleep apnea        Past Surgical History:   Procedure Laterality Date    ABDOMEN SURGERY      APPENDECTOMY      BREAST SURGERY      lumpectomy w radiation 2009    CARDIAC CATHETERIZATION      with ablation    CARPAL TUNNEL RELEASE Left 10/22/2020    COLONOSCOPY  2009    10 years    COLONOSCOPY  08/2017    FINGER SURGERY  10/2017    trigger finger right hand    HERNIA REPAIR      umbilical    HYSTERECTOMY      total    JOINT REPLACEMENT      Right Knee 2014    KNEE ARTHROSCOPY      bilateral    KNEE SURGERY      x2    LAPAROSCOPIC APPENDECTOMY Right     TONSILLECTOMY      UMBILICAL HERNIA REPAIR N/A     WRIST SURGERY  04/12/2018    plate and screws right wrist       Allergies   Allergen Reactions    Augmentin [Amoxicillin-Pot Clavulanate] Nausea And Vomiting     Projectile vomiting    Sulfa Antibiotics Anaphylaxis    Amoxapine And Related Diarrhea and Nausea And Vomiting     AMOX TR-K- 875-125 mg    Loxapine Succinate Diarrhea and Nausea Only     AMOX TR-K- 875-125 mg         Current Outpatient Medications:     HYDROcodone-acetaminophen (NORCO) 5-325 MG per tablet, Take 1 tablet by mouth 2 times daily as needed for Pain for up to 30 days. , Disp: 60 tablet, Rfl: 0    calcium carbonate-vitamin D (CALTRATE) 600-400 MG-UNIT TABS per tab, Take by mouth, Disp: , Rfl:     levothyroxine (SYNTHROID) 50 MCG tablet, take 1 tablet by mouth once daily, Disp: 90 tablet, Rfl: 1    meloxicam (MOBIC) 15 MG tablet, take 1 tablet by mouth once daily, Disp: 90 tablet, Rfl: 1    venlafaxine (EFFEXOR XR) 150 MG extended release capsule, take 1 capsule by mouth once daily, Disp: 90 capsule, Rfl: 1    magnesium oxide (MAG-OX) 400 (240 Mg) MG tablet, take 1 tablet by mouth twice a day, Disp: , Rfl:     loratadine (CLARITIN) 10 MG capsule, Take 10 mg by mouth daily, Disp: , Rfl:     flecainide (TAMBOCOR) 50 MG tablet, Take 50 mg by mouth 2 times daily. , Disp: , Rfl:     amoxicillin (AMOXIL) 500 MG capsule, take 4 capsules by mouth 1 hour prior to appointment, Disp: , Rfl:     Tens Unit MISC, by Does not apply route For low back pain, Disp: 1 each, Rfl: 0    Tens Unit MISC, by Does not apply route, Disp: 1 each, Rfl: 0    Polyethylene Glycol 3350 (MIRALAX PO), Take by mouth Taking daily, Disp: , Rfl:     Family History   Problem Relation Age of Onset    Cancer Mother         pancreatic cancer 78    Heart Disease Mother     COPD Father     COPD Brother     Breast Cancer Maternal Grandmother         dx unknown age    [facility] Colon Cancer Maternal Grandfather         66-80s        Social History     Socioeconomic History    Marital status:       Spouse name: Not on file    Number of children: Not on file    Years of education: Not on file    Highest education level: Not on file   Occupational History    Occupation: [employer] disability    Social Needs    Financial resource strain: Not on file    Food insecurity     Worry: Not on file     Inability: Not on file    Transportation needs     Medical: Not on file     Non-medical: Not on file   Tobacco Use    Smoking status: Never Smoker    Smokeless tobacco: Never Used   Substance and Sexual Activity    Alcohol use: No     Alcohol/week: 0.0 standard drinks     Comment: one to two drinks a month    Drug use: No    Sexual activity: Yes     Partners: Male     Birth control/protection: Surgical     Comment: hyst   Lifestyle    Physical activity     Days per week: Not on file     Minutes per session: Not on file    Stress: Not on file   Relationships    Social connections     Talks on phone: Not on file     Gets together: Not on file     Attends Spiritism service: Not on file     Active member of club or organization: Not on file     Attends meetings of clubs or organizations: Not on file     Relationship status: Not on file    Intimate partner violence     Fear of current or ex partner: Not on file     Emotionally abused: Not on file     Physically abused: Not on file     Forced sexual activity: Not on file   Other Topics Concern    Not on file   Social History Narrative    Not on file         Review of Systems:  Review of Systems   Constitution: Negative. HENT: Negative. Eyes: Positive for visual disturbance. Glasses   Cardiovascular: Negative. Respiratory: Negative. Endocrine: Negative. Hematologic/Lymphatic: Bruises/bleeds easily. Skin: Negative. Musculoskeletal: Positive for back pain, joint pain and myalgias. Gastrointestinal: Positive for constipation. Genitourinary: Negative. Neurological: Negative. Psychiatric/Behavioral: Negative. Physical Exam:  LMP 06/13/2010 (Within Months)     Physical Exam  Skin:         Neurological:      Mental Status: She is alert and oriented to person, place, and time. Psychiatric:         Mood and Affect: Mood normal.         Thought Content: Thought content normal.           Assessment:      Problem List Items Addressed This Visit     Sacroili[address] ([facility] [address] - Primary    Neck pain (Chronic)    Relevant Orders    DRUG SCREEN, PAIN    Lumbar facet arthropathy    Encounter for medication monitoring    Relevant Orders    DRUG SCREEN, PAIN    DDD (degenerative disc disease), lumbar    Relevant Orders    DRUG SCREEN, PAIN            Treatment Plan:  DISCUSSION: Treatment options discussed withpatient and all questions answered to patient's satisfaction.      Possible side effects, risk of tolerance and or dependence and alternative treatments discussed    Obtaining appropriate analgesic effect of treatment   No signs of potential drug abuse or diversion identified    [x] Ill effects of being on chronic pain medications such as sleep disturbances, respiratory depression, hormonal changes, withdrawal symptoms, chronic opioid dependence and tolerance as well as risk of taking opioids with Benzodiazepines and taking opioids along with alcohol,  werediscussed with patient. I had asked the patient to minimize medication use and utilize pain medications only for uncontrolled rest pain or pain with exertional activities. I advised patient not to self-escalate painmedications without consulting with us. At each of patient's future visits we will try to taper pain medications, while adjusting the adjunct medications, and re-evaluating for Physical Therapy to improve spinal andjoint strength. We will continue to have discussions to decrease pain medications as tolerated. Counseled patient on effects their pain medication and /or their medical condition mayhave on their  ability to drive or operate machinery. Instructed not to drive or operate machinery if drowsy     I also discussed with the patient regarding the dangers of combining narcotic pain medication with tranquilizers, alcohol or illegal drugs or taking the medication any way other than prescribed. The dangers were discussed  including respiratory depression and death. Patient was told to tell  all  physicians regarding the medications he is getting from pain clinic. Patient is warned not to take any unprescribed medications over-the-countermedications that can depress breathing . Patient is advised to talk to the pharmacist or physicians if planning to take any over-the-counter medications before  takeing them. Patient is strongly advised to avoid tranquilizers or  relaxants, illegal drugs  or any medications that can depress breathing  Patient is also advised to tell us if there is any changes in their medications from other physicians.       1. UDT, instructed to get done by tomorrow      TREATMENT OPTIONS:     UDT  Medication Agreement Requirements Met  Continue Opioid therapy  Script written for  hydrocodone  Follow up appointment made

## 2021-04-07 ENCOUNTER — HOSPITAL ENCOUNTER (OUTPATIENT)
Age: 66
Discharge: HOME | End: 2021-04-07
Payer: MEDICARE

## 2021-04-07 DIAGNOSIS — Z51.81 ENCOUNTER FOR MEDICATION MONITORING: ICD-10-CM

## 2021-04-07 DIAGNOSIS — M51.36 DDD (DEGENERATIVE DISC DISEASE), LUMBAR: ICD-10-CM

## 2021-04-07 DIAGNOSIS — M54.2 NECK PAIN: Chronic | ICD-10-CM

## 2021-04-07 PROCEDURE — 80307 DRUG TEST PRSMV CHEM ANLYZR: CPT

## 2021-04-11 LAB
6-ACETYLMORPHINE, UR: NOT DETECTED
7-AMINOCLONAZEPAM, URINE: NOT DETECTED
ALPHA-OH-ALPRAZ, URINE: NOT DETECTED
ALPHA-OH-MIDAZOLAM, URINE: NOT DETECTED
ALPRAZOLAM, URINE: NOT DETECTED
AMPHETAMINES, URINE: NOT DETECTED
BARBITURATES, URINE: NOT DETECTED
BENZOYLECGONINE, UR: NOT DETECTED
BUPRENORPHINE URINE: NOT DETECTED
CARISOPRODOL, UR: NOT DETECTED
CLONAZEPAM, URINE: NOT DETECTED
CODEINE, URINE: NOT DETECTED
CREATININE URINE: 78.5 MG/DL (ref 20–400)
DIAZEPAM, URINE: NOT DETECTED
DRUGS EXPECTED, UR: NORMAL
EER HI RES INTERP UR: NORMAL
ETHYL GLUCURONIDE UR: NOT DETECTED
FENTANYL URINE: NOT DETECTED
GABAPENTIN: NOT DETECTED
HYDROCODONE, URINE: NOT DETECTED
HYDROMORPHONE, URINE: NOT DETECTED
LORAZEPAM, URINE: NOT DETECTED
MARIJUANA METAB, UR: NOT DETECTED
MDA, UR: NOT DETECTED
MDEA, EVE, UR: NOT DETECTED
MDMA URINE: NOT DETECTED
MEPERIDINE METAB, UR: NOT DETECTED
METHADONE, URINE: NOT DETECTED
METHAMPHETAMINE, URINE: NOT DETECTED
METHYLPHENIDATE: NOT DETECTED
MIDAZOLAM, URINE: NOT DETECTED
MORPHINE URINE: NOT DETECTED
NALOXONE URINE: NOT DETECTED
NORBUPRENORPHINE, URINE: NOT DETECTED
NORDIAZEPAM, URINE: NOT DETECTED
NORFENTANYL, URINE: NOT DETECTED
NORHYDROCODONE, URINE: PRESENT
NOROXYCODONE, URINE: NOT DETECTED
NOROXYMORPHONE, URINE: NOT DETECTED
OXAZEPAM, URINE: NOT DETECTED
OXYCODONE URINE: NOT DETECTED
OXYMORPHONE, URINE: NOT DETECTED
PAIN MANAGEMENT DRUG PANEL INTERP, URINE: NORMAL
PAIN MGT DRUG PANEL, HI RES, UR: NORMAL
PCP,URINE: NOT DETECTED
PHENTERMINE, UR: NOT DETECTED
PREGABALIN: NOT DETECTED
TAPENTADOL, URINE: NOT DETECTED
TAPENTADOL-O-SULFATE, URINE: NOT DETECTED
TEMAZEPAM, URINE: NOT DETECTED
TRAMADOL, URINE: NOT DETECTED
ZOLPIDEM METABOLITE (ZCA), URINE: NOT DETECTED
ZOLPIDEM, URINE: NOT DETECTED

## 2021-04-27 ENCOUNTER — HOSPITAL ENCOUNTER (OUTPATIENT)
Dept: CT IMAGING | Facility: CLINIC | Age: 66
Discharge: HOME | End: 2021-04-29
Payer: MEDICARE

## 2021-04-27 ENCOUNTER — HOSPITAL ENCOUNTER (OUTPATIENT)
Age: 66
Setting detail: SPECIMEN
Discharge: HOME | End: 2021-04-27
Payer: MEDICARE

## 2021-04-27 DIAGNOSIS — R11.0 NAUSEA: ICD-10-CM

## 2021-04-27 DIAGNOSIS — Z98.890 HISTORY OF ABDOMINAL SURGERY: ICD-10-CM

## 2021-04-27 DIAGNOSIS — K59.09 CHRONIC CONSTIPATION: ICD-10-CM

## 2021-04-27 DIAGNOSIS — R10.84 GENERALIZED ABDOMINAL PAIN: ICD-10-CM

## 2021-04-27 DIAGNOSIS — K56.600 PARTIAL INTESTINAL OBSTRUCTION, UNSPECIFIED CAUSE (HCC): ICD-10-CM

## 2021-04-27 DIAGNOSIS — Z87.19 HX OF SMALL BOWEL OBSTRUCTION: ICD-10-CM

## 2021-04-27 LAB
ABSOLUTE EOS #: 0.28 K/UL (ref 0–0.44)
ABSOLUTE IMMATURE GRANULOCYTE: <0.03 K/UL (ref 0–0.3)
ABSOLUTE LYMPH #: 1.86 K/UL (ref 1.1–3.7)
ABSOLUTE MONO #: 0.34 K/UL (ref 0.1–1.2)
ALBUMIN SERPL-MCNC: 4.2 G/DL (ref 3.5–5.2)
ALBUMIN/GLOBULIN RATIO: 1.4 (ref 1–2.5)
ALP BLD-CCNC: 80 U/L (ref 35–104)
ALT SERPL-CCNC: 20 U/L (ref 5–33)
AMYLASE: 59 U/L (ref 28–100)
ANION GAP SERPL CALCULATED.3IONS-SCNC: 8 MMOL/L (ref 9–17)
AST SERPL-CCNC: 19 U/L
BASOPHILS # BLD: 1 % (ref 0–2)
BASOPHILS ABSOLUTE: 0.07 K/UL (ref 0–0.2)
BILIRUB SERPL-MCNC: 0.23 MG/DL (ref 0.3–1.2)
BUN BLDV-MCNC: 23 MG/DL (ref 8–23)
BUN/CREAT BLD: ABNORMAL (ref 9–20)
CALCIUM SERPL-MCNC: 8.6 MG/DL (ref 8.6–10.4)
CHLORIDE BLD-SCNC: 103 MMOL/L (ref 98–107)
CO2: 29 MMOL/L (ref 20–31)
CREAT SERPL-MCNC: 0.54 MG/DL (ref 0.5–0.9)
DIFFERENTIAL TYPE: ABNORMAL
EOSINOPHILS RELATIVE PERCENT: 6 % (ref 1–4)
GFR AFRICAN AMERICAN: >60 ML/MIN
GFR NON-AFRICAN AMERICAN: >60 ML/MIN
GFR SERPL CREATININE-BSD FRML MDRD: ABNORMAL ML/MIN/{1.73_M2}
GFR SERPL CREATININE-BSD FRML MDRD: ABNORMAL ML/MIN/{1.73_M2}
GLUCOSE BLD-MCNC: 94 MG/DL (ref 70–99)
HCT VFR BLD CALC: 42.7 % (ref 36.3–47.1)
HEMOGLOBIN: 13.6 G/DL (ref 11.9–15.1)
IMMATURE GRANULOCYTES: 0 %
LIPASE: 37 U/L (ref 13–60)
LYMPHOCYTES # BLD: 38 % (ref 24–43)
MCH RBC QN AUTO: 29.6 PG (ref 25.2–33.5)
MCHC RBC AUTO-ENTMCNC: 31.9 G/DL (ref 28.4–34.8)
MCV RBC AUTO: 93 FL (ref 82.6–102.9)
MONOCYTES # BLD: 7 % (ref 3–12)
NRBC AUTOMATED: 0 PER 100 WBC
PDW BLD-RTO: 13.4 % (ref 11.8–14.4)
PLATELET # BLD: 199 K/UL (ref 138–453)
PLATELET ESTIMATE: ABNORMAL
PMV BLD AUTO: 9.9 FL (ref 8.1–13.5)
POTASSIUM SERPL-SCNC: 4.5 MMOL/L (ref 3.7–5.3)
RBC # BLD: 4.59 M/UL (ref 3.95–5.11)
RBC # BLD: ABNORMAL 10*6/UL
SEG NEUTROPHILS: 48 % (ref 36–65)
SEGMENTED NEUTROPHILS ABSOLUTE COUNT: 2.36 K/UL (ref 1.5–8.1)
SODIUM BLD-SCNC: 140 MMOL/L (ref 135–144)
TOTAL PROTEIN: 7.1 G/DL (ref 6.4–8.3)
WBC # BLD: 4.9 K/UL (ref 3.5–11.3)
WBC # BLD: ABNORMAL 10*3/UL

## 2021-04-27 PROCEDURE — 74176 CT ABD & PELVIS W/O CONTRAST: CPT

## 2021-05-06 ENCOUNTER — HOSPITAL ENCOUNTER (OUTPATIENT)
Dept: PAIN MANAGEMENT | Age: 66
Discharge: HOME | End: 2021-05-06
Payer: MEDICARE

## 2021-05-06 DIAGNOSIS — M51.36 DDD (DEGENERATIVE DISC DISEASE), LUMBAR: ICD-10-CM

## 2021-05-06 DIAGNOSIS — M47.816 LUMBAR FACET ARTHROPATHY: ICD-10-CM

## 2021-05-06 DIAGNOSIS — M15.9 PRIMARY OSTEOARTHRITIS INVOLVING MULTIPLE JOINTS: Primary | ICD-10-CM

## 2021-05-06 DIAGNOSIS — Z51.81 ENCOUNTER FOR MEDICATION MONITORING: ICD-10-CM

## 2021-05-06 DIAGNOSIS — M79.7 FIBROMYALGIA: ICD-10-CM

## 2021-05-06 PROCEDURE — 99442 PR PHYS/QHP TELEPHONE EVALUATION 11-20 MIN: CPT | Performed by: NURSE PRACTITIONER

## 2021-05-06 PROCEDURE — 99213 OFFICE O/P EST LOW 20 MIN: CPT

## 2021-05-06 RX ORDER — FAMOTIDINE 20 MG/1
20 TABLET, FILM COATED ORAL 2 TIMES DAILY
COMMUNITY

## 2021-05-06 RX ORDER — HYDROCODONE BITARTRATE AND ACETAMINOPHEN 5; 325 MG/1; MG/1
1 TABLET ORAL 2 TIMES DAILY PRN
Qty: 60 TABLET | Refills: 0 | Status: SHIPPED | OUTPATIENT
Start: 2021-05-09 | End: 2021-05-06 | Stop reason: SDUPTHER

## 2021-05-06 RX ORDER — HYDROCODONE BITARTRATE AND ACETAMINOPHEN 5; 325 MG/1; MG/1
1 TABLET ORAL 2 TIMES DAILY PRN
Qty: 60 TABLET | Refills: 0 | Status: SHIPPED | OUTPATIENT
Start: 2021-05-09 | End: 2021-06-07 | Stop reason: SDUPTHER

## 2021-05-06 ASSESSMENT — ENCOUNTER SYMPTOMS
EYES NEGATIVE: 1
BACK PAIN: 1
CONSTIPATION: 1

## 2021-05-06 NOTE — PROGRESS NOTES
[address] PROGRESS NOTE      Patient  completed []  video visit   [x]   phone call:    Due to technical difficulties  11   Minutes :       [x]    to  review Medication Agreement    []  Follow up after procedure   []  Discuss treatment options      Location:  Provider:  working from    [x]    home    []   [facility] - STEVEN GUSTAFSON ,   patient at home       Chief Complaint: low back pain    She c/o low back pain which radiates down her legs at times. She states aquatics helped her, She has no history of lumbar surgery, She uses cpap and sleeps well. She has had no Ed visits. She has had stomach issues and is on pepcid. She states had CT scan of abdomen,which was okay,  Back Pain  This is a chronic problem. The problem occurs constantly. The problem is unchanged. The pain is present in the lumbar spine. The quality of the pain is described as aching (dull). Radiates to: down legs at times. The pain is at a severity of 5/10. The pain is moderate. The pain is the same all the time. The symptoms are aggravated by standing (actiivty). Risk factors include menopause (osteopenia). She has tried analgesics and heat for the symptoms. Treatment goals:  Functional status: do daily   activiites      Aberrancy:   Any alcoholic beverages   no         Any illegal drugs   no      Analgesia:                      Adverse  Effects :constipation, uses miralax      ADL;s :stretching  5    Data:    When was thelast UDS:   4-7-2021         Was the UDS appropriate:  [x] yes []   no      Record/Diagnostics Review:      As above, I did review the imaging     4/11/2021  9:01 PM - Michael, Radha Incoming Lab Results From [facility]    Component Value Ref Range & Units Status Collected Lab   Pain Management Drug Panel Interp, Urine Consistent   Final 04/07/2021  1:15 PM [facility]   (NOTE)   ________________________________________________________________   DRUGS EXPECTED:   NORCO (HYDROCODONE) ________________________________________________________________   CONSISTENT with medications provided:   [address] (HYDROCODONE): based on norhydrocodone   ________________________________________________________________   Drugs Not Included in this Assay:   Acetaminophen   ________________________________________________________________   INTERPRETIVE INFORMATION: Targeted drug profile Interp   Interpretation depends on accuracy and completeness of patient   medication information submitted by client. EXAMINATION:   MRI OF THE CERVICAL SPINE WITHOUT CONTRAST; MRI OF THE LUMBAR SPINE WITHOUT   CONTRAST 5/7/2019 4:25 pm; 5/7/2019 4:24 pm       TECHNIQUE:   Multiplanar multisequence MRI of the cervical spine was performed without the   administration of intravenous contrast.; Multiplanar multisequence MRI of the   lumbar spine was performed without the administration of intravenous contrast.       COMPARISON:   None.       HISTORY:   ORDERING SYSTEM PROVIDED HISTORY: Neck pain; ORDERING SYSTEM PROVIDED   HISTORY: Lumbar facet arthropathy       FINDINGS:   Cervical spine       BONES/ALIGNMENT: Straightening of the normal lordotic curvature is noted. There is no fracture.       SPINAL CORD: No focal cord signal hyperintensity is noted.       SOFT TISSUES: Small cervical nodes are present.       C2-C3: Minimal disc bulging is noted.  Mild facet hypertrophic changes are   noted       C3-C4: Mild disc bulging is noted.  Small left proximal foraminal protrusion   is noted.  Endplate, uncovertebral and facet hypertrophic changes are noted.    Moderate left foraminal narrowing is noted.       C4-C5: Mild-to-moderate diffuse disc bulging is noted. Arneta Azucena is a   questionable small proximal foraminal protrusion on the right.  Endplate,   uncovertebral and facet hypertrophic changes are noted.  Severe right and   moderate left foraminal narrowing is noted.  Ventral cord flattening is   noted.  Mild narrowing of the canal is noted       C5-C6: Mild disc bulging is noted diffusely. Gauri Beers is a small proximal   foraminal protrusion on the left.  Endplate, uncovertebral and facet   hypertrophic changes are noted.  Minimal right and moderate left foraminal   narrowing is noted       C6-C7: Mild disc bulging is noted diffusely.  Endplate, uncovertebral and   facet hypertrophic changes are noted.  These are greater on the left. Moderate to severe left and mild-to-moderate right foraminal narrowing is   noted.       C7-T1: Very minimal disc bulging is noted.  Endplate, uncovertebral and facet   hypertrophic changes are noted. Gauri Beers is a suggested small left foraminal   protrusion.  There mild-to-moderate left foraminal narrowing.       MRI lumbar spine:       Bones: Mild lordotic curvature is noted.  There is no acute fracture. Minimal edema in the left L5 pedicle is noted.  Minimal T12 retrolisthesis is   noted.  Minimal L5 retrolisthesis is noted degenerative endplate signal   changes are greatest at T12-L1.       Soft tissues: No retroperitoneal mass lesion is noted.       Cord: Conus terminates at L1-L2 and is normal in appearance.       T12-L1: Uncovering of the disc is noted.  Minimal disc bulging is noted. There is a small proximal foraminal protrusion on the right.       L1-2: Facet and ligament hypertrophic changes are noted       L2-3: Mild disc bulging is noted diffusely.  Annular tear is noted.  Left   foraminal broad-based protrusion is noted.  Minimal right and moderate left   foraminal narrowing is noted.  Facet and ligament hypertrophic changes are   noted.  Mild narrowing of the canal is noted       L3-4: Minimal disc bulging is noted diffusely.  Foraminal protrusions are   noted, left greater than right.  Mild right and mild-to-moderate left   foraminal narrowing is noted.  Facet and ligament hypertrophic changes are   noted.  Severe narrowing of the canal is noted       Mild disc bulging is noted with annular tear. Seble Guise is a small central and   right paracentral protrusion with minimal upward migration of extruded disc   material.  Mild to moderate proximal foraminal narrowing on the right is   noted.  Facet and ligament hypertrophic changes are noted.  Severe narrowing   of the canal is noted       L5-S1: Mild disc bulging is noted diffusely with annular tear.  Endplate   hypertrophic changes are noted.  Proximal foraminal narrowing is noted   bilaterally.  There is a questionable small proximal foraminal protrusion on   the left.  Bilateral facet hypertrophic changes are noted.           Impression   Cervical spine:       Multilevel degenerative disc disease in the cervical spine as described.  See   above for details of each level.       Lumbar spine:       Multilevel degenerative disc disease in the lower thoracic spine and the   lumbar spine as described.  See above for details of each level                Narrative   EXAMINATION:   CT OF THE ABDOMEN AND PELVIS WITHOUT CONTRAST 4/27/2021 12:59 pm       TECHNIQUE:   CT of the abdomen and pelvis was performed without the administration of   intravenous contrast. Multiplanar reformatted images are provided for review.    Dose modulation, iterative reconstruction, and/or weight based adjustment of   the mA/kV was utilized to reduce the radiation dose to as low as reasonably   achievable.       COMPARISON:   January 17, 2017       HISTORY:   ORDERING SYSTEM PROVIDED HISTORY: Generalized abdominal pain   TECHNOLOGIST PROVIDED HISTORY:   Reason for Exam: pt stated RLQ AND MID ABDOMEN PAIN X 1 WK   Acuity: Acute   Type of Exam: Initial   Additional signs and symptoms: pt stated RLQ AND MID ABDOMEN PAIN X 1 WK   Relevant Medical/Surgical History: SURG HERNIA, APPY, HYST,  HX BOWEL   OBSTRUCTION       FINDINGS:   Lower Chest: Lung bases are clear       Organs: Stable appearance to multiple hypodense areas suggestive hepatics   cysts       Gallbladder is unremarkable.     Pancreas is within normal limits.       Spleen is unremarkable.       Stable appearance of the right adrenal gland unremarkable appearance of the   right kidney no evidence for nephrolithiasis.       Within the liver       GI/Bowel: The appendix appears to be surgically absent no inflammatory   changes are noted along the GI tract or in the mesentery.  No evidence for   ascites.       Pelvis: The bladder is unremarkable no inflammatory changes in the pelvis. No free fluid in the pelvis       Peritoneum/Retroperitoneum: The aorta is normal contour course and caliber no   inflammatory changes in the retroperitoneum no evidence for lymphadenopathy.       Bones/Soft Tissues: Stable appearance of the spine when compared to prior   study no abnormal soft tissue collections are noted.           Impression   No acute abnormality in the abdomen or pelvis                 Pill count: appropriate    fill date :5-    Morphine equivalent dose as reported on OARRS:10  Periodic Controlled Substance Monitoring: Possible medication side effects, risk of tolerance/dependence & alternative treatments discussed., No signs of potential drug abuse or diversion identified. , Assessed functional status., Obtaining appropriate analgesic effect of treatment. Lm Lagunas, APRN - CNP)  Review ofOARRS does not show any aberrant prescription behavior. Medication is helping the patient stay active. Patient denies any side effects and reports adequate analgesia. No sign of misuse/abuse.             Past Medical History:   Diagnosis Date    Anxiety     Breast cancer ([address]     Breast cancer ([address]     Cancer ([address]     breast, in remission    Chronic back pain 12/3/2013    Depression     DJD (degenerative joint disease)     Family history of breast cancer     MGM    Fibromyalgia     Fibromyalgia     History of hysterectomy     Hypothyroidism     Obesity, Class III, BMI 40-49.9 (morbid obesity) ([address] 7/22/2014    Osteoarthritis  [facility]     SVT (supraventricular tachycardia) ([facility]     Unspecified sleep apnea        Past Surgical History:   Procedure Laterality Date    ABDOMEN SURGERY      APPENDECTOMY      BREAST SURGERY      lumpectomy w radiation 2009    CARDIAC CATHETERIZATION      with ablation    CARPAL TUNNEL RELEASE Left 10/22/2020    COLONOSCOPY  2009    10 years    COLONOSCOPY  08/2017    FINGER SURGERY  10/2017    trigger finger right hand    HERNIA REPAIR      umbilical    HYSTERECTOMY      total    JOINT REPLACEMENT      Right Knee 2014    KNEE ARTHROSCOPY      bilateral    KNEE SURGERY      x2    LAPAROSCOPIC APPENDECTOMY Right     TONSILLECTOMY      UMBILICAL HERNIA REPAIR N/A     WRIST SURGERY  04/12/2018    plate and screws right wrist       Allergies   Allergen Reactions    Augmentin [Amoxicillin-Pot Clavulanate] Nausea And Vomiting     Projectile vomiting    Sulfa Antibiotics Anaphylaxis    Amoxapine And Related Diarrhea and Nausea And Vomiting     AMOX TR-K- 875-125 mg    Loxapine Succinate Diarrhea and Nausea Only     AMOX TR-K- 875-125 mg         Current Outpatient Medications:     famotidine (PEPCID) 20 MG tablet, Take 20 mg by mouth 2 times daily, Disp: , Rfl:     meloxicam (MOBIC) 15 MG tablet, take 1 tablet by mouth once daily, Disp: 30 tablet, Rfl: 5    HYDROcodone-acetaminophen (NORCO) 5-325 MG per tablet, Take 1 tablet by mouth 2 times daily as needed for Pain for up to 30 days. , Disp: 60 tablet, Rfl: 0    calcium carbonate-vitamin D (CALTRATE) 600-400 MG-UNIT TABS per tab, Take by mouth, Disp: , Rfl:     levothyroxine (SYNTHROID) 50 MCG tablet, take 1 tablet by mouth once daily, Disp: 90 tablet, Rfl: 1    venlafaxine (EFFEXOR XR) 150 MG extended release capsule, take 1 capsule by mouth once daily, Disp: 90 capsule, Rfl: 1    magnesium oxide (MAG-OX) 400 (240 Mg) MG tablet, take 1 tablet by mouth twice a day, Disp: , Rfl:     loratadine (CLARITIN) 10 MG capsule, Take 10 medication use and utilize pain medications only for uncontrolled rest pain or pain with exertional activities. I advised patient not to self-escalate painmedications without consulting with us. At each of patient's future visits we will try to taper pain medications, while adjusting the adjunct medications, and re-evaluating for Physical Therapy to improve spinal andjoint strength. We will continue to have discussions to decrease pain medications as tolerated. Counseled patient on effects their pain medication and /or their medical condition mayhave on their  ability to drive or operate machinery. Instructed not to drive or operate machinery if drowsy     I also discussed with the patient regarding the dangers of combining narcotic pain medication with tranquilizers, alcohol or illegal drugs or taking the medication any way other than prescribed. The dangers were discussed  including respiratory depression and death. Patient was told to tell  all  physicians regarding the medications he is getting from pain clinic. Patient is warned not to take any unprescribed medications over-the-countermedications that can depress breathing . Patient is advised to talk to the pharmacist or physicians if planning to take any over-the-counter medications before  takeing them. Patient is strongly advised to avoid tranquilizers or  relaxants, illegal drugs  or any medications that can depress breathing  Patient is also advised to tell us if there is any changes in their medications from other physicians.       1. May fill script 2 days early going on vacation, start date is 5-      TREATMENT OPTIONS:       Medication Agreement Requirements Met  Continue Opioid therapy  Script written for  hydrocodone  Follow up appointment made

## 2021-05-25 ENCOUNTER — HOSPITAL ENCOUNTER (OUTPATIENT)
Dept: MAMMOGRAPHY | Age: 66
Discharge: HOME | End: 2021-05-27
Payer: MEDICARE

## 2021-05-25 DIAGNOSIS — Z12.31 ENCOUNTER FOR SCREENING MAMMOGRAM FOR MALIGNANT NEOPLASM OF BREAST: ICD-10-CM

## 2021-05-25 PROCEDURE — 77063 BREAST TOMOSYNTHESIS BI: CPT

## 2021-06-07 ENCOUNTER — HOSPITAL ENCOUNTER (OUTPATIENT)
Dept: PAIN MANAGEMENT | Age: 66
Discharge: HOME | End: 2021-06-07
Payer: MEDICARE

## 2021-06-07 DIAGNOSIS — M46.1 SACROILIITIS (HCC): Primary | ICD-10-CM

## 2021-06-07 DIAGNOSIS — M79.7 FIBROMYALGIA: ICD-10-CM

## 2021-06-07 DIAGNOSIS — Z51.81 ENCOUNTER FOR MEDICATION MONITORING: ICD-10-CM

## 2021-06-07 DIAGNOSIS — M51.36 DDD (DEGENERATIVE DISC DISEASE), LUMBAR: ICD-10-CM

## 2021-06-07 DIAGNOSIS — M47.816 LUMBAR FACET ARTHROPATHY: ICD-10-CM

## 2021-06-07 PROCEDURE — 99213 OFFICE O/P EST LOW 20 MIN: CPT | Performed by: NURSE PRACTITIONER

## 2021-06-07 PROCEDURE — 99213 OFFICE O/P EST LOW 20 MIN: CPT

## 2021-06-07 RX ORDER — HYDROCODONE BITARTRATE AND ACETAMINOPHEN 5; 325 MG/1; MG/1
1 TABLET ORAL 2 TIMES DAILY PRN
Qty: 60 TABLET | Refills: 0 | Status: SHIPPED | OUTPATIENT
Start: 2021-06-08 | End: 2021-07-06 | Stop reason: SDUPTHER

## 2021-06-07 ASSESSMENT — ENCOUNTER SYMPTOMS
BACK PAIN: 1
SHORTNESS OF BREATH: 0
CONSTIPATION: 0
COUGH: 0

## 2021-06-07 NOTE — PROGRESS NOTES
Patient completed a video visit today to review medication contract. Chief Complaint: back pain    PMH  Pt reports chronic history of back pain, states she was diagnosed with fibromyalgia. Pain has been worse with colder [place]. She does see a chiropractor with adjustements heat and TENS tx which helps to control the pain.  Pt has tried lyrica but stopped due to weight gain and gabapentin was causing drowsiness. Pt also experienced side effects with Topamax. She has limited ROM left shoulder.  XR done 4/18/19 shows no acute abnormality. Cervical MRI shows Multilevel degenerative disc disease in the cervical spine. She is having worsening pain in the left knee. She was told she would would need it replaced in the future. She has had steroid injections with some benefit. Back Pain  This is a chronic problem. The current episode started more than 1 year ago. The problem occurs constantly. The problem is unchanged. The pain is present in the lumbar spine. The quality of the pain is described as aching. The pain does not radiate. The pain is at a severity of 4/10. The pain is moderate. The symptoms are aggravated by position (prolonged standing ). Pertinent negatives include no chest pain or fever. She has tried analgesics and bed rest for the symptoms. The treatment provided mild relief. Patient denies any new neurological symptoms. No bowel or bladder incontinence, no weakness, and no falling. Pill count: appropriate due 6/8    Morphine equivalent: 10    Periodic Controlled Substance Monitoring: Possible medication side effects, risk of tolerance/dependence & alternative treatments discussed., No signs of potential drug abuse or diversion identified., Obtaining appropriate analgesic effect of treatment.  Shantel Rome, APRN - CNP)      Past Medical History:   Diagnosis Date    Anxiety     [facility]     [facility]     Cancer ([facility]     breast, in remission    Chronic back pain 12/3/2013    Depression     DJD (degenerative joint disease)     Family history of breast cancer     MGM    Fibromyalgia     Fibromyalgia     History of hysterectomy     Hypothyroidism     Obesity, Class III, BMI 40-49.9 (morbid obesity) ([facility] 7/22/2014    Osteoarthritis     Sacroiliitis ([facility])     SVT (supraventricular tachycardia) ([facility]     Unspecified sleep apnea        Past Surgical History:   Procedure Laterality Date    ABDOMEN SURGERY      APPENDECTOMY      BREAST SURGERY      lumpectomy w radiation 2009    CARDIAC CATHETERIZATION      with ablation    CARPAL TUNNEL RELEASE Left 10/22/2020    COLONOSCOPY  2009    10 years    COLONOSCOPY  08/2017    FINGER SURGERY  10/2017    trigger finger right hand    HERNIA REPAIR      umbilical    HYSTERECTOMY      total    JOINT REPLACEMENT      Right Knee 2014    KNEE ARTHROSCOPY      bilateral    KNEE SURGERY      x2    LAPAROSCOPIC APPENDECTOMY Right     TONSILLECTOMY      UMBILICAL HERNIA REPAIR N/A     WRIST SURGERY  04/12/2018    plate and screws right wrist       Allergies   Allergen Reactions    Augmentin [Amoxicillin-Pot Clavulanate] Nausea And Vomiting     Projectile vomiting    Sulfa Antibiotics Anaphylaxis    Amoxapine And Related Diarrhea and Nausea And Vomiting     AMOX TR-K- 875-125 mg    Loxapine Succinate Diarrhea and Nausea Only     AMOX TR-K- 875-125 mg         Current Outpatient Medications:     famotidine (PEPCID) 20 MG tablet, Take 20 mg by mouth 2 times daily, Disp: , Rfl:     HYDROcodone-acetaminophen (NORCO) 5-325 MG per tablet, Take 1 tablet by mouth 2 times daily as needed for Pain for up to 30 days. , Disp: 60 tablet, Rfl: 0    meloxicam (MOBIC) 15 MG tablet, take 1 tablet by mouth once daily, Disp: 30 tablet, Rfl: 5    amoxicillin (AMOXIL) 500 MG capsule, take 4 capsules by mouth 1 hour prior to appointment, Disp: , Rfl:     calcium carbonate-vitamin D (CALTRATE) 600-400 MG-UNIT TABS per tab, Take Ran Out of Food in the Last Year:    Transportation Needs:     Lack of Transportation (Medical):  Lack of Transportation (Non-Medical):    Physical Activity:     Days of Exercise per Week:     Minutes of Exercise per Session:    Stress:     Feeling of Stress :    Social Connections:     Frequency of Communication with Friends and Family:     Frequency of Social Gatherings with Friends and Family:     Attends Orthodox Services:     Active Member of Clubs or Organizations:     Attends Club or Organization Meetings:     Marital Status:    Intimate Partner Violence:     Fear of Current or Ex-Partner:     Emotionally Abused:     Physically Abused:     Sexually Abused:        Review of Systems:  Review of Systems   Constitutional: Negative for chills and fever. Cardiovascular: Negative for chest pain and palpitations. Respiratory: Negative for cough and shortness of breath. Musculoskeletal: Positive for back pain and joint pain. Gastrointestinal: Negative for constipation. Neurological: Negative for disturbances in coordination and loss of balance. Physical Exam:  LMP 06/13/2010 (Within Months)     Physical Exam  HENT:      Head: Normocephalic. Pulmonary:      Effort: Pulmonary effort is normal.   Neurological:      Mental Status: She is alert.    Psychiatric:         Mood and Affect: Mood normal.         Behavior: Behavior normal.         Record/Diagnostics Review:    Last annabella 4/2021 and was appropriate     ABERRANT BEHAVIORS SINCE LAST VISIT  Lost rx/pills:------------------------------------------ no  Taking  medication as prescribed: ----------- yes  Urine Drug Screen ---------------------------------  yes             Date-------------------------------------------------4/2021              Results as expected ---------------------yes     Recent ER visits: -------------------------------------No  Pill count is appropriate: ---------------------------yes   Refills for prescriptions appropriate:---------- yes    Assessment:  Problem List Items Addressed This Visit     Fibromyalgia    Relevant Medications    HYDROcodone-acetaminophen (NORCO) 5-325 MG per tablet (Start on 6/8/2021)    Lumbar facet arthropathy    Relevant Medications    HYDROcodone-acetaminophen (NORCO) 5-325 MG per tablet (Start on 6/8/2021)    Sacroiliitis ([address] - Primary    Relevant Medications    HYDROcodone-acetaminophen ([facility]) 5-325 MG per tablet (Start on 6/8/2021)    DDD (degenerative disc disease), lumbar    Relevant Medications    HYDROcodone-acetaminophen (NORCO) 5-325 MG per tablet (Start on 6/8/2021)    Encounter for medication monitoring             Treatment Plan:  Patient relates current medications are helping the pain. Patient reports taking pain medications as prescribed, denies obtaining medications from different sources and denies use of illegal drugs. Patient denies side effects from medications like nausea, vomiting, constipation or drowsiness. Patient reports current activities of daily living are possible due to medications and would like to continue them. As always, we encourage daily stretching and strengthening exercises, and recommend minimizing use of pain medications unless patient cannot get through daily activities due to pain. Contract requirements met. Continue opioid therapy. Script written for norco  Follow up appointment made for 4 weeks    [address], was evaluated through a synchronous (real-time) audio-video encounter. The patient (or guardian if applicable) is aware that this is a billable service. Verbal consent to proceed has been obtained within the past 12 months. The visit was conducted pursuant to the emergency declaration under the [address] [place] [address], [address] [place] [address] authority and the [employer] Act. Patient identification was verified, and a caregiver was present when appropriate.  The

## 2021-07-06 ENCOUNTER — HOSPITAL ENCOUNTER (OUTPATIENT)
Dept: PAIN MANAGEMENT | Age: 66
Discharge: HOME | End: 2021-07-06
Payer: MEDICARE

## 2021-07-06 DIAGNOSIS — M47.816 LUMBAR FACET ARTHROPATHY: ICD-10-CM

## 2021-07-06 DIAGNOSIS — M25.512 CHRONIC LEFT SHOULDER PAIN: Chronic | ICD-10-CM

## 2021-07-06 DIAGNOSIS — M46.1 SACROILIITIS (HCC): Primary | ICD-10-CM

## 2021-07-06 DIAGNOSIS — G89.29 CHRONIC LEFT SHOULDER PAIN: Chronic | ICD-10-CM

## 2021-07-06 DIAGNOSIS — M79.7 FIBROMYALGIA: ICD-10-CM

## 2021-07-06 DIAGNOSIS — Z51.81 ENCOUNTER FOR MEDICATION MONITORING: ICD-10-CM

## 2021-07-06 DIAGNOSIS — M51.36 DDD (DEGENERATIVE DISC DISEASE), LUMBAR: ICD-10-CM

## 2021-07-06 PROCEDURE — 99213 OFFICE O/P EST LOW 20 MIN: CPT

## 2021-07-06 PROCEDURE — 99213 OFFICE O/P EST LOW 20 MIN: CPT | Performed by: NURSE PRACTITIONER

## 2021-07-06 RX ORDER — HYDROCODONE BITARTRATE AND ACETAMINOPHEN 5; 325 MG/1; MG/1
1 TABLET ORAL 2 TIMES DAILY PRN
Qty: 60 TABLET | Refills: 0 | Status: SHIPPED | OUTPATIENT
Start: 2021-07-08 | End: 2021-08-04 | Stop reason: SDUPTHER

## 2021-07-06 RX ORDER — MAGNESIUM OXIDE 400 MG/1
TABLET ORAL
COMMUNITY
Start: 2021-05-20

## 2021-07-06 ASSESSMENT — ENCOUNTER SYMPTOMS
RESPIRATORY NEGATIVE: 1
BACK PAIN: 1
EYES NEGATIVE: 1
CONSTIPATION: 1

## 2021-07-06 NOTE — PROGRESS NOTES
[address] 89 PROGRESS NOTE      Patient  completed [x]  video visit   []   phone call:         Minutes :       [x]    to  review Medication Agreement    []  Follow up after procedure   []  Discuss treatment options      Location:  Provider:  working from    [x]    home    []   [facility] - STEVEN GUSTAFSON ,   patient at home       Chief Complaint: low back pain    She had an episode of sciatic pain on the right where she had trouble lifting her legs. She states it hurt to stand up or walk,. She goes in the pool. The pain has gotten better, described as dull, achey. She sleeps well, uses cpap. She uses a pillow between her legs when she sleeps which helps. She has been doing yard work and going in the pool. Back Pain  This is a chronic problem. The problem occurs constantly. The problem has been waxing and waning since onset. The pain is present in the lumbar spine. The quality of the pain is described as aching (dull). The pain does not radiate. The pain is at a severity of 4/10. Worse during: larter in the day. Exacerbated by: walk a lng distance. Risk factors: osteopenia. She has tried heat and analgesics for the symptoms.                Treatment goals:  Functional status: to function on a daily basis      Aberrancy:   Any alcoholic beverages     no       Any illegal drugs   no      Analgesia:        4             Adverse  Effects :constipation, takes med    ADL;s :exercising in the pool    Data:    When was thelast UDS:  4-7-2021          Was the UDS appropriate:  [x] yes []   no      Record/Diagnostics Review:      As above, I did review the imaging     4/11/2021  9:01 PM - MichaelRadha mayer Incoming Lab Results From [facility] [employer]    Component Value Ref Range & Units Status Collected Lab   Pain Management Drug Panel Interp, Urine Consistent   Final 04/07/2021  1:15 PM [facility]   (NOTE)   ________________________________________________________________   DRUGS EXPECTED:   NORCO (HYDROCODONE) ________________________________________________________________   CONSISTENT with medications provided:   Edy Mari (HYDROCODONE): based on norhydrocodone   ________________________________________________________________   Drugs Not Included in this Assay:   Acetaminophen   ________________________________________________________________   INTERPRETIVE INFORMATION: Targeted drug profile Interp   Interpretation depends on accuracy and completeness of patient   medication information submitted by       EXAMINATION:   MRI OF THE CERVICAL SPINE WITHOUT CONTRAST; MRI OF THE LUMBAR SPINE WITHOUT   CONTRAST 5/7/2019 4:25 pm; 5/7/2019 4:24 pm       TECHNIQUE:   Multiplanar multisequence MRI of the cervical spine was performed without the   administration of intravenous contrast.; Multiplanar multisequence MRI of the   lumbar spine was performed without the administration of intravenous contrast.       COMPARISON:   None.       HISTORY:   ORDERING SYSTEM PROVIDED HISTORY: Neck pain; ORDERING SYSTEM PROVIDED   HISTORY: Lumbar facet arthropathy       FINDINGS:   Cervical spine       BONES/ALIGNMENT: Straightening of the normal lordotic curvature is noted. There is no fracture.       SPINAL CORD: No focal cord signal hyperintensity is noted.       SOFT TISSUES: Small cervical nodes are present.       C2-C3: Minimal disc bulging is noted.  Mild facet hypertrophic changes are   noted       C3-C4: Mild disc bulging is noted.  Small left proximal foraminal protrusion   is noted.  Endplate, uncovertebral and facet hypertrophic changes are noted.    Moderate left foraminal narrowing is noted.       C4-C5: Mild-to-moderate diffuse disc bulging is noted. Tabatha Cheeks is a   questionable small proximal foraminal protrusion on the right.  Endplate,   uncovertebral and facet hypertrophic changes are noted.  Severe right and   moderate left foraminal narrowing is noted.  Ventral cord flattening is   noted.  Mild narrowing of the canal is noted     C5-C6: Mild disc bulging is noted diffusely. Julio Tong is a small proximal   foraminal protrusion on the left.  Endplate, uncovertebral and facet   hypertrophic changes are noted.  Minimal right and moderate left foraminal   narrowing is noted       C6-C7: Mild disc bulging is noted diffusely.  Endplate, uncovertebral and   facet hypertrophic changes are noted.  These are greater on the left. Moderate to severe left and mild-to-moderate right foraminal narrowing is   noted.       C7-T1: Very minimal disc bulging is noted.  Endplate, uncovertebral and facet   hypertrophic changes are noted. Julio Tong is a suggested small left foraminal   protrusion.  There mild-to-moderate left foraminal narrowing.       MRI lumbar spine:       Bones: Mild lordotic curvature is noted.  There is no acute fracture. Minimal edema in the left L5 pedicle is noted.  Minimal T12 retrolisthesis is   noted.  Minimal L5 retrolisthesis is noted degenerative endplate signal   changes are greatest at T12-L1.       Soft tissues: No retroperitoneal mass lesion is noted.       Cord: Conus terminates at L1-L2 and is normal in appearance.       T12-L1: Uncovering of the disc is noted.  Minimal disc bulging is noted.    There is a small proximal foraminal protrusion on the right.       L1-2: Facet and ligament hypertrophic changes are noted       L2-3: Mild disc bulging is noted diffusely.  Annular tear is noted.  Left   foraminal broad-based protrusion is noted.  Minimal right and moderate left   foraminal narrowing is noted.  Facet and ligament hypertrophic changes are   noted.  Mild narrowing of the canal is noted       L3-4: Minimal disc bulging is noted diffusely.  Foraminal protrusions are   noted, left greater than right.  Mild right and mild-to-moderate left   foraminal narrowing is noted.  Facet and ligament hypertrophic changes are   noted.  Severe narrowing of the canal is noted       Mild disc bulging is noted with annular tear. Julio Tong is a small central and   right paracentral protrusion with minimal upward migration of extruded disc   material.  Mild to moderate proximal foraminal narrowing on the right is   noted.  Facet and ligament hypertrophic changes are noted.  Severe narrowing   of the canal is noted       L5-S1: Mild disc bulging is noted diffusely with annular tear.  Endplate   hypertrophic changes are noted.  Proximal foraminal narrowing is noted   bilaterally.  There is a questionable small proximal foraminal protrusion on   the left.  Bilateral facet hypertrophic changes are noted.           Impression   Cervical spine:       Multilevel degenerative disc disease in the cervical spine as described.  See   above for details of each level.       Lumbar spine:       Multilevel degenerative disc disease in the lower thoracic spine and the   lumbar spine as described.  See above for details of each level                     Pill count: appropriate    fill date :7-8-2021    Morphine equivalent dose as reported on [place]ARRS:10  Periodic Controlled Substance Monitoring: Possible medication side effects, risk of tolerance/dependence & alternative treatments discussed., No signs of potential drug abuse or diversion identified., Obtaining appropriate analgesic effect of treatment., Assessed functional status. Lorna Lutz, APRN - CNP)  Review of[place]ARRS does not show any aberrant prescription behavior. Medication is helping the patient stay active. Patient denies any side effects and reports adequate analgesia. No sign of misuse/abuse.             Past Medical History:   Diagnosis Date    Anxiety     Breast cancer ([address]     Breast cancer ([address]     Cancer ([address]     breast, in remission    Chronic back pain 12/3/2013    Depression     DJD (degenerative joint disease)     Family history of breast cancer     MGM    Fibromyalgia     Fibromyalgia     History of hysterectomy     Hypothyroidism     Obesity, Class III, BMI 40-49.9 (morbid obesity) [facility] 7/22/2014    Osteoarthritis     Sacroiliitis (HCC)     SVT (supraventricular tachycardia) [facility]     Unspecified sleep apnea        Past Surgical History:   Procedure Laterality Date    ABDOMEN SURGERY      APPENDECTOMY      BREAST SURGERY      lumpectomy w radiation 2009    CARDIAC CATHETERIZATION      with ablation    CARPAL TUNNEL RELEASE Left 10/22/2020    COLONOSCOPY  2009    10 years    COLONOSCOPY  08/2017   [facility] FINGER SURGERY  10/2017    trigger finger right hand    HERNIA REPAIR      umbilical    HYSTERECTOMY      total    JOINT REPLACEMENT      Right Knee 2014    KNEE ARTHROSCOPY      bilateral    KNEE SURGERY      x2    LAPAROSCOPIC APPENDECTOMY Right     TONSILLECTOMY      UMBILICAL HERNIA REPAIR N/A     WRIST SURGERY  04/12/2018    plate and screws right wrist       Allergies   Allergen Reactions    Augmentin [Amoxicillin-Pot Clavulanate] Nausea And Vomiting     Projectile vomiting    Sulfa Antibiotics Anaphylaxis    Amoxapine And Related Diarrhea and Nausea And Vomiting     AMOX TR-K- 875-125 mg    Loxapine Succinate Diarrhea and Nausea Only     AMOX TR-K- 875-125 mg         Current Outpatient Medications:     magnesium oxide (MAG-OX) 400 MG tablet, take 1 tablet by mouth twice a day, Disp: , Rfl:     HYDROcodone-acetaminophen (NORCO) 5-325 MG per tablet, Take 1 tablet by mouth 2 times daily as needed for Pain for up to 30 days. , Disp: 60 tablet, Rfl: 0    meloxicam (MOBIC) 15 MG tablet, take 1 tablet by mouth once daily, Disp: 30 tablet, Rfl: 5    calcium carbonate-vitamin D (CALTRATE) 600-400 MG-UNIT TABS per tab, Take by mouth, Disp: , Rfl:     levothyroxine (SYNTHROID) 50 MCG tablet, take 1 tablet by mouth once daily, Disp: 90 tablet, Rfl: 1    venlafaxine (EFFEXOR XR) 150 MG extended release capsule, take 1 capsule by mouth once daily, Disp: 90 capsule, Rfl: 1    loratadine (CLARITIN) 10 MG capsule, Take 10 mg by mouth daily, Disp: , Rfl:     flecainide (TAMBOCOR) 50 MG tablet, Take 50 mg by mouth 2 times daily. , Disp: , Rfl:     famotidine (PEPCID) 20 MG tablet, Take 20 mg by mouth 2 times daily, Disp: , Rfl:     amoxicillin (AMOXIL) 500 MG capsule, take 4 capsules by mouth 1 hour prior to appointment, Disp: , Rfl:     magnesium oxide (MAG-OX) 400 (240 Mg) MG tablet, take 1 tablet by mouth twice a day, Disp: , Rfl:     Tens Unit MISC, by Does not apply route For low back pain, Disp: 1 each, Rfl: 0    Tens Unit MISC, by Does not apply route, Disp: 1 each, Rfl: 0    Polyethylene Glycol 3350 (MIRALAX PO), Take by mouth Taking daily, Disp: , Rfl:     Family History   Problem Relation Age of Onset    Cancer Mother         pancreatic cancer 78    Heart Disease Mother     COPD Father     COPD Brother     Breast Cancer Maternal Grandmother         dx unknown age    [employer] Colon Cancer Maternal Grandfather         66-80s        Social History     Socioeconomic History    Marital status:      Spouse name: Not on file    Number of children: Not on file    Years of education: Not on file    Highest education level: Not on file   Occupational History    Occupation: [employer] disability    Tobacco Use    Smoking status: Never Smoker    Smokeless tobacco: Never Used   Vaping Use    Vaping Use: Never used   Substance and Sexual Activity    Alcohol use: No     Alcohol/week: 0.0 standard drinks     Comment: one to two drinks a month    Drug use: No    Sexual activity: Yes     Partners: Male     Birth control/protection: Surgical     Comment: hyst   Other Topics Concern    Not on file   Social History Narrative    Not on file     Social Determinants of Health     Financial Resource Strain:     Difficulty of Paying Living Expenses:    Food Insecurity:     Worried About Running Out of Food in the Last Year:     [address] in the Last Year:    Transportation Needs:     Lack of Transportation (Medical):      Lack of Transportation (Non-Medical):    Physical treatment   No signs of potential drug abuse or diversion identified    [x] Ill effects of being on chronic pain medications such as sleep disturbances, respiratory depression, hormonal changes, withdrawal symptoms, chronic opioid dependence and tolerance as well as risk of taking opioids with Benzodiazepines and taking opioids along with alcohol,  werediscussed with patient. I had asked the patient to minimize medication use and utilize pain medications only for uncontrolled rest pain or pain with exertional activities. I advised patient not to self-escalate painmedications without consulting with us. At each of patient's future visits we will try to taper pain medications, while adjusting the adjunct medications, and re-evaluating for Physical Therapy to improve spinal andjoint strength. We will continue to have discussions to decrease pain medications as tolerated. Counseled patient on effects their pain medication and /or their medical condition mayhave on their  ability to drive or operate machinery. Instructed not to drive or operate machinery if drowsy     I also discussed with the patient regarding the dangers of combining narcotic pain medication with tranquilizers, alcohol or illegal drugs or taking the medication any way other than prescribed. The dangers were discussed  including respiratory depression and death. Patient was told to tell  all  physicians regarding the medications he is getting from pain clinic. Patient is warned not to take any unprescribed medications over-the-countermedications that can depress breathing . Patient is advised to talk to the pharmacist or physicians if planning to take any over-the-counter medications before  takeing them.  Patient is strongly advised to avoid tranquilizers or  relaxants, illegal drugs  or any medications that can depress breathing  Patient is also advised to tell us if there is any changes in their medications from other physicians.             TREATMENT OPTIONS:       Medication Agreement Requirements Met  Continue Opioid therapy  Script written for  hydrocodone  Follow up appointment made

## 2021-08-03 ENCOUNTER — TELEPHONE (OUTPATIENT)
Dept: PAIN MANAGEMENT | Age: 66
End: 2021-08-03

## 2021-08-04 ENCOUNTER — HOSPITAL ENCOUNTER (OUTPATIENT)
Dept: PAIN MANAGEMENT | Age: 66
Discharge: HOME | End: 2021-08-04
Payer: MEDICARE

## 2021-08-04 DIAGNOSIS — Z51.81 ENCOUNTER FOR MEDICATION MONITORING: ICD-10-CM

## 2021-08-04 DIAGNOSIS — M25.512 CHRONIC LEFT SHOULDER PAIN: Chronic | ICD-10-CM

## 2021-08-04 DIAGNOSIS — M51.36 DDD (DEGENERATIVE DISC DISEASE), LUMBAR: ICD-10-CM

## 2021-08-04 DIAGNOSIS — M47.816 LUMBAR FACET ARTHROPATHY: ICD-10-CM

## 2021-08-04 DIAGNOSIS — M54.2 NECK PAIN: Chronic | ICD-10-CM

## 2021-08-04 DIAGNOSIS — M79.7 FIBROMYALGIA: ICD-10-CM

## 2021-08-04 DIAGNOSIS — M77.8 SHOULDER TENDINITIS, LEFT: Primary | ICD-10-CM

## 2021-08-04 DIAGNOSIS — G89.29 CHRONIC LEFT SHOULDER PAIN: Chronic | ICD-10-CM

## 2021-08-04 PROCEDURE — 99441 PR PHYS/QHP TELEPHONE EVALUATION 5-10 MIN: CPT | Performed by: NURSE PRACTITIONER

## 2021-08-04 PROCEDURE — 99213 OFFICE O/P EST LOW 20 MIN: CPT

## 2021-08-04 RX ORDER — HYDROCODONE BITARTRATE AND ACETAMINOPHEN 5; 325 MG/1; MG/1
1 TABLET ORAL 2 TIMES DAILY PRN
Qty: 60 TABLET | Refills: 0 | Status: SHIPPED | OUTPATIENT
Start: 2021-08-07 | End: 2021-09-03 | Stop reason: SDUPTHER

## 2021-08-04 ASSESSMENT — ENCOUNTER SYMPTOMS
RESPIRATORY NEGATIVE: 1
CONSTIPATION: 1
BACK PAIN: 1

## 2021-08-04 NOTE — PROGRESS NOTES
[facility] 89 PROGRESS NOTE      Patient  completed []  video visit   [x]   phone call:   10      Minutes :       [x]    to  review Medication Agreement    []  Follow up after procedure   []  Discuss treatment options      Location:  Provider:  working from    [x]    home    []   [facility] - STEVEN GUSTAFSON ,   patient at home       Chief Complaint:  Low back pain    She c/o low back pain all across her back, it is dull and achey, Her pain has not changed, She states aquatics helped but land PT aggravated her pain. No history of lumbar surgery, she states injections only gave minimal relief. She states going in the pool at her daughter's house and can do normal activities. Most of the time she sleeps well. Her left knee bothers her, and she had injection months ago, she states knee replacement surgery,      Back Pain  This is a chronic problem. The current episode started more than 1 year ago. The problem occurs constantly. The problem is unchanged. The pain is present in the lumbar spine. The quality of the pain is described as aching (dull). The pain does not radiate. The pain is at a severity of 4/10. The pain is moderate. The pain is worse during the day. Exacerbated by: activity. Risk factors: osteopenia. She has tried analgesics for the symptoms.        Treatment goals:  Functional status: continue with activiites      Aberrancy:   Any alcoholic beverages   no         Any illegal drugs   no      Analgesia:  4                   Adverse  Effects :constipation, takes miralax  ADL;s :going in pool    Data:    When was thelast UDS:    4-7-2021        Was the UDS appropriate:  [x] yes []   no      Record/Diagnostics Review:      As above, I did review the imaging     4/11/2021  9:01 PM - Radha Rodriguez Incoming Lab Results From [facility]    Component Value Ref Range & Units Status Collected Lab   Pain Management Drug Panel Interp, Urine Consistent   Final 04/07/2021  1:15 PM [facility]   (NOTE) ________________________________________________________________   DRUGS EXPECTED:   NORCO (HYDROCODONE)   ________________________________________________________________   CONSISTENT with medications provided:   Lea Sarna (HYDROCODONE): based on norhydrocodone   ________________________________________________________________   Drugs Not Included in this Assay:   Acetaminophen   ________________________________________________________________   INTERPRETIVE INFORMATION: Targeted drug profile Interp   Interpretation depends on accuracy and completeness of patient        EXAMINATION:   MRI OF THE CERVICAL SPINE WITHOUT CONTRAST; MRI OF THE LUMBAR SPINE WITHOUT   CONTRAST 5/7/2019 4:25 pm; 5/7/2019 4:24 pm       TECHNIQUE:   Multiplanar multisequence MRI of the cervical spine was performed without the   administration of intravenous contrast.; Multiplanar multisequence MRI of the   lumbar spine was performed without the administration of intravenous contrast.       COMPARISON:   None.       HISTORY:   ORDERING SYSTEM PROVIDED HISTORY: Neck pain; ORDERING SYSTEM PROVIDED   HISTORY: Lumbar facet arthropathy       FINDINGS:   Cervical spine       BONES/ALIGNMENT: Straightening of the normal lordotic curvature is noted. There is no fracture.       SPINAL CORD: No focal cord signal hyperintensity is noted.       SOFT TISSUES: Small cervical nodes are present.       C2-C3: Minimal disc bulging is noted.  Mild facet hypertrophic changes are   noted       C3-C4: Mild disc bulging is noted.  Small left proximal foraminal protrusion   is noted.  Endplate, uncovertebral and facet hypertrophic changes are noted. Moderate left foraminal narrowing is noted.       C4-C5: Mild-to-moderate diffuse disc bulging is noted. Juris Danielle is a   questionable small proximal foraminal protrusion on the right.  Endplate,   uncovertebral and facet hypertrophic changes are noted.  Severe right and   moderate left foraminal narrowing is noted.  Ventral cord flattening is   noted.  Mild narrowing of the canal is noted       C5-C6: Mild disc bulging is noted diffusely. Maren Dennys is a small proximal   foraminal protrusion on the left.  Endplate, uncovertebral and facet   hypertrophic changes are noted.  Minimal right and moderate left foraminal   narrowing is noted       C6-C7: Mild disc bulging is noted diffusely.  Endplate, uncovertebral and   facet hypertrophic changes are noted.  These are greater on the left. Moderate to severe left and mild-to-moderate right foraminal narrowing is   noted.       C7-T1: Very minimal disc bulging is noted.  Endplate, uncovertebral and facet   hypertrophic changes are noted. Maren Dennys is a suggested small left foraminal   protrusion.  There mild-to-moderate left foraminal narrowing.       MRI lumbar spine:       Bones: Mild lordotic curvature is noted.  There is no acute fracture. Minimal edema in the left L5 pedicle is noted.  Minimal T12 retrolisthesis is   noted.  Minimal L5 retrolisthesis is noted degenerative endplate signal   changes are greatest at T12-L1.       Soft tissues: No retroperitoneal mass lesion is noted.       Cord: Conus terminates at L1-L2 and is normal in appearance.       T12-L1: Uncovering of the disc is noted.  Minimal disc bulging is noted.    There is a small proximal foraminal protrusion on the right.       L1-2: Facet and ligament hypertrophic changes are noted       L2-3: Mild disc bulging is noted diffusely.  Annular tear is noted.  Left   foraminal broad-based protrusion is noted.  Minimal right and moderate left   foraminal narrowing is noted.  Facet and ligament hypertrophic changes are   noted.  Mild narrowing of the canal is noted       L3-4: Minimal disc bulging is noted diffusely.  Foraminal protrusions are   noted, left greater than right.  Mild right and mild-to-moderate left   foraminal narrowing is noted.  Facet and ligament hypertrophic changes are   noted.  Severe narrowing of the hysterectomy     Hypothyroidism     Obesity, Class III, BMI 40-49.9 (morbid obesity) ([facility] [address] 7/22/2014    Osteoarthritis     Sacroiliitis (HCC)     SVT (supraventricular tachycardia) ([facility] [address]     Unspecified sleep apnea        Past Surgical History:   Procedure Laterality Date    ABDOMEN SURGERY      APPENDECTOMY      BREAST SURGERY      lumpectomy w radiation 2009    CARDIAC CATHETERIZATION      with ablation    CARPAL TUNNEL RELEASE Left 10/22/2020    COLONOSCOPY  2009    10 years    COLONOSCOPY  08/2017    FINGER SURGERY  10/2017    trigger finger right hand    HERNIA REPAIR      umbilical    HYSTERECTOMY      total    JOINT REPLACEMENT      Right Knee 2014    KNEE ARTHROSCOPY      bilateral    KNEE SURGERY      x2    LAPAROSCOPIC APPENDECTOMY Right     TONSILLECTOMY      UMBILICAL HERNIA REPAIR N/A     WRIST SURGERY  04/12/2018    plate and screws right wrist       Allergies   Allergen Reactions    Augmentin [Amoxicillin-Pot Clavulanate] Nausea And Vomiting     Projectile vomiting    Sulfa Antibiotics Anaphylaxis    Amoxapine And Related Diarrhea and Nausea And Vomiting     AMOX TR-K- 875-125 mg    Loxapine Succinate Diarrhea and Nausea Only     AMOX TR-K- 875-125 mg         Current Outpatient Medications:     magnesium oxide (MAG-OX) 400 MG tablet, take 1 tablet by mouth twice a day, Disp: , Rfl:     HYDROcodone-acetaminophen (NORCO) 5-325 MG per tablet, Take 1 tablet by mouth 2 times daily as needed for Pain for up to 30 days. , Disp: 60 tablet, Rfl: 0    meloxicam (MOBIC) 15 MG tablet, take 1 tablet by mouth once daily, Disp: 30 tablet, Rfl: 5    calcium carbonate-vitamin D (CALTRATE) 600-400 MG-UNIT TABS per tab, Take by mouth, Disp: , Rfl:     levothyroxine (SYNTHROID) 50 MCG tablet, take 1 tablet by mouth once daily, Disp: 90 tablet, Rfl: 1    venlafaxine (EFFEXOR XR) 150 MG extended release capsule, take 1 capsule by mouth once daily, Disp: 90 capsule, Rfl: 1    loratadine (CLARITIN) 10 MG capsule, Take 10 mg by mouth daily, Disp: , Rfl:     flecainide (TAMBOCOR) 50 MG tablet, Take 50 mg by mouth 2 times daily. , Disp: , Rfl:     famotidine (PEPCID) 20 MG tablet, Take 20 mg by mouth 2 times daily, Disp: , Rfl:     amoxicillin (AMOXIL) 500 MG capsule, take 4 capsules by mouth 1 hour prior to appointment, Disp: , Rfl:     magnesium oxide (MAG-OX) 400 (240 Mg) MG tablet, take 1 tablet by mouth twice a day, Disp: , Rfl:     Tens Unit MISC, by Does not apply route For low back pain, Disp: 1 each, Rfl: 0    Tens Unit MISC, by Does not apply route, Disp: 1 each, Rfl: 0    Polyethylene Glycol 3350 (MIRALAX PO), Take by mouth Taking daily, Disp: , Rfl:     Family History   Problem Relation Age of Onset    Cancer Mother         pancreatic cancer 78    Heart Disease Mother     COPD Father     COPD Brother     Breast Cancer Maternal Grandmother         dx unknown age    [facility] Colon Cancer Maternal Grandfather         66-80s        Social History     Socioeconomic History    Marital status:       Spouse name: Not on file    Number of children: Not on file    Years of education: Not on file    Highest education level: Not on file   Occupational History    Occupation: [employer] disability    Tobacco Use    Smoking status: Never Smoker    Smokeless tobacco: Never Used   Vaping Use    Vaping Use: Never used   Substance and Sexual Activity    Alcohol use: No     Alcohol/week: 0.0 standard drinks     Comment: one to two drinks a month    Drug use: No    Sexual activity: Yes     Partners: Male     Birth control/protection: Surgical     Comment: hyst   Other Topics Concern    Not on file   Social History Narrative    Not on file     Social Determinants of Health     Financial Resource Strain:     Difficulty of Paying Living Expenses:    Food Insecurity:     Worried About Running Out of Food in the Last Year:     [address] in the Last Year:    Transportation Needs:     Lack of Transportation (Medical):  Lack of Transportation (Non-Medical):    Physical Activity:     Days of Exercise per Week:     Minutes of Exercise per Session:    Stress:     Feeling of Stress :    Social Connections:     Frequency of Communication with Friends and Family:     Frequency of Social Gatherings with Friends and Family:     Attends [employer] Services:     Active Member of Clubs or Organizations:     Attends Club or Organization Meetings:     Marital Status:    Intimate Partner Violence:     Fear of Current or Ex-Partner:     Emotionally Abused:     Physically Abused:     Sexually Abused:          Review of Systems:  Review of Systems   Constitutional: Negative. Eyes:        Glasses   Cardiovascular: Negative. Respiratory: Negative. Endocrine: Negative. Hematologic/Lymphatic: Bruises/bleeds easily. Skin: Negative. Musculoskeletal: Positive for back pain and joint pain. Gastrointestinal: Positive for constipation. Genitourinary: Negative. Neurological: Negative. Psychiatric/Behavioral: Negative. Physical Exam:  LMP 06/13/2010 (Within Months)     Physical Exam  Skin:         Neurological:      Mental Status: She is alert and oriented to person, place, and time. Psychiatric:         Mood and Affect: Mood normal.         Thought Content: Thought content normal.           Assessment:    Problem List Items Addressed This Visit     Shoulder tendinitis, left - Primary    Neck pain (Chronic)    Lumbar facet arthropathy    Fibromyalgia    Encounter for medication monitoring    DDD (degenerative disc disease), lumbar    Chronic left shoulder pain (Chronic)            Treatment Plan:  DISCUSSION: Treatment options discussed withpatient and all questions answered to patient's satisfaction.      Possible side effects, risk of tolerance and or dependence and alternative treatments discussed    Obtaining appropriate analgesic effect of treatment   No signs of potential drug abuse or diversion identified    [x] Ill effects of being on chronic pain medications such as sleep disturbances, respiratory depression, hormonal changes, withdrawal symptoms, chronic opioid dependence and tolerance as well as risk of taking opioids with Benzodiazepines and taking opioids along with alcohol,  werediscussed with patient. I had asked the patient to minimize medication use and utilize pain medications only for uncontrolled rest pain or pain with exertional activities. I advised patient not to self-escalate painmedications without consulting with us. At each of patient's future visits we will try to taper pain medications, while adjusting the adjunct medications, and re-evaluating for Physical Therapy to improve spinal andjoint strength. We will continue to have discussions to decrease pain medications as tolerated. Counseled patient on effects their pain medication and /or their medical condition mayhave on their  ability to drive or operate machinery. Instructed not to drive or operate machinery if drowsy     I also discussed with the patient regarding the dangers of combining narcotic pain medication with tranquilizers, alcohol or illegal drugs or taking the medication any way other than prescribed. The dangers were discussed  including respiratory depression and death. Patient was told to tell  all  physicians regarding the medications he is getting from pain clinic. Patient is warned not to take any unprescribed medications over-the-countermedications that can depress breathing . Patient is advised to talk to the pharmacist or physicians if planning to take any over-the-counter medications before  takeing them. Patient is strongly advised to avoid tranquilizers or  relaxants, illegal drugs  or any medications that can depress breathing  Patient is also advised to tell us if there is any changes in their medications from other physicians.             TREATMENT OPTIONS:       Medication Agreement Requirements Met  Continue Opioid therapy  Script written for  hydrocodone  Follow up appointment made

## 2021-09-03 ENCOUNTER — HOSPITAL ENCOUNTER (OUTPATIENT)
Dept: PAIN MANAGEMENT | Age: 66
Discharge: HOME | End: 2021-09-03
Payer: MEDICARE

## 2021-09-03 DIAGNOSIS — M77.8 SHOULDER TENDINITIS, LEFT: Primary | ICD-10-CM

## 2021-09-03 DIAGNOSIS — M47.816 LUMBAR FACET ARTHROPATHY: ICD-10-CM

## 2021-09-03 DIAGNOSIS — M79.7 FIBROMYALGIA: ICD-10-CM

## 2021-09-03 DIAGNOSIS — M54.2 NECK PAIN: Chronic | ICD-10-CM

## 2021-09-03 DIAGNOSIS — M46.1 SACROILIITIS (HCC): ICD-10-CM

## 2021-09-03 DIAGNOSIS — Z51.81 ENCOUNTER FOR MEDICATION MONITORING: ICD-10-CM

## 2021-09-03 DIAGNOSIS — M51.36 DDD (DEGENERATIVE DISC DISEASE), LUMBAR: ICD-10-CM

## 2021-09-03 PROCEDURE — 99213 OFFICE O/P EST LOW 20 MIN: CPT

## 2021-09-03 PROCEDURE — 99441 PR PHYS/QHP TELEPHONE EVALUATION 5-10 MIN: CPT | Performed by: NURSE PRACTITIONER

## 2021-09-03 RX ORDER — HYDROCODONE BITARTRATE AND ACETAMINOPHEN 5; 325 MG/1; MG/1
1 TABLET ORAL 2 TIMES DAILY PRN
Qty: 60 TABLET | Refills: 0 | Status: SHIPPED | OUTPATIENT
Start: 2021-09-05 | End: 2021-10-04 | Stop reason: SDUPTHER

## 2021-09-03 ASSESSMENT — ENCOUNTER SYMPTOMS
RESPIRATORY NEGATIVE: 1
BACK PAIN: 1
CONSTIPATION: 1

## 2021-09-03 NOTE — PROGRESS NOTES
Kendal 89 PROGRESS NOTE      Patient  completed []  video visit   [x]   phone call:   10      Minutes :       [x]    to  review Medication Agreement    []  Follow up after procedure   []  Discuss treatment options      Location:  Provider:  working from    [x]    home    []   [facility] - STEVEN GUSTAFSON ,   patient at home       Chief Complaint:  Back pain    She c/o low back pain radiating down right leg. She states when having a BM she developed sciatic pain. She has no history of  Lumbar surgery. She has fibromyalgia and she aches all over. Her feet hurt. She has been going in the pool. Back Pain  This is a chronic problem. The problem occurs constantly. The problem has been gradually worsening since onset. The pain is present in the lumbar spine. The quality of the pain is described as aching (dull). Radiates to: right leg. The pain is at a severity of 5/10. The pain is moderate. The pain is the same all the time. The symptoms are aggravated by standing (walking, folding clothes). Risk factors: osteopenia. She has tried analgesics, heat and home exercises (rest) for the symptoms.            Treatment goals:  Functional status: be able to walk and take care of self    Aberrancy:   Any alcoholic beverages     no       Any illegal drugs   no      Analgesia:         5            Adverse  Effects :constipation, takes oTC med      ADL;s :home exercises      Data:    When was thelast UDS:   4-7-2021         Was the UDS appropriate:  [x] yes []   no      Record/Diagnostics Review:      As above, I did review the imaging     4/11/2021  9:01 PM - Michael, Darionpn Incoming Lab Results From [facility]    Component Value Ref Range & Units Status Collected Lab   Pain Management Drug Panel Interp, Urine Consistent   Final 04/07/2021  1:15 PM [facility]   (NOTE)   ________________________________________________________________   DRUGS EXPECTED:   NORCO (HYDROCODONE) ________________________________________________________________   CONSISTENT with medications provided:   Andreina Mcneil (HYDROCODONE): based on norhydrocodone   ________________________________________________________________   Drugs Not Included in this Assay:   Acetaminophen   ________________________________________________________________   INTERPRETIVE INFORMATION: Targeted drug profile Interp   Interpretation depends on accuracy and completeness of patient   medication information submitted by client. EXAMINATION:   MRI OF THE CERVICAL SPINE WITHOUT CONTRAST; MRI OF THE LUMBAR SPINE WITHOUT   CONTRAST 5/7/2019 4:25 pm; 5/7/2019 4:24 pm       TECHNIQUE:   Multiplanar multisequence MRI of the cervical spine was performed without the   administration of intravenous contrast.; Multiplanar multisequence MRI of the   lumbar spine was performed without the administration of intravenous contrast.       COMPARISON:   None.       HISTORY:   ORDERING SYSTEM PROVIDED HISTORY: Neck pain; ORDERING SYSTEM PROVIDED   HISTORY: Lumbar facet arthropathy       FINDINGS:   Cervical spine       BONES/ALIGNMENT: Straightening of the normal lordotic curvature is noted. There is no fracture.       SPINAL CORD: No focal cord signal hyperintensity is noted.       SOFT TISSUES: Small cervical nodes are present.       C2-C3: Minimal disc bulging is noted.  Mild facet hypertrophic changes are   noted       C3-C4: Mild disc bulging is noted.  Small left proximal foraminal protrusion   is noted.  Endplate, uncovertebral and facet hypertrophic changes are noted.    Moderate left foraminal narrowing is noted.       C4-C5: Mild-to-moderate diffuse disc bulging is noted. Noreene Shouts is a   questionable small proximal foraminal protrusion on the right.  Endplate,   uncovertebral and facet hypertrophic changes are noted.  Severe right and   moderate left foraminal narrowing is noted.  Ventral cord flattening is   noted.  Mild narrowing of the canal is noted       C5-C6: Mild disc bulging is noted diffusely. [place]iel [place] is a small proximal   foraminal protrusion on the left.  Endplate, uncovertebral and facet   hypertrophic changes are noted.  Minimal right and moderate left foraminal   narrowing is noted       C6-C7: Mild disc bulging is noted diffusely.  Endplate, uncovertebral and   facet hypertrophic changes are noted.  These are greater on the left. Moderate to severe left and mild-to-moderate right foraminal narrowing is   noted.       C7-T1: Very minimal disc bulging is noted.  Endplate, uncovertebral and facet   hypertrophic changes are noted. Gadiel [place] is a suggested small left foraminal   protrusion.  There mild-to-moderate left foraminal narrowing.       MRI lumbar spine:       Bones: Mild lordotic curvature is noted.  There is no acute fracture. Minimal edema in the left L5 pedicle is noted.  Minimal T12 retrolisthesis is   noted.  Minimal L5 retrolisthesis is noted degenerative endplate signal   changes are greatest at T12-L1.       Soft tissues: No retroperitoneal mass lesion is noted.       Cord: Conus terminates at L1-L2 and is normal in appearance.       T12-L1: Uncovering of the disc is noted.  Minimal disc bulging is noted. There is a small proximal foraminal protrusion on the right.       L1-2: Facet and ligament hypertrophic changes are noted       L2-3: Mild disc bulging is noted diffusely.  Annular tear is noted.  Left   foraminal broad-based protrusion is noted.  Minimal right and moderate left   foraminal narrowing is noted.  Facet and ligament hypertrophic changes are   noted.  Mild narrowing of the canal is noted       L3-4: Minimal disc bulging is noted diffusely.  Foraminal protrusions are   noted, left greater than right.  Mild right and mild-to-moderate left   foraminal narrowing is noted.  Facet and ligament hypertrophic changes are   noted.  Severe narrowing of the canal is noted       Mild disc bulging is noted with annular tear. Dewitte Peto is a small central and   right paracentral protrusion with minimal upward migration of extruded disc   material.  Mild to moderate proximal foraminal narrowing on the right is   noted.  Facet and ligament hypertrophic changes are noted.  Severe narrowing   of the canal is noted       L5-S1: Mild disc bulging is noted diffusely with annular tear.  Endplate   hypertrophic changes are noted.  Proximal foraminal narrowing is noted   bilaterally.  There is a questionable small proximal foraminal protrusion on   the left.  Bilateral facet hypertrophic changes are noted.           Impression   Cervical spine:       Multilevel degenerative disc disease in the cervical spine as described.  See   above for details of each level.       Lumbar spine:       Multilevel degenerative disc disease in the lower thoracic spine and the   lumbar spine as described.  See above for details of each level                     Pill count: appropriate    fill date :9-6-2021    Morphine equivalent dose as reported on OARRS:10  Periodic Controlled Substance Monitoring: Possible medication side effects, risk of tolerance/dependence & alternative treatments discussed., No signs of potential drug abuse or diversion identified. , Assessed functional status., Obtaining appropriate analgesic effect of treatment. Boo Ramsay, APRN - CNP)  Review ofOARRS does not show any aberrant prescription behavior. Medication is helping the patient stay active. Patient denies any side effects and reports adequate analgesia. No sign of misuse/abuse.             Past Medical History:   Diagnosis Date    Anxiety     Breast cancer ([address]     Breast cancer ([address]     Cancer ([address]     breast, in remission    Chronic back pain 12/3/2013    Depression     DJD (degenerative joint disease)     Family history of breast cancer     MGM    Fibromyalgia     Fibromyalgia     History of hysterectomy     Hypothyroidism     Obesity, Class III, BMI 40-49.9 (morbid obesity) [facility] 7/22/2014    Osteoarthritis     Sacroiliitis (HCC)     SVT (supraventricular tachycardia) ([facility]     Unspecified sleep apnea        Past Surgical History:   Procedure Laterality Date    ABDOMEN SURGERY      APPENDECTOMY      BREAST SURGERY      lumpectomy w radiation 2009    CARDIAC CATHETERIZATION      with ablation    CARPAL TUNNEL RELEASE Left 10/22/2020    COLONOSCOPY  2009    10 years    COLONOSCOPY  08/2017    FINGER SURGERY  10/2017    trigger finger right hand    HERNIA REPAIR      umbilical    HYSTERECTOMY      total    JOINT REPLACEMENT      Right Knee 2014    KNEE ARTHROSCOPY      bilateral    KNEE SURGERY      x2    LAPAROSCOPIC APPENDECTOMY Right     TONSILLECTOMY      UMBILICAL HERNIA REPAIR N/A     WRIST SURGERY  04/12/2018    plate and screws right wrist       Allergies   Allergen Reactions    Augmentin [Amoxicillin-Pot Clavulanate] Nausea And Vomiting     Projectile vomiting    Sulfa Antibiotics Anaphylaxis    Amoxapine And Related Diarrhea and Nausea And Vomiting     AMOX TR-K- 875-125 mg    Loxapine Succinate Diarrhea and Nausea Only     AMOX TR-K- 875-125 mg         Current Outpatient Medications:     HYDROcodone-acetaminophen (NORCO) 5-325 MG per tablet, Take 1 tablet by mouth 2 times daily as needed for Pain for up to 30 days. , Disp: 60 tablet, Rfl: 0    magnesium oxide (MAG-OX) 400 MG tablet, take 1 tablet by mouth twice a day, Disp: , Rfl:     meloxicam (MOBIC) 15 MG tablet, take 1 tablet by mouth once daily, Disp: 30 tablet, Rfl: 5    calcium carbonate-vitamin D (CALTRATE) 600-400 MG-UNIT TABS per tab, Take by mouth, Disp: , Rfl:     levothyroxine (SYNTHROID) 50 MCG tablet, take 1 tablet by mouth once daily, Disp: 90 tablet, Rfl: 1    venlafaxine (EFFEXOR XR) 150 MG extended release capsule, take 1 capsule by mouth once daily, Disp: 90 capsule, Rfl: 1    loratadine (CLARITIN) 10 MG capsule, Take 10 mg by mouth daily, Disp: , Rfl:     flecainide (TAMBOCOR) 50 MG tablet, Take 50 mg by mouth 2 times daily. , Disp: , Rfl:     famotidine (PEPCID) 20 MG tablet, Take 20 mg by mouth 2 times daily, Disp: , Rfl:     amoxicillin (AMOXIL) 500 MG capsule, take 4 capsules by mouth 1 hour prior to appointment, Disp: , Rfl:     magnesium oxide (MAG-OX) 400 (240 Mg) MG tablet, take 1 tablet by mouth twice a day, Disp: , Rfl:     Tens Unit MISC, by Does not apply route For low back pain, Disp: 1 each, Rfl: 0    Tens Unit MISC, by Does not apply route, Disp: 1 each, Rfl: 0    Polyethylene Glycol 3350 (MIRALAX PO), Take by mouth Taking daily, Disp: , Rfl:     Family History   Problem Relation Age of Onset    Cancer Mother         pancreatic cancer 78    Heart Disease Mother     COPD Father     COPD Brother     Breast Cancer Maternal Grandmother         dx unknown age    [facility] Colon Cancer Maternal Grandfather         66-80s        Social History     Socioeconomic History    Marital status:      Spouse name: Not on file    Number of children: Not on file    Years of education: Not on file    Highest education level: Not on file   Occupational History    Occupation: [employer] disability    Tobacco Use    Smoking status: Never Smoker    Smokeless tobacco: Never Used   Vaping Use    Vaping Use: Never used   Substance and Sexual Activity    Alcohol use: No     Alcohol/week: 0.0 standard drinks     Comment: one to two drinks a month    Drug use: No    Sexual activity: Yes     Partners: Male     Birth control/protection: Surgical     Comment: hyst   Other Topics Concern    Not on file   Social History Narrative    Not on file     Social Determinants of Health     Financial Resource Strain:     Difficulty of Paying Living Expenses:    Food Insecurity:     Worried About Running Out of Food in the Last Year:     [address] in the Last Year:    Transportation Needs:     Lack of Transportation (Medical):      Lack of Transportation (Non-Medical):    Physical Activity:     Days of Exercise per Week:     Minutes of Exercise per Session:    Stress:     Feeling of Stress :    Social Connections:     Frequency of Communication with Friends and Family:     Frequency of Social Gatherings with Friends and Family:     Attends Baptism Services:     Active Member of Clubs or Organizations:     Attends Club or Organization Meetings:     Marital Status:    Intimate Partner Violence:     Fear of Current or Ex-Partner:     Emotionally Abused:     Physically Abused:     Sexually Abused:          Review of Systems:  Review of Systems   Constitutional: Negative. HENT: Negative. Eyes:        Glasses   Cardiovascular: Negative. Respiratory: Negative. Endocrine: Negative. Hematologic/Lymphatic: Bruises/bleeds easily. Skin: Negative. Musculoskeletal: Positive for back pain, joint pain and myalgias. Gastrointestinal: Positive for constipation. Genitourinary: Negative. Neurological: Negative. Physical Exam:  LMP 06/13/2010 (Within Months)     Physical Exam  Skin:         Neurological:      Mental Status: She is alert and oriented to person, place, and time. Psychiatric:         Mood and Affect: Mood normal.         Thought Content: Thought content normal.           Assessment:    Problem List Items Addressed This Visit     Shoulder tendinitis, left - Primary    Sacroiliitis (HCC)    Neck pain (Chronic)    Lumbar facet arthropathy    Fibromyalgia    Encounter for medication monitoring    DDD (degenerative disc disease), lumbar              Treatment Plan:  DISCUSSION: Treatment options discussed withpatient and all questions answered to patient's satisfaction.      Possible side effects, risk of tolerance and or dependence and alternative treatments discussed    Obtaining appropriate analgesic effect of treatment   No signs of potential drug abuse or diversion identified    [x] Ill effects of being on chronic pain medications such as sleep disturbances, respiratory depression, hormonal changes, withdrawal symptoms, chronic opioid dependence and tolerance as well as risk of taking opioids with Benzodiazepines and taking opioids along with alcohol,  werediscussed with patient. I had asked the patient to minimize medication use and utilize pain medications only for uncontrolled rest pain or pain with exertional activities. I advised patient not to self-escalate painmedications without consulting with us. At each of patient's future visits we will try to taper pain medications, while adjusting the adjunct medications, and re-evaluating for Physical Therapy to improve spinal andjoint strength. We will continue to have discussions to decrease pain medications as tolerated. Counseled patient on effects their pain medication and /or their medical condition mayhave on their  ability to drive or operate machinery. Instructed not to drive or operate machinery if drowsy     I also discussed with the patient regarding the dangers of combining narcotic pain medication with tranquilizers, alcohol or illegal drugs or taking the medication any way other than prescribed. The dangers were discussed  including respiratory depression and death. Patient was told to tell  all  physicians regarding the medications he is getting from pain clinic. Patient is warned not to take any unprescribed medications over-the-countermedications that can depress breathing . Patient is advised to talk to the pharmacist or physicians if planning to take any over-the-counter medications before  takeing them. Patient is strongly advised to avoid tranquilizers or  relaxants, illegal drugs  or any medications that can depress breathing  Patient is also advised to tell us if there is any changes in their medications from other physicians.             TREATMENT OPTIONS:       Medication Agreement Requirements Met  Continue Opioid therapy  Script written for  hydrocodone  Follow up appointment made

## 2021-10-04 ENCOUNTER — HOSPITAL ENCOUNTER (OUTPATIENT)
Dept: PAIN MANAGEMENT | Age: 66
Discharge: HOME | End: 2021-10-04
Payer: MEDICARE

## 2021-10-04 DIAGNOSIS — Z51.81 ENCOUNTER FOR MEDICATION MONITORING: ICD-10-CM

## 2021-10-04 DIAGNOSIS — M51.36 DDD (DEGENERATIVE DISC DISEASE), LUMBAR: ICD-10-CM

## 2021-10-04 DIAGNOSIS — M47.816 LUMBAR FACET ARTHROPATHY: ICD-10-CM

## 2021-10-04 DIAGNOSIS — M46.1 SACROILIITIS (HCC): Primary | ICD-10-CM

## 2021-10-04 DIAGNOSIS — M79.7 FIBROMYALGIA: ICD-10-CM

## 2021-10-04 PROCEDURE — 99213 OFFICE O/P EST LOW 20 MIN: CPT

## 2021-10-04 PROCEDURE — 99212 OFFICE O/P EST SF 10 MIN: CPT | Performed by: NURSE PRACTITIONER

## 2021-10-04 RX ORDER — HYDROCODONE BITARTRATE AND ACETAMINOPHEN 5; 325 MG/1; MG/1
1 TABLET ORAL 2 TIMES DAILY PRN
Qty: 60 TABLET | Refills: 0 | Status: SHIPPED | OUTPATIENT
Start: 2021-10-05 | End: 2021-11-02 | Stop reason: SDUPTHER

## 2021-10-04 ASSESSMENT — ENCOUNTER SYMPTOMS
CONSTIPATION: 0
BACK PAIN: 1
COUGH: 0
SHORTNESS OF BREATH: 0

## 2021-10-04 NOTE — PROGRESS NOTES
Patient completed a telephone visit today to review medication contract. Chief Complaint: back pain    PMH  Pt reports chronic history of back pain, states she was diagnosed with fibromyalgia. Pain has been worse with colder [place]. She does see a chiropractor with adjustements heat and TENS tx which helps to control the pain.  Pt has tried lyrica but stopped due to weight gain and gabapentin was causing drowsiness. Pt also experienced side effects with Topamax. She has limited ROM left shoulder.  XR done 4/18/19 shows no acute abnormality. Cervical MRI shows Multilevel degenerative disc disease in the cervical spine.       She is having worsening pain in the left knee. She was told she would would need it replaced in the future. She has had steroid injections with some benefit. Back Pain  This is a chronic problem. The current episode started more than 1 year ago. The problem occurs constantly. The problem is unchanged. The pain is present in the lumbar spine. The quality of the pain is described as aching. The pain does not radiate. The pain is at a severity of 4/10. The pain is mild. The symptoms are aggravated by position, standing and lying down. Pertinent negatives include no chest pain, fever, numbness, paresthesias or tingling. She has tried analgesics and bed rest for the symptoms. The treatment provided mild relief. Patient denies any new neurological symptoms. No bowel or bladder incontinence, no weakness, and no falling. Pill count: appropriate due 10/5    Morphine equivalent: 10    Periodic Controlled Substance Monitoring: Possible medication side effects, risk of tolerance/dependence & alternative treatments discussed., No signs of potential drug abuse or diversion identified., Obtaining appropriate analgesic effect of treatment.  Josiah Robertson, KAYLEE - CNP)      Past Medical History:   Diagnosis Date    Anxiety     [facility]     [facility]     Cancer HYDROcodone-acetaminophen (NORCO) 5-325 MG per tablet, Take 1 tablet by mouth 2 times daily as needed for Pain for up to 30 days. , Disp: 60 tablet, Rfl: 0    magnesium oxide (MAG-OX) 400 MG tablet, take 1 tablet by mouth twice a day, Disp: , Rfl:     famotidine (PEPCID) 20 MG tablet, Take 20 mg by mouth 2 times daily, Disp: , Rfl:     amoxicillin (AMOXIL) 500 MG capsule, take 4 capsules by mouth 1 hour prior to appointment, Disp: , Rfl:     calcium carbonate-vitamin D (CALTRATE) 600-400 MG-UNIT TABS per tab, Take by mouth, Disp: , Rfl:     magnesium oxide (MAG-OX) 400 (240 Mg) MG tablet, take 1 tablet by mouth twice a day, Disp: , Rfl:     Tens Unit MISC, by Does not apply route For low back pain, Disp: 1 each, Rfl: 0    Tens Unit MISC, by Does not apply route, Disp: 1 each, Rfl: 0    loratadine (CLARITIN) 10 MG capsule, Take 10 mg by mouth daily, Disp: , Rfl:     Polyethylene Glycol 3350 (MIRALAX PO), Take by mouth Taking daily, Disp: , Rfl:     flecainide (TAMBOCOR) 50 MG tablet, Take 50 mg by mouth 2 times daily. , Disp: , Rfl:     Family History   Problem Relation Age of Onset    Cancer Mother         pancreatic cancer 78    Heart Disease Mother     COPD Father     COPD Brother     Breast Cancer Maternal Grandmother         dx unknown age    [facility] Colon Cancer Maternal Grandfather         66-80s        Social History     Socioeconomic History    Marital status:       Spouse name: Not on file    Number of children: Not on file    Years of education: Not on file    Highest education level: Not on file   Occupational History    Occupation: [employer] disability    Tobacco Use    Smoking status: Never Smoker    Smokeless tobacco: Never Used   Vaping Use    Vaping Use: Never used   Substance and Sexual Activity    Alcohol use: No     Alcohol/week: 0.0 standard drinks     Comment: one to two drinks a month    Drug use: No    Sexual activity: Yes     Partners: Male     Birth control/protection: Surgical     Comment: hyst   Other Topics Concern    Not on file   Social History Narrative    Not on file     Social Determinants of Health     Financial Resource Strain:     Difficulty of Paying Living Expenses:    Food Insecurity:     Worried About Running Out of Food in the Last Year:     [address] in the Last Year:    Transportation Needs:     Lack of Transportation (Medical):  Lack of Transportation (Non-Medical):    Physical Activity:     Days of Exercise per Week:     Minutes of Exercise per Session:    Stress:     Feeling of Stress :    Social Connections:     Frequency of Communication with Friends and Family:     Frequency of Social Gatherings with Friends and Family:     Attends Druze Services:     Active Member of Clubs or Organizations:     Attends Club or Organization Meetings:     Marital Status:    Intimate Partner Violence:     Fear of Current or Ex-Partner:     Emotionally Abused:     Physically Abused:     Sexually Abused:        Review of Systems:  Review of Systems   Constitutional: Negative for chills and fever. Cardiovascular: Negative for chest pain and palpitations. Respiratory: Negative for cough and shortness of breath. Musculoskeletal: Positive for back pain. Gastrointestinal: Negative for constipation. Neurological: Negative for disturbances in coordination, loss of balance, numbness, paresthesias and tingling. Physical Exam:  LMP 06/13/2010 (Within Months)     Physical Exam  Neurological:      Mental Status: She is alert.    Psychiatric:         Mood and Affect: Mood normal.         Record/Diagnostics Review:    Last annabella 4/2021 and was appropriate     ABERRANT BEHAVIORS SINCE LAST VISIT  Lost rx/pills:------------------------------------------ no  Taking  medication as prescribed: ----------- yes  Urine Drug Screen 12 months. The visit was conducted pursuant to the emergency declaration under the [address] [place] [address], [address] [place] [address] and the [employer] Act. Patient identification was verified, and a caregiver was present when appropriate. The patient was located in a state where the provider was credentialed to provide care. Total time spent for this encounter: 15 minutes    --KAYLEE Main CNP on 10/4/2021 at 2:23 PM    An electronic signature was used to authenticate this note.

## 2021-11-02 ENCOUNTER — HOSPITAL ENCOUNTER (OUTPATIENT)
Dept: PAIN MANAGEMENT | Age: 66
Discharge: HOME | End: 2021-11-02
Payer: MEDICARE

## 2021-11-02 DIAGNOSIS — M46.1 SACROILIITIS (HCC): Primary | ICD-10-CM

## 2021-11-02 DIAGNOSIS — M51.36 DDD (DEGENERATIVE DISC DISEASE), LUMBAR: ICD-10-CM

## 2021-11-02 DIAGNOSIS — M47.816 LUMBAR FACET ARTHROPATHY: ICD-10-CM

## 2021-11-02 DIAGNOSIS — Z51.81 ENCOUNTER FOR MEDICATION MONITORING: ICD-10-CM

## 2021-11-02 DIAGNOSIS — M79.7 FIBROMYALGIA: ICD-10-CM

## 2021-11-02 DIAGNOSIS — M79.18 MUSCLE PAIN, LUMBAR: ICD-10-CM

## 2021-11-02 PROCEDURE — 99213 OFFICE O/P EST LOW 20 MIN: CPT

## 2021-11-02 PROCEDURE — 99214 OFFICE O/P EST MOD 30 MIN: CPT | Performed by: PAIN MEDICINE

## 2021-11-02 RX ORDER — HYDROCODONE BITARTRATE AND ACETAMINOPHEN 5; 325 MG/1; MG/1
1 TABLET ORAL 2 TIMES DAILY PRN
Qty: 60 TABLET | Refills: 0 | Status: SHIPPED | OUTPATIENT
Start: 2021-11-04 | End: 2021-11-30 | Stop reason: SDUPTHER

## 2021-11-02 RX ORDER — LIDOCAINE 50 MG/G
1 PATCH TOPICAL DAILY
Qty: 30 PATCH | Refills: 3 | Status: SHIPPED | OUTPATIENT
Start: 2021-11-02 | End: 2021-12-02

## 2021-11-02 ASSESSMENT — ENCOUNTER SYMPTOMS
EYES NEGATIVE: 1
VOMITING: 0
RESPIRATORY NEGATIVE: 1
BOWEL INCONTINENCE: 0
EYE PAIN: 0
SORE THROAT: 0
BACK PAIN: 1
CONSTIPATION: 1
SHORTNESS OF BREATH: 0
COUGH: 0
NAUSEA: 0
ABDOMINAL PAIN: 0
ALLERGIC/IMMUNOLOGIC NEGATIVE: 1
PHOTOPHOBIA: 0
VOICE CHANGE: 0

## 2021-11-02 NOTE — PROGRESS NOTES
sitting/standing/walking  Mood changes,none  Patient currently unemployed. Physical therapy did not help the pain. Are you under psychological counseling at present: Yes  Goals for treatment include:  Decrease in pain  Enjoy daily and recreational activities, return to previous status. Patient relates current medications are helping the pain. Patient reports taking pain medications as prescribed, denies obtaining medications from different sources and denies use of illegal drugs. Patient denies side effects from medications like nausea, vomiting, constipation or drowsiness. Patient reports current activities of daily living ar possible due to medications and would like to continue them. ACTIVITY/SOCIAL/EMOTIONAL:  Sleep Pattern: 7 hours per night.  generally restful sleep  Home Exercises:  no regular exercise  Activity:unchanged  Emotional Issues: normal.   Currently seeing a Psychiatrist or Psychologist:  No     ADVERSE MEDICATION EFFECTS:   Nausea and vomiting: no   Constipation: no-Undercontrol-: yes  Dizziness/drowsy/sleepy--yes  Urinary Retention: yes    ABERRANT BEHAVIORS SINCE LAST VISIT  Lost rx/pills:------------------------------------------ no  Taking  medication as prescribed: ----------- yes  Urine Drug Screen ---------------------------------  yes             Date------------------------------------------------- 4/11/2021              Results as expected ---------------------yes    Recent ER visits: -------------------------------------No  Pill count is appropriate: ---------------------------yes   Refills for prescriptions appropriate:---------- yes      Past Medical History:   Diagnosis Date    Anxiety     [facility] ([facility]     Breast [facility] ([facility]     Cancer ([address]     breast, in remission    Chronic back pain 12/3/2013    Depression     DJD (degenerative joint disease)     Family history of breast cancer     MGM    Fibromyalgia     Fibromyalgia     History of hysterectomy      Difficulty of Paying Living Expenses: Not hard at all   Food Insecurity: No Food Insecurity    Worried About Running Out of Food in the Last Year: Never true    Ran Out of Food in the Last Year: Never true   Transportation Needs:     Lack of Transportation (Medical):  Lack of Transportation (Non-Medical):    Physical Activity:     Days of Exercise per Week:     Minutes of Exercise per Session:    Stress:     Feeling of Stress :    Social Connections:     Frequency of Communication with Friends and Family:     Frequency of Social Gatherings with Friends and Family:     Attends Spiritism Services:     Active Member of Clubs or Organizations:     Attends Club or Organization Meetings:     Marital Status:    Intimate Partner Violence:     Fear of Current or Ex-Partner:     Emotionally Abused:     Physically Abused:     Sexually Abused:         Allergies   Allergen Reactions    Augmentin [Amoxicillin-Pot Clavulanate] Nausea And Vomiting     Projectile vomiting    Sulfa Antibiotics Anaphylaxis    Amoxapine And Related Diarrhea and Nausea And Vomiting     AMOX TR-K- 875-125 mg    Loxapine Succinate Diarrhea and Nausea Only     AMOX TR-K- 875-125 mg       Current Outpatient Medications on File Prior to Encounter   Medication Sig Dispense Refill    meloxicam (MOBIC) 15 MG tablet take 1 tablet by mouth once daily 90 tablet 1    levothyroxine (SYNTHROID) 50 MCG tablet take 1 tablet by mouth once daily 90 tablet 1    venlafaxine (EFFEXOR XR) 150 MG extended release capsule take 1 capsule by mouth once daily 90 capsule 1    magnesium oxide (MAG-OX) 400 MG tablet take 1 tablet by mouth twice a day      famotidine (PEPCID) 20 MG tablet Take 20 mg by mouth 2 times daily      amoxicillin (AMOXIL) 500 MG capsule take 4 capsules by mouth 1 hour prior to appointment      calcium carbonate-vitamin D (CALTRATE) 600-400 MG-UNIT TABS per tab Take by mouth      magnesium oxide (MAG-OX) 400 (240 Mg) MG tablet take 1 tablet by mouth twice a day      Tens Unit MISC by Does not apply route For low back pain 1 each 0    Tens Unit MISC by Does not apply route 1 each 0    loratadine (CLARITIN) 10 MG capsule Take 10 mg by mouth daily      Polyethylene Glycol 3350 (MIRALAX PO) Take by mouth Taking daily      flecainide (TAMBOCOR) 50 MG tablet Take 50 mg by mouth 2 times daily. No current facility-administered medications on file prior to encounter. Review of Systems   Constitutional: Negative. Negative for activity change, chills, fatigue, fever and unexpected weight change. HENT: Negative. Negative for congestion, hearing loss, sore throat and voice change. Eyes: Negative. Negative for photophobia, pain and visual disturbance. Respiratory: Negative. Negative for cough and shortness of breath. Cardiovascular: Negative. Negative for chest pain and palpitations. Gastrointestinal: Positive for constipation. Negative for abdominal pain, bowel incontinence, nausea and vomiting. Endocrine: Negative. Negative for cold intolerance and polyuria. Genitourinary: Negative. Negative for bladder incontinence, dysuria and hematuria. Musculoskeletal: Positive for arthralgias and back pain. Skin: Negative. Negative for rash. Allergic/Immunologic: Negative. Negative for immunocompromised state. Neurological: Negative. Negative for tingling, syncope and weakness. Hematological: Negative. Psychiatric/Behavioral: Positive for sleep disturbance. Negative for behavioral problems, self-injury and suicidal ideas. The patient is not nervous/anxious. Patient denies any change in the review of systems since her last visit  Physical Exam  Constitutional:       Appearance: Normal appearance. Skin:         Neurological:      Mental Status: She is alert and oriented to person, place, and time.    Psychiatric:         Mood and Affect: Mood normal.        Ortho Exam DATA:  LAB.:  4/11/2021  9:01 PM - Radha Rodriguez Incoming Lab Results From [facility]    Component Value Ref Range & Units Status Collected Lab   Pain Management Drug Panel Interp, Urine Consistent   Final 04/07/2021  1:15 PM [facility]   (NOTE)   ________________________________________________________________   DRUGS EXPECTED:   [address] (HYDROCODONE)   ________________________________________________________________   CONSISTENT with medications provided:   [address] (HYDROCODONE): based on norhydrocodone   ________________________________________________________________   Drugs Not Included in this Assay:   Acetaminophen   ________________________________________________________________       X-Ray reports:  EXAMINATION:    MRI OF THE CERVICAL SPINE WITHOUT CONTRAST; MRI OF THE LUMBAR SPINE WITHOUT    CONTRAST 5/7/2019 4:25 pm; 5/7/2019 4:24 pm        TECHNIQUE:    Multiplanar multisequence MRI of the cervical spine was performed without the    administration of intravenous contrast.; Multiplanar multisequence MRI of the    lumbar spine was performed without the administration of intravenous contrast.        COMPARISON:    None. HISTORY:    ORDERING SYSTEM PROVIDED HISTORY: Neck pain; ORDERING SYSTEM PROVIDED    HISTORY: Lumbar facet arthropathy        FINDINGS:    Cervical spine        BONES/ALIGNMENT: Straightening of the normal lordotic curvature is noted. There is no fracture. SPINAL CORD: No focal cord signal hyperintensity is noted. SOFT TISSUES: Small cervical nodes are present. C2-C3: Minimal disc bulging is noted. Mild facet hypertrophic changes are    noted        C3-C4: Mild disc bulging is noted. Small left proximal foraminal protrusion    is noted. Endplate, uncovertebral and facet hypertrophic changes are noted. Moderate left foraminal narrowing is noted. C4-C5: Mild-to-moderate diffuse disc bulging is noted.   There is a    questionable small proximal foraminal protrusion on the right. Endplate,    uncovertebral and facet hypertrophic changes are noted. Severe right and    moderate left foraminal narrowing is noted. Ventral cord flattening is    noted. Mild narrowing of the canal is noted        C5-C6: Mild disc bulging is noted diffusely. There is a small proximal    foraminal protrusion on the left. Endplate, uncovertebral and facet    hypertrophic changes are noted. Minimal right and moderate left foraminal    narrowing is noted        C6-C7: Mild disc bulging is noted diffusely. Endplate, uncovertebral and    facet hypertrophic changes are noted. These are greater on the left. Moderate to severe left and mild-to-moderate right foraminal narrowing is    noted. C7-T1: Very minimal disc bulging is noted. Endplate, uncovertebral and facet    hypertrophic changes are noted. There is a suggested small left foraminal    protrusion. There mild-to-moderate left foraminal narrowing. MRI lumbar spine:        Bones: Mild lordotic curvature is noted. There is no acute fracture. Minimal edema in the left L5 pedicle is noted. Minimal T12 retrolisthesis is    noted. Minimal L5 retrolisthesis is noted degenerative endplate signal    changes are greatest at T12-L1. Soft tissues: No retroperitoneal mass lesion is noted. Cord: Conus terminates at L1-L2 and is normal in appearance. T12-L1: Uncovering of the disc is noted. Minimal disc bulging is noted. There is a small proximal foraminal protrusion on the right. L1-2: Facet and ligament hypertrophic changes are noted        L2-3: Mild disc bulging is noted diffusely. Annular tear is noted. Left    foraminal broad-based protrusion is noted. Minimal right and moderate left    foraminal narrowing is noted. Facet and ligament hypertrophic changes are    noted. Mild narrowing of the canal is noted        L3-4: Minimal disc bulging is noted diffusely.   Foraminal protrusions are    noted, left greater than right. Mild right and mild-to-moderate left    foraminal narrowing is noted. Facet and ligament hypertrophic changes are    noted. Severe narrowing of the canal is noted        Mild disc bulging is noted with annular tear. There is a small central and    right paracentral protrusion with minimal upward migration of extruded disc    material.  Mild to moderate proximal foraminal narrowing on the right is    noted. Facet and ligament hypertrophic changes are noted. Severe narrowing    of the canal is noted        L5-S1: Mild disc bulging is noted diffusely with annular tear. Endplate    hypertrophic changes are noted. Proximal foraminal narrowing is noted    bilaterally. There is a questionable small proximal foraminal protrusion on    the left. Bilateral facet hypertrophic changes are noted. Impression    Cervical spine:        Multilevel degenerative disc disease in the cervical spine as described. See    above for details of each level. Lumbar spine:        Multilevel degenerative disc disease in the lower thoracic spine and the    lumbar spine as described. See above for details of each level        Clinical  impression:  1. Sacroiliitis ([address]    2. Lumbar facet arthropathy    3. Fibromyalgia    4. DDD (degenerative disc disease), lumbar    5. Encounter for medication monitoring    6. Muscle pain, lumbar        Plan of care: We will continue current pain medications  Current medications are being tolerated without any Adverse side effects. Orders Placed This Encounter   Medications    HYDROcodone-acetaminophen (NORCO) 5-325 MG per tablet     Sig: Take 1 tablet by mouth 2 times daily as needed for Pain for up to 30 days. Dispense:  60 tablet     Refill:  0     Reduce doses taken as pain becomes manageable    lidocaine (LIDODERM) 5 %     Sig: Place 1 patch onto the skin daily 12 hours on, 12 hours off.      Dispense:  30 patch     Refill:  3 Urine drug screens have been appropriate. No aberrant activity noted. Analgesia is achieved. Activities of daily living are possible because of medications. Safe use of medications explained to patient. PDMP Monitoring:    Last PDMP [facility]ur[facility] Lor[address]ft[facility] as Reviewed [facility]):  Review User Review Instant Review Result   Chadd Gloria 11/2/2021  4:13 AM Reviewed PDMP [1]     Counselling/Preventive measures for pain  Control:    [x]  Spine strengthening exercises are discussed with patient in detail. [x] Ill effects of being on chronic pain medications such as sleep disturbances, hormonal changes, withdrawal symptoms,  chronic opioid dependence and tolerance were discussed with patient. I had asked the patient to minimize medication use and utilize pain medications only for uncontrolled rest pain or pain with exertional activities. I advised patient not to self escalate pain medications without consulting with us. At each of patient's future visits we will try to taper pain medications, while adjusting the adjunct medications, and re-evaluating for Physical Therapy to improve spinal and joint strength. We will continue to have discussions to decrease pain medications as tolerated. I also discussed with the patient regarding the dangers of combining narcotic pain medication with tranquilizers, alcohol or illegal drugs or taking the medication any other than prescribed. The dangers including the respiratory depression and death. Patient was told to tell  to all  physicians regarding the medications he is getting from pain clinic. Patient is warned not to take any unprescribed medications over-the-counter medications that can depress breathing . Patient is advised to talk to the pharmacist or physicians if planning to take any over-the-counter medications before  takeing them. Patient is strongly advised to avoid tranquilizers or  Relaxants for any medications that can depress breathing or recreational drugs.  Patient is also advised to tell us if there is any changes in his medications from other physicians. We discussed the same at today's visit and have not been to implement it, as the patient's pain is not under control with current medications. Decision Making Process : Patient's health history and referral records thoroughly reviewed before focused physical examination and discussion with patient. Over 50% of today's visit is spent on examining the patient and counseling and coordinating the care. Level of complexity of date to be reviewed is Moderate. The chart date reviewed include the following: Imaging Reports. Summary of Care. Time spent reviewing with patient the below reports:   Medication safety, Treatment options. Level of diagnosis and management options of this case is multiple: involving the following management options: Interventions as needed, medication management as appropriate, future visits, activity modification, heat/ice as needed, Urine drug screen as required. [x]The patient's questions were answered to the best of my abilities. This note was created using voice recognition software. There may be inaccuracies of transcription  that are inadvertently overlooked prior to the signature. There is any questions about the transcription please contact me. Return in  4 weeks  with physician / CNP  for further plan of treatment. Due to the COVID-19 pandemic and the appropriate interventions by Mat Espinoza, our non-urgent pain management patients will not be seen in the office at this time for their protection and the protection of our staff.  To offer continuity of care, their prescriptions will be escribed this month after a careful chart review and review of their OARRS report  Pursuant to the emergency declaration under the [address] and [facility], [facility] waiver authority and the [facility] Appropriations Act, this Virtual Visit was conducted, with patient's consent, to reduce the patient's risk of exposure to COVID-19 and provide continuity of care for an established patient. Services were provided through a video synchronous discussion virtually to substitute for in-person appointment. \"  Documentation:  I communicated with the patient and/or health care decision maker about plan of care  Details of this discussion including any medical advice provided: Total Time: minutes: 21-30 minutes    I affirm this is a Patient Initiated Episode with an Established Patient who has not had a related appointment within my department in the past 7 days or scheduled within the next 24 hours.     Electronically signed by Nydia Nieto MD on 11/2/2021 at 8:45 PM

## 2021-11-30 ENCOUNTER — HOSPITAL ENCOUNTER (OUTPATIENT)
Dept: PAIN MANAGEMENT | Age: 66
Discharge: HOME | End: 2021-11-30
Payer: MEDICARE

## 2021-11-30 DIAGNOSIS — Z51.81 ENCOUNTER FOR MEDICATION MONITORING: Primary | ICD-10-CM

## 2021-11-30 DIAGNOSIS — M51.36 DDD (DEGENERATIVE DISC DISEASE), LUMBAR: ICD-10-CM

## 2021-11-30 DIAGNOSIS — M46.1 SACROILIITIS (HCC): ICD-10-CM

## 2021-11-30 DIAGNOSIS — M79.7 FIBROMYALGIA: ICD-10-CM

## 2021-11-30 DIAGNOSIS — M47.816 LUMBAR FACET ARTHROPATHY: ICD-10-CM

## 2021-11-30 PROCEDURE — 99442 PR PHYS/QHP TELEPHONE EVALUATION 11-20 MIN: CPT | Performed by: NURSE PRACTITIONER

## 2021-11-30 PROCEDURE — 99214 OFFICE O/P EST MOD 30 MIN: CPT

## 2021-11-30 PROCEDURE — 99213 OFFICE O/P EST LOW 20 MIN: CPT

## 2021-11-30 RX ORDER — HYDROCODONE BITARTRATE AND ACETAMINOPHEN 5; 325 MG/1; MG/1
1 TABLET ORAL 2 TIMES DAILY PRN
Qty: 60 TABLET | Refills: 0 | Status: SHIPPED | OUTPATIENT
Start: 2021-12-08 | End: 2022-01-03 | Stop reason: SDUPTHER

## 2021-11-30 ASSESSMENT — ENCOUNTER SYMPTOMS
COUGH: 0
CONSTIPATION: 0
SHORTNESS OF BREATH: 0
BACK PAIN: 1

## 2021-11-30 NOTE — PROGRESS NOTES
Patient completed a telephone visit today to review medication contract. Chief Complaint: back pain    PMH  Pt reports chronic history of back pain, states she was diagnosed with fibromyalgia. Pain has been worse with colder [place]. She does see a chiropractor with adjustements heat and TENS tx which helps to control the pain.  Pt has tried lyrica but stopped due to weight gain and gabapentin was causing drowsiness. Pt also experienced side effects with Topamax. She has limited ROM left shoulder.  XR done 4/18/19 shows no acute abnormality. Cervical MRI shows Multilevel degenerative disc disease in the cervical spine. She is not interested in injections - does not feel she has much relief from them.     She is having worsening pain in the left knee. She was told she would would need it replaced in the future. She has had steroid injections with some benefit. Back Pain  This is a chronic problem. The current episode started more than 1 year ago. The problem occurs constantly. The problem is unchanged. The pain is present in the lumbar spine. The quality of the pain is described as aching. The pain does not radiate. The pain is at a severity of 3/10. The pain is mild. The symptoms are aggravated by position and standing (walking). Pertinent negatives include no chest pain, fever, numbness, paresthesias or tingling. She has tried analgesics, bed rest and heat for the symptoms. The treatment provided mild relief. Patient denies any new neurological symptoms. No bowel or bladder incontinence, no weakness, and no falling. Pill count: appropriate due 12/8    Morphine equivalent: 10    Periodic Controlled Substance Monitoring: Possible medication side effects, risk of tolerance/dependence & alternative treatments discussed., No signs of potential drug abuse or diversion identified., Obtaining appropriate analgesic effect of treatment.  KYALEE Medeiros - CNP)      Past Medical History: Diagnosis Date    Anxiety     [facility]     [facility]     Cancer ([facility]     breast, in remission    Chronic back pain 12/3/2013    Depression     DJD (degenerative joint disease)     Family history of breast cancer     MGM    Fibromyalgia     Fibromyalgia     History of hysterectomy     Hypothyroidism     Obesity, Class III, BMI 40-49.9 (morbid obesity) ([facility] 7/22/2014    Osteoarthritis     Sacroiliitis (HCC)     SVT (supraventricular tachycardia) ([facility]     Unspecified sleep apnea        Past Surgical History:   Procedure Laterality Date    ABDOMEN SURGERY      APPENDECTOMY      BREAST SURGERY      lumpectomy w radiation 2009    CARDIAC CATHETERIZATION      with ablation    CARPAL TUNNEL RELEASE Left 10/22/2020    COLONOSCOPY  2009    10 years    COLONOSCOPY  08/2017    FINGER SURGERY  10/2017    trigger finger right hand    HERNIA REPAIR      umbilical    HYSTERECTOMY      total    JOINT REPLACEMENT      Right Knee 2014    KNEE ARTHROSCOPY      bilateral    KNEE SURGERY      x2    LAPAROSCOPIC APPENDECTOMY Right     TONSILLECTOMY      UMBILICAL HERNIA REPAIR N/A     WRIST SURGERY  04/12/2018    plate and screws right wrist       Allergies   Allergen Reactions    Augmentin [Amoxicillin-Pot Clavulanate] Nausea And Vomiting     Projectile vomiting    Sulfa Antibiotics Anaphylaxis    Amoxapine And Related Diarrhea and Nausea And Vomiting     AMOX TR-K- 875-125 mg    Loxapine Succinate Diarrhea and Nausea Only     AMOX TR-K- 875-125 mg         Current Outpatient Medications:     HYDROcodone-acetaminophen (NORCO) 5-325 MG per tablet, Take 1 tablet by mouth 2 times daily as needed for Pain for up to 30 days. , Disp: 60 tablet, Rfl: 0    lidocaine (LIDODERM) 5 %, Place 1 patch onto the skin daily 12 hours on, 12 hours off., Disp: 30 patch, Rfl: 3    meloxicam (MOBIC) 15 MG tablet, take 1 tablet by mouth once daily, Disp: 90 tablet, Rfl: 1    levothyroxine (SYNTHROID) 50 MCG tablet, take 1 tablet by mouth once daily, Disp: 90 tablet, Rfl: 1    venlafaxine (EFFEXOR XR) 150 MG extended release capsule, take 1 capsule by mouth once daily, Disp: 90 capsule, Rfl: 1    magnesium oxide (MAG-OX) 400 MG tablet, take 1 tablet by mouth twice a day, Disp: , Rfl:     famotidine (PEPCID) 20 MG tablet, Take 20 mg by mouth 2 times daily, Disp: , Rfl:     amoxicillin (AMOXIL) 500 MG capsule, take 4 capsules by mouth 1 hour prior to appointment, Disp: , Rfl:     calcium carbonate-vitamin D (CALTRATE) 600-400 MG-UNIT TABS per tab, Take by mouth, Disp: , Rfl:     Tens Unit MISC, by Does not apply route For low back pain, Disp: 1 each, Rfl: 0    loratadine (CLARITIN) 10 MG capsule, Take 10 mg by mouth daily, Disp: , Rfl:     Polyethylene Glycol 3350 (MIRALAX PO), Take by mouth Taking daily, Disp: , Rfl:     flecainide (TAMBOCOR) 50 MG tablet, Take 50 mg by mouth 2 times daily. , Disp: , Rfl:     Family History   Problem Relation Age of Onset    Cancer Mother         pancreatic cancer 78    Heart Disease Mother     COPD Father     COPD Brother     Breast Cancer Maternal Grandmother         dx unknown age    [facility] Colon Cancer Maternal Grandfather         66-80s        Social History     Socioeconomic History    Marital status:       Spouse name: Not on file    Number of children: Not on file    Years of education: Not on file    Highest education level: Not on file   Occupational History    Occupation: [employer] disability    Tobacco Use    Smoking status: Never Smoker    Smokeless tobacco: Never Used   Vaping Use    Vaping Use: Never used   Substance and Sexual Activity    Alcohol use: No     Alcohol/week: 0.0 standard drinks     Comment: one to two drinks a month    Drug use: No    Sexual activity: Yes     Partners: Male     Birth control/protection: Surgical     Comment: hyst   Other Topics Concern    Not on file   Social History Narrative    Not on file     Social Determinants of Health     Financial Resource Strain: Low Risk     Difficulty of Paying Living Expenses: Not hard at all   Food Insecurity: No Food Insecurity    Worried About Running Out of Food in the Last Year: Never true    [place] of Food in the Last Year: Never true   Transportation Needs:     Lack of Transportation (Medical): Not on file    Lack of Transportation (Non-Medical): Not on file   Physical Activity:     Days of Exercise per Week: Not on file    Minutes of Exercise per Session: Not on file   Stress:     Feeling of Stress : Not on file   Social Connections:     Frequency of Communication with Friends and Family: Not on file    Frequency of Social Gatherings with Friends and Family: Not on file    Attends Hindu Services: Not on file    Active Member of [address] [place] [address] or Organizations: Not on file    Attends Club or Organization Meetings: Not on file    Marital Status: Not on file   Intimate Partner Violence:     Fear of Current or Ex-Partner: Not on file    Emotionally Abused: Not on file    Physically Abused: Not on file    Sexually Abused: Not on file   Housing Stability:     Unable to Pay for Housing in the Last Year: Not on file    Number of [place] in the Last Year: Not on file    Unstable Housing in the Last Year: Not on file       Review of Systems:  Review of Systems   Constitutional: Negative for chills and fever. Cardiovascular: Negative for chest pain and palpitations. Respiratory: Negative for cough and shortness of breath. Musculoskeletal: Positive for back pain. Gastrointestinal: Negative for constipation. Neurological: Negative for disturbances in coordination, loss of balance, numbness, paresthesias and tingling. Physical Exam:  [facility] 06/13/2010 (Within Months)     Physical Exam  Neurological:      Mental Status: She is alert.    Psychiatric:         Mood and Affect: Mood normal.         Record/Diagnostics Review:    Last annabella 4/2021 and was

## 2022-01-03 ENCOUNTER — HOSPITAL ENCOUNTER (OUTPATIENT)
Dept: PAIN MANAGEMENT | Age: 67
Discharge: HOME | End: 2022-01-03
Payer: MEDICARE

## 2022-01-03 DIAGNOSIS — M47.816 LUMBAR FACET ARTHROPATHY: ICD-10-CM

## 2022-01-03 DIAGNOSIS — M51.36 DDD (DEGENERATIVE DISC DISEASE), LUMBAR: ICD-10-CM

## 2022-01-03 DIAGNOSIS — Z51.81 ENCOUNTER FOR MEDICATION MONITORING: ICD-10-CM

## 2022-01-03 DIAGNOSIS — M79.7 FIBROMYALGIA: ICD-10-CM

## 2022-01-03 DIAGNOSIS — M46.1 SACROILIITIS (HCC): Primary | ICD-10-CM

## 2022-01-03 PROCEDURE — 99213 OFFICE O/P EST LOW 20 MIN: CPT

## 2022-01-03 PROCEDURE — 99213 OFFICE O/P EST LOW 20 MIN: CPT | Performed by: NURSE PRACTITIONER

## 2022-01-03 RX ORDER — HYDROCODONE BITARTRATE AND ACETAMINOPHEN 5; 325 MG/1; MG/1
1 TABLET ORAL 2 TIMES DAILY PRN
Qty: 60 TABLET | Refills: 0 | Status: SHIPPED | OUTPATIENT
Start: 2022-01-07 | End: 2022-02-03 | Stop reason: SDUPTHER

## 2022-01-03 ASSESSMENT — ENCOUNTER SYMPTOMS
SHORTNESS OF BREATH: 0
COUGH: 0
BACK PAIN: 1
CONSTIPATION: 0

## 2022-01-03 NOTE — PROGRESS NOTES
Patient completed a telephone visit today to review medication contract. Chief Complaint: back pain    PMH Pt reports chronic history of back pain, states she was diagnosed with fibromyalgia. Pain has been worse with colder [place]. She does see a chiropractor with adjustements heat and TENS tx which helps to control the pain.  Pt has tried lyrica but stopped due to weight gain and gabapentin was causing drowsiness. Pt also experienced side effects with Topamax. She has limited ROM left shoulder.  XR done 4/18/19 shows no acute abnormality. Cervical MRI shows Multilevel degenerative disc disease in the cervical spine. She is not interested in injections - does not feel she has much relief from them.     She is having worsening pain in the left knee. She was told she would would need it replaced in the future. She has had steroid injections with some benefit.         Back Pain  This is a chronic problem. The current episode started more than 1 year ago. The problem occurs constantly. The problem is unchanged. The pain is present in the lumbar spine. The quality of the pain is described as aching. The pain does not radiate. The pain is at a severity of 4/10. The pain is mild. The symptoms are aggravated by position (walking, prolonged standing). Pertinent negatives include no chest pain, fever, numbness, paresthesias or tingling. She has tried analgesics, bed rest and heat for the symptoms. The treatment provided mild relief. Patient denies any new neurological symptoms. No bowel or bladder incontinence, no weakness, and no falling. Pill count: appropriate due 1/7    Morphine equivalent: 10    Periodic Controlled Substance Monitoring: Possible medication side effects, risk of tolerance/dependence & alternative treatments discussed. ,No signs of potential drug abuse or diversion identified. ,Obtaining appropriate analgesic effect of treatment.  Howie Bowles, KAYLEE - CNP)      Past Medical History: Diagnosis Date    Anxiety     [facility]     Breast cancer [facility]     Cancer ([facility]     breast, in remission    Chronic back pain 12/3/2013    Depression     DJD (degenerative joint disease)     Family history of breast cancer     MGM    Fibromyalgia     Fibromyalgia     History of hysterectomy     Hypothyroidism     Obesity, Class III, BMI 40-49.9 (morbid obesity) ([facility] 7/22/2014    Osteoarthritis     Sacroiliitis (HCC)     SV[facility] tachycardia) ([facility]     Unspecified sleep apnea        Past Surgical History:   Procedure Laterality Date    ABDOMEN SURGERY      APPENDECTOMY      BREAST SURGERY      lumpectomy w radiation 2009    CARDIAC CATHETERIZATION      with ablation    CARPAL TUNNEL RELEASE Left 10/22/2020    COLONOSCOPY  2009    10 years    COLONOSCOPY  08/2017    FINGER SURGERY  10/2017    trigger finger right hand    HERNIA REPAIR      umbilical    HYSTERECTOMY      total    JOINT REPLACEMENT      Right Knee 2014    KNEE ARTHROSCOPY      bilateral    KNEE SURGERY      x2    LAPAROSCOPIC APPENDECTOMY Right     TONSILLECTOMY      UMBILICAL HERNIA REPAIR N/A     WRIST SURGERY  04/12/2018    plate and screws right wrist       Allergies   Allergen Reactions    Augmentin [Amoxicillin-Pot Clavulanate] Nausea And Vomiting     Projectile vomiting    Sulfa Antibiotics Anaphylaxis    Amoxapine And Related Diarrhea and Nausea And Vomiting     AMOX TR-K- 875-125 mg    Loxapine Succinate Diarrhea and Nausea Only     AMOX TR-K- 875-125 mg         Current Outpatient Medications:     HYDROcodone-acetaminophen (NORCO) 5-325 MG per tablet, Take 1 tablet by mouth 2 times daily as needed for Pain for up to 30 days. , Disp: 60 tablet, Rfl: 0    meloxicam (MOBIC) 15 MG tablet, take 1 tablet by mouth once daily, Disp: 90 tablet, Rfl: 1    levothyroxine (SYNTHROID) 50 MCG tablet, take 1 tablet by mouth once daily, Disp: 90 tablet, Rfl: 1    venlafaxine (EFFEXOR XR) 150 MG extended release capsule, take 1 capsule by mouth once daily, Disp: 90 capsule, Rfl: 1    magnesium oxide (MAG-OX) 400 MG tablet, take 1 tablet by mouth twice a day, Disp: , Rfl:     famotidine (PEPCID) 20 MG tablet, Take 20 mg by mouth 2 times daily, Disp: , Rfl:     amoxicillin (AMOXIL) 500 MG capsule, take 4 capsules by mouth 1 hour prior to appointment, Disp: , Rfl:     calcium carbonate-vitamin D (CALTRATE) 600-400 MG-UNIT TABS per tab, Take by mouth, Disp: , Rfl:     Tens Unit MISC, by Does not apply route For low back pain, Disp: 1 each, Rfl: 0    loratadine (CLARITIN) 10 MG capsule, Take 10 mg by mouth daily, Disp: , Rfl:     Polyethylene Glycol 3350 (MIRALAX PO), Take by mouth Taking daily, Disp: , Rfl:     flecainide (TAMBOCOR) 50 MG tablet, Take 50 mg by mouth 2 times daily. , Disp: , Rfl:     Family History   Problem Relation Age of Onset    Cancer Mother         pancreatic cancer 78    Heart Disease Mother     COPD Father     COPD Brother     Breast Cancer Maternal Grandmother         dx unknown age    [facility] Colon Cancer Maternal Grandfather         66-80s        Social History     Socioeconomic History    Marital status:       Spouse name: Not on file    Number of children: Not on file    Years of education: Not on file    Highest education level: Not on file   Occupational History    Occupation: [employer] disability    Tobacco Use    Smoking status: Never Smoker    Smokeless tobacco: Never Used   Vaping Use    Vaping Use: Never used   Substance and Sexual Activity    Alcohol use: No     Alcohol/week: 0.0 standard drinks     Comment: one to two drinks a month    Drug use: No    Sexual activity: Yes     Partners: Male     Birth control/protection: Surgical     Comment: hyst   Other Topics Concern    Not on file   Social History Narrative    Not on file     Social Determinants of Health     Financial Resource Strain: Low Risk     Difficulty of Paying Living Expenses: Not hard at all Food Insecurity: No Food Insecurity    Worried About Running Out of Food in the Last Year: Never true    [place] of Food in the Last Year: Never true   Transportation Needs:     Lack of Transportation (Medical): Not on file    Lack of Transportation (Non-Medical): Not on file   Physical Activity:     Days of Exercise per Week: Not on file    Minutes of Exercise per Session: Not on file   Stress:     Feeling of Stress : Not on file   Social Connections:     Frequency of Communication with Friends and Family: Not on file    Frequency of Social Gatherings with Friends and Family: Not on file    Attends Restoration Services: Not on file    Active Member of [address] [place] [address] [id number] or Organizations: Not on file    Attends Club or Organization Meetings: Not on file    Marital Status: Not on file   Intimate Partner Violence:     Fear of Current or Ex-Partner: Not on file    Emotionally Abused: Not on file    Physically Abused: Not on file    Sexually Abused: Not on file   Housing Stability:     Unable to Pay for Housing in the Last Year: Not on file    Number of [place] in the Last Year: Not on file    Unstable Housing in the Last Year: Not on file       Review of Systems:  Review of Systems   Constitutional: Negative for chills and fever. Cardiovascular: Negative for chest pain and palpitations. Respiratory: Negative for cough and shortness of breath. Musculoskeletal: Positive for back pain. Gastrointestinal: Negative for constipation. Neurological: Negative for disturbances in coordination, loss of balance, numbness, paresthesias and tingling. Physical Exam:  [facility] 06/13/2010 (Within Months)     Physical Exam  Neurological:      Mental Status: She is alert.    Psychiatric:         Mood and Affect: Mood normal.         Record/Diagnostics Review:    Last annabella 4/2021 and was appropriate     Assessment:  Problem List Items Addressed This Visit     Fibromyalgia    Relevant Medications HYDROcodone-acetaminophen (NORCO) 5-325 MG per tablet (Start on 1/7/2022)    Lumbar facet arthropathy    Relevant Medications    HYDROcodone-acetaminophen (NORCO) 5-325 MG per tablet (Start on 1/7/2022)    Sacroiliitis ([facility] - Primary    Relevant Medications    HYDROcodone-acetaminophen (NORCO) 5-325 MG per tablet (Start on 1/7/2022)    DDD (degenerative disc disease), lumbar    Relevant Medications    HYDROcodone-acetaminophen (NORCO) 5-325 MG per tablet (Start on 1/7/2022)    Encounter for medication monitoring             Treatment Plan:  Patient relates current medications are helping the pain. Patient reports taking pain medications as prescribed, denies obtaining medications from different sources and denies use of illegal drugs. Patient denies side effects from medications like nausea, vomiting, constipation or drowsiness. Patient reports current activities of daily living are possible due to medications and would like to continue them. As always, we encourage daily stretching and strengthening exercises, and recommend minimizing use of pain medications unless patient cannot get through daily activities due to pain. Contract requirements met. Continue opioid therapy. Script written for norco  Follow up appointment made for 4 weeks    [facility], was evaluated through a synchronous (real-time) audio-video encounter. The patient (or guardian if applicable) is aware that this is a billable service. Verbal consent to proceed has been obtained within the past 12 months. The visit was conducted pursuant to the emergency declaration under the [address], [address] [place] [address] authority and the [employer] General Act. Patient identification was verified, and a caregiver was present when appropriate. The patient was located in a state where the provider was credentialed to provide care.     Total time spent for this encounter: 20 minutes    --Divina Hall, KAYLEE - CNP on 1/3/2022 at 1:18 PM    An electronic signature was used to authenticate this note.

## 2022-02-02 NOTE — PROGRESS NOTES
[address] PROGRESS NOTE      Patient  completed [x]  video visit   []   phone call:         Minutes :   [] in person visit to pain clinic    [x]    to  review Medication Agreement    []  Follow up after procedure   []  Discuss treatment options      Location:  Provider:  working from    [x]    home    []   [facility] - STEVEN GUSTAFSON ,   patient at   [] pain clinic     [x]  home         Chief Complaint: low back pain    She c/o low back  Pain which has not changed. She states sometimes she has right sciatic pain. She reports she wants to do PT, dry needling, she has to check with her insurance co,  She had cervical, thoracic and lumbar MRI 5-7-2019 which read as \" multilevel cervical, thoracic and lumbar DDD\" She has tried lyrica stopped due to weight gain and gabapentin was causing drowsiness. Pt also experienced side effects with Topamax. She reports sleep patterns are good. She does some light stretching, she does silver sneakers  Exercises on you tube. Back Pain  This is a chronic problem. The problem occurs constantly. The problem is unchanged. The pain is present in the lumbar spine. The quality of the pain is described as aching. The pain does not radiate. The pain is at a severity of 3/10. The pain is the same all the time. Exacerbated by: walking, standing. Risk factors: osteopenia. She has tried heat and analgesics (rest) for the symptoms.          Treatment goals:  Functional status:  Do daily activiites    Aberrancy:   Any alcoholic beverages            Any illegal drugs         Analgesia:   3                  Adverse  Effects :constipation,  takesmiralax      ADL;s : home exercises      Data:    When was thelast UDS:     4-7-2021       Was the UDS appropriate:  [] yes []   no      Record/Diagnostics Review:      As above, I did review the imaging         DRUG SCREEN, PAIN  Order: [id number]   Status: Final result     Visible to patient: Yes (not seen)     Next appt: 02/03/2022 at 10:20 AM in Pain Management Shakira BROWN, APRN - CNP)     Dx: Neck pain; DDD (degenerative disc dis. ..     0 Result Notes    Component Ref Range & Units 4/7/21 1315 1/22/20 1345 10/28/19 1600 10/23/18 1400 6/29/18 1442 7/7/17 1330 3/3/16 1301   Pain Management Drug Panel Interp, Urine  Consistent ARUP  Consistent CMARUP  Consistent CM  Consistent CMARUP  Inconsistent CM  Consistent CM  Inconsistent CM    Comment: (NOTE)   ________________________________________________________________   DRUGS EXPECTED:   Al Peach (HYDROCODONE)   ________________________________________________________________   [id number] with medications provided:   Al Peach (HYDROCODONE): based on norhydrocodone   ________________________________________________________________   Drugs Not Included in this Assay:   Acetaminophen   ________________________________________________________________   INTERPRETIVE INFORMATION: Targeted drug profile Interp   Interpretation depends on accuracy and completeness of patient   medication information submitted by client. EXAMINATION:   MRI OF THE CERVICAL SPINE WITHOUT CONTRAST; MRI OF THE LUMBAR SPINE WITHOUT   CONTRAST 5/7/2019 4:25 pm; 5/7/2019 4:24 pm       TECHNIQUE:   Multiplanar multisequence MRI of the cervical spine was performed without the   administration of intravenous contrast.; Multiplanar multisequence MRI of the   lumbar spine was performed without the administration of intravenous contrast.       COMPARISON:   None.       HISTORY:   ORDERING SYSTEM PROVIDED HISTORY: Neck pain; ORDERING SYSTEM PROVIDED   HISTORY: Lumbar facet arthropathy       FINDINGS:   Cervical spine       BONES/ALIGNMENT: Straightening of the normal lordotic curvature is noted.    There is no fracture.       SPINAL CORD: No focal cord signal hyperintensity is noted.       SOFT TISSUES: Small cervical nodes are present.       C2-C3: Minimal disc bulging is noted.  Mild facet hypertrophic changes are   noted     C3-C4: Mild disc bulging is noted.  Small left proximal foraminal protrusion   is noted.  Endplate, uncovertebral and facet hypertrophic changes are noted. Moderate left foraminal narrowing is noted.       C4-C5: Mild-to-moderate diffuse disc bulging is noted. Kaur Mettle is a   questionable small proximal foraminal protrusion on the right.  Endplate,   uncovertebral and facet hypertrophic changes are noted.  Severe right and   moderate left foraminal narrowing is noted.  Ventral cord flattening is   noted.  Mild narrowing of the canal is noted       C5-C6: Mild disc bulging is noted diffusely. Kaur Mettle is a small proximal   foraminal protrusion on the left.  Endplate, uncovertebral and facet   hypertrophic changes are noted.  Minimal right and moderate left foraminal   narrowing is noted       C6-C7: Mild disc bulging is noted diffusely.  Endplate, uncovertebral and   facet hypertrophic changes are noted.  These are greater on the left. Moderate to severe left and mild-to-moderate right foraminal narrowing is   noted.       C7-T1: Very minimal disc bulging is noted.  Endplate, uncovertebral and facet   hypertrophic changes are noted. Kaur Mettle is a suggested small left foraminal   protrusion.  There mild-to-moderate left foraminal narrowing.       MRI lumbar spine:       Bones: Mild lordotic curvature is noted.  There is no acute fracture. Minimal edema in the left L5 pedicle is noted.  Minimal T12 retrolisthesis is   noted.  Minimal L5 retrolisthesis is noted degenerative endplate signal   changes are greatest at T12-L1.       Soft tissues: No retroperitoneal mass lesion is noted.       Cord: Conus terminates at L1-L2 and is normal in appearance.       T12-L1: Uncovering of the disc is noted.  Minimal disc bulging is noted.    There is a small proximal foraminal protrusion on the right.       L1-2: Facet and ligament hypertrophic changes are noted       L2-3: Mild disc bulging is noted diffusely.  Annular tear is noted.  Left   foraminal broad-based protrusion is noted.  Minimal right and moderate left   foraminal narrowing is noted.  Facet and ligament hypertrophic changes are   noted.  Mild narrowing of the canal is noted       L3-4: Minimal disc bulging is noted diffusely.  Foraminal protrusions are   noted, left greater than right.  Mild right and mild-to-moderate left   foraminal narrowing is noted.  Facet and ligament hypertrophic changes are   noted.  Severe narrowing of the canal is noted       Mild disc bulging is noted with annular tear.  There is a small central and   right paracentral protrusion with minimal upward migration of extruded disc   material.  Mild to moderate proximal foraminal narrowing on the right is   noted.  Facet and ligament hypertrophic changes are noted.  Severe narrowing   of the canal is noted       L5-S1: Mild disc bulging is noted diffusely with annular tear.  Endplate   hypertrophic changes are noted.  Proximal foraminal narrowing is noted   bilaterally.  There is a questionable small proximal foraminal protrusion on   the left.  Bilateral facet hypertrophic changes are noted.           Impression   Cervical spine:       Multilevel degenerative disc disease in the cervical spine as described.  See   above for details of each level.       Lumbar spine:       Multilevel degenerative disc disease in the lower thoracic spine and the   lumbar spine as described.  See above for details of each level                     Pill count: appropriate    fill date :2-6-22  Morphine equivalent dose as reported on OARRS:10  Periodic Controlled Substance Monitoring: Possible medication side effects, risk of tolerance/dependence & alternative treatments discussed. ,No signs of potential drug abuse or diversion identified. ,Assessed functional status. ,Obtaining appropriate analgesic effect of treatment. KAYLEE Gillis - CNP)  Review ofOARRS does not show any aberrant prescription behavior.  Medication is helping the patient stay active. Patient denies any side effects and reports adequate analgesia. No sign of misuse/abuse. Past Medical History:   Diagnosis Date    Anxiety     Breast cancer ([address]     [facility]     Cancer ([facility]     breast, in remission    Chronic back pain 12/3/2013    Depression     DJD (degenerative joint disease)     Family history of breast cancer     MGM    Fibromyalgia     Fibromyalgia     Fibromyalgia     History of hysterectomy     Hypothyroidism     Obesity, Class III, BMI 40-49.9 (morbid obesity) ([facility] 7/22/2014    Osteoarthritis     Sacroiliitis (HCC)     SVT (supraventricular tachycardia) ([facility]     Unspecified sleep apnea        Past Surgical History:   Procedure Laterality Date    ABDOMEN SURGERY      APPENDECTOMY      BREAST SURGERY      lumpectomy w radiation 2009    CARDIAC CATHETERIZATION      with ablation    CARPAL TUNNEL RELEASE Left 10/22/2020    COLONOSCOPY  2009    10 years    COLONOSCOPY  08/2017    FINGER SURGERY  10/2017    trigger finger right hand    HERNIA REPAIR      umbilical    HYSTERECTOMY      total    JOINT REPLACEMENT      Right Knee 2014    KNEE ARTHROSCOPY      bilateral    KNEE SURGERY      x2    LAPAROSCOPIC APPENDECTOMY Right     TONSILLECTOMY      UMBILICAL HERNIA REPAIR N/A     WRIST SURGERY  04/12/2018    plate and screws right wrist       Allergies   Allergen Reactions    Augmentin [Amoxicillin-Pot Clavulanate] Nausea And Vomiting     Projectile vomiting    Sulfa Antibiotics Anaphylaxis    Amoxapine And Related Diarrhea and Nausea And Vomiting     AMOX TR-K- 875-125 mg    Loxapine Succinate Diarrhea and Nausea Only     AMOX TR-K- 875-125 mg         Current Outpatient Medications:     [START ON 2/6/2022] HYDROcodone-acetaminophen (NORCO) 5-325 MG per tablet, Take 1 tablet by mouth 2 times daily as needed for Pain for up to 30 days. , Disp: 60 tablet, Rfl: 0    Multiple Vitamin (MULTIVITAMIN ADULT PO), Take by mouth, Disp: , Rfl:     meloxicam (MOBIC) 15 MG tablet, take 1 tablet by mouth once daily, Disp: 90 tablet, Rfl: 1    levothyroxine (SYNTHROID) 50 MCG tablet, take 1 tablet by mouth once daily, Disp: 90 tablet, Rfl: 1    venlafaxine (EFFEXOR XR) 150 MG extended release capsule, take 1 capsule by mouth once daily, Disp: 90 capsule, Rfl: 1    magnesium oxide (MAG-OX) 400 MG tablet, take 1 tablet by mouth twice a day, Disp: , Rfl:     calcium carbonate-vitamin D (CALTRATE) 600-400 MG-UNIT TABS per tab, Take by mouth, Disp: , Rfl:     loratadine (CLARITIN) 10 MG capsule, Take 10 mg by mouth daily, Disp: , Rfl:     flecainide (TAMBOCOR) 50 MG tablet, Take 50 mg by mouth 2 times daily. , Disp: , Rfl:     famotidine (PEPCID) 20 MG tablet, Take 20 mg by mouth 2 times daily, Disp: , Rfl:     amoxicillin (AMOXIL) 500 MG capsule, take 4 capsules by mouth 1 hour prior to appointment, Disp: , Rfl:     Tens Unit MISC, by Does not apply route For low back pain, Disp: 1 each, Rfl: 0    Polyethylene Glycol 3350 (MIRALAX PO), Take by mouth Taking daily, Disp: , Rfl:     Family History   Problem Relation Age of Onset    Cancer Mother         pancreatic cancer 78    Heart Disease Mother     COPD Father     COPD Brother     Breast Cancer Maternal Grandmother         dx unknown age    Hortencia Pricekle Colon Cancer Maternal Grandfather         66-80s        Social History     Socioeconomic History    Marital status:       Spouse name: Not on file    Number of children: Not on file    Years of education: Not on file    Highest education level: Not on file   Occupational History    Occupation: [employer] disability    Tobacco Use    Smoking status: Never Smoker    Smokeless tobacco: Never Used   Vaping Use    Vaping Use: Never used   Substance and Sexual Activity    Alcohol use: No     Alcohol/week: 0.0 standard drinks     Comment: one to two drinks a month    Drug use: No    Sexual activity: Yes     Partners: Male Birth control/protection: Surgical     Comment: hyst   Other Topics Concern    Not on file   Social History Narrative    Not on file     Social Determinants of Health     Financial Resource Strain: Low Risk     Difficulty of Paying Living Expenses: Not hard at all   Food Insecurity: No Food Insecurity    Worried About Running Out of Food in the Last Year: Never true    [address] in the Last Year: Never true   Transportation Needs:     Lack of Transportation (Medical): Not on file    Lack of Transportation (Non-Medical): Not on file   Physical Activity:     Days of Exercise per Week: Not on file    Minutes of Exercise per Session: Not on file   Stress:     Feeling of Stress : Not on file   Social Connections:     Frequency of Communication with Friends and Family: Not on file    Frequency of Social Gatherings with Friends and Family: Not on file    Attends Jainism Services: Not on file    Active Member of [address] [place] [address] [employer] or Organizations: Not on file    Attends Club or Organization Meetings: Not on file    Marital Status: Not on file   Intimate Partner Violence:     Fear of Current or Ex-Partner: Not on file    Emotionally Abused: Not on file    Physically Abused: Not on file    Sexually Abused: Not on file   Housing Stability:     Unable to Pay for Housing in the Last Year: Not on file    Number of [place] in the Last Year: Not on file    Unstable Housing in the Last Year: Not on file         Review of Systems:  Review of Systems   Constitutional: Negative. HENT: Negative. Eyes: Positive for visual disturbance. Cardiovascular: Negative. Respiratory: Negative. Endocrine: Negative. Hematologic/Lymphatic: Bruises/bleeds easily. Skin: Negative. Musculoskeletal: Positive for back pain and myalgias. Gastrointestinal: Positive for constipation. Genitourinary: Negative. Neurological: Negative for dizziness. Psychiatric/Behavioral: Negative.           Physical Exam:  LMP 06/13/2010 (Within Months)     Physical Exam  HENT:      Head: Normocephalic. Pulmonary:      Effort: Pulmonary effort is normal.   Skin:         Neurological:      Mental Status: She is alert and oriented to person, place, and time. Psychiatric:         Mood and Affect: Mood normal.         Thought Content: Thought content normal.           Assessment:      Problem List Items Addressed This Visit     Sacroiliitis ([address]    Relevant Medications    HYDROcodone-acetaminophen (NORCO) 5-325 MG per tablet (Start on 2/6/2022)    Neck pain (Chronic)    Lumbar facet arthropathy    Relevant Medications    HYDROcodone-acetaminophen (NORCO) 5-325 MG per tablet (Start on 2/6/2022)    Fibromyalgia    Relevant Medications    HYDROcodone-acetaminophen (Jonathon Sang) 5-325 MG per tablet (Start on 2/6/2022)    Encounter for medication monitoring    DDD (degenerative disc disease), lumbar    Relevant Medications    HYDROcodone-acetaminophen (NORCO) 5-325 MG per tablet (Start on 2/6/2022)    Chronic left shoulder pain - Primary (Chronic)    Relevant Medications    HYDROcodone-acetaminophen (NORCO) 5-325 MG per tablet (Start on 2/6/2022)            Treatment Plan:  DISCUSSION: Treatment options discussed withpatient and all questions answered to patient's satisfaction. Possible side effects, risk of tolerance and or dependence and alternative treatments discussed    Obtaining appropriate analgesic effect of treatment   No signs of potential drug abuse or diversion identified    [x] Ill effects of being on chronic pain medications such as sleep disturbances, respiratory depression, hormonal changes, withdrawal symptoms, chronic opioid dependence and tolerance as well as risk of taking opioids with Benzodiazepines and taking opioids along with alcohol,  werediscussed with patient. I had asked the patient to minimize medication use and utilize pain medications only for uncontrolled rest pain or pain with exertional activities.  I advised patient not to self-escalate painmedications without consulting with us. At each of patient's future visits we will try to taper pain medications, while adjusting the adjunct medications, and re-evaluating for Physical Therapy to improve spinal andjoint strength. We will continue to have discussions to decrease pain medications as tolerated. Counseled patient on effects their pain medication and /or their medical condition mayhave on their  ability to drive or operate machinery. Instructed not to drive or operate machinery if drowsy     I also discussed with the patient regarding the dangers of combining narcotic pain medication with tranquilizers, alcohol or illegal drugs or taking the medication any way other than prescribed. The dangers were discussed  including respiratory depression and death. Patient was told to tell  all  physicians regarding the medications he is getting from pain clinic. Patient is warned not to take any unprescribed medications over-the-countermedications that can depress breathing . Patient is advised to talk to the pharmacist or physicians if planning to take any over-the-counter medications before  takeing them. Patient is strongly advised to avoid tranquilizers or  relaxants, illegal drugs  or any medications that can depress breathing  Patient is also advised to tell us if there is any changes in their medications from other physicians. Cl Pacheco, was evaluated through a synchronous (real-time) audio-video  encounter. The patient (or guardian if applicable) is aware that this is a billable  service, which includes applicable co-pays. This Virtual Visit was conducted with  patient's (and/or legal guardian's) consent. The visit was conducted pursuant to  the emergency declaration under the [address] [place] [address], [address] [place] [address] authority and the [employer] and  [employer] Act.  Patient identification was verified,  and a caregiver was present when appropriate. The patient was located in a  state where the provider was licensed to provide care.         TREATMENT OPTIONS:       Medication Agreement Requirements Met  Continue Opioid therapy  Script written for  hydrocodone  Follow up appointment made

## 2022-02-03 ENCOUNTER — HOSPITAL ENCOUNTER (OUTPATIENT)
Dept: PAIN MANAGEMENT | Age: 67
Discharge: HOME | End: 2022-02-03
Payer: COMMERCIAL

## 2022-02-03 DIAGNOSIS — M79.7 FIBROMYALGIA: ICD-10-CM

## 2022-02-03 DIAGNOSIS — Z51.81 ENCOUNTER FOR MEDICATION MONITORING: ICD-10-CM

## 2022-02-03 DIAGNOSIS — M46.1 SACROILIITIS (HCC): ICD-10-CM

## 2022-02-03 DIAGNOSIS — M54.2 NECK PAIN: Chronic | ICD-10-CM

## 2022-02-03 DIAGNOSIS — M51.36 DDD (DEGENERATIVE DISC DISEASE), LUMBAR: ICD-10-CM

## 2022-02-03 DIAGNOSIS — M47.816 LUMBAR FACET ARTHROPATHY: ICD-10-CM

## 2022-02-03 DIAGNOSIS — G89.29 CHRONIC LEFT SHOULDER PAIN: Primary | Chronic | ICD-10-CM

## 2022-02-03 DIAGNOSIS — M25.512 CHRONIC LEFT SHOULDER PAIN: Primary | Chronic | ICD-10-CM

## 2022-02-03 PROCEDURE — 99213 OFFICE O/P EST LOW 20 MIN: CPT

## 2022-02-03 PROCEDURE — 99213 OFFICE O/P EST LOW 20 MIN: CPT | Performed by: NURSE PRACTITIONER

## 2022-02-03 RX ORDER — HYDROCODONE BITARTRATE AND ACETAMINOPHEN 5; 325 MG/1; MG/1
1 TABLET ORAL 2 TIMES DAILY PRN
Qty: 60 TABLET | Refills: 0 | Status: SHIPPED | OUTPATIENT
Start: 2022-02-06 | End: 2022-03-03 | Stop reason: SDUPTHER

## 2022-02-03 ASSESSMENT — ENCOUNTER SYMPTOMS
CONSTIPATION: 1
BACK PAIN: 1
RESPIRATORY NEGATIVE: 1

## 2022-03-02 NOTE — PROGRESS NOTES
Kendal 89 PROGRESS NOTE      Patient  completed []  video visit   []   phone call:         Minutes :   [x] in person visit to pain clinic    [x]    to  review Medication Agreement    []  Follow up after procedure   []  Discuss treatment options      Location:  Provider:  working from    []    home    [x]   [facility] - STEVEN GUSTAFSON ,   patient at   [x] pain clinic     []  home         Chief Complaint:  Lower back and left knee      She c/o low back pain which dies not radiate . She had cervical and lumbar MRI done 5-7-2019 which read as \" multilevel degenerative changes cervical, lower thoracic and lumbar spine\":She has tried lyrica stopped due to weight gain and gabapentin was causing drowsiness. Pt also experienced side effects with Topamax. She has done PT in the past, She states has prescription for PT  For dry needling. She c/o left knee pain which  Has increased. She states needs knee replacement surgery. She complketed antibiotic therapy for sinus infection. Back Pain  This is a chronic problem. The current episode started more than 1 year ago. The problem occurs intermittently. The problem is unchanged. The pain is present in the lumbar spine. The quality of the pain is described as aching (dull). The pain does not radiate. The pain is at a severity of 4/10. The pain is moderate. The pain is the same all the time. Exacerbated by: walking. Risk factors: osteopenia. She has tried heat and analgesics (standing straight ) for the symptoms. The treatment provided mild relief. Knee Pain   The pain is present in the left knee. The pain is at a severity of 4/10. The pain is moderate. The pain has been intermittent since onset. Associated symptoms include a loss of motion. She reports no foreign bodies present. Exacerbated by: walking. Treatments tried: pain med. The treatment provided mild relief.            Treatment goals:  Functional status: perform daily functions      Aberrancy:   Any alcoholic beverages     no       Any illegal drugs  no       Analgesia:      4               Adverse  Effects :      ADL;s :stretching, does home exercises      Data:    When was thelast UDS:    4-7-21        Was the UDS appropriate:  [x] yes []   no      Record/Diagnostics Review:      As above, I did review the imaging     DRUG SCREEN, PAIN  Order: [id number]   Status: Final result     Visible to patient: Yes (not seen)     Next appt: 03/03/2022 at 01:00 PM in Pain Management Marly BROWN, APRN - CNP)     Dx: Neck pain; DDD (degenerative disc dis. ..     0 Result Notes    Component Ref Range & Units 4/7/21 1315 1/22/20 1345 10/28/19 1600 10/23/18 1400 6/29/18 1442 7/7/17 1330 3/3/16 1301   Pain Management Drug Panel Interp, Urine  Consistent ARUP  Consistent CMARUP  Consistent CM  Consistent CMARUP  Inconsistent CM  Consistent CM  Inconsistent CM    Comment: (NOTE)   ________________________________________________________________   DRUGS EXPECTED:   Kraigann Sous (HYDROCODONE)   ________________________________________________________________   [id number] with medications provided:   Laurann Sous (HYDROCODONE): based on norhydrocodone   ________________________________________________________________   Drugs Not Included in this Assay:   Acetaminophen   ________________________________________________________________   INTERPRETIVE INFORMATION: Targeted drug profile Interp   Interpretation depends on accuracy and completeness of patient   medication information submitted by client.     6-Acetylmorphine, Ur  Not Detected ARUP  Not Detected ARUP  Not Detected                 EXAMINATION:   MRI OF THE CERVICAL SPINE WITHOUT CONTRAST; MRI OF THE LUMBAR SPINE WITHOUT   CONTRAST 5/7/2019 4:25 pm; 5/7/2019 4:24 pm       TECHNIQUE:   Multiplanar multisequence MRI of the cervical spine was performed without the   administration of intravenous contrast.; Multiplanar multisequence MRI of the   lumbar spine was performed without the administration of intravenous contrast.       COMPARISON:   None.       HISTORY:   ORDERING SYSTEM PROVIDED HISTORY: Neck pain; ORDERING SYSTEM PROVIDED   HISTORY: Lumbar facet arthropathy       FINDINGS:   Cervical spine       BONES/ALIGNMENT: Straightening of the normal lordotic curvature is noted. There is no fracture.       SPINAL CORD: No focal cord signal hyperintensity is noted.       SOFT TISSUES: Small cervical nodes are present.       C2-C3: Minimal disc bulging is noted.  Mild facet hypertrophic changes are   noted       C3-C4: Mild disc bulging is noted.  Small left proximal foraminal protrusion   is noted.  Endplate, uncovertebral and facet hypertrophic changes are noted. Moderate left foraminal narrowing is noted.       C4-C5: Mild-to-moderate diffuse disc bulging is noted. Ephriam Osorio is a   questionable small proximal foraminal protrusion on the right.  Endplate,   uncovertebral and facet hypertrophic changes are noted.  Severe right and   moderate left foraminal narrowing is noted.  Ventral cord flattening is   noted.  Mild narrowing of the canal is noted       C5-C6: Mild disc bulging is noted diffusely. Ephriam Osorio is a small proximal   foraminal protrusion on the left.  Endplate, uncovertebral and facet   hypertrophic changes are noted.  Minimal right and moderate left foraminal   narrowing is noted       C6-C7: Mild disc bulging is noted diffusely.  Endplate, uncovertebral and   facet hypertrophic changes are noted.  These are greater on the left. Moderate to severe left and mild-to-moderate right foraminal narrowing is   noted.       C7-T1: Very minimal disc bulging is noted.  Endplate, uncovertebral and facet   hypertrophic changes are noted. Ephriam Osorio is a suggested small left foraminal   protrusion.  There mild-to-moderate left foraminal narrowing.       MRI lumbar spine:       Bones: Mild lordotic curvature is noted.  There is no acute fracture. Minimal edema in the left L5 pedicle is noted.  Minimal T12 retrolisthesis is   noted.  Minimal L5 retrolisthesis is noted degenerative endplate signal   changes are greatest at T12-L1.       Soft tissues: No retroperitoneal mass lesion is noted.       Cord: Conus terminates at L1-L2 and is normal in appearance.       T12-L1: Uncovering of the disc is noted.  Minimal disc bulging is noted. There is a small proximal foraminal protrusion on the right.       L1-2: Facet and ligament hypertrophic changes are noted       L2-3: Mild disc bulging is noted diffusely.  Annular tear is noted.  Left   foraminal broad-based protrusion is noted.  Minimal right and moderate left   foraminal narrowing is noted.  Facet and ligament hypertrophic changes are   noted.  Mild narrowing of the canal is noted       L3-4: Minimal disc bulging is noted diffusely.  Foraminal protrusions are   noted, left greater than right.  Mild right and mild-to-moderate left   foraminal narrowing is noted.  Facet and ligament hypertrophic changes are   noted.  Severe narrowing of the canal is noted       Mild disc bulging is noted with annular tear.  There is a small central and   right paracentral protrusion with minimal upward migration of extruded disc   material.  Mild to moderate proximal foraminal narrowing on the right is   noted.  Facet and ligament hypertrophic changes are noted.  Severe narrowing   of the canal is noted       L5-S1: Mild disc bulging is noted diffusely with annular tear.  Endplate   hypertrophic changes are noted.  Proximal foraminal narrowing is noted   bilaterally.  There is a questionable small proximal foraminal protrusion on   the left.  Bilateral facet hypertrophic changes are noted.           Impression   Cervical spine:       Multilevel degenerative disc disease in the cervical spine as described.  See   above for details of each level.       Lumbar spine:       Multilevel degenerative disc disease in the lower thoracic spine and the   lumbar spine as described. Outpatient Medications:     HYDROcodone-acetaminophen (NORCO) 5-325 MG per tablet, Take 1 tablet by mouth 2 times daily as needed for Pain for up to 30 days. , Disp: 60 tablet, Rfl: 0    Multiple Vitamin (MULTIVITAMIN ADULT PO), Take by mouth, Disp: , Rfl:     meloxicam (MOBIC) 15 MG tablet, take 1 tablet by mouth once daily, Disp: 90 tablet, Rfl: 1    levothyroxine (SYNTHROID) 50 MCG tablet, take 1 tablet by mouth once daily, Disp: 90 tablet, Rfl: 1    venlafaxine (EFFEXOR XR) 150 MG extended release capsule, take 1 capsule by mouth once daily, Disp: 90 capsule, Rfl: 1    magnesium oxide (MAG-OX) 400 MG tablet, take 1 tablet by mouth twice a day, Disp: , Rfl:     famotidine (PEPCID) 20 MG tablet, Take 20 mg by mouth 2 times daily, Disp: , Rfl:     amoxicillin (AMOXIL) 500 MG capsule, take 4 capsules by mouth 1 hour prior to appointment, Disp: , Rfl:     calcium carbonate-vitamin D (CALTRATE) 600-400 MG-UNIT TABS per tab, Take by mouth, Disp: , Rfl:     Tens Unit MISC, by Does not apply route For low back pain, Disp: 1 each, Rfl: 0    loratadine (CLARITIN) 10 MG capsule, Take 10 mg by mouth daily, Disp: , Rfl:     Polyethylene Glycol 3350 (MIRALAX PO), Take by mouth Taking daily, Disp: , Rfl:     flecainide (TAMBOCOR) 50 MG tablet, Take 50 mg by mouth 2 times daily. , Disp: , Rfl:     Family History   Problem Relation Age of Onset    Cancer Mother         pancreatic cancer 78    Heart Disease Mother     COPD Father     COPD Brother     Breast Cancer Maternal Grandmother         dx unknown age    Husain Colon Cancer Maternal Grandfather         66-80s        Social History     Socioeconomic History    Marital status:       Spouse name: Not on file    Number of children: Not on file    Years of education: Not on file    Highest education level: Not on file   Occupational History    Occupation: [employer] disability    Tobacco Use    Smoking status: Never Smoker    Smokeless tobacco: Never Used   Vaping Use    Vaping Use: Never used   Substance and Sexual Activity    Alcohol use: No     Alcohol/week: 0.0 standard drinks     Comment: one to two drinks a month    Drug use: No    Sexual activity: Yes     Partners: Male     Birth control/protection: Surgical     Comment: hyst   Other Topics Concern    Not on file   Social History Narrative    Not on file     Social Determinants of Health     Financial Resource Strain: Low Risk     Difficulty of Paying Living Expenses: Not hard at all   Food Insecurity: No Food Insecurity    Worried About Running Out of Food in the Last Year: Never true    [place] of Food in the Last Year: Never true   Transportation Needs:     Lack of Transportation (Medical): Not on file    Lack of Transportation (Non-Medical): Not on file   Physical Activity:     Days of Exercise per Week: Not on file    Minutes of Exercise per Session: Not on file   Stress:     Feeling of Stress : Not on file   Social Connections:     Frequency of Communication with Friends and Family: Not on file    Frequency of Social Gatherings with Friends and Family: Not on file    Attends Judaism Services: Not on file    Active Member of [address] [place] [address] or Organizations: Not on file    Attends Club or Organization Meetings: Not on file    Marital Status: Not on file   Intimate Partner Violence:     Fear of Current or Ex-Partner: Not on file    Emotionally Abused: Not on file    Physically Abused: Not on file    Sexually Abused: Not on file   Housing Stability:     Unable to Pay for Housing in the Last Year: Not on file    Number of [place] in the Last Year: Not on file    Unstable Housing in the Last Year: Not on file         Review of Systems:  Review of Systems   Constitutional: Positive for malaise/fatigue. HENT: Negative. Eyes:        Glasses   Cardiovascular: Negative. Respiratory: Negative. Endocrine: Negative. Hematologic/Lymphatic: Bruises/bleeds easily. Skin: Negative. We will continue to have discussions to decrease pain medications as tolerated. Counseled patient on effects their pain medication and /or their medical condition mayhave on their  ability to drive or operate machinery. Instructed not to drive or operate machinery if drowsy     I also discussed with the patient regarding the dangers of combining narcotic pain medication with tranquilizers, alcohol or illegal drugs or taking the medication any way other than prescribed. The dangers were discussed  including respiratory depression and death. Patient was told to tell  all  physicians regarding the medications he is getting from pain clinic. Patient is warned not to take any unprescribed medications over-the-countermedications that can depress breathing . Patient is advised to talk to the pharmacist or physicians if planning to take any over-the-counter medications before  takeing them. Patient is strongly advised to avoid tranquilizers or  relaxants, illegal drugs  or any medications that can depress breathing  Patient is also advised to tell us if there is any changes in their medications from other physicians.     1. Med contract signed    TREATMENT OPTIONS:       Medication Agreement Requirements Met  Continue Opioid therapy  Script written for  hydrocodone  Follow up appointment made

## 2022-03-03 ENCOUNTER — HOSPITAL ENCOUNTER (OUTPATIENT)
Dept: PAIN MANAGEMENT | Age: 67
Discharge: HOME | End: 2022-03-03
Payer: COMMERCIAL

## 2022-03-03 VITALS
DIASTOLIC BLOOD PRESSURE: 41 MMHG | RESPIRATION RATE: 20 BRPM | HEIGHT: 62 IN | WEIGHT: 230 LBS | SYSTOLIC BLOOD PRESSURE: 156 MMHG | TEMPERATURE: 96.9 F | HEART RATE: 85 BPM | OXYGEN SATURATION: 97 % | BODY MASS INDEX: 42.33 KG/M2

## 2022-03-03 DIAGNOSIS — Z51.81 ENCOUNTER FOR MEDICATION MONITORING: ICD-10-CM

## 2022-03-03 DIAGNOSIS — M51.36 DDD (DEGENERATIVE DISC DISEASE), LUMBAR: Primary | ICD-10-CM

## 2022-03-03 DIAGNOSIS — M46.1 SACROILIITIS (HCC): ICD-10-CM

## 2022-03-03 DIAGNOSIS — M47.816 LUMBAR FACET ARTHROPATHY: ICD-10-CM

## 2022-03-03 DIAGNOSIS — M79.7 FIBROMYALGIA: ICD-10-CM

## 2022-03-03 PROCEDURE — 99213 OFFICE O/P EST LOW 20 MIN: CPT

## 2022-03-03 PROCEDURE — 99213 OFFICE O/P EST LOW 20 MIN: CPT | Performed by: NURSE PRACTITIONER

## 2022-03-03 RX ORDER — HYDROCODONE BITARTRATE AND ACETAMINOPHEN 5; 325 MG/1; MG/1
1 TABLET ORAL 2 TIMES DAILY PRN
Qty: 60 TABLET | Refills: 0 | Status: SHIPPED | OUTPATIENT
Start: 2022-03-09 | End: 2022-04-05 | Stop reason: SDUPTHER

## 2022-03-03 ASSESSMENT — ENCOUNTER SYMPTOMS
RESPIRATORY NEGATIVE: 1
BACK PAIN: 1
CONSTIPATION: 1

## 2022-04-05 ENCOUNTER — HOSPITAL ENCOUNTER (OUTPATIENT)
Dept: PAIN MANAGEMENT | Age: 67
Discharge: HOME | End: 2022-04-05
Payer: COMMERCIAL

## 2022-04-05 VITALS
TEMPERATURE: 97.5 F | BODY MASS INDEX: 42.33 KG/M2 | OXYGEN SATURATION: 95 % | DIASTOLIC BLOOD PRESSURE: 87 MMHG | WEIGHT: 230 LBS | SYSTOLIC BLOOD PRESSURE: 157 MMHG | HEIGHT: 62 IN | HEART RATE: 78 BPM

## 2022-04-05 DIAGNOSIS — M47.816 LUMBAR FACET ARTHROPATHY: ICD-10-CM

## 2022-04-05 DIAGNOSIS — M51.36 DDD (DEGENERATIVE DISC DISEASE), LUMBAR: ICD-10-CM

## 2022-04-05 DIAGNOSIS — M79.7 FIBROMYALGIA: ICD-10-CM

## 2022-04-05 DIAGNOSIS — Z51.81 ENCOUNTER FOR MEDICATION MONITORING: Primary | ICD-10-CM

## 2022-04-05 PROCEDURE — 99213 OFFICE O/P EST LOW 20 MIN: CPT | Performed by: NURSE PRACTITIONER

## 2022-04-05 PROCEDURE — 80307 DRUG TEST PRSMV CHEM ANLYZR: CPT

## 2022-04-05 PROCEDURE — 99213 OFFICE O/P EST LOW 20 MIN: CPT

## 2022-04-05 RX ORDER — HYDROCODONE BITARTRATE AND ACETAMINOPHEN 5; 325 MG/1; MG/1
1 TABLET ORAL 2 TIMES DAILY PRN
Qty: 60 TABLET | Refills: 0 | Status: SHIPPED | OUTPATIENT
Start: 2022-04-08 | End: 2022-05-03 | Stop reason: SDUPTHER

## 2022-04-05 ASSESSMENT — ENCOUNTER SYMPTOMS
BACK PAIN: 1
ABDOMINAL PAIN: 0
BOWEL INCONTINENCE: 0
SHORTNESS OF BREATH: 0
COUGH: 0
CONSTIPATION: 0

## 2022-04-05 ASSESSMENT — PAIN DESCRIPTION - FREQUENCY: FREQUENCY: INTERMITTENT

## 2022-04-05 ASSESSMENT — PAIN DESCRIPTION - PROGRESSION: CLINICAL_PROGRESSION: NOT CHANGED

## 2022-04-05 ASSESSMENT — PAIN DESCRIPTION - LOCATION: LOCATION: BACK

## 2022-04-05 ASSESSMENT — PAIN SCALES - GENERAL: PAINLEVEL_OUTOF10: 3

## 2022-04-05 ASSESSMENT — PAIN DESCRIPTION - DESCRIPTORS: DESCRIPTORS: ACHING

## 2022-04-05 ASSESSMENT — PAIN DESCRIPTION - PAIN TYPE: TYPE: CHRONIC PAIN

## 2022-04-05 ASSESSMENT — PAIN DESCRIPTION - ORIENTATION: ORIENTATION: LOWER

## 2022-04-05 NOTE — PROGRESS NOTES
Chief Complaint   Patient presents with    Back Pain    Medication Refill     Norco         PMH    Pt reports chronic history of back pain, states she was diagnosed with fibromyalgia. Pain has been worse with colder [place]. She does see a chiropractor with adjustements heat and TENS tx which helps to control the pain.  Pt has tried lyrica but stopped due to weight gain and gabapentin was causing drowsiness. Pt also experienced side effects with Topamax. She has limited ROM left shoulder.  XR done 4/18/19 shows no acute abnormality. Cervical MRI shows Multilevel degenerative disc disease in the cervical spine. She is not interested in injections - does not feel she has much relief from them.     She is having worsening pain in the left knee. She was told she would would need it replaced in the future. She has had steroid injections with some benefit.       She had an order for dry needling but out-of-pocket cost was prohibitive. She plans to do aquatics at home when the weather gets warm. Back Pain  This is a chronic problem. The current episode started more than 1 year ago. The problem occurs intermittently. The problem is unchanged. The pain is present in the lumbar spine and gluteal. The pain radiates to the right thigh and left thigh. The pain is at a severity of 3/10. The pain is moderate. The pain is worse during the day. The symptoms are aggravated by standing (walking). Associated symptoms include leg pain. Pertinent negatives include no abdominal pain, bladder incontinence, bowel incontinence, chest pain, dysuria, fever, headaches, numbness, pelvic pain, tingling or weakness. Risk factors include history of cancer (breast cancer). She has tried chiropractic manipulation, heat, ice, NSAIDs, muscle relaxant and analgesics (physical therapy) for the symptoms. The treatment provided moderate (heat, pain creams) relief. Knee Pain   The incident occurred more than 1 week ago.  The pain is present in the left knee. The quality of the pain is described as aching. The pain is at a severity of 3/10. The pain is mild. The pain has been fluctuating since onset. Pertinent negatives include no numbness or tingling. The symptoms are aggravated by movement and weight bearing. She has tried non-weight bearing and rest (CBD oil) for the symptoms. The treatment provided mild relief. Patient denies any new neurological symptoms. No bowel or bladder incontinence, no weakness, and no falling. Pill count: appropriate / Nargis Duran 8 tabs due 4/8    Morphine equivalent: 10    Controlled Substance Monitoring:    Acute and Chronic Pain Monitoring:   RX Monitoring 4/5/2022   Attestation -   Acute Pain Prescriptions -   Periodic Controlled Substance Monitoring Possible medication side effects, risk of tolerance/dependence & alternative treatments discussed. ;No signs of potential drug abuse or diversion identified.;Obtaining appropriate analgesic effect of treatment.    Chronic Pain > 50 MEDD -   Chronic Pain > 80 MEDD -                Past Medical History:   Diagnosis Date    Anxiety     [facility] [address]     Breast cancer ([address]     Cancer ([address]     breast, in remission    Chronic back pain 12/3/2013    Depression     DJD (degenerative joint disease)     Family history of breast cancer     MGM    Fibromyalgia     Fibromyalgia     Fibromyalgia     History of hysterectomy     Hypothyroidism     Obesity, Class III, BMI 40-49.9 (morbid obesity) ([address] 7/22/2014    Osteoarthritis     Sacroiliitis (HCC)     SVT (supraventricular tachycardia) ([address]     Unspecified sleep apnea        Past Surgical History:   Procedure Laterality Date    ABDOMEN SURGERY      APPENDECTOMY      BREAST SURGERY      lumpectomy w radiation 2009    CARDIAC CATHETERIZATION      with ablation    CARPAL TUNNEL RELEASE Left 10/22/2020    COLONOSCOPY  2009    10 years    COLONOSCOPY  08/2017   Husain FINGER SURGERY  10/2017    trigger finger right hand    HERNIA REPAIR      umbilical    HYSTERECTOMY      total    JOINT REPLACEMENT      Right Knee 2014    KNEE ARTHROSCOPY      bilateral    KNEE SURGERY      x2    LAPAROSCOPIC APPENDECTOMY Right     TONSILLECTOMY      UMBILICAL HERNIA REPAIR N/A     WRIST SURGERY  04/12/2018    plate and screws right wrist       Allergies   Allergen Reactions    Augmentin [Amoxicillin-Pot Clavulanate] Nausea And Vomiting     Projectile vomiting    Sulfa Antibiotics Anaphylaxis    Amoxapine And Related Diarrhea and Nausea And Vomiting     AMOX TR-K- 875-125 mg    Loxapine Succinate Diarrhea and Nausea Only     AMOX TR-K- 875-125 mg         Current Outpatient Medications:     HYDROcodone-acetaminophen (NORCO) 5-325 MG per tablet, Take 1 tablet by mouth 2 times daily as needed for Pain for up to 30 days. , Disp: 60 tablet, Rfl: 0    Multiple Vitamin (MULTIVITAMIN ADULT PO), Take by mouth, Disp: , Rfl:     meloxicam (MOBIC) 15 MG tablet, take 1 tablet by mouth once daily, Disp: 90 tablet, Rfl: 1    levothyroxine (SYNTHROID) 50 MCG tablet, take 1 tablet by mouth once daily, Disp: 90 tablet, Rfl: 1    venlafaxine (EFFEXOR XR) 150 MG extended release capsule, take 1 capsule by mouth once daily, Disp: 90 capsule, Rfl: 1    magnesium oxide (MAG-OX) 400 MG tablet, take 1 tablet by mouth twice a day, Disp: , Rfl:     famotidine (PEPCID) 20 MG tablet, Take 20 mg by mouth 2 times daily, Disp: , Rfl:     amoxicillin (AMOXIL) 500 MG capsule, take 4 capsules by mouth 1 hour prior to appointment, Disp: , Rfl:     calcium carbonate-vitamin D (CALTRATE) 600-400 MG-UNIT TABS per tab, Take by mouth, Disp: , Rfl:     Tens Unit MISC, by Does not apply route For low back pain, Disp: 1 each, Rfl: 0    loratadine (CLARITIN) 10 MG capsule, Take 10 mg by mouth daily, Disp: , Rfl:     Polyethylene Glycol 3350 (MIRALAX PO), Take by mouth Taking daily, Disp: , Rfl:     flecainide (TAMBOCOR) 50 MG tablet, Take 50 mg by mouth 2 times daily. , Disp: , Rfl:     Family History   Problem Relation Age of Onset    Cancer Mother         pancreatic cancer 78    Heart Disease Mother     COPD Father     COPD Brother     Breast Cancer Maternal Grandmother         dx unknown age    Husain Colon Cancer Maternal Grandfather         66-80s        Social History     Socioeconomic History    Marital status:      Spouse name: Not on file    Number of children: Not on file    Years of education: Not on file    Highest education level: Not on file   Occupational History    Occupation: [employer] disability    Tobacco Use    Smoking status: Never Smoker    Smokeless tobacco: Never Used   Vaping Use    Vaping Use: Never used   Substance and Sexual Activity    Alcohol use: No     Alcohol/week: 0.0 standard drinks     Comment: one to two drinks a month    Drug use: No    Sexual activity: Yes     Partners: Male     Birth control/protection: Surgical     Comment: hyst   Other Topics Concern    Not on file   Social History Narrative    Not on file     Social Determinants of Health     Financial Resource Strain: Low Risk     Difficulty of Paying Living Expenses: Not hard at all   Food Insecurity: No Food Insecurity    Worried About Running Out of Food in the Last Year: Never true    [place] of Food in the Last Year: Never true   Transportation Needs:     Lack of Transportation (Medical): Not on file    Lack of Transportation (Non-Medical):  Not on file   Physical Activity:     Days of Exercise per Week: Not on file    Minutes of Exercise per Session: Not on file   Stress:     Feeling of Stress : Not on file   Social Connections:     Frequency of Communication with Friends and Family: Not on file    Frequency of Social Gatherings with Friends and Family: Not on file    Attends Spiritism Services: Not on file    Active Member of Clubs or Organizations: Not on file    Attends Club or Organization Meetings: Not on file    Marital Status: Not on file   Intimate Partner Violence:     Fear of Current or Ex-Partner: Not on file    Emotionally Abused: Not on file    Physically Abused: Not on file    Sexually Abused: Not on file   Housing Stability:     Unable to Pay for Housing in the Last Year: Not on file    Number of [place] in the Last Year: Not on file    Unstable Housing in the Last Year: Not on file       Review of Systems:  Review of Systems   Constitutional: Negative for chills and fever. Cardiovascular: Negative for chest pain and palpitations. Respiratory: Negative for cough and shortness of breath. Musculoskeletal: Positive for back pain and joint pain. Gastrointestinal: Negative for abdominal pain, bowel incontinence and constipation. Genitourinary: Negative for bladder incontinence, dysuria and pelvic pain. Neurological: Negative for disturbances in coordination, headaches, loss of balance, numbness, tingling and weakness. Physical Exam:  BP (!) 157/87   Pulse 78   Temp 97.5 °F (36.4 °C)   Ht 5' 2\" (1.575 m)   Wt 230 lb (104.3 kg)   LMP 06/13/2010 (Within Months)   SpO2 95%   BMI 42.07 kg/m²     Physical Exam  HENT:      Head: Normocephalic. Pulmonary:      Effort: Pulmonary effort is normal.   Musculoskeletal:         General: Normal range of motion. Cervical back: Normal range of motion. Lumbar back: Tenderness present. Left knee: Tenderness present. Skin:     General: Skin is warm and dry. Neurological:      Mental Status: She is alert and oriented to person, place, and time.          Record/Diagnostics Review:    Last annabella 4/2021 and was appropriate     Assessment:  Problem List Items Addressed This Visit     Fibromyalgia    Relevant Medications    HYDROcodone-acetaminophen ([address]) 5-325 MG per tablet (Start on 4/8/2022)    Other Relevant Orders    DRUG SCREEN, PAIN    Lumbar facet arthropathy    Relevant Medications    HYDROcodone-acetaminophen (NORCO) 5-325 MG per tablet (Start on 4/8/2022)    Other Relevant Orders    DRUG SCREEN, PAIN    DDD (degenerative disc disease), lumbar    Relevant Medications    HYDROcodone-acetaminophen (NORCO) 5-325 MG per tablet (Start on 4/8/2022)    Encounter for medication monitoring - Primary    Relevant Orders    DRUG SCREEN, PAIN             Treatment Plan:  Patient relates current medications are helping the pain. Patient reports taking pain medications as prescribed, denies obtaining medications from different sources and denies use of illegal drugs. Patient denies side effects from medications like nausea, vomiting, constipation or drowsiness. Patient reports current activities of daily living are possible due to medications and would like to continue them. As always, we encourage daily stretching and strengthening exercises, and recommend minimizing use of pain medications unless patient cannot get through daily activities due to pain. Contract requirements met. Continue opioid therapy. Script written for norco  Plans to start HEP  UDS for standard monitoring purposes  Follow up appointment made for 4 weeks    I have reviewed the chief complaint and history of present illness (including ROS and PFSH) and vital documentation by my staff and I agree with their documentation and have added where applicable.

## 2022-04-09 LAB
6-ACETYLMORPHINE, UR: NOT DETECTED
7-AMINOCLONAZEPAM, URINE: NOT DETECTED
ALPHA-OH-ALPRAZ, URINE: NOT DETECTED
ALPHA-OH-MIDAZOLAM, URINE: NOT DETECTED
ALPRAZOLAM, URINE: NOT DETECTED
AMPHETAMINES, URINE: NOT DETECTED
BARBITURATES, URINE: NOT DETECTED
BENZOYLECGONINE, UR: NOT DETECTED
BUPRENORPHINE URINE: NOT DETECTED
CARISOPRODOL, UR: NOT DETECTED
CLONAZEPAM, URINE: NOT DETECTED
CODEINE, URINE: NOT DETECTED
CREATININE URINE: 57.6 MG/DL (ref 20–400)
DIAZEPAM, URINE: NOT DETECTED
DRUGS EXPECTED, UR: NORMAL
EER HI RES INTERP UR: NORMAL
ETHYL GLUCURONIDE UR: NOT DETECTED
FENTANYL URINE: NOT DETECTED
GABAPENTIN: NOT DETECTED
HYDROCODONE, URINE: PRESENT
HYDROMORPHONE, URINE: NOT DETECTED
LORAZEPAM, URINE: NOT DETECTED
MARIJUANA METAB, UR: NOT DETECTED
MDA, UR: NOT DETECTED
MDEA, EVE, UR: NOT DETECTED
MDMA URINE: NOT DETECTED
MEPERIDINE METAB, UR: NOT DETECTED
METHADONE, URINE: NOT DETECTED
METHAMPHETAMINE, URINE: NOT DETECTED
METHYLPHENIDATE: NOT DETECTED
MIDAZOLAM, URINE: NOT DETECTED
MORPHINE URINE: NOT DETECTED
NALOXONE URINE: NOT DETECTED
NORBUPRENORPHINE, URINE: NOT DETECTED
NORDIAZEPAM, URINE: NOT DETECTED
NORFENTANYL, URINE: NOT DETECTED
NORHYDROCODONE, URINE: PRESENT
NOROXYCODONE, URINE: NOT DETECTED
NOROXYMORPHONE, URINE: NOT DETECTED
OXAZEPAM, URINE: NOT DETECTED
OXYCODONE URINE: NOT DETECTED
OXYMORPHONE, URINE: NOT DETECTED
PAIN MANAGEMENT DRUG PANEL INTERP, URINE: NORMAL
PAIN MGT DRUG PANEL, HI RES, UR: NORMAL
PCP,URINE: NOT DETECTED
PHENTERMINE, UR: NOT DETECTED
PREGABALIN: NOT DETECTED
TAPENTADOL, URINE: NOT DETECTED
TAPENTADOL-O-SULFATE, URINE: NOT DETECTED
TEMAZEPAM, URINE: NOT DETECTED
TRAMADOL, URINE: NOT DETECTED
ZOLPIDEM METABOLITE (ZCA), URINE: NOT DETECTED
ZOLPIDEM, URINE: NOT DETECTED

## 2022-05-03 ENCOUNTER — HOSPITAL ENCOUNTER (OUTPATIENT)
Dept: PAIN MANAGEMENT | Age: 67
Discharge: HOME | End: 2022-05-03
Payer: COMMERCIAL

## 2022-05-03 VITALS
SYSTOLIC BLOOD PRESSURE: 136 MMHG | HEIGHT: 62 IN | DIASTOLIC BLOOD PRESSURE: 85 MMHG | WEIGHT: 235 LBS | TEMPERATURE: 97.7 F | OXYGEN SATURATION: 92 % | HEART RATE: 77 BPM | BODY MASS INDEX: 43.24 KG/M2

## 2022-05-03 DIAGNOSIS — Z51.81 ENCOUNTER FOR MEDICATION MONITORING: ICD-10-CM

## 2022-05-03 DIAGNOSIS — M47.816 LUMBAR FACET ARTHROPATHY: ICD-10-CM

## 2022-05-03 DIAGNOSIS — G89.29 CHRONIC LEFT SHOULDER PAIN: Primary | Chronic | ICD-10-CM

## 2022-05-03 DIAGNOSIS — M54.2 NECK PAIN: Chronic | ICD-10-CM

## 2022-05-03 DIAGNOSIS — M25.512 CHRONIC LEFT SHOULDER PAIN: Primary | Chronic | ICD-10-CM

## 2022-05-03 DIAGNOSIS — M51.36 DDD (DEGENERATIVE DISC DISEASE), LUMBAR: ICD-10-CM

## 2022-05-03 DIAGNOSIS — M79.7 FIBROMYALGIA: ICD-10-CM

## 2022-05-03 PROCEDURE — 99213 OFFICE O/P EST LOW 20 MIN: CPT

## 2022-05-03 PROCEDURE — 99213 OFFICE O/P EST LOW 20 MIN: CPT | Performed by: NURSE PRACTITIONER

## 2022-05-03 RX ORDER — HYDROCODONE BITARTRATE AND ACETAMINOPHEN 5; 325 MG/1; MG/1
1 TABLET ORAL 2 TIMES DAILY PRN
Qty: 60 TABLET | Refills: 0 | Status: SHIPPED | OUTPATIENT
Start: 2022-05-08 | End: 2022-05-31 | Stop reason: SDUPTHER

## 2022-05-03 ASSESSMENT — ENCOUNTER SYMPTOMS
BACK PAIN: 1
SHORTNESS OF BREATH: 0
BOWEL INCONTINENCE: 0
COUGH: 0
CONSTIPATION: 0

## 2022-05-03 ASSESSMENT — PAIN SCALES - GENERAL: PAINLEVEL_OUTOF10: 5

## 2022-05-03 NOTE — PROGRESS NOTES
Chief Complaint:   Chief Complaint   Patient presents with    Back Pain    Medication Refill       PMH       Pt reports chronic history of back pain, has [facility] and [facility] with limited benefit. Also states she was diagnosed with fibromyalgia. Pain has been worse with colder [place]. She does see a chiropractor with adjustements heat and TENS tx which helps to control the pain.  Pt has tried lyrica but stopped due to weight gain and gabapentin was causing drowsiness. Pt also experienced side effects with Topamax. She has limited ROM left shoulder.  XR done 4/18/19 shows no acute abnormality. Cervical MRI shows Multilevel degenerative disc disease in the cervical spine. She is not interested in injections - does not feel she has much relief from them. HPI:     Back Pain  This is a chronic problem. The current episode started more than 1 year ago. The problem occurs constantly. The problem is unchanged. The pain is present in the lumbar spine. The quality of the pain is described as aching. The pain does not radiate. The pain is at a severity of 4/10. The pain is moderate. The pain is the same all the time. The symptoms are aggravated by standing. Stiffness is present in the morning. Pertinent negatives include no bladder incontinence, bowel incontinence, chest pain, fever, headaches, numbness, tingling or weakness. Risk factors include menopause, obesity, poor posture and lack of exercise. She has tried analgesics, bed rest, heat, walking and home exercises for the symptoms. The treatment provided mild relief. Patient denies any new neurological symptoms. No bowel or bladder incontinence, no weakness, and no falling.     Pill count: appropriate /12     Morphine equivalent: 10    Controlled Substance Monitoring:    Acute and Chronic Pain Monitoring:   RX Monitoring 5/3/2022   Attestation -   Acute Pain Prescriptions -   Periodic Controlled Substance Monitoring Possible medication side effects, risk of tolerance/dependence & alternative treatments discussed. ;No signs of potential drug abuse or diversion identified.;Obtaining appropriate analgesic effect of treatment. ;Assessed functional status. Chronic Pain > 50 MEDD -   Chronic Pain > 80 MEDD -         Periodic Controlled Substance Monitoring: Possible medication side effects, risk of tolerance/dependence & alternative treatments discussed. ,No signs of potential drug abuse or diversion identified. ,Obtaining appropriate analgesic effect of treatment. ,Assessed functional status.  Farhat Sauer, APRN - CNP)      Past Medical History:   Diagnosis Date    Anxiety     [facility]     Breast cancer [facility]     Cancer ([facility] [address]     breast, in remission    Chronic back pain 12/3/2013    Depression     DJD (degenerative joint disease)     Family history of breast cancer     MGM    Fibromyalgia     Fibromyalgia     Fibromyalgia     History of hysterectomy     Hypothyroidism     Obesity, Class III, BMI 40-49.9 (morbid obesity) ([facility] [address] 7/22/2014    Osteoarthritis     Sacroiliitis (HCC)     SVT (supraventricular tachycardia) ([facility] [facility]     Unspecified sleep apnea        Past Surgical History:   Procedure Laterality Date    ABDOMEN SURGERY      APPENDECTOMY      BREAST SURGERY      lumpectomy w radiation 2009    CARDIAC CATHETERIZATION      with ablation    CARPAL TUNNEL RELEASE Left 10/22/2020    COLONOSCOPY  2009    10 years    COLONOSCOPY  08/2017    FINGER SURGERY  10/2017    trigger finger right hand    HERNIA REPAIR      umbilical    HYSTERECTOMY      total    JOINT REPLACEMENT      Right Knee 2014    KNEE ARTHROSCOPY      bilateral    KNEE SURGERY      x2    LAPAROSCOPIC APPENDECTOMY Right     TONSILLECTOMY      UMBILICAL HERNIA REPAIR N/A     WRIST SURGERY  04/12/2018    plate and screws right wrist       Allergies   Allergen Reactions    Augmentin [Amoxicillin-Pot Clavulanate] Nausea And Vomiting     Projectile vomiting    Sulfa Antibiotics Anaphylaxis    Amoxapine And Related Diarrhea and Nausea And Vomiting     AMOX TR-K- 875-125 mg    Loxapine Succinate Diarrhea and Nausea Only     AMOX TR-K- 875-125 mg         Current Outpatient Medications:     [START ON 5/8/2022] HYDROcodone-acetaminophen (NORCO) 5-325 MG per tablet, Take 1 tablet by mouth 2 times daily as needed for Pain for up to 30 days. , Disp: 60 tablet, Rfl: 0    meloxicam (MOBIC) 15 MG tablet, take 1 tablet by mouth once daily, Disp: 90 tablet, Rfl: 1    Multiple Vitamin (MULTIVITAMIN ADULT PO), Take by mouth, Disp: , Rfl:     levothyroxine (SYNTHROID) 50 MCG tablet, take 1 tablet by mouth once daily, Disp: 90 tablet, Rfl: 1    venlafaxine (EFFEXOR XR) 150 MG extended release capsule, take 1 capsule by mouth once daily, Disp: 90 capsule, Rfl: 1    magnesium oxide (MAG-OX) 400 MG tablet, take 1 tablet by mouth twice a day, Disp: , Rfl:     famotidine (PEPCID) 20 MG tablet, Take 20 mg by mouth 2 times daily, Disp: , Rfl:     amoxicillin (AMOXIL) 500 MG capsule, take 4 capsules by mouth 1 hour prior to appointment, Disp: , Rfl:     calcium carbonate-vitamin D (CALTRATE) 600-400 MG-UNIT TABS per tab, Take by mouth, Disp: , Rfl:     Tens Unit MISC, by Does not apply route For low back pain, Disp: 1 each, Rfl: 0    loratadine (CLARITIN) 10 MG capsule, Take 10 mg by mouth daily, Disp: , Rfl:     Polyethylene Glycol 3350 (MIRALAX PO), Take by mouth Taking daily, Disp: , Rfl:     flecainide (TAMBOCOR) 50 MG tablet, Take 50 mg by mouth 2 times daily. , Disp: , Rfl:     Family History   Problem Relation Age of Onset    Cancer Mother         pancreatic cancer 78    Heart Disease Mother     COPD Father     COPD Brother     Breast Cancer Maternal Grandmother         dx unknown age    Amber Solum Colon Cancer Maternal Grandfather         66-80s        Social History     Socioeconomic History    Marital status:       Spouse name: Not on file    Number of children: Not on file  Years of education: Not on file    Highest education level: Not on file   Occupational History    Occupation: [employer] disability    Tobacco Use    Smoking status: Never Smoker    Smokeless tobacco: Never Used   Vaping Use    Vaping Use: Never used   Substance and Sexual Activity    Alcohol use: No     Alcohol/week: 0.0 standard drinks     Comment: one to two drinks a month    Drug use: No    Sexual activity: Yes     Partners: Male     Birth control/protection: Surgical     Comment: hyst   Other Topics Concern    Not on file   Social History Narrative    Not on file     Social Determinants of Health     Financial Resource Strain: Low Risk     Difficulty of Paying Living Expenses: Not hard at all   Food Insecurity: No Food Insecurity    Worried About Running Out of Food in the Last Year: Never true    [place] of Food in the Last Year: Never true   Transportation Needs:     Lack of Transportation (Medical): Not on file    Lack of Transportation (Non-Medical):  Not on file   Physical Activity:     Days of Exercise per Week: Not on file    Minutes of Exercise per Session: Not on file   Stress:     Feeling of Stress : Not on file   Social Connections:     Frequency of Communication with Friends and Family: Not on file    Frequency of Social Gatherings with Friends and Family: Not on file    Attends Jehovah's witness Services: Not on file    Active Member of [address] [place] [address] or Organizations: Not on file    Attends Club or Organization Meetings: Not on file    Marital Status: Not on file   Intimate Partner Violence:     Fear of Current or Ex-Partner: Not on file    Emotionally Abused: Not on file    Physically Abused: Not on file    Sexually Abused: Not on file   Housing Stability:     Unable to Pay for Housing in the Last Year: Not on file    Number of [place] in the Last Year: Not on file    Unstable Housing in the Last Year: Not on file       Review of Systems:  Review of Systems   Constitutional: Negative for chills and fever. Cardiovascular: Negative for chest pain. Respiratory: Negative for cough and shortness of breath. Musculoskeletal: Positive for back pain. Gastrointestinal: Negative for bowel incontinence and constipation. Genitourinary: Negative for bladder incontinence. Neurological: Negative for headaches, numbness, tingling and weakness. Physical Exam:  /85   Pulse 77   Temp 97.7 °F (36.5 °C)   Ht 5' 2\" (1.575 m)   Wt 235 lb (106.6 kg)   LMP 06/13/2010 (Within Months)   SpO2 92%   BMI 42.98 kg/m²     Physical Exam  Cardiovascular:      Rate and Rhythm: Normal rate. Pulmonary:      Effort: Pulmonary effort is normal.   Musculoskeletal:         General: Normal range of motion. Skin:     General: Skin is warm and dry. Neurological:      Mental Status: She is alert and oriented to person, place, and time. Record/Diagnostics Review:    Last annabella 4/22  and was appropriate     Assessment:  Problem List Items Addressed This Visit     Chronic left shoulder pain - Primary (Chronic)    Relevant Medications    HYDROcodone-acetaminophen (NORCO) 5-325 MG per tablet (Start on 5/8/2022)    Neck pain (Chronic)    Fibromyalgia    Relevant Medications    HYDROcodone-acetaminophen (NORCO) 5-325 MG per tablet (Start on 5/8/2022)    Lumbar facet arthropathy    Relevant Medications    HYDROcodone-acetaminophen (NORCO) 5-325 MG per tablet (Start on 5/8/2022)    DDD (degenerative disc disease), lumbar    Relevant Medications    HYDROcodone-acetaminophen (NORCO) 5-325 MG per tablet (Start on 5/8/2022)    Encounter for medication monitoring             Treatment Plan:  Patient relates current medications are helping the pain. Patient reports taking pain medications as prescribed, denies obtaining medications from different sources and denies use of illegal drugs. Patient denies side effects from medications like nausea, vomiting, constipation or drowsiness.  Patient reports current activities of daily living are possible due to medications and would like to continue them. As always, we encourage daily stretching and strengthening exercises, and recommend minimizing use of pain medications unless patient cannot get through daily activities due to pain. Contract requirements met. Continue opioid therapy. Script written for norco  Follow up appointment made for 4 weeks    I have reviewed the chief complaint and history of present illness (including ROS and PFSH) and vital documentation by my staff and I agree with their documentation and have added where applicable.

## 2022-05-31 ENCOUNTER — HOSPITAL ENCOUNTER (OUTPATIENT)
Dept: PAIN MANAGEMENT | Age: 67
Discharge: HOME | End: 2022-05-31
Payer: COMMERCIAL

## 2022-05-31 VITALS
OXYGEN SATURATION: 96 % | BODY MASS INDEX: 43.24 KG/M2 | RESPIRATION RATE: 16 BRPM | HEART RATE: 81 BPM | WEIGHT: 235 LBS | SYSTOLIC BLOOD PRESSURE: 133 MMHG | TEMPERATURE: 97.3 F | DIASTOLIC BLOOD PRESSURE: 72 MMHG | HEIGHT: 62 IN

## 2022-05-31 DIAGNOSIS — Z79.891 CHRONIC USE OF OPIATE FOR THERAPEUTIC PURPOSE: ICD-10-CM

## 2022-05-31 DIAGNOSIS — M51.36 DDD (DEGENERATIVE DISC DISEASE), LUMBAR: ICD-10-CM

## 2022-05-31 DIAGNOSIS — M54.2 NECK PAIN: Chronic | ICD-10-CM

## 2022-05-31 DIAGNOSIS — G89.29 CHRONIC BILATERAL LOW BACK PAIN WITHOUT SCIATICA: ICD-10-CM

## 2022-05-31 DIAGNOSIS — M15.9 PRIMARY OSTEOARTHRITIS INVOLVING MULTIPLE JOINTS: ICD-10-CM

## 2022-05-31 DIAGNOSIS — M79.7 FIBROMYALGIA: ICD-10-CM

## 2022-05-31 DIAGNOSIS — M25.512 CHRONIC LEFT SHOULDER PAIN: Primary | Chronic | ICD-10-CM

## 2022-05-31 DIAGNOSIS — G89.29 CHRONIC LEFT SHOULDER PAIN: Primary | Chronic | ICD-10-CM

## 2022-05-31 DIAGNOSIS — M47.816 LUMBAR FACET ARTHROPATHY: ICD-10-CM

## 2022-05-31 DIAGNOSIS — M54.50 CHRONIC BILATERAL LOW BACK PAIN WITHOUT SCIATICA: ICD-10-CM

## 2022-05-31 PROCEDURE — 99213 OFFICE O/P EST LOW 20 MIN: CPT | Performed by: NURSE PRACTITIONER

## 2022-05-31 PROCEDURE — 99213 OFFICE O/P EST LOW 20 MIN: CPT

## 2022-05-31 RX ORDER — MELOXICAM 15 MG/1
TABLET ORAL
Qty: 90 TABLET | Refills: 1 | Status: SHIPPED | OUTPATIENT
Start: 2022-05-31 | End: 2022-07-11 | Stop reason: SDUPTHER

## 2022-05-31 RX ORDER — HYDROCODONE BITARTRATE AND ACETAMINOPHEN 5; 325 MG/1; MG/1
1 TABLET ORAL 2 TIMES DAILY PRN
Qty: 60 TABLET | Refills: 0 | Status: SHIPPED | OUTPATIENT
Start: 2022-06-08 | End: 2022-07-05 | Stop reason: SDUPTHER

## 2022-05-31 ASSESSMENT — ENCOUNTER SYMPTOMS
CONSTIPATION: 1
COUGH: 0
BOWEL INCONTINENCE: 0
BACK PAIN: 1
SHORTNESS OF BREATH: 0

## 2022-05-31 ASSESSMENT — PAIN SCALES - GENERAL: PAINLEVEL_OUTOF10: 4

## 2022-05-31 NOTE — PROGRESS NOTES
Chief Complaint   Patient presents with    Back Pain    Medication Refill         PMH       Pt reports chronic history of back pain, has [facility] and [facility] with limited benefit. Also states she was diagnosed with fibromyalgia. Pain has been worse with colder [place]. She does see a chiropractor with adjustements heat and TENS tx which helps to control the pain.  Pt has tried lyrica but stopped due to weight gain and gabapentin was causing drowsiness. Pt also experienced side effects with Topamax. She has limited ROM left shoulder.  XR done 4/18/19 shows no acute abnormality. Cervical MRI shows Multilevel degenerative disc disease in the cervical spine. She is not interested in injections - does not feel she has much relief from them. HPI:     Back Pain  This is a chronic problem. The current episode started more than 1 year ago. The problem occurs constantly. The problem is unchanged. The pain is present in the lumbar spine. The quality of the pain is described as aching. The pain does not radiate. The pain is at a severity of 4/10. The pain is the same all the time. The symptoms are aggravated by bending, sitting and standing. Stiffness is present in the morning. Pertinent negatives include no bladder incontinence, bowel incontinence, chest pain, fever, headaches, leg pain, numbness, tingling or weakness. Risk factors include menopause, obesity, poor posture and lack of exercise. She has tried ice, heat, analgesics, home exercises and NSAIDs (topical cream, CBD oil) for the symptoms. The treatment provided mild relief. Patient denies any new neurological symptoms. No bowel or bladder incontinence, no weakness, and no falling.     Pill count: appropriate / Hydrocodone - 14 6/8    Morphine equivalent: 10    Controlled Substance Monitoring:    Acute and Chronic Pain Monitoring:   RX Monitoring 5/31/2022   Attestation -   Acute Pain Prescriptions -   Periodic Controlled Substance Monitoring Possible medication side effects, risk of tolerance/dependence & alternative treatments discussed. ;No signs of potential drug abuse or diversion identified. ;Assessed functional status. ;Obtaining appropriate analgesic effect of treatment. Chronic Pain > 50 MEDD -   Chronic Pain > 80 MEDD -         Periodic Controlled Substance Monitoring: Possible medication side effects, risk of tolerance/dependence & alternative treatments discussed. ,No signs of potential drug abuse or diversion identified. ,Assessed functional status. ,Obtaining appropriate analgesic effect of treatment.  Karen Arechiga, APRN - CNP)      Past Medical History:   Diagnosis Date    Anxiety     [facility]     [facility]     Cancer ([facility]     breast, in remission    Chronic back pain 12/3/2013    Depression     DJD (degenerative joint disease)     Family history of breast cancer     MGM    Fibromyalgia     Fibromyalgia     Fibromyalgia     History of hysterectomy     Hypothyroidism     Obesity, Class III, BMI 40-49.9 (morbid obesity) ([facility] 7/22/2014    Osteoarthritis     Sacroiliitis (HCC)     SVT (supraventricular tachycardia) ([facility]     Unspecified sleep apnea        Past Surgical History:   Procedure Laterality Date    ABDOMEN SURGERY      APPENDECTOMY      BREAST SURGERY      lumpectomy w radiation 2009    CARDIAC CATHETERIZATION      with ablation    CARPAL TUNNEL RELEASE Left 10/22/2020    COLONOSCOPY  2009    10 years    COLONOSCOPY  08/2017    FINGER SURGERY  10/2017    trigger finger right hand    HERNIA REPAIR      umbilical    HYSTERECTOMY      total    JOINT REPLACEMENT      Right Knee 2014    KNEE ARTHROSCOPY      bilateral    KNEE SURGERY      x2    LAPAROSCOPIC APPENDECTOMY Right     TONSILLECTOMY      UMBILICAL HERNIA REPAIR N/A     WRIST SURGERY  04/12/2018    plate and screws right wrist       Allergies   Allergen Reactions    Augmentin [Amoxicillin-Pot Clavulanate] Nausea And Vomiting Projectile vomiting    Sulfa Antibiotics Anaphylaxis    Amoxapine And Related Diarrhea and Nausea And Vomiting     AMOX TR-K- 875-125 mg    Loxapine Succinate Diarrhea and Nausea Only     AMOX TR-K- 875-125 mg         Current Outpatient Medications:     HYDROcodone-acetaminophen (NORCO) 5-325 MG per tablet, Take 1 tablet by mouth 2 times daily as needed for Pain for up to 30 days. , Disp: 60 tablet, Rfl: 0    meloxicam (MOBIC) 15 MG tablet, take 1 tablet by mouth once daily, Disp: 90 tablet, Rfl: 1    Multiple Vitamin (MULTIVITAMIN ADULT PO), Take by mouth, Disp: , Rfl:     levothyroxine (SYNTHROID) 50 MCG tablet, take 1 tablet by mouth once daily, Disp: 90 tablet, Rfl: 1    venlafaxine (EFFEXOR XR) 150 MG extended release capsule, take 1 capsule by mouth once daily, Disp: 90 capsule, Rfl: 1    magnesium oxide (MAG-OX) 400 MG tablet, take 1 tablet by mouth twice a day, Disp: , Rfl:     famotidine (PEPCID) 20 MG tablet, Take 20 mg by mouth 2 times daily, Disp: , Rfl:     amoxicillin (AMOXIL) 500 MG capsule, take 4 capsules by mouth 1 hour prior to appointment, Disp: , Rfl:     calcium carbonate-vitamin D (CALTRATE) 600-400 MG-UNIT TABS per tab, Take by mouth, Disp: , Rfl:     Tens Unit MISC, by Does not apply route For low back pain, Disp: 1 each, Rfl: 0    loratadine (CLARITIN) 10 MG capsule, Take 10 mg by mouth daily, Disp: , Rfl:     Polyethylene Glycol 3350 (MIRALAX PO), Take by mouth Taking daily, Disp: , Rfl:     flecainide (TAMBOCOR) 50 MG tablet, Take 50 mg by mouth 2 times daily. , Disp: , Rfl:     Family History   Problem Relation Age of Onset    Cancer Mother         pancreatic cancer 78    Heart Disease Mother     COPD Father     COPD Brother     Breast Cancer Maternal Grandmother         dx unknown age    [place] Colon Cancer Maternal Grandfather         66-80s        Social History     Socioeconomic History    Marital status:       Spouse name: Not on file    Number of children: Not on file    Years of education: Not on file    Highest education level: Not on file   Occupational History    Occupation: [employer] disability    Tobacco Use    Smoking status: Never Smoker    Smokeless tobacco: Never Used   Vaping Use    Vaping Use: Never used   Substance and Sexual Activity    Alcohol use: No     Alcohol/week: 0.0 standard drinks     Comment: one to two drinks a month    Drug use: No    Sexual activity: Yes     Partners: Male     Birth control/protection: Surgical     Comment: hyst   Other Topics Concern    Not on file   Social History Narrative    Not on file     Social Determinants of Health     Financial Resource Strain: Low Risk     Difficulty of Paying Living Expenses: Not hard at all   Food Insecurity: No Food Insecurity    Worried About Running Out of Food in the Last Year: Never true    [place] of Food in the Last Year: Never true   Transportation Needs:     Lack of Transportation (Medical): Not on file    Lack of Transportation (Non-Medical):  Not on file   Physical Activity:     Days of Exercise per Week: Not on file    Minutes of Exercise per Session: Not on file   Stress:     Feeling of Stress : Not on file   Social Connections:     Frequency of Communication with Friends and Family: Not on file    Frequency of Social Gatherings with Friends and Family: Not on file    Attends Yazidi Services: Not on file    Active Member of [address] [place] [address] or Organizations: Not on file    Attends Club or Organization Meetings: Not on file    Marital Status: Not on file   Intimate Partner Violence:     Fear of Current or Ex-Partner: Not on file    Emotionally Abused: Not on file    Physically Abused: Not on file    Sexually Abused: Not on file   Housing Stability:     Unable to Pay for Housing in the Last Year: Not on file    Number of [place] in the Last Year: Not on file    Unstable Housing in the Last Year: Not on file       Review of Systems:  Review of Systems   Constitutional: Negative for chills and fever. Cardiovascular: Negative for chest pain. Respiratory: Negative for cough and shortness of breath. Musculoskeletal: Positive for back pain. Gastrointestinal: Positive for constipation (uses miralax). Negative for bowel incontinence. Genitourinary: Negative for bladder incontinence. Neurological: Negative for headaches, numbness, tingling and weakness. Physical Exam:  /72   Pulse 81   Temp 97.3 °F (36.3 °C)   Resp 16   Ht 5' 2\" (1.575 m)   Wt 235 lb (106.6 kg)   LMP 06/13/2010 (Within Months)   SpO2 96%   BMI 42.98 kg/m²     Physical Exam  Cardiovascular:      Rate and Rhythm: Normal rate. Pulmonary:      Effort: Pulmonary effort is normal.   Musculoskeletal:         General: Normal range of motion. Skin:     General: Skin is warm and dry. Neurological:      Mental Status: She is alert and oriented to person, place, and time. Record/Diagnostics Review:    Last annabella 4/22  and was appropriate     Assessment:  Problem List Items Addressed This Visit     Chronic left shoulder pain - Primary (Chronic)    Neck pain (Chronic)    Chronic back pain    Chronic use of opiate for therapeutic purpose    DDD (degenerative disc disease), lumbar             Treatment Plan:  Patient relates current medications are helping the pain. Patient reports taking pain medications as prescribed, denies obtaining medications from different sources and denies use of illegal drugs. Patient denies side effects from medications like nausea, vomiting, constipation or drowsiness. Patient reports current activities of daily living are possible due to medications and would like to continue them. As always, we encourage daily stretching and strengthening exercises, and recommend minimizing use of pain medications unless patient cannot get through daily activities due to pain. Contract requirements met. Continue opioid therapy.  Script written for norco  Follow up appointment made for 4 weeks    I have reviewed the chief complaint and history of present illness (including ROS and [address]) and vital documentation by my staff and I agree with their documentation and have added where applicable.

## 2022-07-05 ENCOUNTER — TELEPHONE (OUTPATIENT)
Dept: PAIN MANAGEMENT | Age: 67
End: 2022-07-05

## 2022-07-05 DIAGNOSIS — M51.36 DDD (DEGENERATIVE DISC DISEASE), LUMBAR: ICD-10-CM

## 2022-07-05 DIAGNOSIS — M47.816 LUMBAR FACET ARTHROPATHY: ICD-10-CM

## 2022-07-05 DIAGNOSIS — M79.7 FIBROMYALGIA: ICD-10-CM

## 2022-07-05 RX ORDER — HYDROCODONE BITARTRATE AND ACETAMINOPHEN 5; 325 MG/1; MG/1
1 TABLET ORAL 2 TIMES DAILY PRN
Qty: 10 TABLET | Refills: 0 | Status: SHIPPED | OUTPATIENT
Start: 2022-07-08 | End: 2022-07-11 | Stop reason: SDUPTHER

## 2022-07-05 NOTE — TELEPHONE ENCOUNTER
Pt calls the office stating she will be out of medication before next appt.  Will route message to NP

## 2022-07-11 ENCOUNTER — HOSPITAL ENCOUNTER (OUTPATIENT)
Dept: PAIN MANAGEMENT | Age: 67
Discharge: HOME | End: 2022-07-11
Payer: COMMERCIAL

## 2022-07-11 VITALS — OXYGEN SATURATION: 97 % | SYSTOLIC BLOOD PRESSURE: 146 MMHG | DIASTOLIC BLOOD PRESSURE: 79 MMHG | TEMPERATURE: 97.5 F

## 2022-07-11 DIAGNOSIS — M46.1 SACROILIITIS (HCC): ICD-10-CM

## 2022-07-11 DIAGNOSIS — M47.816 LUMBAR FACET ARTHROPATHY: ICD-10-CM

## 2022-07-11 DIAGNOSIS — M79.7 FIBROMYALGIA: ICD-10-CM

## 2022-07-11 DIAGNOSIS — Z79.891 CHRONIC USE OF OPIATE FOR THERAPEUTIC PURPOSE: Primary | ICD-10-CM

## 2022-07-11 DIAGNOSIS — M15.9 PRIMARY OSTEOARTHRITIS INVOLVING MULTIPLE JOINTS: ICD-10-CM

## 2022-07-11 DIAGNOSIS — M51.36 DDD (DEGENERATIVE DISC DISEASE), LUMBAR: ICD-10-CM

## 2022-07-11 PROCEDURE — 99213 OFFICE O/P EST LOW 20 MIN: CPT

## 2022-07-11 PROCEDURE — 99213 OFFICE O/P EST LOW 20 MIN: CPT | Performed by: NURSE PRACTITIONER

## 2022-07-11 RX ORDER — MELOXICAM 15 MG/1
TABLET ORAL
Qty: 90 TABLET | Refills: 1 | Status: SHIPPED | OUTPATIENT
Start: 2022-07-11 | End: 2022-08-10 | Stop reason: SDUPTHER

## 2022-07-11 RX ORDER — HYDROCODONE BITARTRATE AND ACETAMINOPHEN 5; 325 MG/1; MG/1
1 TABLET ORAL 2 TIMES DAILY PRN
Qty: 10 TABLET | Refills: 0 | Status: SHIPPED | OUTPATIENT
Start: 2022-07-11 | End: 2022-07-14 | Stop reason: SDUPTHER

## 2022-07-11 ASSESSMENT — ENCOUNTER SYMPTOMS
BOWEL INCONTINENCE: 0
COUGH: 0
SHORTNESS OF BREATH: 0
BACK PAIN: 1
CONSTIPATION: 0

## 2022-07-11 NOTE — PROGRESS NOTES
Chief Complaint   Patient presents with    Back Pain    Medication Refill       PMH   Pt reports chronic history of back pain, has [facility] and [facility] with limited benefit. Also states she was diagnosed with fibromyalgia. Pain has been worse with colder [place]. She does see a chiropractor with adjustements heat and TENS tx which helps to control the pain.  Pt has tried lyrica but stopped due to weight gain and gabapentin was causing drowsiness. Pt also experienced side effects with Topamax. She has limited ROM left shoulder.  XR done 4/18/19 shows no acute abnormality. Cervical MRI shows Multilevel degenerative disc disease in the cervical spine. She is not interested in injections - does not feel she has much relief from them. Back Pain  This is a chronic problem. The current episode started more than 1 year ago. The problem occurs daily. The problem is unchanged. The pain is present in the lumbar spine. The quality of the pain is described as aching. The pain radiates to the right thigh. The pain is at a severity of 4/10. The pain is mild. The pain is worse during the day. The symptoms are aggravated by standing. Pertinent negatives include no bladder incontinence, bowel incontinence, chest pain, fever, headaches, numbness, tingling or weakness. She has tried heat and analgesics for the symptoms. The treatment provided mild relief. Patient denies any new neurological symptoms. No bowel or bladder incontinence, no weakness, and no falling. Pill count: appropriate Hydrocodone #4    Morphine equivalent: 10    Controlled Substance Monitoring:    Acute and Chronic Pain Monitoring:   RX Monitoring 7/11/2022   Attestation -   Acute Pain Prescriptions -   Periodic Controlled Substance Monitoring Possible medication side effects, risk of tolerance/dependence & alternative treatments discussed. ;No signs of potential drug abuse or diversion identified.;Obtaining appropriate analgesic effect of treatment. ADULT PO), Take by mouth, Disp: , Rfl:     levothyroxine (SYNTHROID) 50 MCG tablet, take 1 tablet by mouth once daily, Disp: 90 tablet, Rfl: 1    venlafaxine (EFFEXOR XR) 150 MG extended release capsule, take 1 capsule by mouth once daily, Disp: 90 capsule, Rfl: 1    magnesium oxide (MAG-OX) 400 MG tablet, take 1 tablet by mouth twice a day, Disp: , Rfl:     famotidine (PEPCID) 20 MG tablet, Take 20 mg by mouth 2 times daily, Disp: , Rfl:     amoxicillin (AMOXIL) 500 MG capsule, take 4 capsules by mouth 1 hour prior to appointment, Disp: , Rfl:     calcium carbonate-vitamin D (CALTRATE) 600-400 MG-UNIT TABS per tab, Take by mouth, Disp: , Rfl:     Tens Unit MISC, by Does not apply route For low back pain, Disp: 1 each, Rfl: 0    loratadine (CLARITIN) 10 MG capsule, Take 10 mg by mouth daily, Disp: , Rfl:     Polyethylene Glycol 3350 (MIRALAX PO), Take by mouth Taking daily, Disp: , Rfl:     flecainide (TAMBOCOR) 50 MG tablet, Take 50 mg by mouth 2 times daily. , Disp: , Rfl:     Family History   Problem Relation Age of Onset    Cancer Mother         pancreatic cancer 78    Heart Disease Mother     COPD Father     COPD Brother     Breast Cancer Maternal Grandmother         dx unknown age    [place] Colon Cancer Maternal Grandfather         66-80s        Social History     Socioeconomic History    Marital status:       Spouse name: Not on file    Number of children: Not on file    Years of education: Not on file    Highest education level: Not on file   Occupational History    Occupation: [employer] disability    Tobacco Use    Smoking status: Never Smoker    Smokeless tobacco: Never Used   Vaping Use    Vaping Use: Never used   Substance and Sexual Activity    Alcohol use: No     Alcohol/week: 0.0 standard drinks     Comment: one to two drinks a month    Drug use: No    Sexual activity: Yes     Partners: Male     Birth control/protection: Surgical     Comment: hyst   Other Topics Concern    Not on file   Social History Narrative    Not on file     Social Determinants of Health     Financial Resource Strain: Low Risk     Difficulty of Paying Living Expenses: Not hard at all   Food Insecurity: No Food Insecurity    Worried About Running Out of Food in the Last Year: Never true    [address] in the Last Year: Never true   Transportation Needs:     Lack of Transportation (Medical): Not on file    Lack of Transportation (Non-Medical): Not on file   Physical Activity:     Days of Exercise per Week: Not on file    Minutes of Exercise per Session: Not on file   Stress:     Feeling of Stress : Not on file   Social Connections:     Frequency of Communication with Friends and Family: Not on file    Frequency of Social Gatherings with Friends and Family: Not on file    Attends Hoahaoism Services: Not on file    Active Member of [address] [place] [address] or Organizations: Not on file    Attends Club or Organization Meetings: Not on file    Marital Status: Not on file   Intimate Partner Violence:     Fear of Current or Ex-Partner: Not on file    Emotionally Abused: Not on file    Physically Abused: Not on file    Sexually Abused: Not on file   Housing Stability:     Unable to Pay for Housing in the Last Year: Not on file    Number of [place] in the Last Year: Not on file    Unstable Housing in the Last Year: Not on file       Review of Systems:  Review of Systems   Constitutional: Negative for chills and fever. Cardiovascular: Negative for chest pain and palpitations. Respiratory: Negative for cough and shortness of breath. Musculoskeletal: Positive for back pain. Gastrointestinal: Negative for bowel incontinence and constipation. Genitourinary: Negative for bladder incontinence. Neurological: Negative for disturbances in coordination, headaches, loss of balance, numbness, tingling and weakness.        Physical Exam:  BP (!) 146/79   Temp 97.5 °F (36.4 °C) (Temporal)   LMP 06/13/2010 (Within Months)   SpO2 97%     Physical Exam  HENT:      Head: Normocephalic. Pulmonary:      Effort: Pulmonary effort is normal.   Musculoskeletal:         General: Normal range of motion. Cervical back: Normal range of motion. Lumbar back: Tenderness present. Skin:     General: Skin is warm and dry. Neurological:      Mental Status: She is alert and oriented to person, place, and time. Record/Diagnostics Review:    Last annabella 4/2022 and was appropriate     Assessment:  Problem List Items Addressed This Visit     Osteoarthritis    Relevant Medications    HYDROcodone-acetaminophen (NORCO) 5-325 MG per tablet    meloxicam (MOBIC) 15 MG tablet    Encounter for long-term opiate analgesic use - Primary    Fibromyalgia    Relevant Medications    HYDROcodone-acetaminophen (NORCO) 5-325 MG per tablet    meloxicam (MOBIC) 15 MG tablet    Lumbar facet arthropathy    Relevant Medications    HYDROcodone-acetaminophen (NORCO) 5-325 MG per tablet    Sacroiliitis (HCC)    Relevant Medications    HYDROcodone-acetaminophen (NORCO) 5-325 MG per tablet    meloxicam (MOBIC) 15 MG tablet    DDD (degenerative disc disease), lumbar    Relevant Medications    HYDROcodone-acetaminophen (NORCO) 5-325 MG per tablet    meloxicam (MOBIC) 15 MG tablet             Treatment Plan:  Patient relates current medications are helping the pain. Patient reports taking pain medications as prescribed, denies obtaining medications from different sources and denies use of illegal drugs. Patient denies side effects from medications like nausea, vomiting, constipation or drowsiness. Patient reports current activities of daily living are possible due to medications and would like to continue them. As always, we encourage daily stretching and strengthening exercises, and recommend minimizing use of pain medications unless patient cannot get through daily activities due to pain. Contract requirements met. Continue opioid therapy.  Script written for norco  Follow up appointment made for 4 weeks    I have reviewed the chief complaint and history of present illness (including ROS and PFSH) and vital documentation by my staff and I agree with their documentation and have added where applicable.

## 2022-07-14 ENCOUNTER — TELEPHONE (OUTPATIENT)
Dept: PAIN MANAGEMENT | Age: 67
End: 2022-07-14

## 2022-07-14 DIAGNOSIS — M51.36 DDD (DEGENERATIVE DISC DISEASE), LUMBAR: ICD-10-CM

## 2022-07-14 DIAGNOSIS — M79.7 FIBROMYALGIA: ICD-10-CM

## 2022-07-14 DIAGNOSIS — M47.816 LUMBAR FACET ARTHROPATHY: ICD-10-CM

## 2022-07-14 RX ORDER — HYDROCODONE BITARTRATE AND ACETAMINOPHEN 5; 325 MG/1; MG/1
1 TABLET ORAL 2 TIMES DAILY PRN
Qty: 60 TABLET | Refills: 0 | Status: SHIPPED | OUTPATIENT
Start: 2022-07-16 | End: 2022-08-10 | Stop reason: SDUPTHER

## 2022-07-14 NOTE — TELEPHONE ENCOUNTER
Patient leaves message stating she only received #10 Norco; was supposed to have a full 30-day script sent. Will route to [facility].

## 2022-08-10 ENCOUNTER — HOSPITAL ENCOUNTER (OUTPATIENT)
Dept: PAIN MANAGEMENT | Age: 67
Discharge: HOME | End: 2022-08-10
Payer: COMMERCIAL

## 2022-08-10 VITALS
OXYGEN SATURATION: 97 % | TEMPERATURE: 98.6 F | SYSTOLIC BLOOD PRESSURE: 145 MMHG | RESPIRATION RATE: 20 BRPM | HEART RATE: 71 BPM | DIASTOLIC BLOOD PRESSURE: 91 MMHG

## 2022-08-10 DIAGNOSIS — M47.816 LUMBAR FACET ARTHROPATHY: ICD-10-CM

## 2022-08-10 DIAGNOSIS — M51.36 DDD (DEGENERATIVE DISC DISEASE), LUMBAR: ICD-10-CM

## 2022-08-10 DIAGNOSIS — M79.7 FIBROMYALGIA: ICD-10-CM

## 2022-08-10 DIAGNOSIS — M15.9 PRIMARY OSTEOARTHRITIS INVOLVING MULTIPLE JOINTS: ICD-10-CM

## 2022-08-10 DIAGNOSIS — Z79.891 ENCOUNTER FOR LONG-TERM OPIATE ANALGESIC USE: Primary | ICD-10-CM

## 2022-08-10 PROCEDURE — 99213 OFFICE O/P EST LOW 20 MIN: CPT

## 2022-08-10 PROCEDURE — 99213 OFFICE O/P EST LOW 20 MIN: CPT | Performed by: NURSE PRACTITIONER

## 2022-08-10 RX ORDER — HYDROCODONE BITARTRATE AND ACETAMINOPHEN 5; 325 MG/1; MG/1
1 TABLET ORAL 2 TIMES DAILY PRN
Qty: 60 TABLET | Refills: 0 | Status: SHIPPED | OUTPATIENT
Start: 2022-08-16 | End: 2022-09-15 | Stop reason: SDUPTHER

## 2022-08-10 RX ORDER — MELOXICAM 15 MG/1
TABLET ORAL
Qty: 90 TABLET | Refills: 1 | Status: SHIPPED | OUTPATIENT
Start: 2022-08-10 | End: 2022-10-24 | Stop reason: SDUPTHER

## 2022-08-10 ASSESSMENT — PAIN SCALES - GENERAL
PAINLEVEL_OUTOF10: 6
PAINLEVEL_OUTOF10: 6

## 2022-08-10 ASSESSMENT — ENCOUNTER SYMPTOMS
BACK PAIN: 1
CONSTIPATION: 0
SHORTNESS OF BREATH: 0
COUGH: 0

## 2022-08-10 NOTE — PROGRESS NOTES
Chief Complaint   Patient presents with    Back Pain    Medication Refill         [facility]       HPI:     Back Pain  This is a chronic problem. The current episode started more than 1 year ago. The problem occurs constantly. The problem is unchanged. The pain is present in the lumbar spine and gluteal. The quality of the pain is described as aching. The pain radiates to the right thigh. The pain is at a severity of 4/10. The pain is moderate. The pain is Worse during the day. The symptoms are aggravated by position and standing. Pertinent negatives include no headaches, numbness, tingling or weakness. Risk factors include history of cancer and history of osteoporosis. She has tried home exercises, bed rest, heat, ice, walking, chiropractic manipulation, NSAIDs and muscle relaxant for the symptoms. The treatment provided no relief. Knee Pain   The incident occurred more than 1 week ago. Incident location: bone on bone. There was no injury mechanism. The pain is present in the left knee. The quality of the pain is described as aching. The pain is at a severity of 6/10. The pain is moderate. The pain has been Constant since onset. Associated symptoms include a loss of motion and muscle weakness. Pertinent negatives include no numbness or tingling. It is unknown if a foreign body is present. The symptoms are aggravated by weight bearing and movement. She has tried ice, heat, elevation, acetaminophen, rest and non-weight bearing for the symptoms. The treatment provided mild relief. Patient denies any new neurological symptoms. No bowel or bladder incontinence, no weakness, and no falling.     Pill count: hydrocodone 16    Morphine equivalent:            Past Medical History:   Diagnosis Date    Anxiety     Breast cancer ([facility] [address]     Breast cancer ([facility] [address]     Cancer ([facility] [address]     breast, in remission    Chronic back pain 12/3/2013    Depression     DJD (degenerative joint disease)     Family history of breast cancer MGM    Fibromyalgia     Fibromyalgia     Fibromyalgia     History of hysterectomy     Hypothyroidism     Obesity, Class III, BMI 40-49.9 (morbid obesity) ([facility] [address] 7/22/2014    Osteoarthritis     Sacroiliitis (HCC)     SVT (supraventricular tachycardia) ([facility] [address]     Unspecified sleep apnea        Past Surgical History:   Procedure Laterality Date    ABDOMEN SURGERY      APPENDECTOMY      BREAST SURGERY      lumpectomy w radiation 2009    CARDIAC CATHETERIZATION      with ablation    CARPAL TUNNEL RELEASE Left 10/22/2020    COLONOSCOPY  2009    10 years    COLONOSCOPY  08/2017    FINGER SURGERY  10/2017    trigger finger right hand    HERNIA REPAIR      umbilical    HYSTERECTOMY (CERVIX STATUS UNKNOWN)      total    JOINT REPLACEMENT      Right Knee 2014    KNEE ARTHROSCOPY      bilateral    KNEE SURGERY      x2    LAPAROSCOPIC APPENDECTOMY Right     TONSILLECTOMY      UMBILICAL HERNIA REPAIR N/A     WRIST SURGERY  04/12/2018    plate and screws right wrist       Allergies   Allergen Reactions    Augmentin [Amoxicillin-Pot Clavulanate] Nausea And Vomiting     Projectile vomiting    Sulfa Antibiotics Anaphylaxis    Amoxapine And Related Diarrhea and Nausea And Vomiting     AMOX TR-K- 875-125 mg    Loxapine Succinate Diarrhea and Nausea Only     AMOX TR-K- 875-125 mg         Current Outpatient Medications:     HYDROcodone-acetaminophen (NORCO) 5-325 MG per tablet, Take 1 tablet by mouth 2 times daily as needed for Pain for up to 30 days. , Disp: 60 tablet, Rfl: 0    meloxicam (MOBIC) 15 MG tablet, take 1 tablet by mouth once daily, Disp: 90 tablet, Rfl: 1    Multiple Vitamin (MULTIVITAMIN ADULT PO), Take by mouth, Disp: , Rfl:     levothyroxine (SYNTHROID) 50 MCG tablet, take 1 tablet by mouth once daily, Disp: 90 tablet, Rfl: 1    venlafaxine (EFFEXOR XR) 150 MG extended release capsule, take 1 capsule by mouth once daily, Disp: 90 capsule, Rfl: 1    magnesium oxide (MAG-OX) 400 MG tablet, take 1 tablet by mouth twice a day, Disp: , Rfl:     famotidine (PEPCID) 20 MG tablet, Take 20 mg by mouth 2 times daily, Disp: , Rfl:     amoxicillin (AMOXIL) 500 MG capsule, take 4 capsules by mouth 1 hour prior to appointment, Disp: , Rfl:     calcium carbonate-vitamin D (CALTRATE) 600-400 MG-UNIT TABS per tab, Take by mouth, Disp: , Rfl:     Tens Unit MISC, by Does not apply route For low back pain, Disp: 1 each, Rfl: 0    loratadine (CLARITIN) 10 MG capsule, Take 10 mg by mouth daily, Disp: , Rfl:     Polyethylene Glycol 3350 (MIRALAX PO), Take by mouth Taking daily, Disp: , Rfl:     flecainide (TAMBOCOR) 50 MG tablet, Take 50 mg by mouth 2 times daily. , Disp: , Rfl:     Family History   Problem Relation Age of Onset    Cancer Mother         pancreatic cancer 78    Heart Disease Mother     COPD Father     COPD Brother     Breast Cancer Maternal Grandmother         dx unknown age     Colon Cancer Maternal Grandfather         66-80s        Social History     Socioeconomic History    Marital status:       Spouse name: Not on file    Number of children: Not on file    Years of education: Not on file    Highest education level: Not on file   Occupational History    Occupation: [employer]    Tobacco Use    Smoking status: Never    Smokeless tobacco: Never   Vaping Use    Vaping Use: Never used   Substance and Sexual Activity    Alcohol use: No     Alcohol/week: 0.0 standard drinks     Comment: one to two drinks a month    Drug use: No    Sexual activity: Yes     Partners: Male     Birth control/protection: Surgical     Comment: hyst   Other Topics Concern    Not on file   Social History Narrative    Not on file     Social Determinants of Health     Financial Resource Strain: Low Risk     Difficulty of Paying Living Expenses: Not hard at all   Food Insecurity: No Food Insecurity    Worried About Running Out of Food in the Last Year: Never true    Ran Out of Food in the Last Year: Never true   Transportation Needs: Not on file   Physical Activity: Not on file   Stress: Not on file   Social Connections: Not on file   Intimate Partner Violence: Not on file   Housing Stability: Not on file       Review of Systems:  Review of Systems   Musculoskeletal:  Positive for back pain. Neurological:  Negative for headaches, numbness, tingling and weakness. Physical Exam:  [facility] 06/13/2010 (Within Months)     Physical Exam    Record/Diagnostics Review:    Last annabella  and was appropriate     Assessment:  Problem List Items Addressed This Visit    None         Treatment Plan:  Patient relates current medications are helping the pain. Patient reports taking pain medications as prescribed, denies obtaining medications from different sources and denies use of illegal drugs. Patient denies side effects from medications like nausea, vomiting, constipation or drowsiness. Patient reports current activities of daily living are possible due to medications and would like to continue them. As always, we encourage daily stretching and strengthening exercises, and recommend minimizing use of pain medications unless patient cannot get through daily activities due to pain. Contract requirements met. Continue opioid therapy. Script written for  Follow up appointment made for 4 weeks    I have reviewed the chief complaint and history of present illness (including ROS and PFSH) and vital documentation by my staff and I agree with their documentation and have added where applicable.

## 2022-08-10 NOTE — PROGRESS NOTES
Chief Complaint   Patient presents with    Back Pain    Medication Refill         PMH     Pt reports chronic history of back pain, has [facility] and [facility] with limited benefit. Also states she was diagnosed with fibromyalgia. Pain has been worse with colder temps. She does see a chiropractor with adjustements heat and TENS tx which helps to control the pain. Pt has tried lyrica but stopped due to weight gain and gabapentin was causing drowsiness. Pt also experienced side effects with Topamax. She has limited ROM left shoulder. XR done 4/18/19 shows no acute abnormality. Cervical MRI shows Multilevel degenerative disc disease in the cervical spine. She is not interested in injections - does not feel she has much relief from them. plans to join gym  with [employer] and try to lose weight   PT copay 45/ visit and cost prohibitive at this time      HPI:     Back Pain  This is a chronic problem. The current episode started more than 1 year ago. The problem occurs constantly. The problem is unchanged. The pain is present in the lumbar spine and gluteal. The quality of the pain is described as aching. The pain radiates to the right thigh. The pain is at a severity of 4/10. The pain is moderate. The pain is Worse during the day. The symptoms are aggravated by position and standing. Pertinent negatives include no chest pain, fever, headaches, numbness, tingling or weakness. Risk factors include history of cancer and history of osteoporosis. She has tried home exercises, bed rest, heat, ice, walking, chiropractic manipulation, NSAIDs and muscle relaxant for the symptoms. The treatment provided no relief. Knee Pain   The incident occurred more than 1 week ago. Incident location: bone on bone. There was no injury mechanism. The pain is present in the left knee. The quality of the pain is described as aching. The pain is at a severity of 6/10. The pain is moderate. The pain has been Constant since onset. Associated symptoms include a loss of motion and muscle weakness. Pertinent negatives include no numbness or tingling. It is unknown if a foreign body is present. The symptoms are aggravated by weight bearing and movement. She has tried ice, heat, elevation, acetaminophen, rest and non-weight bearing for the symptoms. The treatment provided mild relief. Patient denies any new neurological symptoms. No bowel or bladder incontinence, no weakness, and no falling. Pill count: hydrocodone 16 8/16    Morphine equivalent: 10    Controlled Substance Monitoring:    Acute and Chronic Pain Monitoring:   RX Monitoring 8/10/2022   Attestation -   Acute Pain Prescriptions -   Periodic Controlled Substance Monitoring Possible medication side effects, risk of tolerance/dependence & alternative treatments discussed. ;No signs of potential drug abuse or diversion identified. ;Assessed functional status. ;Obtaining appropriate analgesic effect of treatment. Chronic Pain > 50 MEDD -   Chronic Pain > 80 MEDD -          Periodic Controlled Substance Monitoring: Possible medication side effects, risk of tolerance/dependence & alternative treatments discussed., No signs of potential drug abuse or diversion identified. , Assessed functional status., Obtaining appropriate analgesic effect of treatment.  [place] Nalini, APRN - CNP)      Past Medical History:   Diagnosis Date    Anxiety     Breast cancer ([facility] [address]     Breast cancer ([facility] [address]     Cancer ([facility] [address]     breast, in remission    Chronic back pain 12/3/2013    Depression     DJD (degenerative joint disease)     Family history of breast cancer     MGM    Fibromyalgia     Fibromyalgia     Fibromyalgia     History of hysterectomy     Hypothyroidism     Obesity, Class III, BMI 40-49.9 (morbid obesity) ([facility] [address] 7/22/2014    Osteoarthritis     Sacroiliitis (HCC)     SVT (supraventricular tachycardia) ([facility] [address]     Unspecified sleep apnea        Past Surgical History:   Procedure Laterality Date ABDOMEN SURGERY      APPENDECTOMY      BREAST SURGERY      lumpectomy w radiation 2009    CARDIAC CATHETERIZATION      with ablation    CARPAL TUNNEL RELEASE Left 10/22/2020    COLONOSCOPY  2009    10 years    COLONOSCOPY  08/2017    FINGER SURGERY  10/2017    trigger finger right hand    HERNIA REPAIR      umbilical    HYSTERECTOMY (CERVIX STATUS UNKNOWN)      total    JOINT REPLACEMENT      Right Knee 2014    KNEE ARTHROSCOPY      bilateral    KNEE SURGERY      x2    LAPAROSCOPIC APPENDECTOMY Right     TONSILLECTOMY      UMBILICAL HERNIA REPAIR N/A     WRIST SURGERY  04/12/2018    plate and screws right wrist       Allergies   Allergen Reactions    Augmentin [Amoxicillin-Pot Clavulanate] Nausea And Vomiting     Projectile vomiting    Sulfa Antibiotics Anaphylaxis    Sulfadiazine Anaphylaxis    Amoxapine Nausea And Vomiting    Amoxapine And Related Diarrhea and Nausea And Vomiting     AMOX TR-K- 875-125 mg    Amoxicillin Nausea And Vomiting    Loxapine Succinate Diarrhea and Nausea Only     AMOX TR-K- 875-125 mg         Current Outpatient Medications:     HYDROcodone-acetaminophen (NORCO) 5-325 MG per tablet, Take 1 tablet by mouth 2 times daily as needed for Pain for up to 30 days. , Disp: 60 tablet, Rfl: 0    meloxicam (MOBIC) 15 MG tablet, take 1 tablet by mouth once daily, Disp: 90 tablet, Rfl: 1    Multiple Vitamin (MULTIVITAMIN ADULT PO), Take by mouth, Disp: , Rfl:     levothyroxine (SYNTHROID) 50 MCG tablet, take 1 tablet by mouth once daily, Disp: 90 tablet, Rfl: 1    venlafaxine (EFFEXOR XR) 150 MG extended release capsule, take 1 capsule by mouth once daily, Disp: 90 capsule, Rfl: 1    magnesium oxide (MAG-OX) 400 MG tablet, take 1 tablet by mouth twice a day, Disp: , Rfl:     famotidine (PEPCID) 20 MG tablet, Take 20 mg by mouth 2 times daily, Disp: , Rfl:     amoxicillin (AMOXIL) 500 MG capsule, take 4 capsules by mouth 1 hour prior to appointment, Disp: , Rfl:     calcium carbonate-vitamin D (CALTRATE) 600-400 MG-UNIT TABS per tab, Take by mouth, Disp: , Rfl:     Tens Unit MISC, by Does not apply route For low back pain, Disp: 1 each, Rfl: 0    loratadine (CLARITIN) 10 MG capsule, Take 10 mg by mouth daily, Disp: , Rfl:     Polyethylene Glycol 3350 (MIRALAX PO), Take by mouth Taking daily, Disp: , Rfl:     flecainide (TAMBOCOR) 50 MG tablet, Take 50 mg by mouth 2 times daily. , Disp: , Rfl:     Family History   Problem Relation Age of Onset    Cancer Mother         pancreatic cancer 78    Heart Disease Mother     COPD Father     COPD Brother     Breast Cancer Maternal Grandmother         dx unknown age     Colon Cancer Maternal Grandfather         66-80s        Social History     Socioeconomic History    Marital status:      Spouse name: Not on file    Number of children: Not on file    Years of education: Not on file    Highest education level: Not on file   Occupational History    Occupation: [employer]    Tobacco Use    Smoking status: Never    Smokeless tobacco: Never   Vaping Use    Vaping Use: Never used   Substance and Sexual Activity    Alcohol use: No     Alcohol/week: 0.0 standard drinks     Comment: one to two drinks a month    Drug use: No    Sexual activity: Yes     Partners: Male     Birth control/protection: Surgical     Comment: hyst   Other Topics Concern    Not on file   Social History Narrative    Not on file     Social Determinants of Health     Financial Resource Strain: Low Risk     Difficulty of Paying Living Expenses: Not hard at all   Food Insecurity: No Food Insecurity    Worried About Running Out of Food in the Last Year: Never true    Ran Out of Food in the Last Year: Never true   Transportation Needs: Not on file   Physical Activity: Not on file   Stress: Not on file   Social Connections: Not on file   Intimate Partner Violence: Not on file   Housing Stability: Not on file       Review of Systems:  Review of Systems   Constitutional: Negative for chills and fever. Cardiovascular:  Negative for chest pain. Respiratory:  Negative for cough and shortness of breath. Musculoskeletal:  Positive for arthritis, back pain, joint pain, muscle cramps, muscle weakness and myalgias. Gastrointestinal:  Negative for constipation. Neurological:  Negative for headaches, numbness, tingling and weakness. Physical Exam:  BP (!) 145/91   Pulse 71   Temp 98.6 °F (37 °C)   Resp 20   LMP 06/13/2010 (Within Months)   SpO2 97%     Physical Exam  Cardiovascular:      Rate and Rhythm: Normal rate. Pulmonary:      Effort: Pulmonary effort is normal.   Musculoskeletal:         General: Normal range of motion. Comments: Antalgic gait using cane    Skin:     General: Skin is warm and dry. Neurological:      Mental Status: She is alert and oriented to person, place, and time. Record/Diagnostics Review:    Last annabella  4/22 and was appropriate     Assessment:  Problem List Items Addressed This Visit       Encounter for long-term opiate analgesic use - Primary    Lumbar facet arthropathy    DDD (degenerative disc disease), lumbar            Treatment Plan:  Patient relates current medications are helping the pain. Patient reports taking pain medications as prescribed, denies obtaining medications from different sources and denies use of illegal drugs. Patient denies side effects from medications like nausea, vomiting, constipation or drowsiness. Patient reports current activities of daily living are possible due to medications and would like to continue them. As always, we encourage daily stretching and strengthening exercises, and recommend minimizing use of pain medications unless patient cannot get through daily activities due to pain. Contract requirements met. Continue opioid therapy.  Script written for norco  Follow up appointment made for 4 weeks    I have reviewed the chief complaint and history of present illness (including ROS and PFSH) and vital documentation by my staff and I agree with their documentation and have added where applicable.

## 2022-09-15 ENCOUNTER — HOSPITAL ENCOUNTER (OUTPATIENT)
Dept: PAIN MANAGEMENT | Age: 67
Discharge: HOME | End: 2022-09-15
Payer: COMMERCIAL

## 2022-09-15 VITALS — DIASTOLIC BLOOD PRESSURE: 93 MMHG | HEART RATE: 85 BPM | OXYGEN SATURATION: 97 % | SYSTOLIC BLOOD PRESSURE: 145 MMHG

## 2022-09-15 DIAGNOSIS — M47.816 LUMBAR FACET ARTHROPATHY: ICD-10-CM

## 2022-09-15 DIAGNOSIS — M79.7 FIBROMYALGIA: ICD-10-CM

## 2022-09-15 DIAGNOSIS — M51.36 DDD (DEGENERATIVE DISC DISEASE), LUMBAR: ICD-10-CM

## 2022-09-15 PROCEDURE — 99213 OFFICE O/P EST LOW 20 MIN: CPT

## 2022-09-15 PROCEDURE — 99213 OFFICE O/P EST LOW 20 MIN: CPT | Performed by: NURSE PRACTITIONER

## 2022-09-15 RX ORDER — HYDROCODONE BITARTRATE AND ACETAMINOPHEN 5; 325 MG/1; MG/1
1 TABLET ORAL 2 TIMES DAILY PRN
Qty: 60 TABLET | Refills: 0 | Status: SHIPPED | OUTPATIENT
Start: 2022-09-15 | End: 2022-10-05 | Stop reason: SDUPTHER

## 2022-09-15 ASSESSMENT — ENCOUNTER SYMPTOMS
BOWEL INCONTINENCE: 0
BACK PAIN: 1

## 2022-09-15 NOTE — PROGRESS NOTES
Chief Complaint   Patient presents with    Back Pain    [facility]     [facility]     Pt reports chronic history of back pain, has tried RONAN and RFA with limited benefit. Also states she was diagnosed with fibromyalgia. Pain is worse with colder temps. She does see a chiropractor with adjustements heat and TENS tx which helps to control the pain. Pt has tried lyrica but stopped due to weight gain and gabapentin was causing drowsiness. Pt also experienced side effects with Topamax. She has limited ROM left shoulder. XR done 4/18/19 shows no acute abnormality. Cervical MRI shows Multilevel degenerative disc disease in the cervical spine. She is not interested in injections - does not feel she has much relief from them. Pt has joined [employer] and trying to lose weight   PT copay $45/ visit and cost prohibitive at this time    HPI:   Back Pain  This is a chronic problem. The current episode started more than 1 year ago. The problem occurs constantly. The problem is unchanged. The pain is present in the gluteal, sacro-iliac and lumbar spine. The quality of the pain is described as aching. The pain radiates to the right thigh and left thigh. The pain is at a severity of 4/10. The pain is mild. The pain is The same all the time. The symptoms are aggravated by position. Pertinent negatives include no bladder incontinence, bowel incontinence, fever, leg pain, numbness or tingling. She has tried ice, muscle relaxant, NSAIDs, walking and heat for the symptoms. The treatment provided no relief. Patient denies any new neurological symptoms. No bowel or bladder incontinence, no weakness, and no falling.     Pill count: Norco 2    Morphine equivalent: 10    Controlled Substance Monitoring:    Acute and Chronic Pain Monitoring:   RX Monitoring 9/15/2022   Attestation -   Acute Pain Prescriptions -   Periodic Controlled Substance Monitoring Possible medication side effects, risk of tolerance/dependence & alternative treatments discussed. ;No signs of potential drug abuse or diversion identified. ;Assessed functional status. ;Obtaining appropriate analgesic effect of treatment. Chronic Pain > 50 MEDD -   Chronic Pain > 80 MEDD -          Periodic Controlled Substance Monitoring: Possible medication side effects, risk of tolerance/dependence & alternative treatments discussed., No signs of potential drug abuse or diversion identified. , Assessed functional status., Obtaining appropriate analgesic effect of treatment.  Joey Cheney APRN - CNP)      Past Medical History:   Diagnosis Date    Anxiety     Breast cancer ([facility]     Breast cancer ([facility]     Cancer ([facility]     breast, in remission    Chronic back pain 12/3/2013    Depression     DJD (degenerative joint disease)     Family history of breast cancer     MGM    Fibromyalgia     Fibromyalgia     Fibromyalgia     History of hysterectomy     Hypothyroidism     Obesity, Class III, BMI 40-49.9 (morbid obesity) ([facility] 7/22/2014    Osteoarthritis     Sacroiliitis ([facility]     [facility]     Unspecified sleep apnea        Past Surgical History:   Procedure Laterality Date    ABDOMEN SURGERY      APPENDECTOMY      BREAST SURGERY      lumpectomy w radiation 2009    CARDIAC CATHETERIZATION      with ablation    CARPAL TUNNEL RELEASE Left 10/22/2020    COLONOSCOPY  2009    10 years    COLONOSCOPY  08/2017    FINGER SURGERY  10/2017    trigger finger right hand    HERNIA REPAIR      umbilical    HYSTERECTOMY (CERVIX STATUS UNKNOWN)      total    JOINT REPLACEMENT      Right Knee 2014    KNEE ARTHROSCOPY      bilateral    KNEE SURGERY      x2    LAPAROSCOPIC APPENDECTOMY Right     TONSILLECTOMY      UMBILICAL HERNIA REPAIR N/A     WRIST SURGERY  04/12/2018    plate and screws right wrist       Allergies   Allergen Reactions    Augmentin [Amoxicillin-Pot Clavulanate] Nausea And Vomiting     Projectile vomiting    Sulfa Antibiotics Anaphylaxis    Sulfadiazine Anaphylaxis    Amoxapine Nausea And Vomiting    Amoxapine And Related Diarrhea and Nausea And Vomiting     AMOX TR-K- 875-125 mg    Amoxicillin Nausea And Vomiting    Loxapine Succinate Diarrhea and Nausea Only     AMOX TR-K- 875-125 mg         Current Outpatient Medications:     HYDROcodone-acetaminophen (NORCO) 5-325 MG per tablet, Take 1 tablet by mouth 2 times daily as needed for Pain for up to 30 days. , Disp: 60 tablet, Rfl: 0    venlafaxine (EFFEXOR XR) 150 MG extended release capsule, take 1 capsule by mouth once daily, Disp: 90 capsule, Rfl: 0    levothyroxine (SYNTHROID) 50 MCG tablet, take 1 tablet by mouth once daily, Disp: 90 tablet, Rfl: 0    meloxicam (MOBIC) 15 MG tablet, take 1 tablet by mouth once daily, Disp: 90 tablet, Rfl: 1    Multiple Vitamin (MULTIVITAMIN ADULT PO), Take by mouth, Disp: , Rfl:     magnesium oxide (MAG-OX) 400 MG tablet, take 1 tablet by mouth twice a day, Disp: , Rfl:     famotidine (PEPCID) 20 MG tablet, Take 20 mg by mouth 2 times daily, Disp: , Rfl:     amoxicillin (AMOXIL) 500 MG capsule, take 4 capsules by mouth 1 hour prior to appointment, Disp: , Rfl:     calcium carbonate-vitamin D (CALTRATE) 600-400 MG-UNIT TABS per tab, Take by mouth, Disp: , Rfl:     Tens Unit MISC, by Does not apply route For low back pain, Disp: 1 each, Rfl: 0    loratadine (CLARITIN) 10 MG capsule, Take 10 mg by mouth daily, Disp: , Rfl:     Polyethylene Glycol 3350 (MIRALAX PO), Take by mouth Taking daily, Disp: , Rfl:     flecainide (TAMBOCOR) 50 MG tablet, Take 50 mg by mouth 2 times daily. , Disp: , Rfl:     Family History   Problem Relation Age of Onset    Cancer Mother         pancreatic cancer 78    Heart Disease Mother     COPD Father     COPD Brother     Breast Cancer Maternal Grandmother         dx unknown age     Colon Cancer Maternal Grandfather         66-80s        Social History     Socioeconomic History    Marital status:       Spouse name: Not on file    Number of children: Not on file    Years of education: Not on file    Highest education level: Not on file   Occupational History    Occupation: [employer]    Tobacco Use    Smoking status: Never    Smokeless tobacco: Never   Vaping Use    Vaping Use: Never used   Substance and Sexual Activity    Alcohol use: No     Alcohol/week: 0.0 standard drinks     Comment: one to two drinks a month    Drug use: No    Sexual activity: Yes     Partners: Male     Birth control/protection: Surgical     Comment: hyst   Other Topics Concern    Not on file   Social History Narrative    Not on file     Social Determinants of Health     Financial Resource Strain: Low Risk     Difficulty of Paying Living Expenses: Not hard at all   Food Insecurity: No Food Insecurity    Worried About Running Out of Food in the Last Year: Never true    Ran Out of Food in the Last Year: Never true   Transportation Needs: Not on file   Physical Activity: Not on file   Stress: Not on file   Social Connections: Not on file   Intimate Partner Violence: Not on file   Housing Stability: Not on file       Review of Systems:  Review of Systems   Constitutional: Negative for fever. Musculoskeletal:  Positive for back pain. Gastrointestinal:  Negative for bowel incontinence. Genitourinary:  Negative for bladder incontinence. Neurological:  Negative for numbness and tingling. Physical Exam:  BP (!) 145/93   Pulse 85   LMP 06/13/2010 (Within Months)   SpO2 97%     Physical Exam  Cardiovascular:      Rate and Rhythm: Normal rate. Pulmonary:      Effort: Pulmonary effort is normal.   Musculoskeletal:         General: Normal range of motion. Skin:     General: Skin is warm and dry. Neurological:      Mental Status: She is alert and oriented to person, place, and time.        Record/Diagnostics Review:    Last annabella  4/22 and was appropriate     Assessment:  Problem List Items Addressed This Visit       Fibromyalgia    Relevant Medications HYDROcodone-acetaminophen (NORCO) 5-325 MG per tablet    Lumbar facet arthropathy    Relevant Medications    HYDROcodone-acetaminophen (NORCO) 5-325 MG per tablet    DDD (degenerative disc disease), lumbar    Relevant Medications    HYDROcodone-acetaminophen (NORCO) 5-325 MG per tablet          Treatment Plan:  Patient relates current medications are helping the pain. Patient reports taking pain medications as prescribed, denies obtaining medications from different sources and denies use of illegal drugs. Patient denies side effects from medications like nausea, vomiting, constipation or drowsiness. Patient reports current activities of daily living are possible due to medications and would like to continue them. As always, we encourage daily stretching and strengthening exercises, and recommend minimizing use of pain medications unless patient cannot get through daily activities due to pain. Contract requirements met. Continue opioid therapy. Script written for norco  Follow up appointment made for 4 weeks    I have reviewed the chief complaint and history of present illness (including ROS and PFSH) and vital documentation by my staff and I agree with their documentation and have added where applicable.

## 2022-10-05 ENCOUNTER — HOSPITAL ENCOUNTER (OUTPATIENT)
Dept: PAIN MANAGEMENT | Age: 67
Discharge: HOME | End: 2022-10-05
Payer: COMMERCIAL

## 2022-10-05 VITALS
HEART RATE: 80 BPM | OXYGEN SATURATION: 97 % | DIASTOLIC BLOOD PRESSURE: 80 MMHG | RESPIRATION RATE: 20 BRPM | SYSTOLIC BLOOD PRESSURE: 147 MMHG | TEMPERATURE: 97.6 F

## 2022-10-05 DIAGNOSIS — Z79.891 LONG-TERM CURRENT USE OF OPIATE ANALGESIC: ICD-10-CM

## 2022-10-05 DIAGNOSIS — M47.816 LUMBAR FACET ARTHROPATHY: ICD-10-CM

## 2022-10-05 DIAGNOSIS — M51.36 DDD (DEGENERATIVE DISC DISEASE), LUMBAR: Primary | ICD-10-CM

## 2022-10-05 DIAGNOSIS — E66.01 CLASS 3 SEVERE OBESITY WITH BODY MASS INDEX (BMI) OF 40.0 TO 44.9 IN ADULT, UNSPECIFIED OBESITY TYPE, UNSPECIFIED WHETHER SERIOUS COMORBIDITY PRESENT (HCC): ICD-10-CM

## 2022-10-05 DIAGNOSIS — M79.7 FIBROMYALGIA: ICD-10-CM

## 2022-10-05 PROCEDURE — 99214 OFFICE O/P EST MOD 30 MIN: CPT | Performed by: STUDENT IN AN ORGANIZED HEALTH CARE EDUCATION/TRAINING PROGRAM

## 2022-10-05 PROCEDURE — 99213 OFFICE O/P EST LOW 20 MIN: CPT

## 2022-10-05 RX ORDER — HYDROCODONE BITARTRATE AND ACETAMINOPHEN 5; 325 MG/1; MG/1
1 TABLET ORAL 2 TIMES DAILY PRN
Qty: 60 TABLET | Refills: 0 | Status: SHIPPED | OUTPATIENT
Start: 2022-10-15 | End: 2022-11-01 | Stop reason: SDUPTHER

## 2022-10-05 ASSESSMENT — PAIN SCALES - GENERAL: PAINLEVEL_OUTOF10: 4

## 2022-10-05 NOTE — PROGRESS NOTES
Chronic Pain Clinic Note     Encounter Date: 10/5/2022     SUBJECTIVE:  Chief Complaint   Patient presents with    Back Pain       History of Present Illness:   Lolita Neely is a 78 y.o. female who presents with back pain    Medication Refill: Hydrocodone 24    Current Complaints of Pain:   Location: back  Radiation: yes  Severity: 4  Pain Numerical Score -    Average: 5     Highest: 8  Lowest: 4  Character/Quality: Complains of pain that is aching  Timing: Constant  Associated symptoms: none  Numbness:  no    Weakness:  no    Exacerbating factors: walking,standing  Alleviating factors: sitting,heat  Length of time pain has been present: Started on years  Inciting event/injury: yes broken back at 13  Bowel/Bladder incontinence: no  Falls: no   Physical Therapy: yes     History of Interventions:   Surgery: No previous lumbar/cervical surgeries  Injections: None    Imaging:    Cervical and lumbar MRI 5/7/2019    Cervical spine:       Multilevel degenerative disc disease in the cervical spine as described. See   above for details of each level. Lumbar spine:       Multilevel degenerative disc disease in the lower thoracic spine and the   lumbar spine as described.   See above for details of each level       Past Medical History:   Diagnosis Date    Anxiety     Bre[facility] cancer ([facility]     Breast cancer ([facility]     Cancer ([facility]     breast, in remission    Chronic back pain 12/3/2013    Depression     DJD (degenerative joint disease)     Family history of breast cancer     MGM    Fibromyalgia     Fibromyalgia     Fibromyalgia     History of hysterectomy     Hypothyroidism     Obesity, Class III, BMI 40-49.9 (morbid obesity) ([facility] 7/22/2014    Osteoarthritis     Sacroiliitis ([facility])     SVT (supraventricular tachycardia) ([facility]     Unspecified sleep apnea        Past Surgical History:   Procedure Laterality Date    ABDOMEN SURGERY      APPENDECTOMY      BREAST SURGERY      lumpectomy w radiation 2009    CARDIAC CATHETERIZATION      with ablation    CARPAL TUNNEL RELEASE Left 10/22/2020    COLONOSCOPY  2009    10 years    COLONOSCOPY  08/2017    FINGER SURGERY  10/2017    trigger finger right hand    HERNIA REPAIR      umbilical    HYSTERECTOMY (CERVIX STATUS UNKNOWN)      total    JOINT REPLACEMENT      Right Knee 2014    KNEE ARTHROSCOPY      bilateral    KNEE SURGERY      x2    LAPAROSCOPIC APPENDECTOMY Right     TONSILLECTOMY      UMBILICAL HERNIA REPAIR N/A     WRIST SURGERY  04/12/2018    plate and screws right wrist       Family History   Problem Relation Age of Onset    Cancer Mother         pancreatic cancer 78    Heart Disease Mother     COPD Father     COPD Brother     Breast Cancer Maternal Grandmother         dx unknown age     Colon Cancer Maternal Grandfather         66-80s        Social History     Socioeconomic History    Marital status:       Spouse name: Not on file    Number of children: Not on file    Years of education: Not on file    Highest education level: Not on file   Occupational History    Occupation: [employer]    Tobacco Use    Smoking status: Never    Smokeless tobacco: Never   Vaping Use    Vaping Use: Never used   Substance and Sexual Activity    Alcohol use: No     Alcohol/week: 0.0 standard drinks     Comment: one to two drinks a month    Drug use: No    Sexual activity: Yes     Partners: Male     Birth control/protection: Surgical     Comment: hyst   Other Topics Concern    Not on file   Social History Narrative    Not on file     Social Determinants of Health     Financial Resource Strain: Low Risk     Difficulty of Paying Living Expenses: Not hard at all   Food Insecurity: No Food Insecurity    Worried About Running Out of Food in the Last Year: Never true    Ran Out of Food in the Last Year: Never true   Transportation Needs: Not on file   Physical Activity: Not on file   Stress: Not on file   Social Connections: Not on file   Intimate Partner Violence: Not on file   Housing Stability: Not on file       Medications & Allergies:   Current Outpatient Medications   Medication Instructions    amoxicillin (AMOXIL) 500 MG capsule take 4 capsules by mouth 1 hour prior to appointment    calcium carbonate-vitamin D (CALTRATE) 600-400 MG-UNIT TABS per tab Oral    famotidine (PEPCID) 20 mg, Oral, 2 TIMES DAILY    flecainide (TAMBOCOR) 50 mg, 2 TIMES DAILY    [START ON 10/15/2022] HYDROcodone-acetaminophen (NORCO) 5-325 MG per tablet 1 tablet, Oral, 2 TIMES DAILY PRN    levothyroxine (SYNTHROID) 50 MCG tablet take 1 tablet by mouth once daily    loratadine (CLARITIN) 10 mg, Oral, DAILY    magnesium oxide (MAG-OX) 400 MG tablet take 1 tablet by mouth twice a day    meloxicam (MOBIC) 15 MG tablet take 1 tablet by mouth once daily    Multiple Vitamin (MULTIVITAMIN ADULT PO) Oral    Polyethylene Glycol 3350 (MIRALAX PO) Oral, Taking daily     Tens Unit MISC Does not apply, For low back pain    venlafaxine (EFFEXOR XR) 150 MG extended release capsule take 1 capsule by mouth once daily       Allergies   Allergen Reactions    Augmentin [Amoxicillin-Pot Clavulanate] Nausea And Vomiting     Projectile vomiting    Sulfa Antibiotics Anaphylaxis    Sulfadiazine Anaphylaxis    Amoxapine Nausea And Vomiting    Amoxapine And Related Diarrhea and Nausea And Vomiting     AMOX TR-K- 875-125 mg    Amoxicillin Nausea And Vomiting    Loxapine Succinate Diarrhea and Nausea Only     AMOX TR-K- 875-125 mg       Review of Systems:   Constitutional: negative for weight changes or fevers  Cardiovascular: negative for chest pain, palpitations, irregular heart beat  Respiratory: negative for dyspnea, cough, wheezing  Gastrointestinal: negative for constipation, diarrhea, nausea  Genitourinary: negative for bowel/bladder incontinence   Musculoskeletal: positive for low back pain, left knee pain  Neurological: negative for radicular leg pain, leg weakness or numbness/tingling  Behavioral/Psych: negative for anxiety/depression Hematological: negative for abnormal bleeding, anticoagulation use or antiplatelet use  All other systems reviewed and are negative    OBJECTIVE:    Vitals:    10/05/22 1138   BP: (!) 147/80   Pulse: 80   Resp: 20   Temp: 97.6 °F (36.4 °C)   SpO2: 97%       PHYSICAL EXAM    GENERAL: No acute distress, pleasant, well-appearing  HEENT: Normocephalic, atraumatic, Pupils equal and round  CARDIOVASCULAR: Well perfused, No peripheral cyanosis  PULMONARY: Good chest wall excursion, breathing unlabored  PSYCH: Appropriate affect and insight, non-pressured speech  SKIN: No rashes or lesions  MUSCULOSKELETAL:  Inspection: The back and extremities are symmetric and aligned. Muscle bulk is normal in appearance. Palpation: There is tenderness to palpation along the lumbar paraspinal musculature bilaterally  Lumbar range of motion is full  NEUROMUSCULAR:  Patient ambulates unassisted  Gait is antalgic  Sensation to light touch is intact throughout lower extremities  Strength is full in lower extremities  No ankle clonus    Special Tests:  Lumbar facet loading is positive bilaterally  Seated straight leg raise is negative bilaterally    DIAGNOSIS:    ICD-10-CM    1. DDD (degenerative disc disease), lumbar  M51.36 HYDROcodone-acetaminophen (NORCO) 5-325 MG per tablet      2. Fibromyalgia  M79.7 HYDROcodone-acetaminophen (NORCO) 5-325 MG per tablet      3. Lumbar facet arthropathy  M47.816 HYDROcodone-acetaminophen (NORCO) 5-325 MG per tablet      4. Long-term current use of opiate analgesic  Z79.891       5. Class 3 severe obesity with body mass index (BMI) of 40.0 to 44.9 in adult, unspecified obesity type, unspecified whether serious comorbidity present [facility])  E66.01     Z68.41            ASSESSMENT:    Wolf Zavala is a 78 y. o.female who presents as follow-up for chronic low back pain. She has been seen through the pain management clinic for medication management as she has failed previous injection therapy.   She continues to receive opioids for her chronic pain needs. The patient's history and physical examination are consistent with lumbar degenerative disc disease and possible fibromyalgia. Neurologically, it appears the patient has full strength and normal sensation. There is no evidence of radiculopathy or myelopathy on examination. There are no red flags in the patient's history. The patient continues to take opioid medications to improve pain, function and quality of life. The patient denies any side effects from the medications including constipation or respiratory depression. The patient reports adequate analgesia with the medication. There is no evidence of aberrant behavior. PLAN:  Medications: For nonopioid therapy, the following medications were prescribed:    -Continue medication prescribed by primary care physician    Opioid therapy:  -Continue Norco 5/325 mg twice daily as needed, refilled  -Pain Treatment agreement: Up to date  -Urine Drug Screen: 4/5/2022, reviewed and appropriate  -OARRS reviewed and appropriate    Interventions:  -None    Imaging:  -Reviewed cervical and lumbar MRI with patient in room    Behavioral Therapies:  -Continue daily stretching and home exercise program    Referrals:  -None    Follow-up Plan:  -1 month    Patient was offered intervention where appropriate. Multi-modal Pain Therapy: The patient was explicitly considered for multimodal and interdisciplinary therapy. Non-opioid and non-pharmacological opportunities to enhance analgesia and quality of life have been and will continue to be pursued. Opioid Therapy: Education provided to patient regarding short term and long term implications of opioid medication use. Repeat opioid risk stratification today, discussion regarding functional achievements, safe storage, and optimization of non-opioid interventional, behavioral, and pharmaceutical modalities.  Will continue attempt to wean off medication as appropriate. Samson Bernal,   Interventional Pain Management/PM&R   [facility]    Orders Placed This Encounter    HYDROcodone-acetaminophen ([address]) 5-325 MG per tablet     Sig: Take 1 tablet by mouth 2 times daily as needed for Pain for up to 30 days.      Dispense:  60 tablet     Refill:  0     Reduce doses taken as pain becomes manageable

## 2022-11-01 ENCOUNTER — HOSPITAL ENCOUNTER (OUTPATIENT)
Dept: PAIN MANAGEMENT | Age: 67
Discharge: HOME | End: 2022-11-01
Payer: MEDICARE

## 2022-11-01 VITALS
WEIGHT: 235 LBS | SYSTOLIC BLOOD PRESSURE: 146 MMHG | DIASTOLIC BLOOD PRESSURE: 80 MMHG | BODY MASS INDEX: 43.24 KG/M2 | TEMPERATURE: 97.3 F | RESPIRATION RATE: 16 BRPM | HEIGHT: 62 IN | OXYGEN SATURATION: 97 %

## 2022-11-01 DIAGNOSIS — M51.36 DDD (DEGENERATIVE DISC DISEASE), LUMBAR: ICD-10-CM

## 2022-11-01 DIAGNOSIS — M25.512 CHRONIC LEFT SHOULDER PAIN: Chronic | ICD-10-CM

## 2022-11-01 DIAGNOSIS — M54.2 NECK PAIN: Chronic | ICD-10-CM

## 2022-11-01 DIAGNOSIS — M47.816 LUMBAR FACET ARTHROPATHY: ICD-10-CM

## 2022-11-01 DIAGNOSIS — G89.29 CHRONIC LEFT SHOULDER PAIN: Chronic | ICD-10-CM

## 2022-11-01 DIAGNOSIS — M54.50 CHRONIC BILATERAL LOW BACK PAIN WITHOUT SCIATICA: ICD-10-CM

## 2022-11-01 DIAGNOSIS — G89.29 CHRONIC BILATERAL LOW BACK PAIN WITHOUT SCIATICA: ICD-10-CM

## 2022-11-01 DIAGNOSIS — Z79.891 LONG-TERM CURRENT USE OF OPIATE ANALGESIC: Primary | ICD-10-CM

## 2022-11-01 PROCEDURE — 99213 OFFICE O/P EST LOW 20 MIN: CPT

## 2022-11-01 PROCEDURE — 99213 OFFICE O/P EST LOW 20 MIN: CPT | Performed by: NURSE PRACTITIONER

## 2022-11-01 RX ORDER — HYDROCODONE BITARTRATE AND ACETAMINOPHEN 5; 325 MG/1; MG/1
1 TABLET ORAL 2 TIMES DAILY PRN
Qty: 60 TABLET | Refills: 0 | Status: SHIPPED | OUTPATIENT
Start: 2022-11-16 | End: 2022-12-16

## 2022-11-01 ASSESSMENT — ENCOUNTER SYMPTOMS
SHORTNESS OF BREATH: 0
BACK PAIN: 1
COUGH: 0
CONSTIPATION: 0
BOWEL INCONTINENCE: 0

## 2022-11-01 ASSESSMENT — PAIN SCALES - GENERAL: PAINLEVEL_OUTOF10: 4

## 2022-11-01 NOTE — PROGRESS NOTES
Chief Complaint   Patient presents with    Back Pain     Med refill         PMH       Pt reports chronic history of back pain, has tried RONAN and RFA with limited benefit. Also states she was diagnosed with fibromyalgia. Pain is worse with colder temps. She does see a chiropractor with adjustements heat and TENS tx which helps to control the pain. Pt has tried lyrica but stopped due to weight gain and gabapentin was causing drowsiness. Pt also experienced side effects with Topamax. She has limited ROM left shoulder. XR done 4/18/19 shows no acute abnormality. Cervical MRI shows Multilevel degenerative disc disease in the cervical spine. She is not interested in injections - does not feel she has much relief from them. Pt has joined [employer] and trying to lose weight   PT copay $45/ visit and cost prohibitive at this time  Plans for knee replacement next year    HPI:     Back Pain  This is a chronic problem. The current episode started more than 1 year ago. The problem occurs intermittently. The problem is unchanged. The pain is present in the lumbar spine. The quality of the pain is described as aching. The pain radiates to the left foot, left thigh, left knee, right foot, right knee and right thigh. The pain is at a severity of 4/10. The pain is Worse during the day. The symptoms are aggravated by bending, sitting and standing. Pertinent negatives include no bladder incontinence, bowel incontinence, chest pain, fever, headaches, numbness, tingling or weakness. She has tried ice and heat (SIJ) for the symptoms. Patient denies any new neurological symptoms. No bowel or bladder incontinence, no weakness, and no falling.     Pill count: appropriate / Tarri Dub - 31    Morphine equivalent: 10    Controlled Substance Monitoring:    Acute and Chronic Pain Monitoring:   RX Monitoring 11/1/2022   Attestation -   Acute Pain Prescriptions -   Periodic Controlled Substance Monitoring Possible medication side effects, risk of tolerance/dependence & alternative treatments discussed. ;No signs of potential drug abuse or diversion identified. ;Assessed functional status. ;Obtaining appropriate analgesic effect of treatment. Chronic Pain > 50 MEDD -   Chronic Pain > 80 MEDD -          Periodic Controlled Substance Monitoring: Possible medication side effects, risk of tolerance/dependence & alternative treatments discussed., No signs of potential drug abuse or diversion identified. , Assessed functional status., Obtaining appropriate analgesic effect of treatment.  Napoleon Ruffin, APRN - CNP)      Past Medical History:   Diagnosis Date    Anxiety     Breast cancer ([facility]     Breast cancer ([facility] [address]     Cancer ([facility] [address]     breast, in remission    Chronic back pain 12/3/2013    Depression     DJD (degenerative joint disease)     Family history of breast cancer     MGM    Fibromyalgia     Fibromyalgia     Fibromyalgia     History of hysterectomy     Hypothyroidism     Obesity, Class III, BMI 40-49.9 (morbid obesity) ([facility] [address] 7/22/2014    Osteoarthritis     Sacroiliitis ([facility] [address]     SVT (supraventricular tachycardia) ([facility] [address]     Unspecified sleep apnea        Past Surgical History:   Procedure Laterality Date    ABDOMEN SURGERY      APPENDECTOMY      BREAST SURGERY      lumpectomy w radiation 2009    CARDIAC CATHETERIZATION      with ablation    CARPAL TUNNEL RELEASE Left 10/22/2020    COLONOSCOPY  2009    10 years    COLONOSCOPY  08/2017    FINGER SURGERY  10/2017    trigger finger right hand    HERNIA REPAIR      umbilical    HYSTERECTOMY (CERVIX STATUS UNKNOWN)      total    JOINT REPLACEMENT      Right Knee 2014    KNEE ARTHROSCOPY      bilateral    KNEE SURGERY      x2    LAPAROSCOPIC APPENDECTOMY Right     TONSILLECTOMY      UMBILICAL HERNIA REPAIR N/A     WRIST SURGERY  04/12/2018    plate and screws right wrist       Allergies   Allergen Reactions    Augmentin [Amoxicillin-Pot Clavulanate] Nausea And Vomiting     Projectile vomiting    Sulfa Antibiotics Anaphylaxis    Sulfadiazine Anaphylaxis    Amoxapine Nausea And Vomiting    Amoxapine And Related Diarrhea and Nausea And Vomiting     AMOX TR-K- 875-125 mg    Amoxicillin Nausea And Vomiting    Loxapine Succinate Diarrhea and Nausea Only     AMOX TR-K- 875-125 mg         Current Outpatient Medications:     ALPRAZolam (XANAX) 0.25 MG tablet, Take 1 tablet by mouth 3 times daily as needed for Anxiety for up to 30 days. , Disp: 15 tablet, Rfl: 0    levothyroxine (SYNTHROID) 50 MCG tablet, take 1 tablet by mouth once daily, Disp: 90 tablet, Rfl: 1    meloxicam (MOBIC) 15 MG tablet, take 1 tablet by mouth once daily, Disp: 90 tablet, Rfl: 1    venlafaxine (EFFEXOR XR) 150 MG extended release capsule, take 1 capsule by mouth once daily, Disp: 90 capsule, Rfl: 1    HYDROcodone-acetaminophen (NORCO) 5-325 MG per tablet, Take 1 tablet by mouth 2 times daily as needed for Pain for up to 30 days. , Disp: 60 tablet, Rfl: 0    Multiple Vitamin (MULTIVITAMIN ADULT PO), Take by mouth, Disp: , Rfl:     magnesium oxide (MAG-OX) 400 MG tablet, take 1 tablet by mouth twice a day, Disp: , Rfl:     famotidine (PEPCID) 20 MG tablet, Take 20 mg by mouth 2 times daily, Disp: , Rfl:     amoxicillin (AMOXIL) 500 MG capsule, take 4 capsules by mouth 1 hour prior to appointment, Disp: , Rfl:     calcium carbonate-vitamin D (CALTRATE) 600-400 MG-UNIT TABS per tab, Take by mouth, Disp: , Rfl:     Tens Unit MISC, by Does not apply route For low back pain, Disp: 1 each, Rfl: 0    loratadine (CLARITIN) 10 MG capsule, Take 10 mg by mouth daily, Disp: , Rfl:     Polyethylene Glycol 3350 (MIRALAX PO), Take by mouth Taking daily, Disp: , Rfl:     flecainide (TAMBOCOR) 50 MG tablet, Take 50 mg by mouth 2 times daily. , Disp: , Rfl:     Family History   Problem Relation Age of Onset    Cancer Mother         pancreatic cancer 78    Heart Disease Mother     COPD Father     COPD Brother     Breast Cancer Maternal Grandmother         dx unknown age Colon Cancer Maternal Grandfather         66-80s        Social History     Socioeconomic History    Marital status:      Spouse name: Not on file    Number of children: Not on file    Years of education: Not on file    Highest education level: Not on file   Occupational History    Occupation: [employer]    Tobacco Use    Smoking status: Never    Smokeless tobacco: Never   Vaping Use    Vaping Use: Never used   Substance and Sexual Activity    Alcohol use: No     Alcohol/week: 0.0 standard drinks     Comment: one to two drinks a month    Drug use: No    Sexual activity: Yes     Partners: Male     Birth control/protection: Surgical     Comment: hyst   Other Topics Concern    Not on file   Social History Narrative    Not on file     Social Determinants of Health     Financial Resource Strain: Low Risk     Difficulty of Paying Living Expenses: Not hard at all   Food Insecurity: No Food Insecurity    Worried About Running Out of Food in the Last Year: Never true    [address] in the Last Year: Never true   Transportation Needs: Not on file   Physical Activity: Insufficiently Active    Days of Exercise per Week: 2 days    Minutes of Exercise per Session: 20 min   Stress: Not on file   Social Connections: Not on file   Intimate Partner Violence: Not on file   Housing Stability: Not on file       Review of Systems:  Review of Systems   Constitutional: Negative for chills and fever. Cardiovascular:  Negative for chest pain. Respiratory:  Negative for cough and shortness of breath. Musculoskeletal:  Positive for back pain and joint pain. Gastrointestinal:  Negative for bowel incontinence and constipation. Genitourinary:  Negative for bladder incontinence. Neurological:  Negative for headaches, numbness, tingling and weakness.      Physical Exam:  BP (!) 146/80   Temp 97.3 °F (36.3 °C)   Resp 16   Ht 5' 2\" (1.575 m)   Wt 235 lb (106.6 kg)   LMP 06/13/2010 (Within Months)   SpO2 97%   BMI 42.98 kg/m²     Physical Exam  Cardiovascular:      Rate and Rhythm: Normal rate. Pulmonary:      Effort: Pulmonary effort is normal.   Musculoskeletal:         General: Normal range of motion. Skin:     General: Skin is warm and dry. Neurological:      Mental Status: She is alert and oriented to person, place, and time. Record/Diagnostics Review:    Last annabella 4/22  and was appropriate     Assessment:  Problem List Items Addressed This Visit       Chronic back pain    Chronic use of opiate drug for therapeutic purpose - Primary    Chronic left shoulder pain (Chronic)    Neck pain (Chronic)    Lumbar facet arthropathy    DDD (degenerative disc disease), lumbar          Treatment Plan:  Patient relates current medications are helping the pain. Patient reports taking pain medications as prescribed, denies obtaining medications from different sources and denies use of illegal drugs. Patient denies side effects from medications like nausea, vomiting, constipation or drowsiness. Patient reports current activities of daily living are possible due to medications and would like to continue them. As always, we encourage daily stretching and strengthening exercises, and recommend minimizing use of pain medications unless patient cannot get through daily activities due to pain. Contract requirements met. Continue opioid therapy. Script written for norco  Follow up appointment made for 4 weeks    I have reviewed the chief complaint and history of present illness (including ROS and PFSH) and vital documentation by my staff and I agree with their documentation and have added where applicable.

## 2022-12-12 ENCOUNTER — HOSPITAL ENCOUNTER (OUTPATIENT)
Dept: PAIN MANAGEMENT | Age: 67
Discharge: HOME | End: 2022-12-12
Payer: MEDICARE

## 2022-12-12 VITALS
BODY MASS INDEX: 43.24 KG/M2 | WEIGHT: 235 LBS | DIASTOLIC BLOOD PRESSURE: 94 MMHG | SYSTOLIC BLOOD PRESSURE: 154 MMHG | HEART RATE: 78 BPM | HEIGHT: 62 IN | RESPIRATION RATE: 18 BRPM | OXYGEN SATURATION: 97 %

## 2022-12-12 DIAGNOSIS — M51.36 DDD (DEGENERATIVE DISC DISEASE), LUMBAR: ICD-10-CM

## 2022-12-12 DIAGNOSIS — Z79.891 LONG-TERM CURRENT USE OF OPIATE ANALGESIC: Primary | ICD-10-CM

## 2022-12-12 DIAGNOSIS — M46.1 SACROILIITIS (HCC): ICD-10-CM

## 2022-12-12 DIAGNOSIS — M47.816 LUMBAR FACET ARTHROPATHY: ICD-10-CM

## 2022-12-12 PROCEDURE — 99213 OFFICE O/P EST LOW 20 MIN: CPT

## 2022-12-12 RX ORDER — HYDROCODONE BITARTRATE AND ACETAMINOPHEN 5; 325 MG/1; MG/1
1 TABLET ORAL 2 TIMES DAILY PRN
Qty: 60 TABLET | Refills: 0 | Status: SHIPPED | OUTPATIENT
Start: 2022-12-18 | End: 2023-01-17

## 2022-12-12 ASSESSMENT — ENCOUNTER SYMPTOMS
CONSTIPATION: 0
BACK PAIN: 1
SHORTNESS OF BREATH: 0
BOWEL INCONTINENCE: 0
COUGH: 0

## 2022-12-12 NOTE — PROGRESS NOTES
Chief Complaint   Patient presents with    Back Pain       PMH Pt reports chronic history of back pain, has tried RONAN and RFA with limited benefit. Also states she was diagnosed with fibromyalgia. Pain is worse with colder temps. She does see a chiropractor with adjustements heat and TENS tx which helps to control the pain. Pt has tried lyrica but stopped due to weight gain and gabapentin was causing drowsiness. Pt also experienced side effects with Topamax. She has limited ROM left shoulder. XR done 4/18/19 shows no acute abnormality. Cervical MRI shows Multilevel degenerative disc disease in the cervical spine. She is not interested in injections - does not feel she has much relief from them. Pt has joined [employer] and trying to lose weight   PT copay $45/ visit and cost prohibitive at this time  Plans for knee replacement next year  - will see orthopedic surgeon 12/15   She has had steroid injection and had relief for 6 weeks - cannot have gel due to having a reaction in the past.     Back Pain  This is a chronic problem. The current episode started more than 1 year ago. The problem occurs intermittently. The problem is unchanged. The pain is present in the lumbar spine. The quality of the pain is described as aching. The pain radiates to the right thigh, right knee, right foot, left thigh, left knee and left foot. The pain is at a severity of 5/10. The pain is moderate. The pain is The same all the time. The symptoms are aggravated by bending, sitting and standing. Associated symptoms include numbness and tingling. Pertinent negatives include no bladder incontinence, bowel incontinence, chest pain, fever or weakness. Risk factors include history of cancer. She has tried bed rest, home exercises, heat, ice and walking for the symptoms. Patient denies any new neurological symptoms. No bowel or bladder incontinence, no weakness, and no falling.     Pill count: Norco 16 due 12/18    Morphine equivalent: 10    Controlled Substance Monitoring:    Acute and Chronic Pain Monitoring:   RX Monitoring 12/12/2022   Attestation -   Acute Pain Prescriptions -   Periodic Controlled Substance Monitoring Possible medication side effects, risk of tolerance/dependence & alternative treatments discussed. ;No signs of potential drug abuse or diversion identified.;Obtaining appropriate analgesic effect of treatment.    Chronic Pain > 50 MEDD -   Chronic Pain > 80 MEDD -            Past Medical History:   Diagnosis Date    Anxiety     Breast cancer ([facility] [address]     Breast cancer ([facility] [address]     Cancer ([facility] [address]     breast, in remission    Chronic back pain 12/3/2013    Depression     DJD (degenerative joint disease)     Family history of breast cancer     MGM    Fibromyalgia     Fibromyalgia     Fibromyalgia     History of hysterectomy     Hypothyroidism     Obesity, Class III, BMI 40-49.9 (morbid obesity) ([facility] [address] 7/22/2014    Osteoarthritis     Sacroiliitis ([facility])     SVT (supraventricular tachycardia) ([facility] [address]     Unspecified sleep apnea        Past Surgical History:   Procedure Laterality Date    ABDOMEN SURGERY      APPENDECTOMY      BREAST SURGERY      lumpectomy w radiation 2009    CARDIAC CATHETERIZATION      with ablation    CARPAL TUNNEL RELEASE Left 10/22/2020    COLONOSCOPY  2009    10 years    COLONOSCOPY  08/2017    FINGER SURGERY  10/2017    trigger finger right hand    HERNIA REPAIR      umbilical    HYSTERECTOMY (CERVIX STATUS UNKNOWN)      total    JOINT REPLACEMENT      Right Knee 2014    KNEE ARTHROSCOPY      bilateral    KNEE SURGERY      x2    LAPAROSCOPIC APPENDECTOMY Right     TONSILLECTOMY      UMBILICAL HERNIA REPAIR N/A     WRIST SURGERY  04/12/2018    plate and screws right wrist       Allergies   Allergen Reactions    Augmentin [Amoxicillin-Pot Clavulanate] Nausea And Vomiting     Projectile vomiting    Sulfa Antibiotics Anaphylaxis    Sulfadiazine Anaphylaxis    Amoxapine Nausea And Vomiting    Amoxapine And Related Diarrhea and Nausea And Vomiting     AMOX TR-K- 875-125 mg    Amoxicillin Nausea And Vomiting    Loxapine Succinate Diarrhea and Nausea Only     AMOX TR-K- 875-125 mg         Current Outpatient Medications:     HYDROcodone-acetaminophen (NORCO) 5-325 MG per tablet, Take 1 tablet by mouth 2 times daily as needed for Pain for up to 30 days. , Disp: 60 tablet, Rfl: 0    levothyroxine (SYNTHROID) 50 MCG tablet, take 1 tablet by mouth once daily, Disp: 90 tablet, Rfl: 1    meloxicam (MOBIC) 15 MG tablet, take 1 tablet by mouth once daily, Disp: 90 tablet, Rfl: 1    venlafaxine (EFFEXOR XR) 150 MG extended release capsule, take 1 capsule by mouth once daily, Disp: 90 capsule, Rfl: 1    Multiple Vitamin (MULTIVITAMIN ADULT PO), Take by mouth, Disp: , Rfl:     magnesium oxide (MAG-OX) 400 MG tablet, take 1 tablet by mouth twice a day, Disp: , Rfl:     famotidine (PEPCID) 20 MG tablet, Take 20 mg by mouth 2 times daily, Disp: , Rfl:     amoxicillin (AMOXIL) 500 MG capsule, take 4 capsules by mouth 1 hour prior to appointment, Disp: , Rfl:     calcium carbonate-vitamin D (CALTRATE) 600-400 MG-UNIT TABS per tab, Take by mouth, Disp: , Rfl:     Tens Unit MISC, by Does not apply route For low back pain, Disp: 1 each, Rfl: 0    loratadine (CLARITIN) 10 MG capsule, Take 10 mg by mouth daily, Disp: , Rfl:     Polyethylene Glycol 3350 (MIRALAX PO), Take by mouth Taking daily, Disp: , Rfl:     flecainide (TAMBOCOR) 50 MG tablet, Take 50 mg by mouth 2 times daily. , Disp: , Rfl:     Family History   Problem Relation Age of Onset    Cancer Mother         pancreatic cancer 78    Heart Disease Mother     COPD Father     COPD Brother     Breast Cancer Maternal Grandmother         dx unknown age     Colon Cancer Maternal Grandfather         66-80s        Social History     Socioeconomic History    Marital status:       Spouse name: Not on file    Number of children: Not on file    Years of education: Not on file    Highest education level: Not on file   Occupational History    Occupation: [employer]    Tobacco Use    Smoking status: Never    Smokeless tobacco: Never   Vaping Use    Vaping Use: Never used   Substance and Sexual Activity    Alcohol use: No     Alcohol/week: 0.0 standard drinks     Comment: one to two drinks a month    Drug use: No    Sexual activity: Yes     Partners: Male     Birth control/protection: Surgical     Comment: hyst   Other Topics Concern    Not on file   Social History Narrative    Not on file     Social Determinants of Health     Financial Resource Strain: Low Risk     Difficulty of Paying Living Expenses: Not hard at all   Food Insecurity: No Food Insecurity    Worried About Running Out of Food in the Last Year: Never true    Ran Out of Food in the Last Year: Never true   Transportation Needs: Not on file   Physical Activity: Insufficiently Active    Days of Exercise per Week: 2 days    Minutes of Exercise per Session: 20 min   Stress: Not on file   Social Connections: Not on file   Intimate Partner Violence: Not on file   Housing Stability: Not on file       Review of Systems:  Review of Systems   Constitutional: Negative for chills and fever. Cardiovascular:  Negative for chest pain and palpitations. Respiratory:  Negative for cough and shortness of breath. Musculoskeletal:  Positive for back pain and joint pain. Gastrointestinal:  Negative for bowel incontinence and constipation. Genitourinary:  Negative for bladder incontinence. Neurological:  Positive for numbness and tingling. Negative for disturbances in coordination, loss of balance and weakness. Physical Exam:  BP (!) 154/94   Pulse 78   Resp 18   Ht 5' 2\" (1.575 m)   Wt 235 lb (106.6 kg)   LMP 06/13/2010 (Within Months)   SpO2 97%   BMI 42.98 kg/m²     Physical Exam  HENT:      Head: Normocephalic. Pulmonary:      Effort: Pulmonary effort is normal.   Musculoskeletal:         General: Normal range of motion.       Cervical back: Normal range of motion. Tenderness present. Lumbar back: Tenderness present. Skin:     General: Skin is warm and dry. Neurological:      Mental Status: She is alert and oriented to person, place, and time. Record/Diagnostics Review:    Last annabella 4/2022 and was appropriate     Assessment:  Problem List Items Addressed This Visit       Long-term current use of opiate analgesic - Primary    Lumbar facet arthropathy    Relevant Medications    HYDROcodone-acetaminophen (NORCO) 5-325 MG per tablet (Start on 12/18/2022)    Sacroiliitis ([address]    Relevant Medications    HYDROcodone-acetaminophen (Jonetta Floss) 5-325 MG per tablet (Start on 12/18/2022)    DDD (degenerative disc disease), lumbar    Relevant Medications    HYDROcodone-acetaminophen (NORCO) 5-325 MG per tablet (Start on 12/18/2022)          Treatment Plan:  Patient relates current medications are helping the pain. Patient reports taking pain medications as prescribed, denies obtaining medications from different sources and denies use of illegal drugs. Medication risk and benefits have been discussed. Patient denies side effects from medications like nausea, vomiting, constipation or drowsiness. Patient reports current activities of daily living are possible due to medications and would like to continue them. As always, we encourage daily stretching and strengthening exercises, and recommend minimizing use of pain medications unless patient cannot get through daily activities due to pain. Due to the high risk nature of this patient's pain medication close monitoring is required. Continue current medication management, pt has been stable and compliant.   Script written for norco  Will see surgeon for left knee this week  Follow up appointment made for 4 weeks    I have reviewed the chief complaint and history of present illness (including ROS and [address]) and vital documentation by my staff and I agree with their documentation and have added where applicable.

## 2023-01-05 ENCOUNTER — HOSPITAL ENCOUNTER (OUTPATIENT)
Dept: MAMMOGRAPHY | Age: 68
Discharge: HOME | End: 2023-01-07
Payer: MEDICARE

## 2023-01-05 ENCOUNTER — HOSPITAL ENCOUNTER (OUTPATIENT)
Age: 68
Setting detail: SPECIMEN
Discharge: HOME | End: 2023-01-05
Payer: MEDICARE

## 2023-01-05 DIAGNOSIS — E78.5 DYSLIPIDEMIA: ICD-10-CM

## 2023-01-05 DIAGNOSIS — M85.80 OSTEOPENIA AFTER MENOPAUSE: ICD-10-CM

## 2023-01-05 DIAGNOSIS — Z78.0 OSTEOPENIA AFTER MENOPAUSE: ICD-10-CM

## 2023-01-05 DIAGNOSIS — E03.9 ACQUIRED HYPOTHYROIDISM: ICD-10-CM

## 2023-01-05 DIAGNOSIS — Z12.31 ENCOUNTER FOR SCREENING MAMMOGRAM FOR MALIGNANT NEOPLASM OF BREAST: ICD-10-CM

## 2023-01-05 LAB
ALBUMIN SERPL-MCNC: 4.3 G/DL (ref 3.5–5.2)
ALBUMIN/GLOBULIN RATIO: 1.5 (ref 1–2.5)
ALP BLD-CCNC: 83 U/L (ref 35–104)
ALT SERPL-CCNC: 16 U/L (ref 5–33)
ANION GAP SERPL CALCULATED.3IONS-SCNC: 11 MMOL/L (ref 9–17)
AST SERPL-CCNC: 19 U/L
BILIRUB SERPL-MCNC: 0.3 MG/DL (ref 0.3–1.2)
BUN BLDV-MCNC: 22 MG/DL (ref 8–23)
CALCIUM SERPL-MCNC: 9.1 MG/DL (ref 8.6–10.4)
CHLORIDE BLD-SCNC: 108 MMOL/L (ref 98–107)
CHOLESTEROL, FASTING: 192 MG/DL
CHOLESTEROL/HDL RATIO: 3.1
CO2: 25 MMOL/L (ref 20–31)
CREAT SERPL-MCNC: 0.72 MG/DL (ref 0.5–0.9)
GFR SERPL CREATININE-BSD FRML MDRD: >60 ML/MIN/1.73M2
GLUCOSE BLD-MCNC: 104 MG/DL (ref 70–99)
HCT VFR BLD CALC: 45.7 % (ref 36.3–47.1)
HDLC SERPL-MCNC: 62 MG/DL
HEMOGLOBIN: 14.3 G/DL (ref 11.9–15.1)
LDL CHOLESTEROL: 117 MG/DL (ref 0–130)
MCH RBC QN AUTO: 29.7 PG (ref 25.2–33.5)
MCHC RBC AUTO-ENTMCNC: 31.3 G/DL (ref 28.4–34.8)
MCV RBC AUTO: 94.8 FL (ref 82.6–102.9)
NRBC AUTOMATED: 0 PER 100 WBC
PDW BLD-RTO: 13.2 % (ref 11.8–14.4)
PLATELET # BLD: 195 K/UL (ref 138–453)
PMV BLD AUTO: 9.5 FL (ref 8.1–13.5)
POTASSIUM SERPL-SCNC: 4.4 MMOL/L (ref 3.7–5.3)
RBC # BLD: 4.82 M/UL (ref 3.95–5.11)
SODIUM BLD-SCNC: 144 MMOL/L (ref 135–144)
THYROXINE, FREE: 0.93 NG/DL (ref 0.93–1.7)
TOTAL PROTEIN: 7.1 G/DL (ref 6.4–8.3)
TRIGLYCERIDE, FASTING: 67 MG/DL
TSH SERPL DL<=0.05 MIU/L-ACNC: 6.18 UIU/ML (ref 0.3–5)
WBC # BLD: 5 K/UL (ref 3.5–11.3)

## 2023-01-05 PROCEDURE — 80061 LIPID PANEL: CPT

## 2023-01-05 PROCEDURE — 84443 ASSAY THYROID STIM HORMONE: CPT

## 2023-01-05 PROCEDURE — 85027 COMPLETE CBC AUTOMATED: CPT

## 2023-01-05 PROCEDURE — 77080 DXA BONE DENSITY AXIAL: CPT

## 2023-01-05 PROCEDURE — 77067 SCR MAMMO BI INCL CAD: CPT

## 2023-01-05 PROCEDURE — 80053 COMPREHEN METABOLIC PANEL: CPT

## 2023-01-05 PROCEDURE — 36415 COLL VENOUS BLD VENIPUNCTURE: CPT

## 2023-01-05 PROCEDURE — 84439 ASSAY OF FREE THYROXINE: CPT

## 2023-01-12 ENCOUNTER — HOSPITAL ENCOUNTER (OUTPATIENT)
Dept: PAIN MANAGEMENT | Age: 68
Discharge: HOME | End: 2023-01-12
Payer: MEDICARE

## 2023-01-12 VITALS
HEART RATE: 78 BPM | RESPIRATION RATE: 20 BRPM | SYSTOLIC BLOOD PRESSURE: 150 MMHG | TEMPERATURE: 97.3 F | OXYGEN SATURATION: 95 % | DIASTOLIC BLOOD PRESSURE: 90 MMHG

## 2023-01-12 DIAGNOSIS — M51.36 DDD (DEGENERATIVE DISC DISEASE), LUMBAR: ICD-10-CM

## 2023-01-12 DIAGNOSIS — M46.1 SACROILIITIS (HCC): Primary | ICD-10-CM

## 2023-01-12 DIAGNOSIS — Z79.891 LONG-TERM CURRENT USE OF OPIATE ANALGESIC: ICD-10-CM

## 2023-01-12 DIAGNOSIS — M47.816 LUMBAR FACET ARTHROPATHY: ICD-10-CM

## 2023-01-12 PROCEDURE — 99213 OFFICE O/P EST LOW 20 MIN: CPT

## 2023-01-12 PROCEDURE — 99213 OFFICE O/P EST LOW 20 MIN: CPT | Performed by: NURSE PRACTITIONER

## 2023-01-12 RX ORDER — HYDROCODONE BITARTRATE AND ACETAMINOPHEN 5; 325 MG/1; MG/1
1 TABLET ORAL 2 TIMES DAILY PRN
Qty: 60 TABLET | Refills: 0 | Status: SHIPPED | OUTPATIENT
Start: 2023-01-17 | End: 2023-02-16

## 2023-01-12 ASSESSMENT — ENCOUNTER SYMPTOMS
CONSTIPATION: 0
BACK PAIN: 1
COUGH: 0
SHORTNESS OF BREATH: 0

## 2023-01-12 NOTE — PROGRESS NOTES
Chief Complaint   Patient presents with    Back Pain    Medication Refill         PMH   Pt reports chronic history of back pain, has tried RONAN and RFA with limited benefit. Also states she was diagnosed with fibromyalgia. Pain is worse with colder temps. She does see a chiropractor with adjustements heat and TENS tx which helps to control the pain. Pt has tried lyrica but stopped due to weight gain and gabapentin was causing drowsiness. Pt also experienced side effects with Topamax. She has limited ROM left shoulder. XR done 4/18/19 shows no acute abnormality. Cervical MRI shows Multilevel degenerative disc disease in the cervical spine. She is not interested in injections - does not feel she has much relief from them. Pt has joined [employer] and trying to lose weight   PT copay $45/ visit and cost prohibitive at this time  Plans for knee replacement next year  - will see orthopedic surgeon 2/2  She has had steroid injection and had relief for 6 weeks - cannot have gel due to having a reaction in the past.      Back Pain  This is a chronic problem. The current episode started more than 1 year ago. The problem occurs constantly. The problem is unchanged. The pain is present in the lumbar spine and gluteal. The quality of the pain is described as aching. The pain radiates to the right thigh and right knee. The pain is at a severity of 5/10. The pain is moderate. The pain is The same all the time. The symptoms are aggravated by position, standing and twisting. Pertinent negatives include no chest pain, fever, headaches, numbness, tingling or weakness. She has tried bed rest, home exercises, heat, ice, walking, chiropractic manipulation, NSAIDs and muscle relaxant for the symptoms. Patient denies any new neurological symptoms. No bowel or bladder incontinence, no weakness, and no falling.     Pill count: norco 11 - due 1/17    Morphine equivalent: 10    Controlled Substance Monitoring:    Acute and Chronic Pain Monitoring:   RX Monitoring 1/12/2023   Attestation -   Acute Pain Prescriptions -   Periodic Controlled Substance Monitoring Possible medication side effects, risk of tolerance/dependence & alternative treatments discussed. ;No signs of potential drug abuse or diversion identified.;Obtaining appropriate analgesic effect of treatment.    Chronic Pain > 50 MEDD -   Chronic Pain > 80 MEDD -            Past Medical History:   Diagnosis Date    Anxiety     Breast cancer ([facility]     Breast cancer ([facility]     Cancer ([facility] [facility]     breast, in remission    Chronic back pain 12/3/2013    Depression     DJD (degenerative joint disease)     Family history of breast cancer     MGM    Fibromyalgia     Fibromyalgia     Fibromyalgia     History of hysterectomy     Hypothyroidism     Obesity, Class III, BMI 40-49.9 (morbid obesity) ([facility] 7/22/2014    Osteoarthritis     Sacroiliitis (HCC)     SVT (supraventricular tachycardia) ([facility]     Unspecified sleep apnea        Past Surgical History:   Procedure Laterality Date    ABDOMEN SURGERY      APPENDECTOMY      BREAST SURGERY      lumpectomy w radiation 2009    CARDIAC CATHETERIZATION      with ablation    CARPAL TUNNEL RELEASE Left 10/22/2020    COLONOSCOPY  2009    10 years    COLONOSCOPY  08/2017    FINGER SURGERY  10/2017    trigger finger right hand    HERNIA REPAIR      umbilical    HYSTERECTOMY (CERVIX STATUS UNKNOWN)      total    JOINT REPLACEMENT      Right Knee 2014    KNEE ARTHROSCOPY      bilateral    KNEE SURGERY      x2    LAPAROSCOPIC APPENDECTOMY Right     TONSILLECTOMY      UMBILICAL HERNIA REPAIR N/A     WRIST SURGERY  04/12/2018    plate and screws right wrist       Allergies   Allergen Reactions    Augmentin [Amoxicillin-Pot Clavulanate] Nausea And Vomiting     Projectile vomiting    Sulfa Antibiotics Anaphylaxis    Sulfadiazine Anaphylaxis    Amoxapine Nausea And Vomiting    Amoxapine And Related Diarrhea and Nausea And Vomiting     AMOX TR-K- 875-125 mg Amoxicillin Nausea And Vomiting    Loxapine Succinate Diarrhea and Nausea Only     AMOX TR-K- 875-125 mg         Current Outpatient Medications:     HYDROcodone-acetaminophen (NORCO) 5-325 MG per tablet, Take 1 tablet by mouth 2 times daily as needed for Pain for up to 30 days. , Disp: 60 tablet, Rfl: 0    Probiotic Product (PROBIOTIC-10) CHEW, Take by mouth, Disp: , Rfl:     levothyroxine (SYNTHROID) 50 MCG tablet, take 1 tablet by mouth once daily, Disp: 90 tablet, Rfl: 1    meloxicam (MOBIC) 15 MG tablet, take 1 tablet by mouth once daily, Disp: 90 tablet, Rfl: 1    venlafaxine (EFFEXOR XR) 150 MG extended release capsule, take 1 capsule by mouth once daily, Disp: 90 capsule, Rfl: 1    Multiple Vitamin (MULTIVITAMIN ADULT PO), Take by mouth, Disp: , Rfl:     magnesium oxide (MAG-OX) 400 MG tablet, take 1 tablet by mouth twice a day, Disp: , Rfl:     famotidine (PEPCID) 20 MG tablet, Take 20 mg by mouth 2 times daily, Disp: , Rfl:     amoxicillin (AMOXIL) 500 MG capsule, take 4 capsules by mouth 1 hour prior to appointment, Disp: , Rfl:     calcium carbonate-vitamin D (CALTRATE) 600-400 MG-UNIT TABS per tab, Take by mouth, Disp: , Rfl:     Tens Unit MISC, by Does not apply route For low back pain, Disp: 1 each, Rfl: 0    loratadine (CLARITIN) 10 MG capsule, Take 10 mg by mouth daily, Disp: , Rfl:     Polyethylene Glycol 3350 (MIRALAX PO), Take by mouth Taking daily, Disp: , Rfl:     flecainide (TAMBOCOR) 50 MG tablet, Take 50 mg by mouth 2 times daily. , Disp: , Rfl:     Family History   Problem Relation Age of Onset    Cancer Mother         pancreatic cancer 78    Heart Disease Mother     COPD Father     COPD Brother     Breast Cancer Maternal Grandmother         dx unknown age     Colon Cancer Maternal Grandfather         66-80s        Social History     Socioeconomic History    Marital status:       Spouse name: Not on file    Number of children: Not on file    Years of education: Not on file    Highest education level: Not on file   Occupational History    Occupation: [employer] disability    Tobacco Use    Smoking status: Never    Smokeless tobacco: Never   Vaping Use    Vaping Use: Never used   Substance and Sexual Activity    Alcohol use: No     Alcohol/week: 0.0 standard drinks     Comment: one to two drinks a month    Drug use: No    Sexual activity: Yes     Partners: Male     Birth control/protection: Surgical     Comment: hyst   Other Topics Concern    Not on file   Social History Narrative    Not on file     Social Determinants of Health     Financial Resource Strain: Low Risk     Difficulty of Paying Living Expenses: Not hard at all   Food Insecurity: No Food Insecurity    Worried About Running Out of Food in the Last Year: Never true    Ran Out of Food in the Last Year: Never true   Transportation Needs: Not on file   Physical Activity: Insufficiently Active    Days of Exercise per Week: 2 days    Minutes of Exercise per Session: 20 min   Stress: Not on file   Social Connections: Not on file   Intimate Partner Violence: Not on file   Housing Stability: Not on file       Review of Systems:  Review of Systems   Constitutional: Negative for chills and fever. Cardiovascular:  Negative for chest pain and palpitations. Respiratory:  Negative for cough and shortness of breath. Musculoskeletal:  Positive for back pain and joint pain. Gastrointestinal:  Negative for constipation. Neurological:  Negative for disturbances in coordination, headaches, loss of balance, numbness, tingling and weakness. Physical Exam:  BP (!) 150/90   Pulse 78   Temp 97.3 °F (36.3 °C)   Resp 20   LMP 06/13/2010 (Within Months)   SpO2 95%     Physical Exam  HENT:      Head: Normocephalic. Pulmonary:      Effort: Pulmonary effort is normal.   Musculoskeletal:         General: Normal range of motion. Cervical back: Normal range of motion. Lumbar back: Tenderness present. Left knee: Tenderness present.    Skin: General: Skin is warm and dry. Neurological:      Mental Status: She is alert and oriented to person, place, and time. Record/Diagnostics Review:    Last annabella 4/2022 and was appropriate     Assessment:  Problem List Items Addressed This Visit       Long-term current use of opiate analgesic    Lumbar facet arthropathy    Relevant Medications    HYDROcodone-acetaminophen (NORCO) 5-325 MG per tablet (Start on 1/17/2023)    Sacroiliitis ([address] - Primary    Relevant Medications    HYDROcodone-acetaminophen ([facility]) 5-325 MG per tablet (Start on 1/17/2023)    DDD (degenerative disc disease), lumbar    Relevant Medications    HYDROcodone-acetaminophen (NORCO) 5-325 MG per tablet (Start on 1/17/2023)          Treatment Plan:  Patient relates current medications are helping the pain. Patient reports taking pain medications as prescribed, denies obtaining medications from different sources and denies use of illegal drugs. Medication risk and benefits have been discussed. Patient denies side effects from medications like nausea, vomiting, constipation or drowsiness. Patient reports current activities of daily living are possible due to medications and would like to continue them. As always, we encourage daily stretching and strengthening exercises, and recommend minimizing use of pain medications unless patient cannot get through daily activities due to pain. Due to the high risk nature of this patient's pain medication close monitoring is required. Continue current medication management, pt has been stable and compliant. Script written for norco  Pt will see orthopedic surgeon for knee 2/2/23  Follow up appointment made for 4 weeks    I have reviewed the chief complaint and history of present illness (including ROS and [address]) and vital documentation by my staff and I agree with their documentation and have added where applicable.

## 2023-02-14 ENCOUNTER — HOSPITAL ENCOUNTER (OUTPATIENT)
Dept: PAIN MANAGEMENT | Age: 68
Discharge: HOME | End: 2023-02-14
Payer: MEDICARE

## 2023-02-14 VITALS
OXYGEN SATURATION: 98 % | TEMPERATURE: 97.3 F | DIASTOLIC BLOOD PRESSURE: 88 MMHG | SYSTOLIC BLOOD PRESSURE: 147 MMHG | RESPIRATION RATE: 20 BRPM | HEART RATE: 83 BPM

## 2023-02-14 DIAGNOSIS — M51.36 DDD (DEGENERATIVE DISC DISEASE), LUMBAR: ICD-10-CM

## 2023-02-14 DIAGNOSIS — M47.816 LUMBAR FACET ARTHROPATHY: ICD-10-CM

## 2023-02-14 DIAGNOSIS — M25.512 CHRONIC LEFT SHOULDER PAIN: Primary | Chronic | ICD-10-CM

## 2023-02-14 DIAGNOSIS — R20.0 NUMBNESS AND TINGLING IN LEFT ARM: Chronic | ICD-10-CM

## 2023-02-14 DIAGNOSIS — G89.29 CHRONIC LEFT SHOULDER PAIN: Primary | Chronic | ICD-10-CM

## 2023-02-14 DIAGNOSIS — R20.2 NUMBNESS AND TINGLING IN LEFT ARM: Chronic | ICD-10-CM

## 2023-02-14 DIAGNOSIS — G89.29 CHRONIC BILATERAL LOW BACK PAIN WITHOUT SCIATICA: ICD-10-CM

## 2023-02-14 DIAGNOSIS — M54.2 NECK PAIN: Chronic | ICD-10-CM

## 2023-02-14 DIAGNOSIS — M54.50 CHRONIC BILATERAL LOW BACK PAIN WITHOUT SCIATICA: ICD-10-CM

## 2023-02-14 PROCEDURE — 99213 OFFICE O/P EST LOW 20 MIN: CPT

## 2023-02-14 PROCEDURE — 99214 OFFICE O/P EST MOD 30 MIN: CPT | Performed by: NURSE PRACTITIONER

## 2023-02-14 RX ORDER — HYDROCODONE BITARTRATE AND ACETAMINOPHEN 5; 325 MG/1; MG/1
1 TABLET ORAL 2 TIMES DAILY PRN
Qty: 60 TABLET | Refills: 0 | Status: SHIPPED | OUTPATIENT
Start: 2023-02-18 | End: 2023-03-20

## 2023-02-14 ASSESSMENT — ENCOUNTER SYMPTOMS
BOWEL INCONTINENCE: 0
BACK PAIN: 1
COUGH: 0
CONSTIPATION: 0
SHORTNESS OF BREATH: 0

## 2023-02-14 NOTE — PROGRESS NOTES
Chief Complaint   Patient presents with    Back Pain     Medication Refill        PMH     Pt reports chronic history of back pain, has tried RONAN and RFA with limited benefit. Also states she was diagnosed with fibromyalgia. Pain is worse with colder temps. She does see a chiropractor with adjustements heat and TENS tx which helps to control the pain. Pt has tried lyrica but stopped due to weight gain and gabapentin was causing drowsiness. Pt also experienced side effects with Topamax. She also c/o chronic left shoulder pain. She has limited ROM left shoulder. XR done 4/18/19 shows no acute abnormality. Cervical MRI shows Multilevel degenerative disc disease in the cervical spine. She is not interested in injections - does not feel she has much relief from them. Pt has joined [employer] and trying to lose weight   PT copay $45/ visit and cost prohibitive at this time  Has seen ortho with knee replacement suggested when she is ready. Discussed knee RFA with him but still not interested in trying      Worsening left shoulder pain is her main c/o today. Would like to try injection but needs to update imaging first. Believes some pain attributed to large breast but states breast reduction not an option d/t hx of breast CA and radiation    HPI:     Back Pain  This is a chronic problem. The current episode started more than 1 year ago. The problem occurs intermittently. The problem is unchanged. The pain is present in the lumbar spine. The quality of the pain is described as aching. The pain radiates to the right thigh. The pain is at a severity of 4/10. The pain is moderate. The pain is Worse during the day. The symptoms are aggravated by standing. Associated symptoms include leg pain. Pertinent negatives include no bladder incontinence, bowel incontinence, chest pain, fever, headaches, numbness, tingling or weakness.  Risk factors include menopause, obesity, poor posture, sedentary lifestyle and lack of exercise. She has tried analgesics, muscle relaxant, heat and home exercises for the symptoms. The treatment provided mild relief. Shoulder Pain   The pain is present in the left shoulder. This is a chronic problem. The current episode started more than 1 year ago. There has been no history of extremity trauma. The problem occurs constantly. The problem has been gradually worsening. The pain is at a severity of 4/10. The pain is moderate. Associated symptoms include a limited range of motion and stiffness. Pertinent negatives include no fever, numbness or tingling. The symptoms are aggravated by activity and lying down. She has tried acetaminophen, cold, heat, movement, NSAIDS, OTC ointments, oral narcotics and rest for the symptoms. The treatment provided mild relief. Her past medical history is significant for osteoarthritis. Patient denies any new neurological symptoms. No bowel or bladder incontinence, no weakness, and no falling. Pill count: Norco-10    Morphine equivalent: 10    Controlled Substance Monitoring:    Acute and Chronic Pain Monitoring:   RX Monitoring 1/12/2023   Attestation -   Acute Pain Prescriptions -   Periodic Controlled Substance Monitoring Possible medication side effects, risk of tolerance/dependence & alternative treatments discussed. ;No signs of potential drug abuse or diversion identified.;Obtaining appropriate analgesic effect of treatment.    Chronic Pain > 50 MEDD -   Chronic Pain > 80 MEDD -                 Past Medical History:   Diagnosis Date    Anxiety     Bre[facility] ([facility] [address]     Breast cancer ([facility] [address]     Cancer ([facility] [address]     breast, in remission    Chronic back pain 12/3/2013    Depression     DJD (degenerative joint disease)     Family history of breast cancer     MGM    Fibromyalgia     Fibromyalgia     Fibromyalgia     History of hysterectomy     Hypothyroidism     Obesity, Class III, BMI 40-49.9 (morbid obesity) ([facility] [address] 7/22/2014    Osteoarthritis     Sacroiliitis [facility])     SVT (supraventricular tachycardia) ([facility] [address]     Unspecified sleep apnea        Past Surgical History:   Procedure Laterality Date    ABDOMEN SURGERY      APPENDECTOMY      BREAST SURGERY      lumpectomy w radiation 2009    CARDIAC CATHETERIZATION      with ablation    CARPAL TUNNEL RELEASE Left 10/22/2020    COLONOSCOPY  2009    10 years    COLONOSCOPY  08/2017    FINGER SURGERY  10/2017    trigger finger right hand    HERNIA REPAIR      umbilical    HYSTERECTOMY (CERVIX STATUS UNKNOWN)      total    JOINT REPLACEMENT      Right Knee 2014    KNEE ARTHROSCOPY      bilateral    KNEE SURGERY      x2    LAPAROSCOPIC APPENDECTOMY Right     TONSILLECTOMY      UMBILICAL HERNIA REPAIR N/A     WRIST SURGERY  04/12/2018    plate and screws right wrist       Allergies   Allergen Reactions    Augmentin [Amoxicillin-Pot Clavulanate] Nausea And Vomiting     Projectile vomiting    Sulfa Antibiotics Anaphylaxis    Sulfadiazine Anaphylaxis    Amoxapine Nausea And Vomiting    Amoxapine And Related Diarrhea and Nausea And Vomiting     AMOX TR-K- 875-125 mg    Amoxicillin Nausea And Vomiting    Loxapine Succinate Diarrhea and Nausea Only     AMOX TR-K- 875-125 mg         Current Outpatient Medications:     [START ON 2/18/2023] HYDROcodone-acetaminophen (NORCO) 5-325 MG per tablet, Take 1 tablet by mouth 2 times daily as needed for Pain for up to 30 days. , Disp: 60 tablet, Rfl: 0    Probiotic Product (PROBIOTIC-10) CHEW, Take by mouth, Disp: , Rfl:     levothyroxine (SYNTHROID) 50 MCG tablet, take 1 tablet by mouth once daily, Disp: 90 tablet, Rfl: 1    meloxicam (MOBIC) 15 MG tablet, take 1 tablet by mouth once daily, Disp: 90 tablet, Rfl: 1    venlafaxine (EFFEXOR XR) 150 MG extended release capsule, take 1 capsule by mouth once daily, Disp: 90 capsule, Rfl: 1    Multiple Vitamin (MULTIVITAMIN ADULT PO), Take by mouth, Disp: , Rfl:     magnesium oxide (MAG-OX) 400 MG tablet, take 1 tablet by mouth twice a day, Disp: , Rfl: famotidine (PEPCID) 20 MG tablet, Take 20 mg by mouth 2 times daily, Disp: , Rfl:     amoxicillin (AMOXIL) 500 MG capsule, take 4 capsules by mouth 1 hour prior to appointment, Disp: , Rfl:     calcium carbonate-vitamin D (CALTRATE) 600-400 MG-UNIT TABS per tab, Take by mouth, Disp: , Rfl:     Tens Unit MISC, by Does not apply route For low back pain, Disp: 1 each, Rfl: 0    loratadine (CLARITIN) 10 MG capsule, Take 10 mg by mouth daily, Disp: , Rfl:     Polyethylene Glycol 3350 (MIRALAX PO), Take by mouth Taking daily, Disp: , Rfl:     flecainide (TAMBOCOR) 50 MG tablet, Take 50 mg by mouth 2 times daily. , Disp: , Rfl:     Family History   Problem Relation Age of Onset    Cancer Mother         pancreatic cancer 78    Heart Disease Mother     COPD Father     COPD Brother     Breast Cancer Maternal Grandmother         dx unknown age     Colon Cancer Maternal Grandfather         66-80s        Social History     Socioeconomic History    Marital status:       Spouse name: Not on file    Number of children: Not on file    Years of education: Not on file    Highest education level: Not on file   Occupational History    Occupation: [employer] disability    Tobacco Use    Smoking status: Never    Smokeless tobacco: Never   Vaping Use    Vaping Use: Never used   Substance and Sexual Activity    Alcohol use: No     Alcohol/week: 0.0 standard drinks     Comment: one to two drinks a month    Drug use: No    Sexual activity: Yes     Partners: Male     Birth control/protection: Surgical     Comment: hyst   Other Topics Concern    Not on file   Social History Narrative    Not on file     Social Determinants of Health     Financial Resource Strain: Low Risk     Difficulty of Paying Living Expenses: Not hard at all   Food Insecurity: No Food Insecurity    Worried About Running Out of Food in the Last Year: Never true    Ran Out of Food in the Last Year: Never true   Transportation Needs: Not on file   Physical Activity: Insufficiently Active    Days of Exercise per Week: 2 days    Minutes of Exercise per Session: 20 min   Stress: Not on file   Social Connections: Not on file   Intimate Partner Violence: Not on file   Housing Stability: Not on file       Review of Systems:  Review of Systems   Constitutional: Negative for chills and fever. Cardiovascular:  Negative for chest pain. Respiratory:  Negative for cough and shortness of breath. Musculoskeletal:  Positive for arthritis, back pain, joint pain and stiffness. Gastrointestinal:  Negative for bowel incontinence and constipation. Genitourinary:  Negative for bladder incontinence. Neurological:  Negative for headaches, numbness, tingling and weakness. Physical Exam:  BP (!) 147/88   Pulse 83   Temp 97.3 °F (36.3 °C)   Resp 20   LMP 06/13/2010 (Within Months)   SpO2 98%     Physical Exam  Cardiovascular:      Rate and Rhythm: Normal rate. Pulmonary:      Effort: Pulmonary effort is normal.   Musculoskeletal:      Left shoulder: Decreased range of motion. Skin:     General: Skin is warm and dry. Neurological:      Mental Status: She is alert and oriented to person, place, and time.        Record/Diagnostics Review:    Last annabella 4/22 and was appropriate     Assessment:  Problem List Items Addressed This Visit       Chronic back pain    Relevant Medications    HYDROcodone-acetaminophen ([address]) 5-325 MG per tablet (Start on 2/18/2023)    Chronic left shoulder pain - Primary (Chronic)    Relevant Medications    HYDROcodone-acetaminophen (NORCO) 5-325 MG per tablet (Start on 2/18/2023)    Other Relevant Orders    XR SHOULDER LEFT (MIN 2 VIEWS)    Neck pain (Chronic)    Numbness and tingling in left arm (Chronic)    Lumbar facet arthropathy    Relevant Medications    HYDROcodone-acetaminophen (NORCO) 5-325 MG per tablet (Start on 2/18/2023)    DDD (degenerative disc disease), lumbar    Relevant Medications    HYDROcodone-acetaminophen (NORCO) 5-325 MG per tablet (Start on 2/18/2023)          Treatment Plan:  Patient relates current medications are helping the pain. Patient reports taking pain medications as prescribed, denies obtaining medications from different sources and denies use of illegal drugs. Medication risk and benefits have been discussed. Patient denies side effects from medications like nausea, vomiting, constipation or drowsiness. Patient reports current activities of daily living are possible due to medications and would like to continue them. As always, we encourage daily stretching and strengthening exercises, and recommend minimizing use of pain medications unless patient cannot get through daily activities due to pain. Due to the high risk nature of this patient's pain medication close monitoring is required. Continue current medication management, pt has been stable and compliant. Script written for norco  Left shoulder XR ordered   Follow up appointment made for 4 weeks    I have reviewed the chief complaint and history of present illness (including ROS and PFSH) and vital documentation by my staff and I agree with their documentation and have added where applicable.

## 2023-03-13 ENCOUNTER — HOSPITAL ENCOUNTER (OUTPATIENT)
Dept: GENERAL RADIOLOGY | Age: 68
Discharge: HOME | End: 2023-03-15
Payer: MEDICARE

## 2023-03-13 ENCOUNTER — HOSPITAL ENCOUNTER (OUTPATIENT)
Age: 68
Discharge: HOME | End: 2023-03-15
Payer: MEDICARE

## 2023-03-13 DIAGNOSIS — M25.512 CHRONIC LEFT SHOULDER PAIN: Chronic | ICD-10-CM

## 2023-03-13 DIAGNOSIS — G89.29 CHRONIC LEFT SHOULDER PAIN: Chronic | ICD-10-CM

## 2023-03-13 PROCEDURE — 73030 X-RAY EXAM OF SHOULDER: CPT

## 2023-03-15 ENCOUNTER — HOSPITAL ENCOUNTER (OUTPATIENT)
Dept: PAIN MANAGEMENT | Age: 68
Discharge: HOME | End: 2023-03-15
Payer: MEDICARE

## 2023-03-15 VITALS
HEART RATE: 74 BPM | HEIGHT: 62 IN | SYSTOLIC BLOOD PRESSURE: 138 MMHG | DIASTOLIC BLOOD PRESSURE: 101 MMHG | WEIGHT: 235 LBS | TEMPERATURE: 97.3 F | OXYGEN SATURATION: 97 % | BODY MASS INDEX: 43.24 KG/M2

## 2023-03-15 DIAGNOSIS — E66.01 CLASS 3 SEVERE OBESITY WITH BODY MASS INDEX (BMI) OF 40.0 TO 44.9 IN ADULT, UNSPECIFIED OBESITY TYPE, UNSPECIFIED WHETHER SERIOUS COMORBIDITY PRESENT (HCC): ICD-10-CM

## 2023-03-15 DIAGNOSIS — G57.82 ENTRAPMENT OF LEFT SAPHENOUS NERVE: Primary | ICD-10-CM

## 2023-03-15 DIAGNOSIS — G89.29 CHRONIC LEFT SHOULDER PAIN: ICD-10-CM

## 2023-03-15 DIAGNOSIS — M51.36 DDD (DEGENERATIVE DISC DISEASE), LUMBAR: ICD-10-CM

## 2023-03-15 DIAGNOSIS — M25.512 CHRONIC LEFT SHOULDER PAIN: ICD-10-CM

## 2023-03-15 DIAGNOSIS — M79.7 FIBROMYALGIA: ICD-10-CM

## 2023-03-15 DIAGNOSIS — Z79.891 LONG-TERM CURRENT USE OF OPIATE ANALGESIC: ICD-10-CM

## 2023-03-15 DIAGNOSIS — M47.816 LUMBAR FACET ARTHROPATHY: ICD-10-CM

## 2023-03-15 PROCEDURE — G0481 DRUG TEST DEF 8-14 CLASSES: HCPCS

## 2023-03-15 PROCEDURE — 6360000002 HC RX W HCPCS: Performed by: STUDENT IN AN ORGANIZED HEALTH CARE EDUCATION/TRAINING PROGRAM

## 2023-03-15 PROCEDURE — 80307 DRUG TEST PRSMV CHEM ANLYZR: CPT

## 2023-03-15 PROCEDURE — 20610 DRAIN/INJ JOINT/BURSA W/O US: CPT

## 2023-03-15 PROCEDURE — 99213 OFFICE O/P EST LOW 20 MIN: CPT

## 2023-03-15 PROCEDURE — 2500000003 HC RX 250 WO HCPCS: Performed by: STUDENT IN AN ORGANIZED HEALTH CARE EDUCATION/TRAINING PROGRAM

## 2023-03-15 RX ORDER — HYDROCODONE BITARTRATE AND ACETAMINOPHEN 5; 325 MG/1; MG/1
1 TABLET ORAL 2 TIMES DAILY PRN
Qty: 60 TABLET | Refills: 0 | Status: SHIPPED | OUTPATIENT
Start: 2023-03-20 | End: 2023-04-19

## 2023-03-15 RX ORDER — TRIAMCINOLONE ACETONIDE 40 MG/ML
40 INJECTION, SUSPENSION INTRA-ARTICULAR; INTRAMUSCULAR ONCE
Status: COMPLETED | OUTPATIENT
Start: 2023-03-15 | End: 2023-03-15

## 2023-03-15 RX ORDER — BUPIVACAINE HYDROCHLORIDE 2.5 MG/ML
4 INJECTION, SOLUTION EPIDURAL; INFILTRATION; INTRACAUDAL
Status: COMPLETED | OUTPATIENT
Start: 2023-03-15 | End: 2023-03-15

## 2023-03-15 RX ADMIN — BUPIVACAINE HYDROCHLORIDE 4 ML: 2.5 INJECTION, SOLUTION EPIDURAL; INFILTRATION; INTRACAUDAL; PERINEURAL at 13:57

## 2023-03-15 RX ADMIN — TRIAMCINOLONE ACETONIDE 40 MG: 40 INJECTION, SUSPENSION INTRA-ARTICULAR; INTRAMUSCULAR at 13:58

## 2023-03-15 ASSESSMENT — PAIN SCALES - GENERAL: PAINLEVEL_OUTOF10: 4

## 2023-03-15 NOTE — PROGRESS NOTES
Chronic Pain Clinic Note     Encounter Date: 3/15/2023     SUBJECTIVE:  Chief Complaint   Patient presents with    Back Pain     Med refill       History of Present Illness:   Cal Mayfield is a 78 y.o. female who presents with back pain    Medication Refill: Norco - 12    Current Complaints of Pain:   Location: back   Radiation: Right leg  Severity: moderate  Pain Numerical Score -    Average: 4     Highest: 7  Lowest: 2  Character/Quality: Complains of pain that is aching  Timing: Intermittent  Associated symptoms: none  Numbness: no  Weakness: no  Exacerbating factors:  standing & walking  Alleviating factors:  sitting down, ice and heat  Length of time pain has been present: Started about 20+ years  Inciting event/injury: had an accident at 16years old  Bowel/Bladder incontinence: no  Falls: no  Physical Therapy:  yes, has tried    History of Interventions:   Surgery: No previous lumbar/cervical surgeries  Injections: SIJ    Imaging:    Cervical and lumbar MRI 5/7/2019    Cervical spine:       Multilevel degenerative disc disease in the cervical spine as described. See   above for details of each level. Lumbar spine:       Multilevel degenerative disc disease in the lower thoracic spine and the   lumbar spine as described.   See above for details of each level       Past Medical History:   Diagnosis Date    Anxiety     Breast cancer ([facility] [address]     Breast cancer ([facility] [address]     Cancer ([facility] [address]     breast, in remission    Chronic back pain 12/3/2013    Depression     DJD (degenerative joint disease)     Family history of breast cancer     MGM    Fibromyalgia     Fibromyalgia     Fibromyalgia     History of hysterectomy     Hypothyroidism     Obesity, Class III, BMI 40-49.9 (morbid obesity) ([facility] [address] 7/22/2014    Osteoarthritis     Sacroiliitis (HCC)     SVT (supraventricular tachycardia) ([facility] [address]     Unspecified sleep apnea        Past Surgical History:   Procedure Laterality Date    ABDOMEN SURGERY      APPENDECTOMY BREAST SURGERY      lumpectomy w radiation 2009    CARDIAC CATHETERIZATION      with ablation    CARPAL TUNNEL RELEASE Left 10/22/2020    COLONOSCOPY  2009    10 years    COLONOSCOPY  08/2017    FINGER SURGERY  10/2017    trigger finger right hand    HERNIA REPAIR      umbilical    HYSTERECTOMY (CERVIX STATUS UNKNOWN)      total    JOINT REPLACEMENT      Right Knee 2014    KNEE ARTHROSCOPY      bilateral    KNEE SURGERY      x2    LAPAROSCOPIC APPENDECTOMY Right     TONSILLECTOMY      UMBILICAL HERNIA REPAIR N/A     WRIST SURGERY  04/12/2018    plate and screws right wrist       Family History   Problem Relation Age of Onset    Cancer Mother         pancreatic cancer 78    Heart Disease Mother     COPD Father     COPD Brother     Breast Cancer Maternal Grandmother         dx unknown age     Colon Cancer Maternal Grandfather         66-80s        Social History     Socioeconomic History    Marital status:       Spouse name: Not on file    Number of children: Not on file    Years of education: Not on file    Highest education level: Not on file   Occupational History    Occupation: [employer] disability    Tobacco Use    Smoking status: Never    Smokeless tobacco: Never   Vaping Use    Vaping Use: Never used   Substance and Sexual Activity    Alcohol use: No     Alcohol/week: 0.0 standard drinks     Comment: one to two drinks a month    Drug use: No    Sexual activity: Yes     Partners: Male     Birth control/protection: Surgical     Comment: hyst   Other Topics Concern    Not on file   Social History Narrative    Not on file     Social Determinants of Health     Financial Resource Strain: Low Risk     Difficulty of Paying Living Expenses: Not hard at all   Food Insecurity: No Food Insecurity    Worried About Running Out of Food in the Last Year: Never true    Ran Out of Food in the Last Year: Never true   Transportation Needs: Not on file   Physical Activity: Insufficiently Active    Days of Exercise per Week: 2 days Minutes of Exercise per Session: 20 min   Stress: Not on file   Social Connections: Not on file   Intimate Partner Violence: Not on file   Housing Stability: Not on file       Medications & Allergies:   Current Outpatient Medications   Medication Instructions    amoxicillin (AMOXIL) 500 MG capsule take 4 capsules by mouth 1 hour prior to appointment    calcium carbonate-vitamin D (CALTRATE) 600-400 MG-UNIT TABS per tab Oral    famotidine (PEPCID) 20 mg, Oral, 2 TIMES DAILY    flecainide (TAMBOCOR) 50 mg, 2 TIMES DAILY    [START ON 3/20/2023] HYDROcodone-acetaminophen (NORCO) 5-325 MG per tablet 1 tablet, Oral, 2 TIMES DAILY PRN    levothyroxine (SYNTHROID) 50 MCG tablet take 1 tablet by mouth once daily    loratadine (CLARITIN) 10 mg, Oral, DAILY    magnesium oxide (MAG-OX) 400 MG tablet take 1 tablet by mouth twice a day    meloxicam (MOBIC) 15 MG tablet take 1 tablet by mouth once daily    Multiple Vitamin (MULTIVITAMIN ADULT PO) Oral    Polyethylene Glycol 3350 (MIRALAX PO) Oral, Taking daily     Probiotic Product (PROBIOTIC-10) CHEW Oral    Tens Unit MISC Does not apply, For low back pain    venlafaxine (EFFEXOR XR) 150 MG extended release capsule take 1 capsule by mouth once daily       Allergies   Allergen Reactions    Augmentin [Amoxicillin-Pot Clavulanate] Nausea And Vomiting     Projectile vomiting    Sulfa Antibiotics Anaphylaxis    Sulfadiazine Anaphylaxis    Amoxapine Nausea And Vomiting    Amoxapine And Related Diarrhea and Nausea And Vomiting     AMOX TR-K- 875-125 mg    Amoxicillin Nausea And Vomiting    Loxapine Succinate Diarrhea and Nausea Only     AMOX TR-K- 875-125 mg       Review of Systems:   Constitutional: negative for weight changes or fevers  Cardiovascular: negative for chest pain, palpitations, irregular heart beat  Respiratory: negative for dyspnea, cough, wheezing  Gastrointestinal: negative for constipation, diarrhea, nausea  Genitourinary: negative for bowel/bladder incontinence Musculoskeletal: positive for low back pain, left knee pain  Neurological: negative for radicular leg pain, leg weakness or numbness/tingling  Behavioral/Psych: negative for anxiety/depression   Hematological: negative for abnormal bleeding, anticoagulation use or antiplatelet use  All other systems reviewed and are negative    OBJECTIVE:    Vitals:    03/15/23 0922   BP: (!) 138/101   Pulse: 74   Temp: 97.3 °F (36.3 °C)   SpO2: 97%       PHYSICAL EXAM    GENERAL: No acute distress, pleasant, well-appearing  HEENT: Normocephalic, atraumatic, Pupils equal and round  CARDIOVASCULAR: Well perfused, No peripheral cyanosis  PULMONARY: Good chest wall excursion, breathing unlabored  PSYCH: Appropriate affect and insight, non-pressured speech  SKIN: No rashes or lesions  MUSCULOSKELETAL:  Inspection: The back and extremities are symmetric and aligned. Muscle bulk is normal in appearance. Palpation: There is tenderness to palpation along the lumbar paraspinal musculature bilaterally  Lumbar range of motion is full    Left shoulder exam:  Painful range of motion in external rotation and abduction  Tenderness to palpation along the anterior lateral shoulder girdle  Positive Neer's and Agrawal sign  Positive empty can sign     Left knee exam:  Tenderness to palpation along the medial and lateral joint line    NEUROMUSCULAR:  Patient ambulates unassisted  Gait is antalgic  Sensation to light touch is intact throughout lower extremities  Strength is full in lower extremities  No ankle clonus    Special Tests:  Lumbar facet loading is positive bilaterally  Seated straight leg raise is negative bilaterally    DIAGNOSIS:    ICD-10-CM    1. Entrapment of left saphenous nerve  G57.82 VA PRQ IMPLTJ NEUROSTIMULATOR ELTRD PERIPHERAL NRV      2. Chronic left shoulder pain  M25.512     G89.29       3. Long-term current use of opiate analgesic  Z79.891       4.  Class 3 severe obesity with body mass index (BMI) of 40.0 to 44.9 in adult, unspecified obesity type, unspecified whether serious comorbidity present ([facility] [address]  E66.01     Z68.41       5. Fibromyalgia  M79.7       6. DDD (degenerative disc disease), lumbar  M51.36 HYDROcodone-acetaminophen (NORCO) 5-325 MG per tablet      7. Lumbar facet arthropathy  M47.816 HYDROcodone-acetaminophen (NORCO) 5-325 MG per tablet           ASSESSMENT:    Daxa Mcdermott is a 78 y. o.female who presents as follow-up for chronic low back pain. She has been seen through the pain management clinic for medication management as she has failed previous injection therapy. She continues to receive opioids for her chronic pain needs. The patient's history and physical examination are consistent with lumbar degenerative disc disease and possible fibromyalgia. At today's visit, she reports worsening left shoulder pain. Her history and physical examination are consistent with left rotator cuff tendinopathy and subacromial bursitis. Therefore, I performed a left subacromial bursa injection in office. She continues to endorse chronic left knee pain. She has been in discussion with her orthopedic surgeon about potential knee replacement surgery. There is evidence of left saphenous nerve entrapment syndrome, therefore I will plan for a left adductor canal (saphenous) nerve peripheral nerve stimulator implant using ultrasound guidance to help with pain and function. Neurologically, it appears the patient has full strength and normal sensation. There is no evidence of radiculopathy or myelopathy on examination. There are no red flags in the patient's history. The patient has failed conservative measures including outpatient physical therapy, TENS unit, greater than 3 medications for pain relief, a self-directed therapy program, as well as activity modification all within the last 6 weeks over 3 months. The patient's pain has been causing worsening quality of life and function.      The patient continues to take opioid medications to improve pain, function and quality of life. The patient denies any side effects from the medications including constipation or respiratory depression. The patient reports adequate analgesia with the medication. There is no evidence of aberrant behavior. The patient is due for a yearly urine drug screen which was ordered at today's visit to ensure the patient is taking the medication as prescribed. The patient reports taking Patsey Jet yesterday. She does admit to recent alcohol use to celebrate her 's birthday. I did warn her to not use alcohol while using opioid medications. Patient understood. I refilled her Norco.    PLAN:  Medications: For nonopioid therapy, the following medications were prescribed:    -Continue medication prescribed by primary care physician    Opioid therapy:  -Continue Norco 5/325 mg twice daily as needed, refilled  -Pain Treatment agreement: Up to date  -Urine Drug Screen: Ordered today 3/15/2023  -OARRS reviewed and appropriate    Interventions:  -Performed left subacromial bursa injection in office today's visit on 3/15/2023 (refer to procedure note)  -Plan for left adductor canal (saphenous) nerve peripheral nerve stimulator implant using ultrasound guidance    Imaging:  -Reviewed shoulder XR with patient in room    Behavioral Therapies:  -Continue daily stretching and home exercise program    Referrals:  -None    Follow-up Plan:  -1 month    Patient was offered intervention where appropriate. Multi-modal Pain Therapy: The patient was explicitly considered for multimodal and interdisciplinary therapy. Non-opioid and non-pharmacological opportunities to enhance analgesia and quality of life have been and will continue to be pursued. Opioid Therapy: Education provided to patient regarding short term and long term implications of opioid medication use.  Repeat opioid risk stratification today, discussion regarding functional achievements, safe storage, and optimization of non-opioid interventional, behavioral, and pharmaceutical modalities. Will continue attempt to wean off medication as appropriate. Whitney Aponte, DO  Interventional Pain Management/PM&R   [address]    Orders Placed This Encounter    DRUG SCREEN, PAIN     Norco yesterday  One alcoholic beverage yesterday     Standing Status:   Standing     Number of Occurrences:   1    CO PRQ IMPLTJ NEUROSTIMULATOR ELTRD PERIPHERAL NRV     Left adductor canal nerve (saphenous) peripheral nerve stimulator implant with ultrasound guidance    HYDROcodone-acetaminophen (NORCO) 5-325 MG per tablet     Sig: Take 1 tablet by mouth 2 times daily as needed for Pain for up to 30 days.      Dispense:  60 tablet     Refill:  0     Reduce doses taken as pain becomes manageable    bupivacaine (PF) (MARCAINE) 0.25 % injection 10 mg    triamcinolone acetonide (KENALOG-40) injection 40 mg

## 2023-03-15 NOTE — PROGRESS NOTES
Procedure performed: Left subacromial bursa injection    Indications: Chronic left Shoulder pain/Shoulder Impingement    Risks, benefits, and alternatives of the procedure were reviewed. All questions were answered appropriately and informed consent, both written and verbal, was obtained. The patient was placed in the sitting position. A timeout was obtained. Landmarks were palpated. A lateral to the inferior border of the scapula was palpated and marked. The area was prepped with alcohol x3 in typical aseptic fashion. At this point, a 25-gauge needle was passed into the shoulder. At this point, a total of 4 mL of Marcaine 0.25% combined with 40 mg of Kenalog was slowly injected into the shoulder for a total of 4 mL of Marcaine and 40 mg of Kenalog after negative aspiration. The needle was withdrawn with the point of needle intact. The procedure was tolerated without sequelae. The patient was kept in the office for approximately 10 minutes and left without signs of any immediate sequelae.

## 2023-03-20 LAB
6-ACETYLMORPHINE, UR: NOT DETECTED
7-AMINOCLONAZEPAM, URINE: NOT DETECTED
ALPHA-OH-ALPRAZ, URINE: NOT DETECTED
ALPHA-OH-MIDAZOLAM, URINE: NOT DETECTED
ALPRAZOLAM, URINE: NOT DETECTED
AMPHETAMINES, URINE: NOT DETECTED
BARBITURATES, URINE: NOT DETECTED
BENZOYLECGONINE, UR: NOT DETECTED
BUPRENORPHINE URINE: NOT DETECTED
CARISOPRODOL, UR: NOT DETECTED
CLONAZEPAM, URINE: NOT DETECTED
CODEINE, URINE: NOT DETECTED
CREATININE URINE: 149.9 MG/DL (ref 20–400)
DIAZEPAM, URINE: NOT DETECTED
DRUGS EXPECTED, UR: NORMAL
EER HI RES INTERP UR: NORMAL
ETHYL GLUCURONIDE UR: PRESENT
FENTANYL URINE: NOT DETECTED
GABAPENTIN: NOT DETECTED
HYDROCODONE, URINE: PRESENT
HYDROMORPHONE, URINE: PRESENT
LORAZEPAM, URINE: NOT DETECTED
MARIJUANA METAB, UR: NOT DETECTED
MDA, UR: NOT DETECTED
MDEA, EVE, UR: NOT DETECTED
MDMA URINE: NOT DETECTED
MEPERIDINE METAB, UR: NOT DETECTED
METHADONE, URINE: NOT DETECTED
METHAMPHETAMINE, URINE: NOT DETECTED
METHYLPHENIDATE: NOT DETECTED
MIDAZOLAM, URINE: NOT DETECTED
MORPHINE URINE: NOT DETECTED
NALOXONE URINE: NOT DETECTED
NORBUPRENORPHINE, URINE: NOT DETECTED
NORDIAZEPAM, URINE: NOT DETECTED
NORFENTANYL, URINE: NOT DETECTED
NORHYDROCODONE, URINE: PRESENT
NOROXYCODONE, URINE: NOT DETECTED
NOROXYMORPHONE, URINE: NOT DETECTED
OXAZEPAM, URINE: NOT DETECTED
OXYCODONE URINE: NOT DETECTED
OXYMORPHONE, URINE: NOT DETECTED
PAIN MANAGEMENT DRUG PANEL INTERP, URINE: NORMAL
PAIN MGT DRUG PANEL, HI RES, UR: NORMAL
PCP,URINE: NOT DETECTED
PHENTERMINE, UR: NOT DETECTED
PREGABALIN: NOT DETECTED
TAPENTADOL, URINE: NOT DETECTED
TAPENTADOL-O-SULFATE, URINE: NOT DETECTED
TEMAZEPAM, URINE: NOT DETECTED
TRAMADOL, URINE: NOT DETECTED
ZOLPIDEM METABOLITE (ZCA), URINE: NOT DETECTED
ZOLPIDEM, URINE: NOT DETECTED

## 2023-04-18 ENCOUNTER — HOSPITAL ENCOUNTER (OUTPATIENT)
Dept: PAIN MANAGEMENT | Age: 68
Discharge: HOME | End: 2023-04-18
Payer: MEDICARE

## 2023-04-18 VITALS — BODY MASS INDEX: 43.24 KG/M2 | TEMPERATURE: 97.3 F | HEIGHT: 62 IN | WEIGHT: 235 LBS

## 2023-04-18 DIAGNOSIS — M51.36 DDD (DEGENERATIVE DISC DISEASE), LUMBAR: ICD-10-CM

## 2023-04-18 DIAGNOSIS — G57.82 ENTRAPMENT OF LEFT SAPHENOUS NERVE: ICD-10-CM

## 2023-04-18 DIAGNOSIS — M25.512 CHRONIC LEFT SHOULDER PAIN: Primary | Chronic | ICD-10-CM

## 2023-04-18 DIAGNOSIS — M47.816 LUMBAR FACET ARTHROPATHY: ICD-10-CM

## 2023-04-18 DIAGNOSIS — Z79.891 LONG-TERM CURRENT USE OF OPIATE ANALGESIC: ICD-10-CM

## 2023-04-18 DIAGNOSIS — G89.29 CHRONIC LEFT SHOULDER PAIN: Primary | Chronic | ICD-10-CM

## 2023-04-18 PROCEDURE — 99213 OFFICE O/P EST LOW 20 MIN: CPT

## 2023-04-18 PROCEDURE — 99213 OFFICE O/P EST LOW 20 MIN: CPT | Performed by: NURSE PRACTITIONER

## 2023-04-18 RX ORDER — HYDROCODONE BITARTRATE AND ACETAMINOPHEN 5; 325 MG/1; MG/1
1 TABLET ORAL 2 TIMES DAILY PRN
Qty: 60 TABLET | Refills: 0 | Status: SHIPPED | OUTPATIENT
Start: 2023-04-19 | End: 2023-05-19

## 2023-04-18 ASSESSMENT — ENCOUNTER SYMPTOMS
BOWEL INCONTINENCE: 0
BACK PAIN: 1

## 2023-04-18 ASSESSMENT — PAIN SCALES - GENERAL: PAINLEVEL_OUTOF10: 5

## 2023-04-18 NOTE — PROGRESS NOTES
sources and denies use of illegal drugs. Medication risk and benefits have been discussed. Patient denies side effects from medications like nausea, vomiting, constipation or drowsiness. Patient reports current activities of daily living are possible due to medications and would like to continue them. As always, we encourage daily stretching and strengthening exercises, and recommend minimizing use of pain medications unless patient cannot get through daily activities due to pain. Due to the high risk nature of this patient's pain medication close monitoring is required. Continue current medication management, pt has been stable and compliant. Script written for norco  Pt is warned that concomitant use of alcohol and opioid significantly increase the risk of respiratory depression. That concurrent use of ETOH with medication is a violation of contract. Continuation of medication will require patient to follow the opioid contract and avoid alcohol use in future and that further evidence with result in termination of medication contract. Pt verbalized understanding. Pt to call and reschedule PNS for left knee pain. Given info for SCS   Follow up appointment made for 4 weeks    I have reviewed the chief complaint and history of present illness (including ROS and PFSH) and vital documentation by my staff and I agree with their documentation and have added where applicable.

## 2023-04-24 ENCOUNTER — HOSPITAL ENCOUNTER (OUTPATIENT)
Age: 68
Setting detail: SPECIMEN
Discharge: HOME | End: 2023-04-24

## 2023-04-24 DIAGNOSIS — R73.9 HYPERGLYCEMIA: ICD-10-CM

## 2023-04-24 PROBLEM — F11.20 OPIOID DEPENDENCE WITH CURRENT USE (HCC): Status: ACTIVE | Noted: 2023-04-24

## 2023-04-26 LAB
EST. AVERAGE GLUCOSE BLD GHB EST-MCNC: 108 MG/DL
HBA1C MFR BLD: 5.4 % (ref 4–6)

## 2023-05-11 ENCOUNTER — HOSPITAL ENCOUNTER (OUTPATIENT)
Dept: PAIN MANAGEMENT | Age: 68
Discharge: HOME | End: 2023-05-11
Payer: MEDICARE

## 2023-05-11 VITALS
SYSTOLIC BLOOD PRESSURE: 142 MMHG | WEIGHT: 235 LBS | BODY MASS INDEX: 43.24 KG/M2 | HEIGHT: 62 IN | DIASTOLIC BLOOD PRESSURE: 89 MMHG | HEART RATE: 90 BPM | OXYGEN SATURATION: 91 %

## 2023-05-11 DIAGNOSIS — Z79.891 LONG-TERM CURRENT USE OF OPIATE ANALGESIC: Primary | ICD-10-CM

## 2023-05-11 DIAGNOSIS — M47.816 LUMBAR FACET ARTHROPATHY: ICD-10-CM

## 2023-05-11 DIAGNOSIS — M51.36 DDD (DEGENERATIVE DISC DISEASE), LUMBAR: ICD-10-CM

## 2023-05-11 PROCEDURE — 99213 OFFICE O/P EST LOW 20 MIN: CPT

## 2023-05-11 PROCEDURE — 99213 OFFICE O/P EST LOW 20 MIN: CPT | Performed by: NURSE PRACTITIONER

## 2023-05-11 RX ORDER — HYDROCODONE BITARTRATE AND ACETAMINOPHEN 5; 325 MG/1; MG/1
1 TABLET ORAL 2 TIMES DAILY PRN
Qty: 60 TABLET | Refills: 0 | Status: SHIPPED | OUTPATIENT
Start: 2023-05-19 | End: 2023-06-18

## 2023-05-11 ASSESSMENT — ENCOUNTER SYMPTOMS
SHORTNESS OF BREATH: 0
COUGH: 0
CONSTIPATION: 0
BACK PAIN: 1
BOWEL INCONTINENCE: 0

## 2023-05-11 ASSESSMENT — PAIN DESCRIPTION - LOCATION: LOCATION: BACK

## 2023-05-11 ASSESSMENT — PAIN DESCRIPTION - DESCRIPTORS: DESCRIPTORS: ACHING

## 2023-05-11 ASSESSMENT — PAIN DESCRIPTION - FREQUENCY: FREQUENCY: CONTINUOUS

## 2023-05-11 ASSESSMENT — PAIN DESCRIPTION - PAIN TYPE: TYPE: CHRONIC PAIN

## 2023-05-11 ASSESSMENT — PAIN SCALES - GENERAL: PAINLEVEL_OUTOF10: 4

## 2023-05-11 NOTE — PROGRESS NOTES
Chief Complaint   Patient presents with    Back Pain        PMH     Pt reports chronic history of back pain, has tried RONAN and RFA with limited benefit. She is not interested in injections - does not feel she has much relief from them. May consider SCS  Also states she was diagnosed with fibromyalgia. Pain is worse with colder temps. She does see a chiropractor with adjustements heat and TENS tx which helps to control the pain. Pt has tried lyrica but stopped due to weight gain and gabapentin was causing drowsiness. Pt also experienced side effects with Topamax. She also c/o chronic left shoulder pain. She has limited ROM left shoulder. XR done 2/23 shows Mild degenerative changes. She had injection 3/23 and reports significant relief with reported side effect of flushing from waist up lasting 2 days. Pt has joined [employer] and trying to lose weight   PT copay $45/ visit and cost prohibitive at this time  Has seen ortho with knee replacement suggested when she is ready. Discussed knee RFA with him but still not interested in trying, had appt with Dr Dai Santos with PNS ordered but pt cancelled appt until cost verified. Recent flare up of left hip buttock groin pain with no injury andr resolved with heat and rest  Pt is ready to try SCS for better pain control so she can exercise and work on weight loss      HPI:   Back Pain  This is a chronic problem. The current episode started more than 1 year ago. The problem occurs constantly. The problem is unchanged. The pain is present in the lumbar spine. The quality of the pain is described as aching and shooting. The pain radiates to the right thigh. The pain is at a severity of 4/10. The pain is mild. The symptoms are aggravated by bending, position, standing and twisting. Pertinent negatives include no bladder incontinence, bowel incontinence, chest pain or fever.  Risk factors include history of cancer, obesity, poor posture, lack of exercise and

## 2023-05-22 ENCOUNTER — TELEPHONE (OUTPATIENT)
Dept: PAIN MANAGEMENT | Age: 68
End: 2023-05-22

## 2023-05-22 NOTE — TELEPHONE ENCOUNTER
I LM on patient's VM to call [employer], per their request.  I asked patient to call me with questions.

## 2023-06-22 ENCOUNTER — TELEPHONE (OUTPATIENT)
Dept: PAIN MANAGEMENT | Age: 68
End: 2023-06-22

## 2023-06-22 NOTE — TELEPHONE ENCOUNTER
Pt calls with increased pain lower back/ hip pain. She is requesting something for nerve pain.  LV 6/13

## 2023-07-10 ENCOUNTER — TELEPHONE (OUTPATIENT)
Dept: PAIN MANAGEMENT | Age: 68
End: 2023-07-10

## 2023-07-10 DIAGNOSIS — G57.82 ENTRAPMENT OF LEFT SAPHENOUS NERVE: Primary | ICD-10-CM

## 2023-07-10 DIAGNOSIS — Z79.891 CHRONIC USE OF OPIATE DRUG FOR THERAPEUTIC PURPOSE: ICD-10-CM

## 2023-07-17 ENCOUNTER — HOSPITAL ENCOUNTER (OUTPATIENT)
Dept: PAIN MANAGEMENT | Age: 68
Discharge: HOME | End: 2023-07-17
Payer: MEDICARE

## 2023-07-17 VITALS
DIASTOLIC BLOOD PRESSURE: 87 MMHG | BODY MASS INDEX: 42.33 KG/M2 | HEIGHT: 62 IN | WEIGHT: 230 LBS | HEART RATE: 79 BPM | TEMPERATURE: 97.3 F | OXYGEN SATURATION: 96 % | SYSTOLIC BLOOD PRESSURE: 173 MMHG | RESPIRATION RATE: 20 BRPM

## 2023-07-17 DIAGNOSIS — M51.36 DDD (DEGENERATIVE DISC DISEASE), LUMBAR: ICD-10-CM

## 2023-07-17 DIAGNOSIS — F11.20 OPIOID DEPENDENCE WITH CURRENT USE (HCC): ICD-10-CM

## 2023-07-17 DIAGNOSIS — M47.816 LUMBAR FACET ARTHROPATHY: ICD-10-CM

## 2023-07-17 DIAGNOSIS — M46.1 SACROILIITIS (HCC): ICD-10-CM

## 2023-07-17 DIAGNOSIS — Z79.891 CHRONIC USE OF OPIATE DRUG FOR THERAPEUTIC PURPOSE: Primary | ICD-10-CM

## 2023-07-17 DIAGNOSIS — M79.7 FIBROMYALGIA: ICD-10-CM

## 2023-07-17 PROCEDURE — G0481 DRUG TEST DEF 8-14 CLASSES: HCPCS

## 2023-07-17 PROCEDURE — 99213 OFFICE O/P EST LOW 20 MIN: CPT

## 2023-07-17 PROCEDURE — 99213 OFFICE O/P EST LOW 20 MIN: CPT | Performed by: NURSE PRACTITIONER

## 2023-07-17 PROCEDURE — 80307 DRUG TEST PRSMV CHEM ANLYZR: CPT

## 2023-07-17 RX ORDER — HYDROCODONE BITARTRATE AND ACETAMINOPHEN 5; 325 MG/1; MG/1
1 TABLET ORAL 2 TIMES DAILY PRN
Qty: 60 TABLET | Refills: 0 | Status: SHIPPED | OUTPATIENT
Start: 2023-07-19 | End: 2023-08-18

## 2023-07-17 ASSESSMENT — ENCOUNTER SYMPTOMS
COUGH: 0
SHORTNESS OF BREATH: 0
BACK PAIN: 1
BOWEL INCONTINENCE: 0
CONSTIPATION: 0

## 2023-07-17 NOTE — PROGRESS NOTES
Chief Complaint   Patient presents with    Back Pain     Med refill        PM  Pt reports chronic history of back pain, has tried RONAN and RFA with limited benefit. She is not interested in injections - does not feel she has much relief from them. May consider SCS  Also states she was diagnosed with fibromyalgia. Pain is worse with colder temps. She does see a chiropractor with adjustements heat and TENS tx which helps to control the pain. Pt has tried lyrica but stopped due to weight gain and gabapentin was causing drowsiness. Pt also experienced side effects with Topamax. She also c/o chronic left shoulder pain. She has limited ROM left shoulder. XR done 2/23 shows Mild degenerative changes. She had injection 3/23 and reports significant relief with reported side effect of flushing from waist up lasting 2 days. Pt has joined [employer] and trying to lose weight   PT copay $45/ visit and cost prohibitive at this time  Has seen ortho with knee replacement suggested when she is ready. Discussed knee RFA with him but still not interested in trying, had appt with Dr Terence Plascencia with PNS ordered but pt cancelled appt until cost verified. She would like another steroid injection for the left knee. Pt is ready to try SCS for better pain control so she can exercise and work on weight loss. Needs to schedule psych eval - did talk with Dr. Cherry Lacey and new referral sent     Back Pain  This is a chronic problem. The current episode started more than 1 year ago. The problem occurs constantly. The problem is unchanged. The pain is present in the lumbar spine. The quality of the pain is described as aching. The pain radiates to the left thigh and right thigh. The pain is at a severity of 4/10. The symptoms are aggravated by standing. Pertinent negatives include no bladder incontinence, bowel incontinence, chest pain, fever, numbness, tingling or weakness.  She has tried heat, home exercises and NSAIDs (tens
APPENDECTOMY Right     TONSILLECTOMY      UMBILICAL HERNIA REPAIR N/A     WRIST SURGERY  04/12/2018    plate and screws right wrist       Allergies   Allergen Reactions    Augmentin [Amoxicillin-Pot Clavulanate] Nausea And Vomiting     Projectile vomiting    Sulfa Antibiotics Anaphylaxis    Sulfadiazine Anaphylaxis    Amoxapine Nausea And Vomiting    Amoxapine And Related Diarrhea and Nausea And Vomiting     AMOX TR-K- 875-125 mg    Amoxicillin Nausea And Vomiting    Loxapine Succinate Diarrhea and Nausea Only     AMOX TR-K- 875-125 mg         Current Outpatient Medications:     HYDROcodone-acetaminophen (NORCO) 5-325 MG per tablet, Take 1 tablet by mouth 2 times daily as needed for Pain for up to 30 days. , Disp: 60 tablet, Rfl: 0    levothyroxine (SYNTHROID) 50 MCG tablet, take 1 tablet by mouth once daily, Disp: 90 tablet, Rfl: 1    meloxicam (MOBIC) 15 MG tablet, take 1 tablet by mouth once daily, Disp: 90 tablet, Rfl: 1    venlafaxine (EFFEXOR XR) 150 MG extended release capsule, take 1 capsule by mouth once daily, Disp: 90 capsule, Rfl: 1    Probiotic Product (PROBIOTIC-10) CHEW, Take by mouth, Disp: , Rfl:     Multiple Vitamin (MULTIVITAMIN ADULT PO), Take by mouth, Disp: , Rfl:     magnesium oxide (MAG-OX) 400 MG tablet, take 1 tablet by mouth twice a day, Disp: , Rfl:     famotidine (PEPCID) 20 MG tablet, Take 1 tablet by mouth 2 times daily, Disp: , Rfl:     amoxicillin (AMOXIL) 500 MG capsule, take 4 capsules by mouth 1 hour prior to appointment (Patient not taking: Reported on 4/24/2023), Disp: , Rfl:     calcium carbonate-vitamin D (CALTRATE) 600-400 MG-UNIT TABS per tab, Take by mouth, Disp: , Rfl:     Tens Unit MISC, by Does not apply route For low back pain, Disp: 1 each, Rfl: 0    loratadine (CLARITIN) 10 MG capsule, Take 1 capsule by mouth daily, Disp: , Rfl:     Polyethylene Glycol 3350 (MIRALAX PO), Take by mouth Taking daily, Disp: , Rfl:     flecainide (TAMBOCOR) 50 MG tablet, Take 1 tablet by

## 2023-07-21 LAB

## 2023-07-24 ENCOUNTER — HOSPITAL ENCOUNTER (OUTPATIENT)
Dept: PAIN MANAGEMENT | Age: 68
Discharge: HOME | End: 2023-07-24
Payer: MEDICARE

## 2023-07-24 VITALS
WEIGHT: 230 LBS | SYSTOLIC BLOOD PRESSURE: 143 MMHG | BODY MASS INDEX: 42.33 KG/M2 | DIASTOLIC BLOOD PRESSURE: 94 MMHG | HEIGHT: 62 IN | TEMPERATURE: 97.3 F

## 2023-07-24 DIAGNOSIS — Z79.891 LONG-TERM CURRENT USE OF OPIATE ANALGESIC: ICD-10-CM

## 2023-07-24 DIAGNOSIS — E66.01 CLASS 3 SEVERE OBESITY WITH BODY MASS INDEX (BMI) OF 40.0 TO 44.9 IN ADULT, UNSPECIFIED OBESITY TYPE, UNSPECIFIED WHETHER SERIOUS COMORBIDITY PRESENT (HCC): ICD-10-CM

## 2023-07-24 DIAGNOSIS — G57.82 ENTRAPMENT OF LEFT SAPHENOUS NERVE: ICD-10-CM

## 2023-07-24 DIAGNOSIS — G89.29 CHRONIC PAIN OF LEFT KNEE: Primary | ICD-10-CM

## 2023-07-24 DIAGNOSIS — M25.562 CHRONIC PAIN OF LEFT KNEE: Primary | ICD-10-CM

## 2023-07-24 PROCEDURE — 99214 OFFICE O/P EST MOD 30 MIN: CPT | Performed by: STUDENT IN AN ORGANIZED HEALTH CARE EDUCATION/TRAINING PROGRAM

## 2023-07-24 PROCEDURE — 99213 OFFICE O/P EST LOW 20 MIN: CPT

## 2023-07-24 ASSESSMENT — PAIN SCALES - GENERAL: PAINLEVEL_OUTOF10: 4

## 2023-07-24 NOTE — PROGRESS NOTES
Chronic Pain Clinic Note     Encounter Date: 7/24/2023     SUBJECTIVE:  Chief Complaint   Patient presents with    Back Pain     Discuss knee injection       History of Present Illness:   Austen Riser is a 78 y.o. female who presents with back pain    Medication Refill: Norco - does not need fill today    Current Complaints of Pain:   Location: back   Radiation: Right leg  Severity: moderate  Pain Numerical Score - 4 Left knee   Average: 4     Highest: 7  Lowest: 2  Character/Quality: Complains of pain that is aching  Timing: Intermittent  Associated symptoms: none  Numbness: no  Weakness: no  Exacerbating factors:  standing & walking  Alleviating factors:  sitting down, ice and heat  Length of time pain has been present: Started about 20+ years  Inciting event/injury: had an accident at 16years old  Bowel/Bladder incontinence: no  Falls: no  Physical Therapy:  yes, has tried    History of Interventions:   Surgery: No previous lumbar/cervical surgeries  Injections: SIJ    Imaging:    Cervical and lumbar MRI 5/7/2019    Cervical spine:       Multilevel degenerative disc disease in the cervical spine as described. See   above for details of each level. Lumbar spine:       Multilevel degenerative disc disease in the lower thoracic spine and the   lumbar spine as described.   See above for details of each level       Past Medical History:   Diagnosis Date    Anxiety     Breast cancer ([address])     Breast cancer ([address])     Cancer ([address])     breast, in remission    Chronic back pain 12/3/2013    Depression     DJD (degenerative joint disease)     Family history of breast cancer     MGM    Fibromyalgia     Fibromyalgia     Fibromyalgia     History of hysterectomy     Hypothyroidism     Obesity, Class III, BMI 40-49.9 (morbid obesity) ([address]) 7/22/2014    Osteoarthritis     Sacroiliitis (HCC)     SVT (supraventricular tachycardia) (HCC)     Unspecified sleep apnea        Past Surgical History:   Procedure Laterality Date

## 2023-11-09 ENCOUNTER — TELEPHONE (OUTPATIENT)
Age: 68
End: 2023-11-09

## 2023-11-09 DIAGNOSIS — Z01.818 PRE-OP EXAM: Primary | ICD-10-CM

## 2023-11-09 NOTE — TELEPHONE ENCOUNTER
Jeremy Khan has been scheduled for sx on 12/4. I called and talked to her today. She has several concerns and questions I could not answer. I have sent an email to Elizabeth asking her to call the patient. I told Jeremy Khan the date/time/location of PAT which she wrote down. She is in need of home therapy afterward due to no one to drive her. Her instructions are in the mail.

## 2023-11-15 PROBLEM — E78.5 DYSLIPIDEMIA: Status: ACTIVE | Noted: 2023-11-15

## 2023-11-20 ENCOUNTER — HOSPITAL ENCOUNTER (OUTPATIENT)
Dept: PREADMISSION TESTING | Age: 68
Discharge: HOME | End: 2023-11-24
Payer: MEDICARE

## 2023-11-20 VITALS
SYSTOLIC BLOOD PRESSURE: 146 MMHG | BODY MASS INDEX: 43.53 KG/M2 | HEART RATE: 68 BPM | WEIGHT: 238 LBS | DIASTOLIC BLOOD PRESSURE: 67 MMHG | OXYGEN SATURATION: 99 %

## 2023-11-20 LAB
ANION GAP SERPL CALCULATED.3IONS-SCNC: 10 MMOL/L (ref 9–17)
BASOPHILS # BLD: 0.1 K/UL (ref 0–0.2)
BASOPHILS NFR BLD: 1 % (ref 0–2)
BUN SERPL-MCNC: 22 MG/DL (ref 8–23)
CALCIUM SERPL-MCNC: 8.9 MG/DL (ref 8.6–10.4)
CHLORIDE SERPL-SCNC: 104 MMOL/L (ref 98–107)
CO2 SERPL-SCNC: 29 MMOL/L (ref 20–31)
CREAT SERPL-MCNC: 0.7 MG/DL (ref 0.5–0.9)
EKG ATRIAL RATE: 72 BPM
EKG P AXIS: -5 DEGREES
EKG P-R INTERVAL: 150 MS
EKG Q-T INTERVAL: 436 MS
EKG QRS DURATION: 92 MS
EKG QTC CALCULATION (BAZETT): 477 MS
EKG R AXIS: -5 DEGREES
EKG T AXIS: 12 DEGREES
EKG VENTRICULAR RATE: 72 BPM
EOSINOPHIL # BLD: 0.2 K/UL (ref 0–0.4)
EOSINOPHILS RELATIVE PERCENT: 5 % (ref 1–4)
ERYTHROCYTE [DISTWIDTH] IN BLOOD BY AUTOMATED COUNT: 14.3 % (ref 12.5–15.4)
GFR SERPL CREATININE-BSD FRML MDRD: >60 ML/MIN/1.73M2
GLUCOSE SERPL-MCNC: 91 MG/DL (ref 70–99)
HCT VFR BLD AUTO: 39.5 % (ref 36–46)
HGB BLD-MCNC: 13.2 G/DL (ref 12–16)
LYMPHOCYTES NFR BLD: 1.3 K/UL (ref 1–4.8)
LYMPHOCYTES RELATIVE PERCENT: 33 % (ref 24–44)
MCH RBC QN AUTO: 30.2 PG (ref 26–34)
MCHC RBC AUTO-ENTMCNC: 33.5 G/DL (ref 31–37)
MCV RBC AUTO: 89.9 FL (ref 80–100)
MONOCYTES NFR BLD: 0.4 K/UL (ref 0.1–1.2)
MONOCYTES NFR BLD: 9 % (ref 2–11)
NEUTROPHILS NFR BLD: 52 % (ref 36–66)
NEUTS SEG NFR BLD: 2.1 K/UL (ref 1.8–7.7)
PLATELET # BLD AUTO: 174 K/UL (ref 140–450)
PMV BLD AUTO: 7.4 FL (ref 6–12)
POTASSIUM SERPL-SCNC: 4.2 MMOL/L (ref 3.7–5.3)
RBC # BLD AUTO: 4.39 M/UL (ref 4–5.2)
SODIUM SERPL-SCNC: 143 MMOL/L (ref 135–144)
WBC OTHER # BLD: 4 K/UL (ref 3.5–11)

## 2023-11-20 PROCEDURE — 93005 ELECTROCARDIOGRAM TRACING: CPT | Performed by: ORTHOPAEDIC SURGERY

## 2023-11-20 PROCEDURE — 36415 COLL VENOUS BLD VENIPUNCTURE: CPT

## 2023-11-20 PROCEDURE — 80048 BASIC METABOLIC PNL TOTAL CA: CPT

## 2023-11-20 PROCEDURE — 85025 COMPLETE CBC W/AUTO DIFF WBC: CPT

## 2023-11-20 PROCEDURE — 87641 MR-STAPH DNA AMP PROBE: CPT

## 2023-11-20 ASSESSMENT — PAIN DESCRIPTION - ONSET
ONSET: AWAKENED FROM SLEEP
ONSET: AWAKENED FROM SLEEP

## 2023-11-20 ASSESSMENT — PAIN DESCRIPTION - FREQUENCY
FREQUENCY: INTERMITTENT
FREQUENCY: CONTINUOUS

## 2023-11-20 ASSESSMENT — PAIN - FUNCTIONAL ASSESSMENT: PAIN_FUNCTIONAL_ASSESSMENT: PREVENTS OR INTERFERES WITH MANY ACTIVE NOT PASSIVE ACTIVITIES

## 2023-11-20 ASSESSMENT — PAIN SCALES - GENERAL
PAINLEVEL_OUTOF10: 6
PAINLEVEL_OUTOF10: 6

## 2023-11-20 ASSESSMENT — PAIN DESCRIPTION - ORIENTATION
ORIENTATION: LEFT
ORIENTATION: LEFT

## 2023-11-20 ASSESSMENT — PAIN DESCRIPTION - PAIN TYPE: TYPE: CHRONIC PAIN

## 2023-11-20 ASSESSMENT — PAIN DESCRIPTION - LOCATION
LOCATION: KNEE
LOCATION: KNEE

## 2023-11-20 ASSESSMENT — PAIN DESCRIPTION - DESCRIPTORS: DESCRIPTORS: ACHING;TENDER

## 2023-11-21 ENCOUNTER — ANESTHESIA EVENT (OUTPATIENT)
Dept: OPERATING ROOM | Age: 68
End: 2023-11-21
Payer: MEDICARE

## 2023-11-21 LAB
MRSA, DNA, NASAL: NEGATIVE
SPECIMEN DESCRIPTION: NORMAL

## 2023-11-28 ENCOUNTER — CASE MANAGEMENT (OUTPATIENT)
Age: 68
End: 2023-11-28

## 2023-11-28 DIAGNOSIS — M17.12 OSTEOARTHRITIS OF LEFT KNEE, UNSPECIFIED OSTEOARTHRITIS TYPE: Primary | ICD-10-CM

## 2023-11-28 NOTE — PROGRESS NOTES
Reynaldo Sandoval was evaluated today and a DME order was entered for a wheeled walker because she requires this to successfully complete daily living tasks of ambulating. A wheeled walker is necessary due to the patient's unsteady gait, upper body weakness, and inability to  an ambulation device; and she can ambulate only by pushing a walker instead of a lesser assistive device such as a cane, crutch, or standard walker. The need for this equipment was discussed with the patient and she understands and is in agreement.

## 2023-11-28 NOTE — PROGRESS NOTES
Pre-op phone call    Spoke with patient preop concerning:     Procedure left  Knee arthroplasty with Dr. Mona Nayak  on  12/4/23 . DME: Patient has  none . DME: Patient needs rolling walker. Therapy after surgery: [facility] with [facility]    Other concerns: Pt requested a FWW be ordered and at the hospital for day of surgery. FWW ordered from [facility].

## 2023-12-04 ENCOUNTER — ANESTHESIA (OUTPATIENT)
Dept: OPERATING ROOM | Age: 68
End: 2023-12-04
Payer: MEDICARE

## 2023-12-04 ENCOUNTER — HOSPITAL ENCOUNTER (OUTPATIENT)
Age: 68
Discharge: HOME | End: 2023-12-04
Attending: ORTHOPAEDIC SURGERY | Admitting: ORTHOPAEDIC SURGERY
Payer: MEDICARE

## 2023-12-04 VITALS
HEART RATE: 68 BPM | WEIGHT: 237.6 LBS | OXYGEN SATURATION: 98 % | DIASTOLIC BLOOD PRESSURE: 60 MMHG | TEMPERATURE: 97.8 F | HEIGHT: 63 IN | SYSTOLIC BLOOD PRESSURE: 131 MMHG | BODY MASS INDEX: 42.1 KG/M2 | RESPIRATION RATE: 17 BRPM

## 2023-12-04 DIAGNOSIS — Z96.652 STATUS POST TOTAL LEFT KNEE REPLACEMENT: Primary | ICD-10-CM

## 2023-12-04 PROBLEM — Z96.651 S/P TOTAL KNEE REPLACEMENT, RIGHT: Status: ACTIVE | Noted: 2023-12-04

## 2023-12-04 PROCEDURE — 3600000004 HC SURGERY LEVEL 4 BASE: Performed by: ORTHOPAEDIC SURGERY

## 2023-12-04 PROCEDURE — 2500000003 HC RX 250 WO HCPCS: Performed by: ORTHOPAEDIC SURGERY

## 2023-12-04 PROCEDURE — 6370000000 HC RX 637 (ALT 250 FOR IP)

## 2023-12-04 PROCEDURE — 64999 UNLISTED PX NERVOUS SYSTEM: CPT | Performed by: ANESTHESIOLOGY

## 2023-12-04 PROCEDURE — 64447 NJX AA&/STRD FEMORAL NRV IMG: CPT | Performed by: ANESTHESIOLOGY

## 2023-12-04 PROCEDURE — 2580000003 HC RX 258: Performed by: ORTHOPAEDIC SURGERY

## 2023-12-04 PROCEDURE — 97162 PT EVAL MOD COMPLEX 30 MIN: CPT

## 2023-12-04 PROCEDURE — 7100000000 HC PACU RECOVERY - FIRST 15 MIN: Performed by: ORTHOPAEDIC SURGERY

## 2023-12-04 PROCEDURE — 27447 TOTAL KNEE ARTHROPLASTY: CPT | Performed by: ORTHOPAEDIC SURGERY

## 2023-12-04 PROCEDURE — C1776 JOINT DEVICE (IMPLANTABLE): HCPCS | Performed by: ORTHOPAEDIC SURGERY

## 2023-12-04 PROCEDURE — 97110 THERAPEUTIC EXERCISES: CPT

## 2023-12-04 PROCEDURE — 97116 GAIT TRAINING THERAPY: CPT

## 2023-12-04 PROCEDURE — C1713 ANCHOR/SCREW BN/BN,TIS/BN: HCPCS | Performed by: ORTHOPAEDIC SURGERY

## 2023-12-04 PROCEDURE — 2709999900 HC NON-CHARGEABLE SUPPLY: Performed by: ORTHOPAEDIC SURGERY

## 2023-12-04 PROCEDURE — 7100000001 HC PACU RECOVERY - ADDTL 15 MIN: Performed by: ORTHOPAEDIC SURGERY

## 2023-12-04 PROCEDURE — 3600000014 HC SURGERY LEVEL 4 ADDTL 15MIN: Performed by: ORTHOPAEDIC SURGERY

## 2023-12-04 PROCEDURE — 2500000003 HC RX 250 WO HCPCS: Performed by: NURSE ANESTHETIST, CERTIFIED REGISTERED

## 2023-12-04 PROCEDURE — 2580000003 HC RX 258: Performed by: ANESTHESIOLOGY

## 2023-12-04 PROCEDURE — 6360000002 HC RX W HCPCS: Performed by: NURSE ANESTHETIST, CERTIFIED REGISTERED

## 2023-12-04 PROCEDURE — 3700000001 HC ADD 15 MINUTES (ANESTHESIA): Performed by: ORTHOPAEDIC SURGERY

## 2023-12-04 PROCEDURE — 6360000002 HC RX W HCPCS

## 2023-12-04 PROCEDURE — 97530 THERAPEUTIC ACTIVITIES: CPT

## 2023-12-04 PROCEDURE — 3700000000 HC ANESTHESIA ATTENDED CARE: Performed by: ORTHOPAEDIC SURGERY

## 2023-12-04 PROCEDURE — C9290 INJ, BUPIVACAINE LIPOSOME: HCPCS | Performed by: ANESTHESIOLOGY

## 2023-12-04 PROCEDURE — 7100000010 HC PHASE II RECOVERY - FIRST 15 MIN: Performed by: ORTHOPAEDIC SURGERY

## 2023-12-04 PROCEDURE — 6360000002 HC RX W HCPCS: Performed by: ORTHOPAEDIC SURGERY

## 2023-12-04 PROCEDURE — 97535 SELF CARE MNGMENT TRAINING: CPT

## 2023-12-04 PROCEDURE — 7100000011 HC PHASE II RECOVERY - ADDTL 15 MIN: Performed by: ORTHOPAEDIC SURGERY

## 2023-12-04 PROCEDURE — 97166 OT EVAL MOD COMPLEX 45 MIN: CPT

## 2023-12-04 PROCEDURE — 6360000002 HC RX W HCPCS: Performed by: ANESTHESIOLOGY

## 2023-12-04 DEVICE — IMPLANTABLE DEVICE: Type: IMPLANTABLE DEVICE | Site: KNEE | Status: FUNCTIONAL

## 2023-12-04 DEVICE — COMPONENT PAT 34MM KNEE ARCM 1 PEG W/ WIRE FOR ONC SALV SYS: Type: IMPLANTABLE DEVICE | Site: KNEE | Status: FUNCTIONAL

## 2023-12-04 DEVICE — TRAY TIB L71MM KNEE CO CHROM FIN MOD INTLOK VANGUARD: Type: IMPLANTABLE DEVICE | Site: KNEE | Status: FUNCTIONAL

## 2023-12-04 DEVICE — CEMENT BNE 40GM HI VISC RADPQ FOR REV SURG: Type: IMPLANTABLE DEVICE | Site: KNEE | Status: FUNCTIONAL

## 2023-12-04 DEVICE — BEARING TIB L71MM THK16MM KNEE ARCM ANT STBL MOD COMPLT: Type: IMPLANTABLE DEVICE | Site: KNEE | Status: FUNCTIONAL

## 2023-12-04 RX ORDER — ONDANSETRON 2 MG/ML
4 INJECTION INTRAMUSCULAR; INTRAVENOUS EVERY 6 HOURS PRN
Status: CANCELLED | OUTPATIENT
Start: 2023-12-04

## 2023-12-04 RX ORDER — SODIUM CHLORIDE 0.9 % (FLUSH) 0.9 %
5-40 SYRINGE (ML) INJECTION PRN
Status: DISCONTINUED | OUTPATIENT
Start: 2023-12-04 | End: 2023-12-04 | Stop reason: HOSPADM

## 2023-12-04 RX ORDER — GLYCOPYRROLATE 0.2 MG/ML
0.4 INJECTION INTRAMUSCULAR; INTRAVENOUS ONCE
Status: DISCONTINUED | OUTPATIENT
Start: 2023-12-04 | End: 2023-12-04 | Stop reason: HOSPADM

## 2023-12-04 RX ORDER — POLYETHYLENE GLYCOL 3350 17 G/17G
17 POWDER, FOR SOLUTION ORAL DAILY PRN
Status: CANCELLED | OUTPATIENT
Start: 2023-12-04

## 2023-12-04 RX ORDER — MORPHINE SULFATE 2 MG/ML
2 INJECTION, SOLUTION INTRAMUSCULAR; INTRAVENOUS EVERY 5 MIN PRN
Status: DISCONTINUED | OUTPATIENT
Start: 2023-12-04 | End: 2023-12-04 | Stop reason: HOSPADM

## 2023-12-04 RX ORDER — ACETAMINOPHEN 500 MG
1000 TABLET ORAL ONCE
Status: COMPLETED | OUTPATIENT
Start: 2023-12-04 | End: 2023-12-04

## 2023-12-04 RX ORDER — IPRATROPIUM BROMIDE AND ALBUTEROL SULFATE 2.5; .5 MG/3ML; MG/3ML
1 SOLUTION RESPIRATORY (INHALATION)
Status: DISCONTINUED | OUTPATIENT
Start: 2023-12-04 | End: 2023-12-04 | Stop reason: HOSPADM

## 2023-12-04 RX ORDER — SODIUM CHLORIDE 9 MG/ML
INJECTION, SOLUTION INTRAVENOUS PRN
Status: CANCELLED | OUTPATIENT
Start: 2023-12-04

## 2023-12-04 RX ORDER — SODIUM CHLORIDE 9 MG/ML
INJECTION, SOLUTION INTRAVENOUS CONTINUOUS
Status: CANCELLED | OUTPATIENT
Start: 2023-12-04

## 2023-12-04 RX ORDER — BUPIVACAINE HYDROCHLORIDE 5 MG/ML
INJECTION, SOLUTION EPIDURAL; INTRACAUDAL
Status: COMPLETED
Start: 2023-12-04 | End: 2023-12-04

## 2023-12-04 RX ORDER — SODIUM CHLORIDE 9 MG/ML
INJECTION, SOLUTION INTRAVENOUS PRN
Status: DISCONTINUED | OUTPATIENT
Start: 2023-12-04 | End: 2023-12-04 | Stop reason: HOSPADM

## 2023-12-04 RX ORDER — MIDAZOLAM HYDROCHLORIDE 1 MG/ML
INJECTION INTRAMUSCULAR; INTRAVENOUS
Status: COMPLETED
Start: 2023-12-04 | End: 2023-12-04

## 2023-12-04 RX ORDER — HYDRALAZINE HYDROCHLORIDE 20 MG/ML
10 INJECTION INTRAMUSCULAR; INTRAVENOUS
Status: DISCONTINUED | OUTPATIENT
Start: 2023-12-04 | End: 2023-12-04 | Stop reason: HOSPADM

## 2023-12-04 RX ORDER — MIDAZOLAM HYDROCHLORIDE 2 MG/2ML
2 INJECTION, SOLUTION INTRAMUSCULAR; INTRAVENOUS
Status: DISCONTINUED | OUTPATIENT
Start: 2023-12-04 | End: 2023-12-04 | Stop reason: HOSPADM

## 2023-12-04 RX ORDER — KETOROLAC TROMETHAMINE 15 MG/ML
15 INJECTION, SOLUTION INTRAMUSCULAR; INTRAVENOUS EVERY 6 HOURS
Status: CANCELLED | OUTPATIENT
Start: 2023-12-04 | End: 2023-12-07

## 2023-12-04 RX ORDER — ONDANSETRON 4 MG/1
8 TABLET, FILM COATED ORAL EVERY 8 HOURS PRN
Qty: 12 TABLET | Refills: 0 | Status: SHIPPED | OUTPATIENT
Start: 2023-12-04

## 2023-12-04 RX ORDER — SODIUM CHLORIDE 0.9 % (FLUSH) 0.9 %
5-40 SYRINGE (ML) INJECTION EVERY 12 HOURS SCHEDULED
Status: CANCELLED | OUTPATIENT
Start: 2023-12-04

## 2023-12-04 RX ORDER — ASPIRIN 81 MG/1
81 TABLET ORAL 2 TIMES DAILY
Qty: 60 TABLET | Refills: 0 | Status: SHIPPED | OUTPATIENT
Start: 2023-12-04 | End: 2024-01-03

## 2023-12-04 RX ORDER — BUPIVACAINE HYDROCHLORIDE 5 MG/ML
INJECTION, SOLUTION EPIDURAL; INTRACAUDAL
Status: COMPLETED | OUTPATIENT
Start: 2023-12-04 | End: 2023-12-04

## 2023-12-04 RX ORDER — METOCLOPRAMIDE HYDROCHLORIDE 5 MG/ML
10 INJECTION INTRAMUSCULAR; INTRAVENOUS
Status: DISCONTINUED | OUTPATIENT
Start: 2023-12-04 | End: 2023-12-04 | Stop reason: HOSPADM

## 2023-12-04 RX ORDER — FLECAINIDE ACETATE 50 MG/1
50 TABLET ORAL 2 TIMES DAILY
Status: CANCELLED | OUTPATIENT
Start: 2023-12-04

## 2023-12-04 RX ORDER — DIPHENHYDRAMINE HYDROCHLORIDE 50 MG/ML
12.5 INJECTION INTRAMUSCULAR; INTRAVENOUS
Status: DISCONTINUED | OUTPATIENT
Start: 2023-12-04 | End: 2023-12-04 | Stop reason: HOSPADM

## 2023-12-04 RX ORDER — TRANEXAMIC ACID 10 MG/ML
1000 INJECTION, SOLUTION INTRAVENOUS
Status: COMPLETED | OUTPATIENT
Start: 2023-12-04 | End: 2023-12-04

## 2023-12-04 RX ORDER — FENTANYL CITRATE 50 UG/ML
100 INJECTION, SOLUTION INTRAMUSCULAR; INTRAVENOUS ONCE
Status: CANCELLED | OUTPATIENT
Start: 2023-12-04 | End: 2023-12-04

## 2023-12-04 RX ORDER — DEXAMETHASONE SODIUM PHOSPHATE 10 MG/ML
8 INJECTION, SOLUTION INTRAMUSCULAR; INTRAVENOUS ONCE
Status: DISCONTINUED | OUTPATIENT
Start: 2023-12-04 | End: 2023-12-04 | Stop reason: HOSPADM

## 2023-12-04 RX ORDER — MEPERIDINE HYDROCHLORIDE 50 MG/ML
INJECTION INTRAMUSCULAR; INTRAVENOUS; SUBCUTANEOUS
Status: COMPLETED
Start: 2023-12-04 | End: 2023-12-04

## 2023-12-04 RX ORDER — PROMETHAZINE HYDROCHLORIDE 25 MG/ML
6.25 INJECTION, SOLUTION INTRAMUSCULAR; INTRAVENOUS EVERY 5 MIN PRN
Status: DISCONTINUED | OUTPATIENT
Start: 2023-12-04 | End: 2023-12-04 | Stop reason: HOSPADM

## 2023-12-04 RX ORDER — ONDANSETRON 4 MG/1
4 TABLET, ORALLY DISINTEGRATING ORAL EVERY 8 HOURS PRN
Status: CANCELLED | OUTPATIENT
Start: 2023-12-04

## 2023-12-04 RX ORDER — OXYCODONE HYDROCHLORIDE AND ACETAMINOPHEN 5; 325 MG/1; MG/1
TABLET ORAL
Status: COMPLETED
Start: 2023-12-04 | End: 2023-12-04

## 2023-12-04 RX ORDER — OXYCODONE HYDROCHLORIDE AND ACETAMINOPHEN 5; 325 MG/1; MG/1
1-2 TABLET ORAL EVERY 4 HOURS PRN
Qty: 50 TABLET | Refills: 0 | Status: SHIPPED | OUTPATIENT
Start: 2023-12-04 | End: 2023-12-11

## 2023-12-04 RX ORDER — ACETAMINOPHEN 500 MG
TABLET ORAL
Status: COMPLETED
Start: 2023-12-04 | End: 2023-12-04

## 2023-12-04 RX ORDER — OXYCODONE HYDROCHLORIDE 5 MG/1
10 TABLET ORAL EVERY 4 HOURS PRN
Status: CANCELLED | OUTPATIENT
Start: 2023-12-04

## 2023-12-04 RX ORDER — OXYCODONE HYDROCHLORIDE AND ACETAMINOPHEN 5; 325 MG/1; MG/1
1 TABLET ORAL
Status: COMPLETED | OUTPATIENT
Start: 2023-12-04 | End: 2023-12-04

## 2023-12-04 RX ORDER — FENTANYL CITRATE 50 UG/ML
INJECTION, SOLUTION INTRAMUSCULAR; INTRAVENOUS
Status: COMPLETED
Start: 2023-12-04 | End: 2023-12-04

## 2023-12-04 RX ORDER — SODIUM CHLORIDE 0.9 % (FLUSH) 0.9 %
5-40 SYRINGE (ML) INJECTION EVERY 12 HOURS SCHEDULED
Status: DISCONTINUED | OUTPATIENT
Start: 2023-12-04 | End: 2023-12-04 | Stop reason: HOSPADM

## 2023-12-04 RX ORDER — CEFAZOLIN 2 G/1
INJECTION, POWDER, FOR SOLUTION INTRAMUSCULAR; INTRAVENOUS
Status: DISCONTINUED
Start: 2023-12-04 | End: 2023-12-04 | Stop reason: HOSPADM

## 2023-12-04 RX ORDER — CEPHALEXIN 500 MG/1
500 CAPSULE ORAL 4 TIMES DAILY
Qty: 20 CAPSULE | Refills: 0 | Status: SHIPPED | OUTPATIENT
Start: 2023-12-04 | End: 2023-12-09

## 2023-12-04 RX ORDER — LIDOCAINE HYDROCHLORIDE 10 MG/ML
1 INJECTION, SOLUTION EPIDURAL; INFILTRATION; INTRACAUDAL; PERINEURAL
Status: DISCONTINUED | OUTPATIENT
Start: 2023-12-04 | End: 2023-12-04 | Stop reason: HOSPADM

## 2023-12-04 RX ORDER — IBUPROFEN 800 MG/1
800 TABLET ORAL EVERY 8 HOURS PRN
Qty: 90 TABLET | Refills: 0 | Status: SHIPPED | OUTPATIENT
Start: 2023-12-04

## 2023-12-04 RX ORDER — VENLAFAXINE HYDROCHLORIDE 150 MG/1
150 CAPSULE, EXTENDED RELEASE ORAL
Status: CANCELLED | OUTPATIENT
Start: 2023-12-05

## 2023-12-04 RX ORDER — LIDOCAINE HYDROCHLORIDE 10 MG/ML
INJECTION, SOLUTION INFILTRATION; PERINEURAL PRN
Status: DISCONTINUED | OUTPATIENT
Start: 2023-12-04 | End: 2023-12-04 | Stop reason: SDUPTHER

## 2023-12-04 RX ORDER — BUPIVACAINE HYDROCHLORIDE 7.5 MG/ML
INJECTION, SOLUTION INTRASPINAL PRN
Status: DISCONTINUED | OUTPATIENT
Start: 2023-12-04 | End: 2023-12-04 | Stop reason: SDUPTHER

## 2023-12-04 RX ORDER — PROPOFOL 10 MG/ML
INJECTION, EMULSION INTRAVENOUS CONTINUOUS PRN
Status: DISCONTINUED | OUTPATIENT
Start: 2023-12-04 | End: 2023-12-04 | Stop reason: SDUPTHER

## 2023-12-04 RX ORDER — PROPOFOL 10 MG/ML
INJECTION, EMULSION INTRAVENOUS PRN
Status: DISCONTINUED | OUTPATIENT
Start: 2023-12-04 | End: 2023-12-04 | Stop reason: SDUPTHER

## 2023-12-04 RX ORDER — LABETALOL HYDROCHLORIDE 5 MG/ML
10 INJECTION, SOLUTION INTRAVENOUS
Status: DISCONTINUED | OUTPATIENT
Start: 2023-12-04 | End: 2023-12-04 | Stop reason: HOSPADM

## 2023-12-04 RX ORDER — OXYCODONE HYDROCHLORIDE 5 MG/1
5 TABLET ORAL EVERY 4 HOURS PRN
Status: CANCELLED | OUTPATIENT
Start: 2023-12-04

## 2023-12-04 RX ORDER — ACETAMINOPHEN 325 MG/1
650 TABLET ORAL EVERY 6 HOURS
Status: CANCELLED | OUTPATIENT
Start: 2023-12-04

## 2023-12-04 RX ORDER — OXYCODONE HYDROCHLORIDE AND ACETAMINOPHEN 5; 325 MG/1; MG/1
2 TABLET ORAL
Status: DISCONTINUED | OUTPATIENT
Start: 2023-12-04 | End: 2023-12-04 | Stop reason: HOSPADM

## 2023-12-04 RX ORDER — ONDANSETRON 2 MG/ML
4 INJECTION INTRAMUSCULAR; INTRAVENOUS
Status: DISCONTINUED | OUTPATIENT
Start: 2023-12-04 | End: 2023-12-04 | Stop reason: HOSPADM

## 2023-12-04 RX ORDER — DEXAMETHASONE SODIUM PHOSPHATE 10 MG/ML
INJECTION, SOLUTION INTRAMUSCULAR; INTRAVENOUS PRN
Status: DISCONTINUED | OUTPATIENT
Start: 2023-12-04 | End: 2023-12-04 | Stop reason: SDUPTHER

## 2023-12-04 RX ORDER — ASPIRIN 325 MG
325 TABLET, DELAYED RELEASE (ENTERIC COATED) ORAL 2 TIMES DAILY
Status: CANCELLED | OUTPATIENT
Start: 2023-12-04

## 2023-12-04 RX ORDER — PHENYLEPHRINE HCL IN 0.9% NACL 1 MG/10 ML
SYRINGE (ML) INTRAVENOUS PRN
Status: DISCONTINUED | OUTPATIENT
Start: 2023-12-04 | End: 2023-12-04 | Stop reason: SDUPTHER

## 2023-12-04 RX ORDER — LEVOTHYROXINE SODIUM 0.05 MG/1
50 TABLET ORAL DAILY
Status: CANCELLED | OUTPATIENT
Start: 2023-12-04

## 2023-12-04 RX ORDER — MIDAZOLAM HYDROCHLORIDE 2 MG/2ML
2 INJECTION, SOLUTION INTRAMUSCULAR; INTRAVENOUS ONCE
Status: CANCELLED | OUTPATIENT
Start: 2023-12-04 | End: 2023-12-04

## 2023-12-04 RX ORDER — MEPERIDINE HYDROCHLORIDE 50 MG/ML
12.5 INJECTION INTRAMUSCULAR; INTRAVENOUS; SUBCUTANEOUS EVERY 5 MIN PRN
Status: DISCONTINUED | OUTPATIENT
Start: 2023-12-04 | End: 2023-12-04 | Stop reason: HOSPADM

## 2023-12-04 RX ORDER — SENNOSIDES A AND B 8.6 MG/1
1 TABLET, FILM COATED ORAL DAILY PRN
Status: CANCELLED | OUTPATIENT
Start: 2023-12-04

## 2023-12-04 RX ORDER — TRANEXAMIC ACID 10 MG/ML
INJECTION, SOLUTION INTRAVENOUS
Status: COMPLETED
Start: 2023-12-04 | End: 2023-12-04

## 2023-12-04 RX ORDER — GLYCOPYRROLATE 0.2 MG/ML
INJECTION INTRAMUSCULAR; INTRAVENOUS PRN
Status: DISCONTINUED | OUTPATIENT
Start: 2023-12-04 | End: 2023-12-04 | Stop reason: SDUPTHER

## 2023-12-04 RX ORDER — SODIUM CHLORIDE, SODIUM LACTATE, POTASSIUM CHLORIDE, CALCIUM CHLORIDE 600; 310; 30; 20 MG/100ML; MG/100ML; MG/100ML; MG/100ML
INJECTION, SOLUTION INTRAVENOUS CONTINUOUS
Status: DISCONTINUED | OUTPATIENT
Start: 2023-12-04 | End: 2023-12-04 | Stop reason: HOSPADM

## 2023-12-04 RX ORDER — SODIUM CHLORIDE 0.9 % (FLUSH) 0.9 %
5-40 SYRINGE (ML) INJECTION PRN
Status: CANCELLED | OUTPATIENT
Start: 2023-12-04

## 2023-12-04 RX ADMIN — ACETAMINOPHEN 1000 MG: 500 TABLET ORAL at 08:54

## 2023-12-04 RX ADMIN — OXYCODONE HYDROCHLORIDE AND ACETAMINOPHEN 1 TABLET: 5; 325 TABLET ORAL at 14:15

## 2023-12-04 RX ADMIN — TRANEXAMIC ACID 1000 MG: 10 INJECTION, SOLUTION INTRAVENOUS at 10:20

## 2023-12-04 RX ADMIN — CEFAZOLIN 2000 MG: 2 INJECTION, POWDER, FOR SOLUTION INTRAMUSCULAR; INTRAVENOUS at 10:18

## 2023-12-04 RX ADMIN — DEXAMETHASONE SODIUM PHOSPHATE 8 MG: 10 INJECTION, SOLUTION INTRAMUSCULAR; INTRAVENOUS at 10:23

## 2023-12-04 RX ADMIN — Medication 100 MCG: at 10:43

## 2023-12-04 RX ADMIN — BUPIVACAINE HYDROCHLORIDE IN DEXTROSE 1.6 ML: 7.5 INJECTION, SOLUTION SUBARACHNOID at 10:13

## 2023-12-04 RX ADMIN — MIDAZOLAM HYDROCHLORIDE 2 MG: 1 INJECTION, SOLUTION INTRAMUSCULAR; INTRAVENOUS at 09:28

## 2023-12-04 RX ADMIN — GLYCOPYRROLATE 0.2 MG: 0.2 INJECTION INTRAMUSCULAR; INTRAVENOUS at 10:35

## 2023-12-04 RX ADMIN — FENTANYL CITRATE 100 MCG: 50 INJECTION, SOLUTION INTRAMUSCULAR; INTRAVENOUS at 09:27

## 2023-12-04 RX ADMIN — OXYCODONE AND ACETAMINOPHEN 1 TABLET: 5; 325 TABLET ORAL at 14:15

## 2023-12-04 RX ADMIN — SODIUM CHLORIDE, POTASSIUM CHLORIDE, SODIUM LACTATE AND CALCIUM CHLORIDE: 600; 310; 30; 20 INJECTION, SOLUTION INTRAVENOUS at 11:18

## 2023-12-04 RX ADMIN — GLYCOPYRROLATE 0.2 MG: 0.2 INJECTION INTRAMUSCULAR; INTRAVENOUS at 10:29

## 2023-12-04 RX ADMIN — Medication 100 MCG: at 11:18

## 2023-12-04 RX ADMIN — MEPERIDINE HYDROCHLORIDE 12.5 MG: 50 INJECTION, SOLUTION INTRAMUSCULAR; INTRAVENOUS; SUBCUTANEOUS at 12:28

## 2023-12-04 RX ADMIN — Medication 100 MCG: at 10:54

## 2023-12-04 RX ADMIN — BUPIVACAINE 10 ML: 13.3 INJECTION, SUSPENSION, LIPOSOMAL INFILTRATION at 09:31

## 2023-12-04 RX ADMIN — LIDOCAINE HYDROCHLORIDE 40 MG: 10 INJECTION, SOLUTION INFILTRATION; PERINEURAL at 10:15

## 2023-12-04 RX ADMIN — BUPIVACAINE HYDROCHLORIDE 10 ML: 5 INJECTION, SOLUTION EPIDURAL; INTRACAUDAL at 09:34

## 2023-12-04 RX ADMIN — Medication 100 MCG: at 11:06

## 2023-12-04 RX ADMIN — MEPERIDINE HYDROCHLORIDE 12.5 MG: 50 INJECTION INTRAMUSCULAR; INTRAVENOUS; SUBCUTANEOUS at 12:28

## 2023-12-04 RX ADMIN — PROPOFOL 100 MCG/KG/MIN: 10 INJECTION, EMULSION INTRAVENOUS at 10:17

## 2023-12-04 RX ADMIN — PROPOFOL 30 MG: 10 INJECTION, EMULSION INTRAVENOUS at 10:15

## 2023-12-04 RX ADMIN — SODIUM CHLORIDE, POTASSIUM CHLORIDE, SODIUM LACTATE AND CALCIUM CHLORIDE: 600; 310; 30; 20 INJECTION, SOLUTION INTRAVENOUS at 08:59

## 2023-12-04 RX ADMIN — BUPIVACAINE HYDROCHLORIDE 10 ML: 5 INJECTION, SOLUTION EPIDURAL; INTRACAUDAL; PERINEURAL at 09:31

## 2023-12-04 RX ADMIN — Medication 100 MCG: at 10:33

## 2023-12-04 RX ADMIN — Medication 1000 MG: at 08:54

## 2023-12-04 ASSESSMENT — PAIN DESCRIPTION - LOCATION
LOCATION: KNEE
LOCATION: KNEE

## 2023-12-04 ASSESSMENT — PAIN SCALES - GENERAL
PAINLEVEL_OUTOF10: 5
PAINLEVEL_OUTOF10: 4
PAINLEVEL_OUTOF10: 5

## 2023-12-04 ASSESSMENT — PAIN DESCRIPTION - DESCRIPTORS
DESCRIPTORS: ACHING
DESCRIPTORS: ACHING

## 2023-12-04 ASSESSMENT — PAIN DESCRIPTION - PAIN TYPE: TYPE: SURGICAL PAIN

## 2023-12-04 ASSESSMENT — PAIN DESCRIPTION - ORIENTATION: ORIENTATION: LEFT

## 2023-12-04 NOTE — PROGRESS NOTES
Anabel Nicole was evaluated today and a DME order was entered for a wheeled walker because she requires this to successfully complete daily living tasks of ambulating. A wheeled walker is necessary due to the patient's unsteady gait, upper body weakness, and inability to  an ambulation device; and she can ambulate only by pushing a walker instead of a lesser assistive device such as a cane, crutch, or standard walker. The need for this equipment was discussed with the patient and she understands and is in agreement.      Electronically signed by Jerrell Merlin, MD on 12/4/2023 at 11:21 AM
Orthopedic Coordinator Note    Patient s/p left total Knee replacement on 12/4/23    The following appointments are currently scheduled:    Post-op with surgeon 12/20/23    Physical Therapy: [facility] with [facility] for PT. Spoke with Linda from [facility], patient is accepted. Wheeled walker order is  entered  Face to face documentation is complete. Pending walker delivery from [facility].     DVT Prophylaxis: ASA 81 mg PO BID      Electronically signed by: Gilda Wahl RN on 12/4/2023 at 8:48 AM
Spoke with Anthony Kohler at [facility], they are out of walkers. Request sent to [facility] who states they have in stock.
Walker delivered to patient bedside
Orientation Status: Within Normal Limits  Orientation Level: Oriented X4                  Education Given To: Patient  Education Provided: Role of Therapy;Plan of Care;ADL Adaptive Strategies;Transfer Training; Fall Prevention Strategies; Energy Conservation  Education Provided Comments: Patient educated on safe technique for car and tub/shower transfer, safe use of walker, application of compression stockings and use of EZ slide, use of surgical soap  Education Method: Verbal;Demonstration  Barriers to Learning: None  Education Outcome: Verbalized understanding;Demonstrated understanding;Continued education needed           Hand Dominance  Hand Dominance: Right  Hand Assessment Comment  Hand Assessment Comment: B  WNL  Car Transfers  Car Transfers: educated on safe technique with car transfer         AM-PAC - ADL  AM-PAC Daily Activity - Inpatient   How much help is needed for putting on and taking off regular lower body clothing?: A Little  How much help is needed for bathing (which includes washing, rinsing, drying)?: A Little  How much help is needed for toileting (which includes using toilet, bedpan, or urinal)?: A Little  How much help is needed for putting on and taking off regular upper body clothing?: A Little  How much help is needed for taking care of personal grooming?: A Little  How much help for eating meals?: None  AM-PAC Inpatient Daily Activity Raw Score: 19  AM-PAC Inpatient ADL T-Scale Score : 40.22  ADL Inpatient CMS 0-100% Score: 42.8  ADL Inpatient CMS G-Code Modifier : CK      Goals  Short Term Goals  Time Frame for Short Term Goals: 14 visits  Short Term Goal 1: Pt will complete LB ADLs/toileting with MOD IND  Short Term Goal 2: Pt will complete functional transfers/ambulation with MOD IND in prep for ADL completion  Short Term Goal 3: Pt will improve dynamic standing balance to good for increased safety during standing aspects of ADL completion  Patient Goals   Patient goals : home
2 steps with one rail LLE SBA  Short Term Goal 4: Pt to demonstrate independent understanding of supine/seated HEP for continued ROM and strengthening to LLE  Short Term Goal 5: Pt to demonstrate independent bed mobility    Education  Patient Education  Education Given To: Patient  Education Provided: Plan of Care;Role of Therapy;Home Exercise Program  Education Method: Demonstration;Verbal;Printed Information/Hand-outs  Barriers to Learning: None  Education Outcome: Verbalized understanding;Demonstrated understanding      Therapy Time   Individual Concurrent Group Co-treatment   Time In 1434         Time Out 1602         Minutes 88         Timed Code Treatment Minutes: 1638 Moise Valdes, PT

## 2023-12-04 NOTE — ANESTHESIA PROCEDURE NOTES
Peripheral Block    Patient location during procedure: pre-op  Reason for block: post-op pain management and at surgeon's request  Start time: 12/4/2023 9:29 AM  End time: 12/4/2023 9:31 AM  Staffing  Performed: anesthesiologist   Anesthesiologist: Ashley Zuniga MD  Performed by: Ashley Zuniga MD  Authorized by: Ashley Zuniga MD    Preanesthetic Checklist  Completed: patient identified, IV checked, site marked, risks and benefits discussed, surgical/procedural consents, equipment checked, pre-op evaluation, timeout performed, anesthesia consent given, oxygen available, monitors applied/VS acknowledged, fire risk safety assessment completed and verbalized and blood product R/B/A discussed and consented  Peripheral Block   Patient position: right lateral decubitus  Prep: ChloraPrep  Provider prep: mask and sterile gloves  Patient monitoring: cardiac monitor, continuous pulse ox, frequent blood pressure checks, IV access, oxygen and responsive to questions  Block type: iPacks  Laterality: left  Injection technique: single-shot  Guidance: ultrasound guided    Needle   Needle type: insulated echogenic nerve stimulator needle   Needle gauge: 20 G  Needle localization: anatomical landmarks and ultrasound guidance  Needle length: 4 IN.   Assessment   Injection assessment: negative aspiration for heme, no paresthesia on injection, local visualized surrounding nerve on ultrasound and no intravascular symptoms  Paresthesia pain: none  Slow fractionated injection: yes  Hemodynamics: stableno  Outcomes: uncomplicated and patient tolerated procedure well    Medications Administered  bupivacaine (MARCAINE) PF injection 0.5% - Perineural   10 mL - 12/4/2023 9:31:00 AM  bupivacaine liposome (EXPAREL) injection 1.3% - Perineural   10 mL - 12/4/2023 9:31:00 AM

## 2023-12-04 NOTE — ANESTHESIA POSTPROCEDURE EVALUATION
Department of Anesthesiology  Postprocedure Note    Patient: Jessenia Kunz  MRN: [id number]  YOB: 1955  Date of evaluation: 12/4/2023      Procedure Summary     Date: 12/04/23 Room / Location: [facility] OR 01 / [employer] [facility]    Anesthesia Start: 1001 Anesthesia Stop: 8044    Procedure: LEFT KNEE TOTAL ARTHROPLASTY WITH Natalia Ing (Left: Knee) Diagnosis:       Osteoarthritis of left knee, unspecified osteoarthritis type      (Osteoarthritis of left knee, unspecified osteoarthritis type [M17.12])    Surgeons: Leonela Vargas MD Responsible Provider: Jaswinder Bethea MD    Anesthesia Type: spinal, regional ASA Status: 3          Anesthesia Type: No value filed.     Julio Cesar Phase I: Julio Cesar Score: 8    Julio Cesar Phase II:        Anesthesia Post Evaluation    Patient location during evaluation: PACU  Patient participation: complete - patient participated  Level of consciousness: awake and alert  Airway patency: patent  Nausea & Vomiting: no nausea and no vomiting  Complications: no  Cardiovascular status: hemodynamically stable  Respiratory status: room air and spontaneous ventilation  Hydration status: euvolemic  Multimodal analgesia pain management approach  Pain management: adequate

## 2023-12-04 NOTE — ANESTHESIA PROCEDURE NOTES
Peripheral Block    Patient location during procedure: pre-op  Reason for block: post-op pain management and at surgeon's request  Start time: 12/4/2023 9:32 AM  End time: 12/4/2023 9:34 AM  Staffing  Performed: anesthesiologist   Anesthesiologist: Braeden Leyva MD  Performed by: Braeden Leyva MD  Authorized by: Braeden Leyva MD    Preanesthetic Checklist  Completed: patient identified, IV checked, site marked, risks and benefits discussed, surgical/procedural consents, equipment checked, pre-op evaluation, timeout performed, anesthesia consent given, oxygen available, monitors applied/VS acknowledged, fire risk safety assessment completed and verbalized and blood product R/B/A discussed and consented  Peripheral Block   Patient position: supine  Prep: ChloraPrep  Provider prep: mask and sterile gloves  Patient monitoring: cardiac monitor, continuous pulse ox, frequent blood pressure checks, IV access, oxygen and responsive to questions  Block type: Femoral  Adductor canal  Laterality: left  Injection technique: single-shot  Guidance: ultrasound guided    Needle   Needle type: insulated echogenic nerve stimulator needle   Needle gauge: 20 G  Needle localization: anatomical landmarks and ultrasound guidance  Needle length: 4 IN.   Assessment   Injection assessment: negative aspiration for heme, no paresthesia on injection, local visualized surrounding nerve on ultrasound and no intravascular symptoms  Paresthesia pain: none  Slow fractionated injection: yes  Hemodynamics: stableno  Outcomes: uncomplicated and patient tolerated procedure well    Medications Administered  bupivacaine (MARCAINE) PF injection 0.5% - Perineural   10 mL - 12/4/2023 9:34:00 AM  bupivacaine liposome (EXPAREL) injection 1.3% - Perineural   10 mL - 12/4/2023 9:34:00 AM

## 2023-12-04 NOTE — DISCHARGE INSTR - COC
Continuity of Care Form    Patient Name: Wm Mckeon   :  1955  MRN:  [id number]    Admit date:  2023  Discharge date:  ***    Code Status Order: Full Code   Advance Directives:   Advance Care Flowsheet Documentation       Date/Time Healthcare Directive Type of Healthcare Directive Copy in [address] Agent's Name Healthcare Agent's Phone Number    [phone] Yes, patient has an advance directive for healthcare treatment -- Yes, copy in chart -- -- --            Admitting Physician:  Lana Wesley MD  PCP: Cindi Robles MD    Discharging Nurse: [facility] Unit/Room#: 237 [facility]  Discharging Unit Phone Number: ***    Emergency Contact:   Extended Emergency Contact Information  Primary Emergency Contact: Chey Hand  Address: [address] of [address] [facility] ([place], [place]) Phone: [phone]  Relation: Child  Hearing or visual needs: None  Preferred language: English   needed? No  Secondary Emergency Contact: Braden Golden  Address: [address] [facility] of [address] [facility] ([place], [place]) Phone: [phone]  Relation: Other  Hearing or visual needs: None  Preferred language: English   needed?  No    Past Surgical History:  Past Surgical History:   Procedure Laterality Date    ABDOMEN SURGERY      ABLATION OF DYSRHYTHMIC FOCUS      unsucessful    APPENDECTOMY      BREAST SURGERY      lumpectomy w radiation 2009    CARDIAC CATHETERIZATION      with ablation    CARPAL TUNNEL RELEASE Left 10/22/2020    COLONOSCOPY  2009    10 years    COLONOSCOPY  2017    FINGER SURGERY  10/2017    trigger finger right hand    FRACTURE SURGERY      wrist    HERNIA REPAIR      umbilical    HYSTERECTOMY (CERVIX STATUS UNKNOWN)      total    JOINT REPLACEMENT      Right Knee 2014    KNEE ARTHROSCOPY      bilateral- loose knee caps    KNEE SURGERY      x2    LAPAROSCOPIC APPENDECTOMY Right

## 2023-12-04 NOTE — ANESTHESIA PRE PROCEDURE
Department of Anesthesiology  Preprocedure Note       Name:  Ying Verma   Age:  80 y.o.  :  1955                                          MRN:  [id number]         Date:  2023      Surgeon: Faustina Wright):  Yaa Moss MD    Procedure: Procedure(s):  LEFT KNEE TOTAL ARTHROPLASTY WITH FREDY BIOMET    Medications prior to admission:   Prior to Admission medications    Medication Sig Start Date End Date Taking? Authorizing Provider   levothyroxine (SYNTHROID) 50 MCG tablet take 1 tablet by mouth once daily 11/15/23   Kailash Donohue MD   venlafaxine (EFFEXOR XR) 150 MG extended release capsule take 1 capsule by mouth once daily 11/15/23   Kailash Donohue MD   Handicap Placard MISC by Does not apply route 23   Kailash Donohue MD   HYDROcodone-acetaminophen (NORCO) 5-325 MG per tablet Take 1 tablet by mouth 2 times daily as needed for Pain for up to 30 days.  23  Ana Donnelly APRN - CNP   meloxicam (MOBIC) 15 MG tablet take 1 tablet by mouth once daily 23   Kailash Donohue MD   Probiotic Product (PROBIOTIC-10) CHEW Take by mouth    ProviderMicaela MD   Multiple Vitamin (MULTIVITAMIN ADULT PO) Take by mouth    Micaela Al MD   magnesium oxide (MAG-OX) 400 MG tablet take 1 tablet by mouth twice a day 21   Micaela Al MD   famotidine (PEPCID) 20 MG tablet Take 1 tablet by mouth 2 times daily    Micaela Al MD   calcium carbonate-vitamin D (CALTRATE) 600-400 MG-UNIT TABS per tab Take by mouth 10/21/20   Micaela Al MD   Tens Unit MISC by Does not apply route For low back pain 20   KAYLEE Humphrey - CNP   loratadine (CLARITIN) 10 MG capsule Take 1 capsule by mouth daily    Micaela Al MD   Polyethylene Glycol 3350 (MIRALAX PO) Take by mouth Taking daily    Micaela Al MD   flecainide (TAMBOCOR) 50 MG tablet Take 1 tablet by mouth 2 times daily 13   Micaela Al MD       Current

## 2023-12-04 NOTE — H&P
ORTHOPEDIC PATIENT EVALUATION      HPI / Chief Complaint  Vito Grewal is a 80 y.o. female who presents for osteoarthritis left knee. Past Medical History  Tommy Monaco  has a past medical history of Anxiety, Breast cancer ([address]), Breast cancer ([address]), Cancer ([address]), Chronic back pain, Depression, DJD (degenerative joint disease), Family history of breast cancer, Fibromyalgia, History of hysterectomy, Hypothyroidism, Obesity, Class III, BMI 40-49.9 (morbid obesity) ([address]), Osteoarthritis, Sacroiliitis ([address]), SVT (supraventricular tachycardia), Under care of team, and Unspecified sleep apnea. Past Surgical History  Tommy Monaco  has a past surgical history that includes Hysterectomy; hernia repair; Appendectomy; Tonsillectomy; knee surgery; Umbilical hernia repair (N/A); laparoscopic appendectomy (Right); Abdomen surgery; Breast surgery; joint replacement; Knee arthroscopy; Cardiac catheterization; Colonoscopy (2009); Colonoscopy (08/2017); Finger surgery (10/2017); Wrist surgery (04/12/2018); Carpal tunnel release (Left, 10/22/2020); ablation of dysrhythmic focus; and fracture surgery. Current Medications  Current Facility-Administered Medications   Medication Dose Route Frequency Provider Last Rate Last Admin    ropivacaine (NAROPIN) 0.5% 60 mL, ketorolac (TORADOL) 30 MG/ML 30 mg, EPINEPHrine PF 1 MG/ML 0.5 mg, morphine (PF) 10 mg IV syringe   Other Once Devon Galicia MD         Current Outpatient Medications   Medication Sig Dispense Refill    levothyroxine (SYNTHROID) 50 MCG tablet take 1 tablet by mouth once daily 90 tablet 1    venlafaxine (EFFEXOR XR) 150 MG extended release capsule take 1 capsule by mouth once daily 90 capsule 1    Handicap Placard MISC by Does not apply route 1 each 0    HYDROcodone-acetaminophen (NORCO) 5-325 MG per tablet Take 1 tablet by mouth 2 times daily as needed for Pain for up to 30 days.  60 tablet 0    meloxicam (MOBIC) 15 MG tablet take 1 tablet by mouth once daily 90 tablet 1

## 2023-12-04 NOTE — OP NOTE
draped in sterile fashion. Timeout was then performed ensuring correct patient, correct extremity, and correct procedure. Preoperative antibiotics were assured with 2g of Ancef. We elevated the extremity and inflated the  tourniquet on the upper thigh to 250 mmHg. I brought the knee up into flexion and made an incision over the front of the knee. I incised sharply through skin and came down through subcu tissue. I then brought the knee into extension and recreated these tissue planes. I then performed a medial arthrotomy and performed a gentle medial release and resected my patellar fat pad. I then everted my patella and made my patellar cut. I then brought the knee up into flexion and just subluxed the patella laterally. I then used my drill for my intramedullary distal femur cutting guide. I made my distal femur cut. I then sized the femur. I then placed my cutting guide on and made my anterior, posterior, and chamfer cuts. I then subluxed the tibia anteriorly. I used my drill for my proximal intramedullary tibial cutting guide. I then made my tibial cut. I then placed a black handle distractor in the femur. I removed some soft tissue from the notch. I removed my meniscus. I performed a small posterior capsular release and removed some posterior distal femur osteophytes. I then subluxed the tibia anteriorly once more. I sized my tibial tray and placed it in proper external rotation based off the tibial tubercle. I then used my punch for my cruciate post.  I then placed in my trial femoral component, sized my patella and drilled for the central peg. I then placed my trial poly in. At this point the knee had full range of motion with excellent patellar tracking and equal flexion and extension gaps. I then removed my trial components. I then injected some local anesthetic in and around the joint capsule. I then irrigated out the knee and dried off my bony surfaces.   I then cemented in my

## 2024-01-03 ENCOUNTER — HOSPITAL ENCOUNTER (OUTPATIENT)
Dept: PHYSICAL THERAPY | Facility: CLINIC | Age: 69
Setting detail: THERAPIES SERIES
Discharge: HOME | End: 2024-01-03
Payer: MEDICARE

## 2024-01-03 PROCEDURE — 97110 THERAPEUTIC EXERCISES: CPT

## 2024-01-03 PROCEDURE — 97161 PT EVAL LOW COMPLEX 20 MIN: CPT

## 2024-01-03 NOTE — CONSULTS
[] [facility]  Outpatient Rehabilitation &  Therapy  [address]  [phone]  F: [phone] [x] [facility]  Outpatient Rehabilitation &  Therapy  [address]   [address]  P: [phone]  F: [phone] [] [facility]  Outpatient Rehabilitation &  Therapy  [address]  P: [phone]  F: [phone] [] [facility]  Outpatient Rehabilitation &  Therapy  [address]   [address]   P: [phone]  F: [phone]  [] [facility]  Outpatient Rehabilitation &  Therapy  [address]  P: [phone]  F: [phone]        Physical Therapy Lower Extremity Evaluation    Date:  1/3/2024  Patient: Shira Sanches  :  1955  MRN: [id number]  Physician: Dilan Sellers MD    Insurance: [employer] MED[employer] ADVANTAGE ($30 copay, BOMN)  Medical Diagnosis: M25.562 (ICD-10-CM) - Left knee pain, unspecified chronicity   Rehab Codes: M25.562, M25.662, R60.1, M62.81   Onset date: 23 (DOS)  Next Dr's appt.: 24      Subjective:  Pt arrived to physical therapy ~ 1 month s/p L TKA on 23 by Dr. Marlo MD.  Pt reported was doing full 3 weeks of home PT, noted is progressing very well.  Pt reported still have difficulty sleeping at night due to inc \"achy\" pain and unable to find position of comfort.  Pt reported was walking with walker for the first week post-op then only uses cane sporadically.  Pt reported is still taking Oxycodone 1-1.5/day to manage pain especially to help with sleep at night but will take higher amount if doing a lot of walking that day.  Pt reported swelling has reduced but still have compression socks donned bilaterally.  Pt noted currently does not ambulate with any AD.      Hx of R TKA about 8 years ago.         Pain present? [x] Yes  [] No    Location  L knee    Pain Rating currently  (0-10 scale)   3/10   Pain at worse 7/10   Pain at best 3/10   Description

## 2024-01-03 NOTE — PLAN OF CARE
[] [facility]  Outpatient Rehabilitation &  Therapy  [address]  [phone]  F: [phone] [x] [facility]  Outpatient Rehabilitation &  Therapy  [address]   [address]  P: [phone]  F: [phone] [] [facility]  Outpatient Rehabilitation &  Therapy  [address]  P: [phone]  F: [phone] [] [facility]  Outpatient Rehabilitation &  Therapy  [address]  P: [phone]  F: [phone] [] [facility]  Outpatient Rehabilitation &  Therapy  [address]   [address]   P: [phone]  F: [phone]        Physical Therapy Plan of Care    Date:  1/3/2024  Patient: Shira Sanches  : 1955  MRN: [id number]  Physician: Dilan Sellers MD    Insurance: [employer] MEDICARE ADVANTAGE ($30 copay, BOMN)  Medical Diagnosis: M25.562 (ICD-10-CM) - Left knee pain, unspecified chronicity            Rehab Codes: M25.562, M25.662, R60.1, M62.81   Onset date: 23 (DOS)                Next Dr's appt.: 24        Subjective:  Pt arrived to physical therapy ~ 1 month s/p L TKA on 23 by Dr. Marlo MD.  Pt reported was doing full 3 weeks of home PT, noted is progressing very well.  Pt reported still have difficulty sleeping at night due to inc \"achy\" pain and unable to find position of comfort.  Pt reported was walking with walker for the first week post-op then only uses cane sporadically.  Pt reported is still taking Oxycodone 1-1.5/day to manage pain especially to help with sleep at night but will take higher amount if doing a lot of walking that day.  Pt reported swelling has reduced but still have compression socks donned bilaterally.  Pt noted currently does not ambulate with any AD.       Hx of R TKA about 8 years ago.          Pain present? [x] Yes  [] No          Location  L knee    Pain Rating currently  (0-10 scale)   3/10   Pain at worse 7/10   Pain at best 3/10

## 2024-01-03 NOTE — FLOWSHEET NOTE
[facility] Fall Risk Assessment    Patient Name:  Shira Sanches  : 1955    Risk Factor Scale  Score   History of Falls [x] Yes  [] No 25  0 25   Secondary Diagnosis [] Yes  [x] No 15  0 0   Ambulatory Aid [] Furniture  [] Crutches/cane/walker  [x] None/bedrest/wheelchair/nurse 30  15  0 0   IV/Heparin Lock [] Yes  [x] No 20  0 0   Gait/Transferring [] Impaired  [x] Weak  [] Normal/bedrest/immobile 20  10  0 10   Mental Status [] Forgets limitations  [x] Oriented to own ability 15  0 0      Total: 35     Based on the Assessment score: check the appropriate box.    []  No intervention needed   Low =   Score of 0-24    [x]  Use standard prevention interventions Moderate =  Score of 24-44   [x] Give patient handout and discuss fall prevention strategies   [x] Establish goal of education for patient/family RE: fall prevention strategies    []  Use high risk prevention interventions High = Score of 45 and higher   [] Give patient handout and discuss fall prevention strategies   [] Establish goal of education for patient/family Re: fall prevention strategies   [] Discuss lifeline / other resources    Electronically signed by:   Judi Caraballo PT  Date: 1/3/2024        Outpatient Physical Rehabilitation Fall Prevention Intervention    Exercises for balance and improving leg strength are beneficial    Use handrails when walking up and down the stairs. You many want handrails on both sides of the stairs.  Turn on the lights before entering a dark room.  Use night lights and have a lamp close at night. Get up slowly. Sit for a few seconds before rising.   Remove all throw rugs to prevent tripping.  Use a reacher to help  items.  If using a step stool use one with handrail. DO NOT stand on a chair.   Carry a cell phone with you and/or have a house phone you can reach from the ground. May use a whistle.  Take your cell phone with you when going out the back door, to the basement, or

## 2024-01-10 ENCOUNTER — APPOINTMENT (OUTPATIENT)
Dept: PHYSICAL THERAPY | Facility: CLINIC | Age: 69
End: 2024-01-10
Payer: MEDICARE

## 2024-01-16 ENCOUNTER — OFFICE VISIT (OUTPATIENT)
Age: 69
End: 2024-01-16

## 2024-01-16 ENCOUNTER — HOSPITAL ENCOUNTER (OUTPATIENT)
Dept: PHYSICAL THERAPY | Facility: CLINIC | Age: 69
Setting detail: THERAPIES SERIES
Discharge: HOME | End: 2024-01-16
Payer: MEDICARE

## 2024-01-16 VITALS — WEIGHT: 236 LBS | HEIGHT: 63 IN | BODY MASS INDEX: 41.82 KG/M2

## 2024-01-16 DIAGNOSIS — Z48.89 POSTOPERATIVE VISIT: Primary | ICD-10-CM

## 2024-01-16 PROCEDURE — 99024 POSTOP FOLLOW-UP VISIT: CPT | Performed by: ORTHOPAEDIC SURGERY

## 2024-01-16 NOTE — PROGRESS NOTES
[facility] Orthopedics & Sports Medicine      [facility] PHYSICIANS [facility]  [facility] ORTHOPAEDICS AND SPORTS MEDICINE  [address]  [place] [address]     Surgery:    12/4/2023  Left Knee Total Arthroplasty With Lamonte Biomet - Left    History of Present Illness:    This is a 68 y.o. female who presents to the clinic today for post op follow up for Left Knee Total Arthroplasty With Lamonte Biomet - Left on 12/4/2023 .  Left total knee replacement.  Overall she is doing very well.  She is very happy with her results.    On examination she has a well-healed incision just a small scab at the very distal portion of the incision that has no erythema or drainage.  She states that a stitch came out there several days earlier.  She has no tenderness to palpation here no fluctuance.  She has full knee extension and flexion back to 115 degrees patella tracking centrally no effusion of the knee swelling is resolved in the leg.    She is doing excellent at this point.  We talked about how important quadricep strength is going forward.  She is going to get back into her exercise routine.  We can see her back on as-needed basis if she has any further questions          Electronically signed by Dilan Sellers MD on 1/16/2024 at 5:26 PM

## 2024-01-16 NOTE — FLOWSHEET NOTE
[] [facility]  Outpatient Rehabilitation &  Therapy  [address]  [phone]  F: [phone] [x] [facility]  Outpatient Rehabilitation &  Therapy  [address]   [address]  P: [phone]  F: [phone] [] [facility]  Outpatient Rehabilitation &  Therapy  [address]  P: [phone]  F: [phone] [] [facility]  Outpatient Rehabilitation &  Therapy  [address]  P: [phone]  F: [phone] [] [facility]  Outpatient Rehabilitation &  Therapy  [address]   [address]   P: [phone]  F: [phone]  [] [facility]  Outpatient Rehabilitation &  Therapy  [address]   P: [phone]  F: [phone] [] [facility]  Outpatient Rehabilitation &  Therapy  [address]  [address]  P: [phone]  F: [phone] [] [facility]  Outpatient Rehabilitation &  Therapy  [address]  P: [phone]  F: [phone] [] [facility]  Outpatient Rehabilitation &  Therapy  [address]  P: [phone]  F: [phone]  [] [facility] Outpatient Rehabilitation &  Therapy  [address]  P: [phone]  F: [phone]     Therapy Cancel/No Show note    Date: 2024  Patient: Shira Sanches  : 1955  MRN: [id number]    Cancels/No Shows to date: 0     For today's appointment patient:    [x]  Cancelled    [] Rescheduled appointment    [] No-show     Reason given by patient:    []  Patient ill    []  Conflicting appointment    [] No transportation      [] Conflict with work    [] No reason given    [] Weather related    [] COVID-19    [] Other:      Comments: not sure if she wants to cont thereapy - will conslt her dr tomorrow.       [] Next appointment was confirmed    Electronically signed by: Judi Caraballo PT

## 2024-02-26 ENCOUNTER — HOSPITAL ENCOUNTER (OUTPATIENT)
Age: 69
Setting detail: SPECIMEN
Discharge: HOME | End: 2024-02-26
Payer: MEDICARE

## 2024-02-26 LAB — 25(OH)D3 SERPL-MCNC: 37.5 NG/ML

## 2024-02-26 PROCEDURE — 36415 COLL VENOUS BLD VENIPUNCTURE: CPT

## 2024-02-26 PROCEDURE — 82306 VITAMIN D 25 HYDROXY: CPT

## 2024-02-27 ENCOUNTER — HOSPITAL ENCOUNTER (OUTPATIENT)
Age: 69
Setting detail: SPECIMEN
Discharge: HOME | End: 2024-02-27
Payer: MEDICARE

## 2024-02-27 PROCEDURE — 87493 C DIFF AMPLIFIED PROBE: CPT

## 2024-02-27 PROCEDURE — 87506 IADNA-DNA/RNA PROBE TQ 6-11: CPT

## 2024-02-27 PROCEDURE — 83993 ASSAY FOR CALPROTECTIN FECAL: CPT

## 2024-02-27 PROCEDURE — 83520 IMMUNOASSAY QUANT NOS NONAB: CPT

## 2024-02-28 LAB
C DIFFICILE TOXINS, PCR: NORMAL
CAMPYLOBACTER DNA SPEC NAA+PROBE: NORMAL
ETEC ELTA+ESTB GENES STL QL NAA+PROBE: NORMAL
P SHIGELLOIDES DNA STL QL NAA+PROBE: NORMAL
SALMONELLA DNA SPEC QL NAA+PROBE: NORMAL
SHIGA TOXIN STX GENE SPEC NAA+PROBE: NORMAL
SHIGELLA DNA SPEC QL NAA+PROBE: NORMAL
SPECIMEN DESCRIPTION: NORMAL
SPECIMEN DESCRIPTION: NORMAL
V CHOL+PARA RFBL+TRKH+TNAA STL QL NAA+PR: NORMAL
Y ENTERO RECN STL QL NAA+PROBE: NORMAL

## 2024-03-01 LAB — FECAL PANCREATIC ELASTASE-1: 726 UG/G

## 2024-03-05 LAB — CALPROTECTIN, FECAL: <5 UG/G

## 2024-03-07 ENCOUNTER — HOSPITAL ENCOUNTER (OUTPATIENT)
Dept: MAMMOGRAPHY | Age: 69
Discharge: HOME | End: 2024-03-09
Payer: MEDICARE

## 2024-03-07 VITALS — WEIGHT: 235 LBS | BODY MASS INDEX: 43.24 KG/M2 | HEIGHT: 62 IN

## 2024-03-07 DIAGNOSIS — Z12.31 ENCOUNTER FOR SCREENING MAMMOGRAM FOR MALIGNANT NEOPLASM OF BREAST: ICD-10-CM

## 2024-03-07 PROCEDURE — 77063 BREAST TOMOSYNTHESIS BI: CPT

## 2024-04-09 ENCOUNTER — OFFICE VISIT (OUTPATIENT)
Age: 69
End: 2024-04-09
Payer: MEDICARE

## 2024-04-09 VITALS — BODY MASS INDEX: 43.24 KG/M2 | WEIGHT: 235 LBS | HEIGHT: 62 IN

## 2024-04-09 DIAGNOSIS — M25.512 CHRONIC LEFT SHOULDER PAIN: Primary | ICD-10-CM

## 2024-04-09 DIAGNOSIS — G89.29 CHRONIC LEFT SHOULDER PAIN: Primary | ICD-10-CM

## 2024-04-09 PROCEDURE — 99214 OFFICE O/P EST MOD 30 MIN: CPT | Performed by: ORTHOPAEDIC SURGERY

## 2024-04-09 PROCEDURE — 1123F ACP DISCUSS/DSCN MKR DOCD: CPT | Performed by: ORTHOPAEDIC SURGERY

## 2024-04-09 PROCEDURE — 20610 DRAIN/INJ JOINT/BURSA W/O US: CPT | Performed by: ORTHOPAEDIC SURGERY

## 2024-04-09 RX ORDER — METHYLPREDNISOLONE ACETATE 80 MG/ML
80 INJECTION, SUSPENSION INTRA-ARTICULAR; INTRALESIONAL; INTRAMUSCULAR; SOFT TISSUE ONCE
Status: COMPLETED | OUTPATIENT
Start: 2024-04-09 | End: 2024-04-09

## 2024-04-09 RX ORDER — LIDOCAINE HYDROCHLORIDE 10 MG/ML
2 INJECTION, SOLUTION INFILTRATION; PERINEURAL ONCE
Status: COMPLETED | OUTPATIENT
Start: 2024-04-09 | End: 2024-04-09

## 2024-04-09 RX ADMIN — METHYLPREDNISOLONE ACETATE 80 MG: 80 INJECTION, SUSPENSION INTRA-ARTICULAR; INTRALESIONAL; INTRAMUSCULAR; SOFT TISSUE at 15:59

## 2024-04-09 RX ADMIN — LIDOCAINE HYDROCHLORIDE 2 ML: 10 INJECTION, SOLUTION INFILTRATION; PERINEURAL at 15:59

## 2024-04-09 NOTE — PROGRESS NOTES
Obesity, Class III, BMI 40-49.9 (morbid obesity) (HCC) 07/22/2014    Osteoarthritis     Sacroiliitis (HCC)     SVT (supraventricular tachycardia) (HCC)     controlled with Flecanide    Under care of team     Storm CArdiology    Unspecified sleep apnea     uses cpap     Past Surgical History:   Procedure Laterality Date    ABDOMEN SURGERY      ABLATION OF DYSRHYTHMIC FOCUS      unsucessful    APPENDECTOMY      BREAST SURGERY      lumpectomy w radiation 2009    CARDIAC CATHETERIZATION      with ablation    CARPAL TUNNEL RELEASE Left 10/22/2020    COLONOSCOPY  2009    10 years    COLONOSCOPY  08/2017    FINGER SURGERY  10/2017    trigger finger right hand    FRACTURE SURGERY      wrist    HERNIA REPAIR      umbilical    HYSTERECTOMY (CERVIX STATUS UNKNOWN)      total    JOINT REPLACEMENT      Right Knee 2014    KNEE ARTHROSCOPY      bilateral- loose knee caps    KNEE SURGERY      x2    LAPAROSCOPIC APPENDECTOMY Right     TONSILLECTOMY      TOTAL KNEE ARTHROPLASTY Left 12/04/2023    LEFT KNEE TOTAL ARTHROPLASTY WITH FREDY BIOMET    TOTAL KNEE ARTHROPLASTY Left 12/4/2023    LEFT KNEE TOTAL ARTHROPLASTY WITH FREDY BIOMET performed by Dilan Sellers MD at [facility] OR    UMBILICAL HERNIA REPAIR N/A     WRIST SURGERY  04/12/2018    plate and screws right wrist     Family History   Problem Relation Age of Onset    Cancer Mother         pancreatic cancer 79    Heart Disease Mother     COPD Father     COPD Brother     Breast Cancer Maternal Grandmother         dx unknown age     Colon Cancer Maternal Grandfather         70-80s        Senior Resident Attestation:    I have independently seen Shira Sanches and discussed the assessment and plan with the attending, Dr. Dilan Sellers.    Fely Ho MD  Family Medicine Resident, PGY-2   4/9/2024  12:20 PM      Provider Attestation:  I, Dilan Sellers, personally performed the services described in this documentation. All medical record entries made by the scribe were at

## 2024-06-03 ENCOUNTER — HOSPITAL ENCOUNTER (OUTPATIENT)
Dept: MRI IMAGING | Age: 69
Discharge: HOME | End: 2024-06-05
Attending: ORTHOPAEDIC SURGERY
Payer: MEDICARE

## 2024-06-03 DIAGNOSIS — M25.512 CHRONIC LEFT SHOULDER PAIN: ICD-10-CM

## 2024-06-03 DIAGNOSIS — G89.29 CHRONIC LEFT SHOULDER PAIN: ICD-10-CM

## 2024-06-03 PROCEDURE — 73221 MRI JOINT UPR EXTREM W/O DYE: CPT

## 2024-06-18 ENCOUNTER — HOSPITAL ENCOUNTER (EMERGENCY)
Age: 69
Discharge: HOME | End: 2024-06-18
Attending: EMERGENCY MEDICINE
Payer: MEDICARE

## 2024-06-18 ENCOUNTER — APPOINTMENT (OUTPATIENT)
Dept: GENERAL RADIOLOGY | Age: 69
End: 2024-06-18
Payer: MEDICARE

## 2024-06-18 VITALS
OXYGEN SATURATION: 98 % | WEIGHT: 235 LBS | DIASTOLIC BLOOD PRESSURE: 86 MMHG | TEMPERATURE: 98.3 F | SYSTOLIC BLOOD PRESSURE: 159 MMHG | HEART RATE: 69 BPM | RESPIRATION RATE: 17 BRPM | HEIGHT: 62 IN | BODY MASS INDEX: 43.24 KG/M2

## 2024-06-18 DIAGNOSIS — S81.011A KNEE LACERATION, RIGHT, INITIAL ENCOUNTER: Primary | ICD-10-CM

## 2024-06-18 PROCEDURE — 6360000002 HC RX W HCPCS: Performed by: EMERGENCY MEDICINE

## 2024-06-18 PROCEDURE — 90715 TDAP VACCINE 7 YRS/> IM: CPT | Performed by: EMERGENCY MEDICINE

## 2024-06-18 PROCEDURE — 6370000000 HC RX 637 (ALT 250 FOR IP): Performed by: EMERGENCY MEDICINE

## 2024-06-18 PROCEDURE — 99284 EMERGENCY DEPT VISIT MOD MDM: CPT

## 2024-06-18 PROCEDURE — 90471 IMMUNIZATION ADMIN: CPT | Performed by: EMERGENCY MEDICINE

## 2024-06-18 PROCEDURE — 12002 RPR S/N/AX/GEN/TRNK2.6-7.5CM: CPT

## 2024-06-18 PROCEDURE — 2500000003 HC RX 250 WO HCPCS: Performed by: EMERGENCY MEDICINE

## 2024-06-18 PROCEDURE — 73562 X-RAY EXAM OF KNEE 3: CPT

## 2024-06-18 RX ORDER — LIDOCAINE HYDROCHLORIDE 10 MG/ML
5 INJECTION, SOLUTION INFILTRATION; PERINEURAL ONCE
Status: COMPLETED | OUTPATIENT
Start: 2024-06-18 | End: 2024-06-18

## 2024-06-18 RX ORDER — HYDROCODONE BITARTRATE AND ACETAMINOPHEN 5; 325 MG/1; MG/1
1 TABLET ORAL EVERY 6 HOURS PRN
Qty: 5 TABLET | Refills: 0 | Status: SHIPPED | OUTPATIENT
Start: 2024-06-18 | End: 2024-06-21

## 2024-06-18 RX ORDER — HYDROCODONE BITARTRATE AND ACETAMINOPHEN 5; 325 MG/1; MG/1
1 TABLET ORAL ONCE
Status: COMPLETED | OUTPATIENT
Start: 2024-06-18 | End: 2024-06-18

## 2024-06-18 RX ADMIN — LIDOCAINE HYDROCHLORIDE 5 ML: 10 INJECTION, SOLUTION INFILTRATION; PERINEURAL at 19:44

## 2024-06-18 RX ADMIN — TETANUS TOXOID, REDUCED DIPHTHERIA TOXOID AND ACELLULAR PERTUSSIS VACCINE, ADSORBED 0.5 ML: 5; 2.5; 8; 8; 2.5 SUSPENSION INTRAMUSCULAR at 18:39

## 2024-06-18 RX ADMIN — HYDROCODONE BITARTRATE AND ACETAMINOPHEN 1 TABLET: 5; 325 TABLET ORAL at 17:59

## 2024-06-18 ASSESSMENT — PAIN DESCRIPTION - LOCATION
LOCATION: LEG
LOCATION: KNEE

## 2024-06-18 ASSESSMENT — PAIN - FUNCTIONAL ASSESSMENT: PAIN_FUNCTIONAL_ASSESSMENT: 0-10

## 2024-06-18 ASSESSMENT — PAIN SCALES - GENERAL
PAINLEVEL_OUTOF10: 4
PAINLEVEL_OUTOF10: 4

## 2024-06-18 ASSESSMENT — PAIN DESCRIPTION - ORIENTATION
ORIENTATION: RIGHT
ORIENTATION: RIGHT

## 2024-06-18 ASSESSMENT — LIFESTYLE VARIABLES
HOW OFTEN DO YOU HAVE A DRINK CONTAINING ALCOHOL: NEVER
HOW MANY STANDARD DRINKS CONTAINING ALCOHOL DO YOU HAVE ON A TYPICAL DAY: PATIENT DOES NOT DRINK

## 2024-06-18 ASSESSMENT — PAIN DESCRIPTION - DESCRIPTORS: DESCRIPTORS: ACHING;DISCOMFORT

## 2024-06-19 NOTE — ED PROVIDER NOTES
Take 1 tablet by mouth every 6 hours as needed for Pain for up to 3 days. Intended supply: 3 days. Take lowest dose possible to manage pain Max Daily Amount: 4 tablets, Disp-5 tablet, R-0Normal           Bere Palafox MD  Attending Emergency Physician                    Bere Palafox MD  06/19/24 6061

## 2024-06-26 ENCOUNTER — OFFICE VISIT (OUTPATIENT)
Age: 69
End: 2024-06-26
Payer: MEDICARE

## 2024-06-26 DIAGNOSIS — M19.019 SHOULDER ARTHRITIS: Primary | ICD-10-CM

## 2024-06-26 PROCEDURE — 1123F ACP DISCUSS/DSCN MKR DOCD: CPT | Performed by: ORTHOPAEDIC SURGERY

## 2024-06-26 PROCEDURE — 99214 OFFICE O/P EST MOD 30 MIN: CPT | Performed by: ORTHOPAEDIC SURGERY

## 2024-06-26 NOTE — PROGRESS NOTES
Marital status:      Spouse name: Not on file    Number of children: Not on file    Years of education: Not on file    Highest education level: Not on file   Occupational History    Occupation: [employer] disability    Tobacco Use    Smoking status: Never    Smokeless tobacco: Never   Vaping Use    Vaping Use: Never used   Substance and Sexual Activity    Alcohol use: No     Alcohol/week: 0.0 standard drinks of alcohol     Comment: one to two drinks a month    Drug use: No    Sexual activity: Yes     Partners: Male     Birth control/protection: Surgical     Comment: hyst   Other Topics Concern    Not on file   Social History Narrative    Not on file     Social Determinants of Health     Financial Resource Strain: Low Risk  (6/4/2024)    Overall Financial Resource Strain (CARDIA)     Difficulty of Paying Living Expenses: Not hard at all   Food Insecurity: No Food Insecurity (6/4/2024)    Hunger Vital Sign     Worried About Running Out of Food in the Last Year: Never true     Ran Out of Food in the Last Year: Never true   Transportation Needs: Unknown (6/4/2024)    PRAPARE - Transportation     Lack of Transportation (Medical): Not on file     Lack of Transportation (Non-Medical): No   Physical Activity: Insufficiently Active (11/15/2023)    Exercise Vital Sign     Days of Exercise per Week: 1 day     Minutes of Exercise per Session: 20 min   Stress: Not on file   Social Connections: Not on file   Intimate Partner Violence: Not on file   Housing Stability: Unknown (6/4/2024)    Housing Stability Vital Sign     Unable to Pay for Housing in the Last Year: Not on file     Number of Places Lived in the Last Year: Not on file     Unstable Housing in the Last Year: No     Past Medical History:   Diagnosis Date    Anxiety     Breast cancer (HCC)     right - states no nodes involved    Breast cancer (HCC)     Cancer (HCC)     breast, in remission    Chronic back pain 12/03/2013    Depression     DJD (degenerative joint disease)

## 2024-07-03 ENCOUNTER — HOSPITAL ENCOUNTER (OUTPATIENT)
Age: 69
Discharge: HOME | End: 2024-07-03
Payer: MEDICARE

## 2024-07-03 DIAGNOSIS — E78.5 DYSLIPIDEMIA: ICD-10-CM

## 2024-07-03 DIAGNOSIS — E03.9 ACQUIRED HYPOTHYROIDISM: ICD-10-CM

## 2024-07-03 LAB
ALBUMIN SERPL-MCNC: 4.5 G/DL (ref 3.5–5.2)
ALBUMIN/GLOB SERPL: 2 {RATIO} (ref 1–2.5)
ALP SERPL-CCNC: 84 U/L (ref 35–104)
ALT SERPL-CCNC: 20 U/L (ref 10–35)
ANION GAP SERPL CALCULATED.3IONS-SCNC: 11 MMOL/L (ref 9–16)
AST SERPL-CCNC: 25 U/L (ref 10–35)
BILIRUB SERPL-MCNC: 0.4 MG/DL (ref 0–1.2)
BUN SERPL-MCNC: 21 MG/DL (ref 8–23)
CALCIUM SERPL-MCNC: 9.1 MG/DL (ref 8.6–10.4)
CHLORIDE SERPL-SCNC: 106 MMOL/L (ref 98–107)
CHOLEST SERPL-MCNC: 203 MG/DL (ref 0–199)
CHOLESTEROL/HDL RATIO: 3
CO2 SERPL-SCNC: 27 MMOL/L (ref 20–31)
CREAT SERPL-MCNC: 0.7 MG/DL (ref 0.5–0.9)
ERYTHROCYTE [DISTWIDTH] IN BLOOD BY AUTOMATED COUNT: 14.2 % (ref 11.8–14.4)
GFR, ESTIMATED: 89 ML/MIN/1.73M2
GLUCOSE SERPL-MCNC: 104 MG/DL (ref 74–99)
HCT VFR BLD AUTO: 43.4 % (ref 36.3–47.1)
HDLC SERPL-MCNC: 66 MG/DL
HGB BLD-MCNC: 13.6 G/DL (ref 11.9–15.1)
LDLC SERPL CALC-MCNC: 119 MG/DL (ref 0–100)
MCH RBC QN AUTO: 29.3 PG (ref 25.2–33.5)
MCHC RBC AUTO-ENTMCNC: 31.3 G/DL (ref 28.4–34.8)
MCV RBC AUTO: 93.5 FL (ref 82.6–102.9)
NRBC BLD-RTO: 0 PER 100 WBC
PLATELET # BLD AUTO: 188 K/UL (ref 138–453)
PMV BLD AUTO: 9.8 FL (ref 8.1–13.5)
POTASSIUM SERPL-SCNC: 4.4 MMOL/L (ref 3.7–5.3)
PROT SERPL-MCNC: 7.2 G/DL (ref 6.6–8.7)
RBC # BLD AUTO: 4.64 M/UL (ref 3.95–5.11)
SODIUM SERPL-SCNC: 144 MMOL/L (ref 136–145)
T4 FREE SERPL-MCNC: 1 NG/DL (ref 0.92–1.68)
TRIGL SERPL-MCNC: 94 MG/DL (ref 0–149)
TSH SERPL DL<=0.05 MIU/L-ACNC: 5.59 UIU/ML (ref 0.27–4.2)
VLDLC SERPL CALC-MCNC: 19 MG/DL
WBC OTHER # BLD: 4.2 K/UL (ref 3.5–11.3)

## 2024-07-03 PROCEDURE — 84443 ASSAY THYROID STIM HORMONE: CPT

## 2024-07-03 PROCEDURE — 84439 ASSAY OF FREE THYROXINE: CPT

## 2024-07-03 PROCEDURE — 80053 COMPREHEN METABOLIC PANEL: CPT

## 2024-07-03 PROCEDURE — 80061 LIPID PANEL: CPT

## 2024-07-03 PROCEDURE — 85027 COMPLETE CBC AUTOMATED: CPT

## 2024-07-03 PROCEDURE — 36415 COLL VENOUS BLD VENIPUNCTURE: CPT

## 2024-11-11 ENCOUNTER — OFFICE VISIT (OUTPATIENT)
Dept: ORTHOPEDIC SURGERY | Age: 69
End: 2024-11-11

## 2024-11-11 VITALS — HEIGHT: 62 IN | RESPIRATION RATE: 14 BRPM | BODY MASS INDEX: 42.51 KG/M2 | WEIGHT: 231 LBS

## 2024-11-11 DIAGNOSIS — M25.512 CHRONIC LEFT SHOULDER PAIN: Primary | ICD-10-CM

## 2024-11-11 DIAGNOSIS — G89.29 CHRONIC LEFT SHOULDER PAIN: Primary | ICD-10-CM

## 2024-11-11 NOTE — PROGRESS NOTES
Orthopedic Shoulder Encounter Note     Chief complaint: left shoulder pain    HPI: Shira Sanches is a 69 y.o.  right-hand dominant female who presents for evaluation of her left shoulder.  Indicates that she has been dealing with left shoulder pain now for about 10 to 12 years.  No precipitating trauma or injury.  She does state that she has fairly large breasts and these have caused problems for her.  However due to the fact that she has had breast cancer and radiation treatment she has not been able to have breast reduction surgery.  In any event with regards to her left shoulder pain she is undergone treatment extensively with use of anti-inflammatories and cortisone injections without long-lasting relief.  At this time she has pain fairly diffusely about the shoulder that is also fairly constant but worse with movement and use and at night as well limiting sleep.    Previous treatment:    NSAIDs: Mobic    Physical Therapy: No    Injections: Left shoulder glenohumeral cortisone injection on 4/9/2024    Surgeries: None    Review of Systems:   Constitutional: Negative for fever, chills, sweats.   Pain Score:   5  Neurological: Negative for headache, numbness, or weakness.   Musculoskeletal: As noted in HPI     Past Medical History  Shira  has a past medical history of Anxiety, Breast cancer (HCC), Breast cancer (HCC), Cancer (HCC), Chronic back pain, Depression, DJD (degenerative joint disease), Family history of breast cancer, Fibromyalgia, History of hysterectomy, Hypothyroidism, Obesity, Class III, BMI 40-49.9 (morbid obesity), Osteoarthritis, Sacroiliitis (HCC), SVT (supraventricular tachycardia) ([facility]), Under care of team, and Unspecified sleep apnea.    Past Surgical History  Shira  has a past surgical history that includes Hysterectomy; hernia repair; Appendectomy; Tonsillectomy; knee surgery; Umbilical hernia repair (N/A); laparoscopic appendectomy (Right); Abdomen surgery; Breast surgery; joint

## 2024-12-02 ENCOUNTER — TELEPHONE (OUTPATIENT)
Dept: ORTHOPEDIC SURGERY | Age: 69
End: 2024-12-02

## 2024-12-02 ENCOUNTER — PREP FOR PROCEDURE (OUTPATIENT)
Dept: ORTHOPEDIC SURGERY | Age: 69
End: 2024-12-02

## 2024-12-02 DIAGNOSIS — M19.012 GLENOHUMERAL ARTHRITIS, LEFT: ICD-10-CM

## 2024-12-02 DIAGNOSIS — M19.019 SHOULDER ARTHRITIS: Primary | ICD-10-CM

## 2024-12-02 DIAGNOSIS — M19.012 OSTEOARTHRITIS OF LEFT GLENOHUMERAL JOINT: Primary | ICD-10-CM

## 2024-12-02 DIAGNOSIS — R93.6 ABNORMAL FINDINGS ON DIAGNOSTIC IMAGING OF LIMBS: ICD-10-CM

## 2024-12-02 NOTE — TELEPHONE ENCOUNTER
Surgery booking form scanned into chart. Patient will need to be notified that orders were placed.

## 2024-12-02 NOTE — TELEPHONE ENCOUNTER
Patient called to schedule surgery.  I scheduled her for a TSA from your chart note however a booking form needs to be given to Nadia or Cassidy for the other orders to be placed.

## 2024-12-04 NOTE — TELEPHONE ENCOUNTER
Initially contacted patient and asked if she was available to discuss orders she should complete prior to surgery. Patient states that this is not a good time as she was driving. Offered to [facility] patient [facility] as she was unable to write down call back number. Patient amendable.    Left voicemail informing patient to complete labs within 30 days of surgery and schedule CT scan at [facility]. Phone number to schedule imaging provided. [facility] number additionally provided for any additional questions.

## 2025-01-07 ENCOUNTER — HOSPITAL ENCOUNTER (OUTPATIENT)
Dept: CT IMAGING | Age: 70
Discharge: HOME | End: 2025-01-09
Attending: ORTHOPAEDIC SURGERY
Payer: MEDICARE

## 2025-01-07 DIAGNOSIS — M19.012 OSTEOARTHRITIS OF LEFT GLENOHUMERAL JOINT: ICD-10-CM

## 2025-01-07 PROCEDURE — 73200 CT UPPER EXTREMITY W/O DYE: CPT

## 2025-01-14 ENCOUNTER — HOSPITAL ENCOUNTER (OUTPATIENT)
Age: 70
Discharge: HOME | End: 2025-01-18
Payer: MEDICARE

## 2025-01-14 VITALS
WEIGHT: 237 LBS | BODY MASS INDEX: 43.61 KG/M2 | RESPIRATION RATE: 20 BRPM | OXYGEN SATURATION: 99 % | SYSTOLIC BLOOD PRESSURE: 143 MMHG | HEART RATE: 82 BPM | HEIGHT: 62 IN | TEMPERATURE: 98.4 F | DIASTOLIC BLOOD PRESSURE: 79 MMHG

## 2025-01-14 DIAGNOSIS — M19.012 OSTEOARTHRITIS OF LEFT GLENOHUMERAL JOINT: ICD-10-CM

## 2025-01-14 DIAGNOSIS — Z01.818 PRE-OP TESTING: ICD-10-CM

## 2025-01-14 DIAGNOSIS — R93.6 ABNORMAL FINDINGS ON DIAGNOSTIC IMAGING OF LIMBS: ICD-10-CM

## 2025-01-14 DIAGNOSIS — Z01.818 PREOP TESTING: Primary | ICD-10-CM

## 2025-01-14 LAB
ABO + RH BLD: NORMAL
ANION GAP SERPL CALCULATED.3IONS-SCNC: 9 MMOL/L (ref 9–16)
ARM BAND NUMBER: NORMAL
BILIRUB UR QL STRIP: NEGATIVE
BLOOD BANK SAMPLE EXPIRATION: NORMAL
BLOOD GROUP ANTIBODIES SERPL: NEGATIVE
BUN SERPL-MCNC: 21 MG/DL (ref 8–23)
CALCIUM SERPL-MCNC: 9.5 MG/DL (ref 8.6–10.4)
CHLORIDE SERPL-SCNC: 109 MMOL/L (ref 98–107)
CLARITY UR: ABNORMAL
CO2 SERPL-SCNC: 27 MMOL/L (ref 20–31)
COLOR UR: YELLOW
CREAT SERPL-MCNC: 0.7 MG/DL (ref 0.6–0.9)
EPI CELLS #/AREA URNS HPF: NORMAL /HPF (ref 0–5)
ERYTHROCYTE [DISTWIDTH] IN BLOOD BY AUTOMATED COUNT: 13.2 % (ref 11.8–14.4)
GFR, ESTIMATED: >90 ML/MIN/1.73M2
GLUCOSE SERPL-MCNC: 86 MG/DL (ref 74–99)
GLUCOSE UR STRIP-MCNC: NEGATIVE MG/DL
HCT VFR BLD AUTO: 42.3 % (ref 36.3–47.1)
HGB BLD-MCNC: 13.2 G/DL (ref 11.9–15.1)
HGB UR QL STRIP.AUTO: NEGATIVE
KETONES UR STRIP-MCNC: NEGATIVE MG/DL
LEUKOCYTE ESTERASE UR QL STRIP: NEGATIVE
MCH RBC QN AUTO: 29 PG (ref 25.2–33.5)
MCHC RBC AUTO-ENTMCNC: 31.2 G/DL (ref 28.4–34.8)
MCV RBC AUTO: 93 FL (ref 82.6–102.9)
NITRITE UR QL STRIP: NEGATIVE
NRBC BLD-RTO: 0 PER 100 WBC
PH UR STRIP: 7.5 [PH] (ref 5–8)
PLATELET # BLD AUTO: 206 K/UL (ref 138–453)
PMV BLD AUTO: 9.8 FL (ref 8.1–13.5)
POTASSIUM SERPL-SCNC: 4.6 MMOL/L (ref 3.7–5.3)
PROT UR STRIP-MCNC: NEGATIVE MG/DL
RBC # BLD AUTO: 4.55 M/UL (ref 3.95–5.11)
RBC #/AREA URNS HPF: NORMAL /HPF (ref 0–4)
SODIUM SERPL-SCNC: 145 MMOL/L (ref 136–145)
SP GR UR STRIP: 1.02 (ref 1–1.03)
UROBILINOGEN UR STRIP-ACNC: NORMAL EU/DL (ref 0–1)
WBC #/AREA URNS HPF: NORMAL /HPF (ref 0–5)
WBC OTHER # BLD: 5.2 K/UL (ref 3.5–11.3)

## 2025-01-14 PROCEDURE — 80048 BASIC METABOLIC PNL TOTAL CA: CPT

## 2025-01-14 PROCEDURE — 86850 RBC ANTIBODY SCREEN: CPT

## 2025-01-14 PROCEDURE — 36415 COLL VENOUS BLD VENIPUNCTURE: CPT

## 2025-01-14 PROCEDURE — 86901 BLOOD TYPING SEROLOGIC RH(D): CPT

## 2025-01-14 PROCEDURE — 81001 URINALYSIS AUTO W/SCOPE: CPT

## 2025-01-14 PROCEDURE — 86900 BLOOD TYPING SEROLOGIC ABO: CPT

## 2025-01-14 PROCEDURE — 85027 COMPLETE CBC AUTOMATED: CPT

## 2025-01-14 RX ORDER — HYDROCODONE BITARTRATE AND ACETAMINOPHEN 5; 325 MG/1; MG/1
1 TABLET ORAL EVERY 6 HOURS PRN
COMMUNITY

## 2025-01-14 ASSESSMENT — PAIN DESCRIPTION - ORIENTATION: ORIENTATION: LEFT

## 2025-01-14 ASSESSMENT — PAIN SCALES - GENERAL: PAINLEVEL_OUTOF10: 4

## 2025-01-14 ASSESSMENT — PAIN DESCRIPTION - LOCATION: LOCATION: SHOULDER

## 2025-01-14 NOTE — DISCHARGE INSTRUCTIONS
DAY OF SURGERY/PROCEDURE  GUIDELINES    As a patient at the [facility], you can expect quality medical and nursing care that is centered on your individual needs. It is our goal to make your surgical experience as comfortable and excellent as possible.  ________________________________________________________________________    The following instructions are general guidelines, if any information on this sheet is different from what your doctor has instructed you to do, please follow your doctor's instructions.    Please arrive on 1/28/2025 @ 0900      Enter through entrance C. Check in at registration     Upon arrival you will be taken to the pre-operative area to get ready for surgery, your family will stay in the waiting room and visit with you once you are ready for surgery. Due to special limitations please limit visitation to 1-2 members of your family at a time. When it is time for surgery your family will return to the waiting room.    Nothing to eat, drink, smoke, suck or chew after midnight (no water, gum, mints, cigarettes, cigars, pipes, snuff, chewing tobacco, etc.) or your surgery may be canceled.     Take a shower or bath on the morning of your surgery/procedure (Hibiclens if directed) Do not apply any lotions.    Brush your teeth, but do not swallow any water    IN CASE OF ILLNESS - If you have a cold or flu symptoms (high fever, runny nose, sore throat, cough, etc.) rash, nausea, vomiting, loose stools, and/or recent contact with someone who has a contagious disease (chick pox, measles, etc.) please call your doctor before coming to the surgery center    Take a small sip of water with    Pepcid, levothyroxine, flecainide    If applicable bring your:  Inhaler (s)  Hearing aid(s)  Eyeglasses and Case (If you wear contacts they have to be removed before surgery, bring case and solution)  CPAP     DO NOT take anticoagulants (blood thinners, aspirin or aspirin-containing products)

## 2025-01-15 PROBLEM — H04.123 DRY EYE SYNDROME OF BILATERAL LACRIMAL GLANDS: Status: ACTIVE | Noted: 2024-09-13

## 2025-01-15 PROBLEM — D31.32 BENIGN NEOPLASM OF LEFT CHOROID: Status: ACTIVE | Noted: 2018-03-21

## 2025-01-15 PROBLEM — H43.813 VITREOUS DEGENERATION, BILATERAL: Status: ACTIVE | Noted: 2019-04-24

## 2025-01-15 PROBLEM — H35.373 BILATERAL EPIRETINAL MEMBRANE: Status: ACTIVE | Noted: 2024-09-13

## 2025-01-15 PROBLEM — H52.13 MYOPIA, BILATERAL: Status: ACTIVE | Noted: 2018-03-21

## 2025-01-15 PROCEDURE — 87641 MR-STAPH DNA AMP PROBE: CPT

## 2025-01-16 LAB
MRSA, DNA, NASAL: NEGATIVE
SPECIMEN DESCRIPTION: NORMAL

## 2025-01-24 ENCOUNTER — ANESTHESIA EVENT (OUTPATIENT)
Dept: OPERATING ROOM | Age: 70
End: 2025-01-24
Payer: MEDICARE

## 2025-01-24 ENCOUNTER — OFFICE VISIT (OUTPATIENT)
Dept: ORTHOPEDIC SURGERY | Age: 70
End: 2025-01-24

## 2025-01-24 DIAGNOSIS — M19.012 GLENOHUMERAL ARTHRITIS, LEFT: Primary | ICD-10-CM

## 2025-01-24 RX ORDER — HYDROCODONE BITARTRATE AND ACETAMINOPHEN 5; 325 MG/1; MG/1
1 TABLET ORAL EVERY 4 HOURS PRN
Qty: 42 TABLET | Refills: 0 | Status: CANCELLED | OUTPATIENT
Start: 2025-01-24 | End: 2025-01-31

## 2025-01-24 RX ORDER — KETOROLAC TROMETHAMINE 10 MG/1
10 TABLET, FILM COATED ORAL
Qty: 9 TABLET | Refills: 0 | Status: SHIPPED | OUTPATIENT
Start: 2025-01-24 | End: 2026-01-24

## 2025-01-24 RX ORDER — SODIUM CHLORIDE 0.9 % (FLUSH) 0.9 %
5-40 SYRINGE (ML) INJECTION PRN
OUTPATIENT
Start: 2025-01-24

## 2025-01-24 RX ORDER — SODIUM CHLORIDE 9 MG/ML
INJECTION, SOLUTION INTRAVENOUS PRN
OUTPATIENT
Start: 2025-01-24

## 2025-01-24 RX ORDER — SCOPOLAMINE 1 MG/3D
1 PATCH, EXTENDED RELEASE TRANSDERMAL
Status: CANCELLED | OUTPATIENT
Start: 2025-01-24 | End: 2025-01-27

## 2025-01-24 RX ORDER — ACETAMINOPHEN 325 MG/1
1000 TABLET ORAL ONCE
OUTPATIENT
Start: 2025-01-24 | End: 2025-01-24

## 2025-01-24 RX ORDER — SODIUM CHLORIDE 0.9 % (FLUSH) 0.9 %
5-40 SYRINGE (ML) INJECTION EVERY 12 HOURS SCHEDULED
OUTPATIENT
Start: 2025-01-24

## 2025-01-24 RX ORDER — DOXYCYCLINE HYCLATE 100 MG
100 TABLET ORAL 2 TIMES DAILY
Qty: 14 TABLET | Refills: 0 | Status: SHIPPED | OUTPATIENT
Start: 2025-01-24 | End: 2025-01-31

## 2025-01-24 RX ORDER — ONDANSETRON 4 MG/1
4 TABLET, FILM COATED ORAL DAILY PRN
Qty: 20 TABLET | Refills: 0 | Status: SHIPPED | OUTPATIENT
Start: 2025-01-24 | End: 2025-02-13

## 2025-01-24 RX ORDER — OXYCODONE AND ACETAMINOPHEN 5; 325 MG/1; MG/1
1 TABLET ORAL EVERY 6 HOURS PRN
Qty: 28 TABLET | Refills: 0 | Status: SHIPPED | OUTPATIENT
Start: 2025-01-24 | End: 2025-01-31

## 2025-01-24 NOTE — PROGRESS NOTES
Cancer in her maternal grandfather; Heart Disease in her mother.      Review of Systems   History obtained from the patient.   REVIEW OF SYSTEMS:   Constitution: negative for fever, chills, weight loss or malaise   Musculoskeletal: As noted in the HPI   Neurologic: As noted in the HPI    Physical Exam    General Appearance: alert, well appearing, and in no distress  Mental Status: alert, oriented to person, place, and time  Evaluation of the patient's left shoulder demonstrates intact skin without any warmth, erythema or swelling. Sensation is grossly intact to light touch diffusely. 2+ radial pulse.     Heart: Regular rate and rhythm  Lungs: Clear to auscultation bilaterally    Assessment and Plan  Shira Sanches is a 69 y.o. old female with advanced left shoulder glenohumeral osteoarthritis. she has failed attempts at conservative management and is electing to proceed with surgery by way of a left total shoulder replacement.  We once again discussed the details of the proposed surgery, postoperative recovery course and expected outcome.  All questions were answered. shewas provided prescriptions for postoperative analgesia, and a sling to protect the shoulder leading up to and following surgery. Preoperative clearance has been obtained. We will proceed with surgery as planned.     This note is created with the assistance of a speech recognition program.  While intending to generate adocument that actually reflects the content of the visit, the document can still have some errors including those of syntax and sound a like substitutions which may escape proof reading.  It such instances, actual meaningcan be extrapolated by contextual diversion.

## 2025-01-28 ENCOUNTER — ANESTHESIA (OUTPATIENT)
Dept: OPERATING ROOM | Age: 70
End: 2025-01-28
Payer: MEDICARE

## 2025-01-28 ENCOUNTER — HOSPITAL ENCOUNTER (OUTPATIENT)
Age: 70
Setting detail: OUTPATIENT SURGERY
Discharge: HOME | End: 2025-01-28
Attending: ORTHOPAEDIC SURGERY | Admitting: ORTHOPAEDIC SURGERY
Payer: MEDICARE

## 2025-01-28 ENCOUNTER — APPOINTMENT (OUTPATIENT)
Dept: GENERAL RADIOLOGY | Age: 70
End: 2025-01-28
Attending: ORTHOPAEDIC SURGERY
Payer: MEDICARE

## 2025-01-28 VITALS
DIASTOLIC BLOOD PRESSURE: 62 MMHG | HEIGHT: 67 IN | BODY MASS INDEX: 37.51 KG/M2 | WEIGHT: 239 LBS | TEMPERATURE: 97.4 F | RESPIRATION RATE: 18 BRPM | HEART RATE: 69 BPM | SYSTOLIC BLOOD PRESSURE: 102 MMHG | OXYGEN SATURATION: 99 %

## 2025-01-28 PROCEDURE — 7100000011 HC PHASE II RECOVERY - ADDTL 15 MIN: Performed by: ORTHOPAEDIC SURGERY

## 2025-01-28 PROCEDURE — 3600000014 HC SURGERY LEVEL 4 ADDTL 15MIN: Performed by: ORTHOPAEDIC SURGERY

## 2025-01-28 PROCEDURE — 2709999900 HC NON-CHARGEABLE SUPPLY: Performed by: ORTHOPAEDIC SURGERY

## 2025-01-28 PROCEDURE — 2580000003 HC RX 258: Performed by: ANESTHESIOLOGY

## 2025-01-28 PROCEDURE — 6370000000 HC RX 637 (ALT 250 FOR IP): Performed by: ANESTHESIOLOGY

## 2025-01-28 PROCEDURE — 6360000002 HC RX W HCPCS: Performed by: ANESTHESIOLOGY

## 2025-01-28 PROCEDURE — 6370000000 HC RX 637 (ALT 250 FOR IP): Performed by: ORTHOPAEDIC SURGERY

## 2025-01-28 PROCEDURE — 7100000001 HC PACU RECOVERY - ADDTL 15 MIN: Performed by: ORTHOPAEDIC SURGERY

## 2025-01-28 PROCEDURE — 2580000003 HC RX 258: Performed by: NURSE ANESTHETIST, CERTIFIED REGISTERED

## 2025-01-28 PROCEDURE — 3600000004 HC SURGERY LEVEL 4 BASE: Performed by: ORTHOPAEDIC SURGERY

## 2025-01-28 PROCEDURE — 7100000010 HC PHASE II RECOVERY - FIRST 15 MIN: Performed by: ORTHOPAEDIC SURGERY

## 2025-01-28 PROCEDURE — 3700000001 HC ADD 15 MINUTES (ANESTHESIA): Performed by: ORTHOPAEDIC SURGERY

## 2025-01-28 PROCEDURE — 2500000003 HC RX 250 WO HCPCS: Performed by: ANESTHESIOLOGY

## 2025-01-28 PROCEDURE — 3700000000 HC ANESTHESIA ATTENDED CARE: Performed by: ORTHOPAEDIC SURGERY

## 2025-01-28 PROCEDURE — 2500000003 HC RX 250 WO HCPCS: Performed by: NURSE ANESTHETIST, CERTIFIED REGISTERED

## 2025-01-28 PROCEDURE — 6360000002 HC RX W HCPCS: Performed by: ORTHOPAEDIC SURGERY

## 2025-01-28 PROCEDURE — 2720000010 HC SURG SUPPLY STERILE: Performed by: ORTHOPAEDIC SURGERY

## 2025-01-28 PROCEDURE — 7100000000 HC PACU RECOVERY - FIRST 15 MIN: Performed by: ORTHOPAEDIC SURGERY

## 2025-01-28 PROCEDURE — 6360000002 HC RX W HCPCS: Performed by: NURSE ANESTHETIST, CERTIFIED REGISTERED

## 2025-01-28 PROCEDURE — 73020 X-RAY EXAM OF SHOULDER: CPT

## 2025-01-28 PROCEDURE — 64415 NJX AA&/STRD BRCH PLXS IMG: CPT | Performed by: ANESTHESIOLOGY

## 2025-01-28 PROCEDURE — C1776 JOINT DEVICE (IMPLANTABLE): HCPCS | Performed by: ORTHOPAEDIC SURGERY

## 2025-01-28 DEVICE — RSP BASEPLATE, 30MM, W/P2 COATING
Type: IMPLANTABLE DEVICE | Site: SHOULDER | Status: FUNCTIONAL
Brand: DJO SURGICAL

## 2025-01-28 DEVICE — SCREW, LOCKING BONE, RSP, 5X26
Type: IMPLANTABLE DEVICE | Site: SHOULDER | Status: FUNCTIONAL
Brand: DJO SURGICAL

## 2025-01-28 DEVICE — GLENOID, HEAD W/RETAINING SCREW, RSP, 36MM, NEUTRAL
Type: IMPLANTABLE DEVICE | Site: SHOULDER | Status: FUNCTIONAL
Brand: DJO SURGICAL

## 2025-01-28 DEVICE — ALTIVATE REVERSE, HUMERAL STEM, SMALL SHELL, SZ 8X48MM
Type: IMPLANTABLE DEVICE | Site: SHOULDER | Status: FUNCTIONAL
Brand: DJO SURGICAL

## 2025-01-28 DEVICE — AR, SMALL SOCKET INSERT, 36MM NEUTRAL EPLUS
Type: IMPLANTABLE DEVICE | Site: SHOULDER | Status: FUNCTIONAL
Brand: DJO SURGICAL

## 2025-01-28 DEVICE — SCREW, LOCKING BONE, RSP, 5X22
Type: IMPLANTABLE DEVICE | Site: SHOULDER | Status: FUNCTIONAL
Brand: DJO SURGICAL

## 2025-01-28 RX ORDER — PROPOFOL 10 MG/ML
INJECTION, EMULSION INTRAVENOUS
Status: DISCONTINUED | OUTPATIENT
Start: 2025-01-28 | End: 2025-01-28 | Stop reason: SDUPTHER

## 2025-01-28 RX ORDER — FENTANYL CITRATE 50 UG/ML
100 INJECTION, SOLUTION INTRAMUSCULAR; INTRAVENOUS ONCE
Status: CANCELLED | OUTPATIENT
Start: 2025-01-28 | End: 2025-01-28

## 2025-01-28 RX ORDER — ONDANSETRON 2 MG/ML
4 INJECTION INTRAMUSCULAR; INTRAVENOUS
Status: DISCONTINUED | OUTPATIENT
Start: 2025-01-28 | End: 2025-01-28 | Stop reason: HOSPADM

## 2025-01-28 RX ORDER — MIDAZOLAM HYDROCHLORIDE 2 MG/2ML
2 INJECTION, SOLUTION INTRAMUSCULAR; INTRAVENOUS
Status: DISCONTINUED | OUTPATIENT
Start: 2025-01-28 | End: 2025-01-28 | Stop reason: HOSPADM

## 2025-01-28 RX ORDER — ACETAMINOPHEN 500 MG
1000 TABLET ORAL ONCE
Status: COMPLETED | OUTPATIENT
Start: 2025-01-28 | End: 2025-01-28

## 2025-01-28 RX ORDER — FENTANYL CITRATE 50 UG/ML
100 INJECTION, SOLUTION INTRAMUSCULAR; INTRAVENOUS
Status: COMPLETED | OUTPATIENT
Start: 2025-01-28 | End: 2025-01-28

## 2025-01-28 RX ORDER — SODIUM CHLORIDE 9 MG/ML
INJECTION, SOLUTION INTRAVENOUS PRN
Status: DISCONTINUED | OUTPATIENT
Start: 2025-01-28 | End: 2025-01-28 | Stop reason: HOSPADM

## 2025-01-28 RX ORDER — SODIUM CHLORIDE 0.9 % (FLUSH) 0.9 %
5-40 SYRINGE (ML) INJECTION PRN
Status: DISCONTINUED | OUTPATIENT
Start: 2025-01-28 | End: 2025-01-28 | Stop reason: HOSPADM

## 2025-01-28 RX ORDER — SODIUM CHLORIDE 0.9 % (FLUSH) 0.9 %
5-40 SYRINGE (ML) INJECTION EVERY 12 HOURS SCHEDULED
Status: DISCONTINUED | OUTPATIENT
Start: 2025-01-28 | End: 2025-01-28 | Stop reason: HOSPADM

## 2025-01-28 RX ORDER — OXYCODONE AND ACETAMINOPHEN 5; 325 MG/1; MG/1
2 TABLET ORAL
Status: DISCONTINUED | OUTPATIENT
Start: 2025-01-28 | End: 2025-01-28 | Stop reason: HOSPADM

## 2025-01-28 RX ORDER — FENTANYL CITRATE 50 UG/ML
INJECTION, SOLUTION INTRAMUSCULAR; INTRAVENOUS
Status: DISCONTINUED | OUTPATIENT
Start: 2025-01-28 | End: 2025-01-28 | Stop reason: SDUPTHER

## 2025-01-28 RX ORDER — LABETALOL HYDROCHLORIDE 5 MG/ML
10 INJECTION, SOLUTION INTRAVENOUS
Status: DISCONTINUED | OUTPATIENT
Start: 2025-01-28 | End: 2025-01-28 | Stop reason: HOSPADM

## 2025-01-28 RX ORDER — IPRATROPIUM BROMIDE AND ALBUTEROL SULFATE 2.5; .5 MG/3ML; MG/3ML
1 SOLUTION RESPIRATORY (INHALATION)
Status: DISCONTINUED | OUTPATIENT
Start: 2025-01-28 | End: 2025-01-28 | Stop reason: HOSPADM

## 2025-01-28 RX ORDER — PHENYLEPHRINE HCL IN 0.9% NACL 1 MG/10 ML
SYRINGE (ML) INTRAVENOUS
Status: DISCONTINUED | OUTPATIENT
Start: 2025-01-28 | End: 2025-01-28 | Stop reason: SDUPTHER

## 2025-01-28 RX ORDER — EPHEDRINE SULFATE/0.9% NACL/PF 25 MG/5 ML
SYRINGE (ML) INTRAVENOUS
Status: DISCONTINUED | OUTPATIENT
Start: 2025-01-28 | End: 2025-01-28 | Stop reason: SDUPTHER

## 2025-01-28 RX ORDER — DIPHENHYDRAMINE HYDROCHLORIDE 50 MG/ML
12.5 INJECTION INTRAMUSCULAR; INTRAVENOUS
Status: DISCONTINUED | OUTPATIENT
Start: 2025-01-28 | End: 2025-01-28 | Stop reason: HOSPADM

## 2025-01-28 RX ORDER — OXYCODONE AND ACETAMINOPHEN 5; 325 MG/1; MG/1
1 TABLET ORAL
Status: COMPLETED | OUTPATIENT
Start: 2025-01-28 | End: 2025-01-28

## 2025-01-28 RX ORDER — BUPIVACAINE HYDROCHLORIDE 5 MG/ML
INJECTION, SOLUTION EPIDURAL; INTRACAUDAL
Status: COMPLETED | OUTPATIENT
Start: 2025-01-28 | End: 2025-01-28

## 2025-01-28 RX ORDER — TRANEXAMIC ACID 10 MG/ML
INJECTION, SOLUTION INTRAVENOUS
Status: COMPLETED
Start: 2025-01-28 | End: 2025-01-28

## 2025-01-28 RX ORDER — ROCURONIUM BROMIDE 10 MG/ML
INJECTION, SOLUTION INTRAVENOUS
Status: DISCONTINUED | OUTPATIENT
Start: 2025-01-28 | End: 2025-01-28 | Stop reason: SDUPTHER

## 2025-01-28 RX ORDER — LIDOCAINE HYDROCHLORIDE 10 MG/ML
INJECTION, SOLUTION EPIDURAL; INFILTRATION; INTRACAUDAL; PERINEURAL
Status: DISCONTINUED | OUTPATIENT
Start: 2025-01-28 | End: 2025-01-28 | Stop reason: SDUPTHER

## 2025-01-28 RX ORDER — ONDANSETRON 2 MG/ML
INJECTION INTRAMUSCULAR; INTRAVENOUS
Status: DISCONTINUED | OUTPATIENT
Start: 2025-01-28 | End: 2025-01-28 | Stop reason: SDUPTHER

## 2025-01-28 RX ORDER — DEXAMETHASONE SODIUM PHOSPHATE 10 MG/ML
10 INJECTION, SOLUTION INTRAMUSCULAR; INTRAVENOUS ONCE
Status: DISCONTINUED | OUTPATIENT
Start: 2025-01-28 | End: 2025-01-28 | Stop reason: HOSPADM

## 2025-01-28 RX ORDER — HYDRALAZINE HYDROCHLORIDE 20 MG/ML
10 INJECTION INTRAMUSCULAR; INTRAVENOUS
Status: DISCONTINUED | OUTPATIENT
Start: 2025-01-28 | End: 2025-01-28 | Stop reason: HOSPADM

## 2025-01-28 RX ORDER — NALOXONE HYDROCHLORIDE 0.4 MG/ML
INJECTION, SOLUTION INTRAMUSCULAR; INTRAVENOUS; SUBCUTANEOUS PRN
Status: DISCONTINUED | OUTPATIENT
Start: 2025-01-28 | End: 2025-01-28 | Stop reason: HOSPADM

## 2025-01-28 RX ORDER — MORPHINE SULFATE 2 MG/ML
2 INJECTION, SOLUTION INTRAMUSCULAR; INTRAVENOUS EVERY 5 MIN PRN
Status: DISCONTINUED | OUTPATIENT
Start: 2025-01-28 | End: 2025-01-28 | Stop reason: HOSPADM

## 2025-01-28 RX ORDER — SCOPOLAMINE 1 MG/3D
1 PATCH, EXTENDED RELEASE TRANSDERMAL ONCE
Status: DISCONTINUED | OUTPATIENT
Start: 2025-01-28 | End: 2025-01-28 | Stop reason: HOSPADM

## 2025-01-28 RX ORDER — SODIUM CHLORIDE, SODIUM LACTATE, POTASSIUM CHLORIDE, CALCIUM CHLORIDE 600; 310; 30; 20 MG/100ML; MG/100ML; MG/100ML; MG/100ML
INJECTION, SOLUTION INTRAVENOUS
Status: DISCONTINUED | OUTPATIENT
Start: 2025-01-28 | End: 2025-01-28 | Stop reason: SDUPTHER

## 2025-01-28 RX ORDER — TRANEXAMIC ACID 10 MG/ML
INJECTION, SOLUTION INTRAVENOUS
Status: DISCONTINUED | OUTPATIENT
Start: 2025-01-28 | End: 2025-01-28 | Stop reason: SDUPTHER

## 2025-01-28 RX ORDER — MEPERIDINE HYDROCHLORIDE 50 MG/ML
12.5 INJECTION INTRAMUSCULAR; INTRAVENOUS; SUBCUTANEOUS EVERY 5 MIN PRN
Status: DISCONTINUED | OUTPATIENT
Start: 2025-01-28 | End: 2025-01-28 | Stop reason: HOSPADM

## 2025-01-28 RX ORDER — EPHEDRINE SULFATE/0.9% NACL/PF 25 MG/5 ML
SYRINGE (ML) INTRAVENOUS
Status: COMPLETED
Start: 2025-01-28 | End: 2025-01-28

## 2025-01-28 RX ORDER — METOCLOPRAMIDE HYDROCHLORIDE 5 MG/ML
10 INJECTION INTRAMUSCULAR; INTRAVENOUS
Status: DISCONTINUED | OUTPATIENT
Start: 2025-01-28 | End: 2025-01-28 | Stop reason: HOSPADM

## 2025-01-28 RX ORDER — APREPITANT 40 MG/1
40 CAPSULE ORAL ONCE
Status: COMPLETED | OUTPATIENT
Start: 2025-01-28 | End: 2025-01-28

## 2025-01-28 RX ORDER — DEXAMETHASONE SODIUM PHOSPHATE 4 MG/ML
INJECTION, SOLUTION INTRA-ARTICULAR; INTRALESIONAL; INTRAMUSCULAR; INTRAVENOUS; SOFT TISSUE
Status: DISCONTINUED | OUTPATIENT
Start: 2025-01-28 | End: 2025-01-28 | Stop reason: SDUPTHER

## 2025-01-28 RX ORDER — MIDAZOLAM HYDROCHLORIDE 2 MG/2ML
2 INJECTION, SOLUTION INTRAMUSCULAR; INTRAVENOUS ONCE
Status: CANCELLED | OUTPATIENT
Start: 2025-01-28 | End: 2025-01-28

## 2025-01-28 RX ORDER — MIDAZOLAM HYDROCHLORIDE 2 MG/2ML
2 INJECTION, SOLUTION INTRAMUSCULAR; INTRAVENOUS
Status: COMPLETED | OUTPATIENT
Start: 2025-01-28 | End: 2025-01-28

## 2025-01-28 RX ORDER — GLYCOPYRROLATE 0.2 MG/ML
0.4 INJECTION INTRAMUSCULAR; INTRAVENOUS ONCE
Status: DISCONTINUED | OUTPATIENT
Start: 2025-01-28 | End: 2025-01-28 | Stop reason: HOSPADM

## 2025-01-28 RX ADMIN — OXYCODONE HYDROCHLORIDE AND ACETAMINOPHEN 1 TABLET: 5; 325 TABLET ORAL at 14:37

## 2025-01-28 RX ADMIN — TRANEXAMIC ACID 1 G: 10 INJECTION, SOLUTION INTRAVENOUS at 11:05

## 2025-01-28 RX ADMIN — FENTANYL CITRATE 50 MCG: 50 INJECTION, SOLUTION INTRAMUSCULAR; INTRAVENOUS at 10:55

## 2025-01-28 RX ADMIN — ACETAMINOPHEN 1000 MG: 500 TABLET ORAL at 10:08

## 2025-01-28 RX ADMIN — PROPOFOL 130 MG: 10 INJECTION, EMULSION INTRAVENOUS at 10:55

## 2025-01-28 RX ADMIN — LIDOCAINE HYDROCHLORIDE 35 MG: 10 INJECTION, SOLUTION EPIDURAL; INFILTRATION; INTRACAUDAL; PERINEURAL at 10:55

## 2025-01-28 RX ADMIN — DEXAMETHASONE SODIUM PHOSPHATE 10 MG: 4 INJECTION INTRA-ARTICULAR; INTRALESIONAL; INTRAMUSCULAR; INTRAVENOUS; SOFT TISSUE at 11:24

## 2025-01-28 RX ADMIN — ROCURONIUM BROMIDE 10 MG: 10 INJECTION, SOLUTION INTRAVENOUS at 12:20

## 2025-01-28 RX ADMIN — FENTANYL CITRATE 100 MCG: 50 INJECTION, SOLUTION INTRAMUSCULAR; INTRAVENOUS at 10:43

## 2025-01-28 RX ADMIN — Medication 100 MCG: at 12:32

## 2025-01-28 RX ADMIN — MIDAZOLAM HYDROCHLORIDE 2 MG: 1 INJECTION, SOLUTION INTRAMUSCULAR; INTRAVENOUS at 10:43

## 2025-01-28 RX ADMIN — EPHEDRINE SULFATE 10 MG: 5 INJECTION INTRAVENOUS at 11:20

## 2025-01-28 RX ADMIN — Medication 100 MCG: at 11:11

## 2025-01-28 RX ADMIN — Medication 100 MCG: at 11:37

## 2025-01-28 RX ADMIN — SUGAMMADEX 200 MG: 100 INJECTION, SOLUTION INTRAVENOUS at 13:14

## 2025-01-28 RX ADMIN — EPHEDRINE SULFATE 15 MG: 5 INJECTION INTRAVENOUS at 11:25

## 2025-01-28 RX ADMIN — BUPIVACAINE HYDROCHLORIDE 10 ML: 5 INJECTION, SOLUTION EPIDURAL; INTRACAUDAL; PERINEURAL at 10:44

## 2025-01-28 RX ADMIN — Medication 100 MCG: at 11:47

## 2025-01-28 RX ADMIN — ROCURONIUM BROMIDE 40 MG: 10 INJECTION, SOLUTION INTRAVENOUS at 10:55

## 2025-01-28 RX ADMIN — SODIUM CHLORIDE, POTASSIUM CHLORIDE, SODIUM LACTATE AND CALCIUM CHLORIDE: 600; 310; 30; 20 INJECTION, SOLUTION INTRAVENOUS at 10:43

## 2025-01-28 RX ADMIN — ROCURONIUM BROMIDE 10 MG: 10 INJECTION, SOLUTION INTRAVENOUS at 12:00

## 2025-01-28 RX ADMIN — ONDANSETRON 4 MG: 2 INJECTION INTRAMUSCULAR; INTRAVENOUS at 12:54

## 2025-01-28 RX ADMIN — Medication 2000 MG: at 11:03

## 2025-01-28 RX ADMIN — ROCURONIUM BROMIDE 10 MG: 10 INJECTION, SOLUTION INTRAVENOUS at 11:39

## 2025-01-28 RX ADMIN — Medication 100 MCG: at 11:30

## 2025-01-28 RX ADMIN — FENTANYL CITRATE 50 MCG: 50 INJECTION, SOLUTION INTRAMUSCULAR; INTRAVENOUS at 13:07

## 2025-01-28 RX ADMIN — SODIUM CHLORIDE, POTASSIUM CHLORIDE, SODIUM LACTATE AND CALCIUM CHLORIDE: 600; 310; 30; 20 INJECTION, SOLUTION INTRAVENOUS at 12:31

## 2025-01-28 RX ADMIN — APREPITANT 40 MG: 40 CAPSULE ORAL at 10:08

## 2025-01-28 RX ADMIN — FAMOTIDINE 20 MG: 10 INJECTION, SOLUTION INTRAVENOUS at 10:24

## 2025-01-28 RX ADMIN — BUPIVACAINE 10 ML: 13.3 INJECTION, SUSPENSION, LIPOSOMAL INFILTRATION at 10:44

## 2025-01-28 ASSESSMENT — PAIN DESCRIPTION - ORIENTATION
ORIENTATION: LEFT

## 2025-01-28 ASSESSMENT — PAIN - FUNCTIONAL ASSESSMENT
PAIN_FUNCTIONAL_ASSESSMENT: NONE - DENIES PAIN
PAIN_FUNCTIONAL_ASSESSMENT: PREVENTS OR INTERFERES SOME ACTIVE ACTIVITIES AND ADLS
PAIN_FUNCTIONAL_ASSESSMENT: 0-10

## 2025-01-28 ASSESSMENT — PAIN SCALES - GENERAL
PAINLEVEL_OUTOF10: 0
PAINLEVEL_OUTOF10: 7
PAINLEVEL_OUTOF10: 2
PAINLEVEL_OUTOF10: 4
PAINLEVEL_OUTOF10: 0
PAINLEVEL_OUTOF10: 7
PAINLEVEL_OUTOF10: 7

## 2025-01-28 ASSESSMENT — PAIN DESCRIPTION - DESCRIPTORS
DESCRIPTORS: ACHING

## 2025-01-28 ASSESSMENT — PAIN DESCRIPTION - PAIN TYPE: TYPE: SURGICAL PAIN

## 2025-01-28 ASSESSMENT — PAIN DESCRIPTION - LOCATION
LOCATION: SHOULDER

## 2025-01-28 NOTE — OP NOTE
Operative Report     Date of Procedure: 1/28/2025     Preop Diagnosis:   1. Left Severe glenohumeral arthritis (M19.012)  2. Left biceps tenosynovitis  (M75.22)    Postop Diagnosis:   1. Left Severe glenohumeral arthritis (M19.012)  2. Left biceps tenosynovitis  (M75.22)    Operation:     1. Left Reverse Shoulder Arthroplasty (60300)    2. Left open biceps tendon transfer (17464)    Surgeon: Nasim Bell MD    Surgical Assistant:  Jluis Harden     Anesthesia: General with left interscalene nerve block    EBL: 100 cc    Summary of findings:  Severe glenohumeral arthritis.  Posterior glenoid wear.       Specimen: None    Implants used:  DJO Altivate Reverse Shoulder    - Uncemented baseplate with three 5.0 mm locking screws screws measuring 22    mm, 22 mm, 26 mm   - 36 Neutral Glenosphere   - Uncemented AltiVate small shell size 8 Humeral Stem   - 36 neutral Poly Insert    Indications: Shira Sanches is a 69 y.o. old female who presents with severe functionally disabling shoulder pain despite non-operative treatment.  Recent radiographs and CT Scan revealed severe glenohumeral arthritis with significant posterior glenoid wear.  Having failed attempts at conservative management and following a discussion with regards to her treatment options including continued non-surgical treatment and operative intervention, she elected to forgo continued attempts at conservative treatment and proceed with surgery by way of a left total shoulder replacement. Risks, benefits, and alternatives were fully discussed.  Postoperative limitations and limitations of the procedure were discussed in detail.  The patient understood risks, alternatives, complications, & post-operative limitations and wishes to proceed.     Operative Report:  Following appropriate identification of the patient and her operative extremity in the preoperative area, consent was reviewed with patient. Risks, benefits, and alternatives were discussed. All

## 2025-01-28 NOTE — ANESTHESIA POSTPROCEDURE EVALUATION
Department of Anesthesiology  Postprocedure Note    Patient: Shira Sanches  MRN: [id number]  YOB: 1955  Date of evaluation: 1/28/2025    Procedure Summary       Date: 01/28/25 Room / Location: [address]    Anesthesia Start: 1050 Anesthesia Stop: 1332    Procedure: LEFT SHOULDER REVERSE TOTAL ARTHROPLASTY WITH DJO AND PRE-OP INTERSCALENE BLOCK WITH EXPAREL (Left: Shoulder) Diagnosis:       Glenohumeral arthritis, left      (Glenohumeral arthritis, left [M19.012])    Surgeons: Nasim Bell MD Responsible Provider: Yair Lewis MD    Anesthesia Type: General ASA Status: 3            Anesthesia Type: General    Julio Cesar Phase I: Julio Cesar Score: 8    Julio Cesar Phase II:      Anesthesia Post Evaluation    Patient location during evaluation: PACU  Patient participation: complete - patient participated  Level of consciousness: awake and alert  Airway patency: patent  Nausea & Vomiting: no nausea and no vomiting  Cardiovascular status: hemodynamically stable  Respiratory status: spontaneous ventilation and nasal cannula  Hydration status: euvolemic  Multimodal analgesia pain management approach  Pain management: adequate    No notable events documented.

## 2025-01-28 NOTE — DISCHARGE INSTRUCTIONS
Nasim Bell MD  [facility] Orthopaedics & Sports Medicine  Specializing in Shoulder and Elbow Surgery      POSTOPERATIVE INSTRUCTIONS    Surgery: Left Shoulder Replacement    DIET  Begin with clear liquids and light foods (jellos, soups, etc.).  Progress to your normal diet if you are not nauseated.    WOUND CARE  Maintain your operative dressing, may loosen bandage if swelling occurs.  It is normal for joints to bleed and swell following surgery - if blood soaks onto the bandage, do not become alarmed - reinforce with additional dressing.  Surgical dressing is changed on the second post-operative day, and daily after that with clean gauze and tape. If it gets wet, it should be changed right away.   To avoid infection, keep surgical incisions clean and dry - you may shower by placing Saran wrap or Press and seal over the surgical site before showering, and afterwards take everything off and apply clean gauze dressings - NO immersion of operative site (i.e. bath, pool).    MEDICATIONS  You received a nerve block prior to surgery - this will typically wear off within 12-20 hours. Start taking your prescription pain medication before it does.  Most patients will require some narcotic pain medication for a short period of time - Start it before numbing medicine wears off, and use as directed on the bottle.  Common side effects of the pain medication are nausea, drowsiness, and constipation - to decrease the side effects, take medication with food - if constipation occurs, consider taking an over-the-counter laxative.  If you are having problems with nausea and vomiting, take your anti-emetic as prescribed, otherwise, contact the office to possibly have your medication changed ([phone]).  Do not drive a car or operate machinery while taking the narcotic medication.  Please resume all home medications, unless instructed otherwise.    ACTIVITY  Keep the limb elevated for several days following surgery to

## 2025-01-28 NOTE — BRIEF OP NOTE
Brief Postoperative Note      Patient: Shira Sanches  YOB: 1955  MRN: [id number]    Date of Procedure: 1/28/2025    Pre-Op Diagnosis Codes:      * Glenohumeral arthritis, left [M19.012]    Post-Op Diagnosis: Same       Procedure(s):  LEFT SHOULDER REVERSE TOTAL ARTHROPLASTY WITH DJO AND PRE-OP INTERSCALENE BLOCK WITH EXPAREL    Surgeon(s):  Nasim Bell MD    Assistant:  * No surgical staff found *    Anesthesia: General    Estimated Blood Loss (mL): 100    Complications: None    Specimens:   * No specimens in log *    Implants:  Implant Name Type Inv. Item Serial No.  Lot No. LRB No. Used Action   BASEPLATE CARLOS 30 MM P2 COAT [facility] - [id number]  BASEPLATE CARLOS 30 MM P2 COAT [facility] - [facility] [id number] Left 1 Implanted   SCREW BNE L22MM DIA5MM TI LOCKING FOR CARLOS BASEPLT FIX - [id number]  SCREW BNE L22MM DIA5MM TI LOCKING FOR CARLOS BASEPLT FIX  [facility] [id number] Left 1 Implanted   SCREW BNE L22MM DIA5MM TI LOCKING FOR CARLOS BASEPLT FIX - [id number]  SCREW BNE L22MM DIA5MM TI LOCKING FOR CARLOS BASEPLT FIX  [facility]ICAL - [facility] [id number] Left 1 Implanted   SCREW BNE L26MM DIA5MM TI LOCKING FOR ACRLOS BASEPLT FIX - [id number]  SCREW BNE L26MM DIA5MM TI LOCKING FOR CARLOS BASEPLT FIX  [facility] MEDICAL - O [facility] [id number] Left 1 Implanted   HEAD CARLOS 36MM NEUT W/ RET SCR [facility] - [id number]  HEAD CARLOS 36MM NEUT W/ RET SCR [facility] [id number] Left 1 Implanted   INSERT HUM SM SOCKET STD 36 MM SHLDR E+ POLYETH ALTIVATE RVS - [id number]  INSERT HUM SM SOCKET STD 36 MM SHLDR E+ POLYETH ALTIVATE RVS  [facility] MEDICAL - [facility]O [facility] [id number] Left 1 Implanted   STEM HUM SHELL SM 8X48 MM SHLDR ALTIVATE RVS - [id number]  STEM HUM SHELL SM 8X48 MM SHLDR ALTIVATE RVS  [employer] - DJO SURGICAL-WD [id number] Left 1 Implanted         Drains: * No LDAs found *    Findings:  Infection Present At Time Of Surgery (PATOS)

## 2025-01-28 NOTE — ANESTHESIA PROCEDURE NOTES
Peripheral Block    Patient location during procedure: pre-op  Reason for block: post-op pain management and at surgeon's request  Start time: 1/28/2025 10:43 AM  End time: 1/28/2025 10:44 AM  Staffing  Performed: anesthesiologist   Anesthesiologist: Yair Lewis MD  Performed by: Yair Lewis MD  Authorized by: Yair Lewis MD    Preanesthetic Checklist  Completed: patient identified, IV checked, site marked, risks and benefits discussed, surgical/procedural consents, equipment checked, pre-op evaluation, timeout performed, anesthesia consent given, oxygen available, monitors applied/VS acknowledged, fire risk safety assessment completed and verbalized and blood product R/B/A discussed and consented  Peripheral Block   Patient position: sitting  Prep: ChloraPrep  Provider prep: mask and sterile gloves  Patient monitoring: cardiac monitor, continuous pulse ox, frequent blood pressure checks, IV access, oxygen and responsive to questions  Block type: Brachial plexus  Interscalene  Laterality: left  Injection technique: single-shot  Guidance: nerve stimulator and ultrasound guided    Needle   Needle type: insulated echogenic nerve stimulator needle   Needle gauge: 22 G  Needle localization: anatomical landmarks, nerve stimulator and ultrasound guidance  Needle length: 2 IN.  Assessment   Injection assessment: negative aspiration for heme, no paresthesia on injection, local visualized surrounding nerve on ultrasound and no intravascular symptoms  Paresthesia pain: none  Slow fractionated injection: yes  Hemodynamics: stable  Outcomes: uncomplicated and patient tolerated procedure well    Additional Notes  (+) LEFT DELTOID TWITCH FROM 1.5MA DOWN TO 0.7MA  Medications Administered  BUPivacaine (MARCAINE) PF injection 0.5% - Perineural   10 mL - 1/28/2025 10:44:00 AM  BUPivacaine liposome (EXPAREL) injection 1.3% - Perineural   10 mL - 1/28/2025 10:44:00 AM

## 2025-01-28 NOTE — H&P
Update History & Physical    The patient's History and Physical of January 24, 2025 was reviewed with the patient and I examined the patient. There was no change. The surgical site was confirmed by the patient and me.       Plan: The risks, benefits, expected outcome, and alternative to the recommended procedure have been discussed with the patient. Patient understands and wants to proceed with the procedure.     Electronically signed by Nasim Bell MD on 1/28/2025 at 9:45 AM

## 2025-01-28 NOTE — ANESTHESIA PRE PROCEDURE
ARTHROPLASTY Left 12/04/2023    LEFT KNEE TOTAL ARTHROPLASTY WITH FREDY BIOMET performed by Dilan Sellers MD at [facility] OR   • UMBILICAL HERNIA REPAIR N/A    • WRIST FRACTURE SURGERY Left     with hardware   • WRIST SURGERY  04/12/2018    plate and screws right wrist       Social History:    Social History     Tobacco Use   • Smoking status: Never   • Smokeless tobacco: Never   Substance Use Topics   • Alcohol use: Yes     Comment: one to two drinks a month                                Counseling given: Not Answered      Vital Signs (Current): There were no vitals filed for this visit.                                           BP Readings from Last 3 Encounters:   01/14/25 (!) 143/79   01/16/25 130/70   11/04/24 134/78       NPO Status: Time of last liquid consumption: 2200                        Time of last solid consumption: 2000                        Date of last liquid consumption: 01/27/25                        Date of last solid food consumption: 01/27/25    BMI:   Wt Readings from Last 3 Encounters:   01/14/25 107.5 kg (237 lb)   01/16/25 108.2 kg (238 lb 9.6 oz)   11/11/24 104.8 kg (231 lb)     There is no height or weight on file to calculate BMI.    CBC:   Lab Results   Component Value Date/Time    WBC 5.2 01/14/2025 03:25 PM    RBC 4.55 01/14/2025 03:25 PM    RBC 4.15 05/14/2012 12:06 PM    HGB 13.2 01/14/2025 03:25 PM    HCT 42.3 01/14/2025 03:25 PM    MCV 93.0 01/14/2025 03:25 PM    RDW 13.2 01/14/2025 03:25 PM     01/14/2025 03:25 PM     05/14/2012 12:06 PM       CMP:   Lab Results   Component Value Date/Time     01/14/2025 03:25 PM    K 4.6 01/14/2025 03:25 PM     01/14/2025 03:25 PM    CO2 27 01/14/2025 03:25 PM    BUN 21 01/14/2025 03:25 PM    CREATININE 0.7 01/14/2025 03:25 PM    GFRAA >60 04/27/2021 12:20 PM    LABGLOM >90 01/14/2025 03:25 PM    LABGLOM >60 11/20/2023 09:45 AM    GLUCOSE 86 01/14/2025 03:25 PM    CALCIUM 9.5 01/14/2025 03:25 PM    BILITOT

## 2025-01-30 ENCOUNTER — TELEPHONE (OUTPATIENT)
Dept: ORTHOPEDIC SURGERY | Age: 70
End: 2025-01-30

## 2025-01-30 NOTE — TELEPHONE ENCOUNTER
Patient called office with post op questions regarding her dressing and sleep issues. Patient advised to begin daily dressing changes and to try taking 1-2 tablets of benadryl prior to going to sleep. Patient voiced understanding and appreciation.

## 2025-02-10 ENCOUNTER — OFFICE VISIT (OUTPATIENT)
Dept: ORTHOPEDIC SURGERY | Age: 70
End: 2025-02-10

## 2025-02-10 VITALS — WEIGHT: 239 LBS | BODY MASS INDEX: 37.51 KG/M2 | HEIGHT: 67 IN | RESPIRATION RATE: 14 BRPM

## 2025-02-10 DIAGNOSIS — Z96.612 STATUS POST REVERSE TOTAL ARTHROPLASTY OF LEFT SHOULDER: Primary | ICD-10-CM

## 2025-02-10 PROCEDURE — 99024 POSTOP FOLLOW-UP VISIT: CPT | Performed by: ORTHOPAEDIC SURGERY

## 2025-02-10 RX ORDER — OXYCODONE AND ACETAMINOPHEN 5; 325 MG/1; MG/1
1 TABLET ORAL EVERY 6 HOURS PRN
Qty: 20 TABLET | Refills: 0 | Status: SHIPPED | OUTPATIENT
Start: 2025-02-10 | End: 2025-02-15

## 2025-02-10 NOTE — PROGRESS NOTES
Procedure: Left Reverse Shoulder Arthroplasty  Date of Procedure: 1/28/25    HPI: Shira Sanches is approximately 2 weeks status post the aforementioned procedure. she is doing relatively well clinically. her pain is appropriately controlled. she has been compliant with his sling/abduction pillow and pendulum exercises. she denies having any fevers, chills, sweats, or constitutional symptoms.    Physical examination:  Resp 14   Ht 1.702 m (5' 7.01\")   Wt 108.4 kg (239 lb)   LMP 06/13/2010 (Within Months)   BMI 37.42 kg/m²   General Appearance: alert, well appearing, and in no distress  Mental Status: alert, oriented to person, place, and time  Evaluation of the left shoulder and upper extremity demonstrates her shoulder incision to be clean, dry, and intact. No wound dehiscence or active drainage is appreciated. There is mild to moderate amount of swelling present. Sensation is grossly intact light touch in all dermatomes and she has a 2+ radial pulse with brisk capillary refill in all her fingers. she can actively flex and extend the wrist and flex, extend, abduct, and adduct all of her fingers.     Imaging Studies: 4 x-ray views of the left shoulder completed on 2/10/2025  were independently reviewed demonstrating a reverse shoulder implant to be in acceptable alignment and well fixed without any obvious fracture, dislocation, or subluxation.    Impression and plan: Shira Sanches is approximately 2 weeks status post a left reverse shoulder arthroplasty. she is doing relatively well clinically at this time. Sutures were taken out today and Steri-Strips and clean dressings applied. she is to continue with daily dressing changes. she was switched from the sling/abduction pillow to a shoulder immobilizer. she will continue with immobilization and pendulum exercises for the next 4 weeks. I'll have her follow up in 4 weeks for re-evaluation, but she was instructed to return or call earlier with any questions or

## 2025-03-10 ENCOUNTER — OFFICE VISIT (OUTPATIENT)
Dept: ORTHOPEDIC SURGERY | Age: 70
End: 2025-03-10

## 2025-03-10 VITALS — RESPIRATION RATE: 14 BRPM | WEIGHT: 239 LBS | BODY MASS INDEX: 37.51 KG/M2 | HEIGHT: 67 IN

## 2025-03-10 DIAGNOSIS — Z96.612 STATUS POST REVERSE TOTAL ARTHROPLASTY OF LEFT SHOULDER: Primary | ICD-10-CM

## 2025-03-10 PROCEDURE — 99024 POSTOP FOLLOW-UP VISIT: CPT

## 2025-03-10 NOTE — PROGRESS NOTES
Procedure: Left Reverse Total Shoulder Arthroplasty  Date of Procedure: 1/28/2025    HPI: Shira Sanches is approximately 6 weeks status post the aforementioned procedure. she is doing relatively well clinically. her pain is appropriately controlled and minimal. she has been compliant with her immobilizer and pendulum exercises. she denies having any fevers, chills, or sweats.     Physical examination:  Resp 14   Ht 1.702 m (5' 7.01\")   Wt 108.4 kg (239 lb)   LMP 06/13/2010 (Within Months)   BMI 37.42 kg/m²   General Appearance: alert, well appearing, and in no distress  Mental Status: alert, oriented to person, place, and time  Evaluation of the left shoulder and upper extremity demonstrates her shoulder incision to be healed appropriately. No wound dehiscence, drainage, warmth or erythema is appreciated. Sensation is grossly intact light touch in all dermatomes and she has a 2+ radial pulse with brisk capillary refill in all her fingers. she can actively flex and extend the wrist and flex, extend, abduct, and adduct all of her fingers. I can passively elevate the left shoulder to 135 degrees, abduct to 135 degrees, and ER to 50 degrees.    Imaging Studies: 4 view x-rays of the left shoulder completed on 3/10/2025 were independently reviewed demonstrating a reverse total shoulder implant to be in acceptable alignment and well fixed without any obvious fracture, dislocation, or subluxation.    Impression and plan: Shira Sanches is approximately 6 weeks status post a left reverse total  shoulder arthroplasty. she is doing relatively well clinically at this time. She is to discontinue the shoulder immobilizer. I'll have her begin phase 2 shoulder exercises focusing on active assisted flexion and external rotation. she may start to use the arm for light activities of daily living, without any pushing, pulling, or lifting more than one pound. she is to follow up in 6 weeks for re-evaluation, but she was

## 2025-04-21 ENCOUNTER — OFFICE VISIT (OUTPATIENT)
Dept: ORTHOPEDIC SURGERY | Age: 70
End: 2025-04-21

## 2025-04-21 DIAGNOSIS — Z96.612 STATUS POST REVERSE TOTAL ARTHROPLASTY OF LEFT SHOULDER: Primary | ICD-10-CM

## 2025-04-21 NOTE — PROGRESS NOTES
Procedure: Left Reverse Shoulder Arthroplasty  Date of Procedure: 1/28/25    HPI: Shira Sanches is approximately 3-month status post the aforementioned procedure. she states that she is doing well having minimal pain in the shoulder.  She has seen improvement in her overall function but has limited internal rotation.    Physical examination:  [facility] 06/13/2010 (Within Months)   General Appearance: alert, well appearing, and in no distress  Mental Status: alert, oriented to person, place, and time  Evaluation of the left shoulder and upper extremity demonstrates her shoulder incision to be healed.  She has approximately 130 degrees of active shoulder elevation and abduction with 40 degrees of external rotation.  She has internal rotation to her waist.    Imaging Studies: 4 x-ray views of the left shoulder completed on 4/21/2025  were independently reviewed demonstrating a reverse shoulder implant to be in acceptable alignment and well fixed without any obvious fracture, dislocation, or subluxation.    Impression and plan: Shira Sanches is approximately 3-month status post a left reverse shoulder arthroplasty. she is doing quite well clinically and radiographically.  At this time she was encouraged to keep up with her home exercise program and was provided a home strengthening program and light resistance bands to go along with this.  She may continue to gradually increase use of this arm as she feels comfortable.  I will see her back for reevaluation in 3 months but she was encouraged to return or call earlier with questions or concerns.

## 2025-07-03 ENCOUNTER — HOSPITAL ENCOUNTER (OUTPATIENT)
Dept: MAMMOGRAPHY | Age: 70
Discharge: HOME | End: 2025-07-05
Attending: FAMILY MEDICINE
Payer: MEDICARE

## 2025-07-03 DIAGNOSIS — Z78.0 OSTEOPENIA AFTER MENOPAUSE: ICD-10-CM

## 2025-07-03 DIAGNOSIS — M85.80 OSTEOPENIA AFTER MENOPAUSE: ICD-10-CM

## 2025-07-03 DIAGNOSIS — Z12.31 ENCOUNTER FOR SCREENING MAMMOGRAM FOR MALIGNANT NEOPLASM OF BREAST: ICD-10-CM

## 2025-07-03 PROCEDURE — 77080 DXA BONE DENSITY AXIAL: CPT

## 2025-07-03 PROCEDURE — 77063 BREAST TOMOSYNTHESIS BI: CPT

## 2025-09-05 ENCOUNTER — HOSPITAL ENCOUNTER (OUTPATIENT)
Dept: LAB | Age: 70
Discharge: HOME | End: 2025-09-05
Payer: MEDICARE

## 2025-09-05 DIAGNOSIS — I10 ESSENTIAL (PRIMARY) HYPERTENSION: ICD-10-CM

## 2025-09-05 DIAGNOSIS — E03.9 ACQUIRED HYPOTHYROIDISM: ICD-10-CM

## 2025-09-05 DIAGNOSIS — R73.9 HYPERGLYCEMIA: ICD-10-CM

## 2025-09-05 DIAGNOSIS — E78.5 DYSLIPIDEMIA: ICD-10-CM

## 2025-09-05 LAB
ALBUMIN SERPL-MCNC: 4.1 G/DL (ref 3.5–5.2)
ALBUMIN/GLOB SERPL: 1.7 {RATIO} (ref 1–2.5)
ALP SERPL-CCNC: 79 U/L (ref 35–104)
ALT SERPL-CCNC: 20 U/L (ref 10–35)
ANION GAP SERPL CALCULATED.3IONS-SCNC: 10 MMOL/L (ref 9–16)
AST SERPL-CCNC: 23 U/L (ref 10–35)
BILIRUB SERPL-MCNC: 0.3 MG/DL (ref 0–1.2)
BUN SERPL-MCNC: 21 MG/DL (ref 8–23)
CALCIUM SERPL-MCNC: 8.9 MG/DL (ref 8.6–10.4)
CHLORIDE SERPL-SCNC: 107 MMOL/L (ref 98–107)
CHOLEST SERPL-MCNC: 174 MG/DL (ref 0–199)
CHOLESTEROL/HDL RATIO: 2.9
CO2 SERPL-SCNC: 27 MMOL/L (ref 20–31)
CREAT SERPL-MCNC: 0.7 MG/DL (ref 0.6–0.9)
ERYTHROCYTE [DISTWIDTH] IN BLOOD BY AUTOMATED COUNT: 13.6 % (ref 11.8–14.4)
EST. AVERAGE GLUCOSE BLD GHB EST-MCNC: 103 MG/DL
GFR, ESTIMATED: >90 ML/MIN/1.73M2
GLUCOSE SERPL-MCNC: 98 MG/DL (ref 74–99)
HBA1C MFR BLD: 5.2 % (ref 4–6)
HCT VFR BLD AUTO: 40.7 % (ref 36.3–47.1)
HDLC SERPL-MCNC: 61 MG/DL
HGB BLD-MCNC: 13.2 G/DL (ref 11.9–15.1)
LDLC SERPL CALC-MCNC: 100 MG/DL (ref 0–100)
MCH RBC QN AUTO: 29.5 PG (ref 25.2–33.5)
MCHC RBC AUTO-ENTMCNC: 32.4 G/DL (ref 28.4–34.8)
MCV RBC AUTO: 90.8 FL (ref 82.6–102.9)
NRBC BLD-RTO: 0 PER 100 WBC
PLATELET # BLD AUTO: 191 K/UL (ref 138–453)
PMV BLD AUTO: 9.7 FL (ref 8.1–13.5)
POTASSIUM SERPL-SCNC: 4.3 MMOL/L (ref 3.7–5.3)
PROT SERPL-MCNC: 6.5 G/DL (ref 6.6–8.7)
RBC # BLD AUTO: 4.48 M/UL (ref 3.95–5.11)
SODIUM SERPL-SCNC: 144 MMOL/L (ref 136–145)
T4 FREE SERPL-MCNC: 0.9 NG/DL (ref 0.92–1.68)
TRIGL SERPL-MCNC: 65 MG/DL (ref 0–149)
TSH SERPL DL<=0.05 MIU/L-ACNC: 4.22 UIU/ML (ref 0.27–4.2)
VLDLC SERPL CALC-MCNC: 13 MG/DL (ref 1–30)
WBC OTHER # BLD: 4 K/UL (ref 3.5–11.3)

## 2025-09-05 PROCEDURE — 85027 COMPLETE CBC AUTOMATED: CPT

## 2025-09-05 PROCEDURE — 84439 ASSAY OF FREE THYROXINE: CPT

## 2025-09-05 PROCEDURE — 84443 ASSAY THYROID STIM HORMONE: CPT

## 2025-09-05 PROCEDURE — 80061 LIPID PANEL: CPT

## 2025-09-05 PROCEDURE — 83036 HEMOGLOBIN GLYCOSYLATED A1C: CPT

## 2025-09-05 PROCEDURE — 80053 COMPREHEN METABOLIC PANEL: CPT

## 2025-09-05 PROCEDURE — 36415 COLL VENOUS BLD VENIPUNCTURE: CPT

## (undated) DEVICE — SOLUTION IRRIG 1000ML STRL H2O USP PLAS POUR BTL

## (undated) DEVICE — INTENT TO BE USED WITH SUTURE MATERIAL FOR TISSUE CLOSURE: Brand: RICHARD-ALLAN® NEEDLE 1/2 CIRCLE TAPER

## (undated) DEVICE — APPLICATOR MEDICATED 26 CC SOLUTION HI LT ORNG CHLORAPREP

## (undated) DEVICE — 3M™ IOBAN™ 2 ANTIMICROBIAL INCISE DRAPE 6651EZ: Brand: IOBAN™ 2

## (undated) DEVICE — STANDARD HYPODERMIC NEEDLE,POLYPROPYLENE HUB: Brand: MONOJECT

## (undated) DEVICE — SOLUTION IRRIG 500ML 0.9% SOD CHLO USP POUR PLAS BTL

## (undated) DEVICE — SYRINGE IRRIG 60ML SFT PLIABLE BLB EZ TO GRP 1 HND USE W/

## (undated) DEVICE — SUTURE ETHIBOND EXCEL SZ 1 L30IN NONABSORBABLE GRN L36MM CT-1 X425H

## (undated) DEVICE — MARKER,SKIN,WI/RULER AND LABELS: Brand: MEDLINE

## (undated) DEVICE — ELECTRODE PT RET AD L9FT HI MOIST COND ADH HYDRGEL CORDED

## (undated) DEVICE — GLOVE SURG SZ 75 L12IN THK75MIL DK GRN LTX FREE

## (undated) DEVICE — GLOVE ORANGE PI 8   MSG9080

## (undated) DEVICE — CONTAINER,SPECIMEN,4OZ,OR STRL: Brand: MEDLINE

## (undated) DEVICE — COVER,MAYO STAND,XL,STERILE: Brand: MEDLINE

## (undated) DEVICE — STRAP,POSITIONING,KNEE/BODY,FOAM,4X60": Brand: MEDLINE

## (undated) DEVICE — SUTURE VICRYL + SZ 3-0 L36IN ABSRB UD L36MM CT-1 1/2 CIR VCP944H

## (undated) DEVICE — DUAL CUT SAGITTAL BLADE

## (undated) DEVICE — SUTURE VCRL SZ 1 L36IN ABSRB UD L48MM CTXB 1/2 CIR BLNT PNT JB977H

## (undated) DEVICE — SUTURE N ABSRB L 36 IN SZ 5-0 NDL L 13 MM POLYPRO OR PPL BLU

## (undated) DEVICE — COVER,TABLE,HEAVY DUTY,50"X90",STRL: Brand: MEDLINE

## (undated) DEVICE — GOWN,SIRUS,POLYRNF,BRTHSLV,XL,30/CS: Brand: MEDLINE

## (undated) DEVICE — SUTURE VICRYL SZ 0 L36IN ABSRB UD CT-1 L36MM 1/2 CIR TAPR PNT VCP946H

## (undated) DEVICE — CEMENT BNE 20ML 41GM FULL DOSE PMMA W/ TOBRA M VISC RADPQ: Type: IMPLANTABLE DEVICE | Status: NON-FUNCTIONAL

## (undated) DEVICE — DRAPE,U/ SHT,SPLIT,PLAS,STERIL: Brand: MEDLINE

## (undated) DEVICE — SYRINGE MED 50ML LUERLOCK TIP

## (undated) DEVICE — SOLUTION IRRIG 1000ML 0.9% SOD CHL USP POUR PLAS BTL

## (undated) DEVICE — DRESSING ALGINATE POST OPERATIVE 12X3.5 IN RECT PRIMASEAL

## (undated) DEVICE — CEMENT MIXING SYSTEM WITH FEMORAL BREAKWAY NOZZLE: Brand: REVOLUTION

## (undated) DEVICE — Device

## (undated) DEVICE — ELECTRODE ES L3IN S STL BLDE INSUL DISP VALLEYLAB EDGE

## (undated) DEVICE — MHPB GEN MINOR PACK: Brand: MEDLINE INDUSTRIES, INC.

## (undated) DEVICE — SYRINGE,PISTON,IRRIGATION,60ML,STERILE: Brand: MEDLINE

## (undated) DEVICE — MHPB TOTAL KNEE PACK: Brand: MEDLINE INDUSTRIES, INC.

## (undated) DEVICE — GOWN,AURORA,NONREINFORCED,LARGE: Brand: MEDLINE

## (undated) DEVICE — BLANKET WRM W40.2XL55.9IN IORT LO BODY + MISTRAL AIR

## (undated) DEVICE — PROTECTOR ULN NRV PUR FOAM HK LOOP STRP ANATOMICALLY

## (undated) DEVICE — 3M™ STERI-DRAPE™ U-DRAPE 1015: Brand: STERI-DRAPE™

## (undated) DEVICE — STRIP,CLOSURE,WOUND,MEDI-STRIP,1/2X4: Brand: MEDLINE

## (undated) DEVICE — SUTURE VCRL + SZ 2-0 L27IN ABSRB CLR CT-1 1/2 CIR TAPERCUT VCP259H

## (undated) DEVICE — LIQUIBAND RAPID ADHESIVE 36/CS 0.8ML: Brand: MEDLINE

## (undated) DEVICE — DRAPE,EXTREMITY,89X128,STERILE: Brand: MEDLINE

## (undated) DEVICE — GAUZE,SPONGE,FLUFF,6"X6.75",STRL,5/TRAY: Brand: MEDLINE

## (undated) DEVICE — DRAPE,REIN 53X77,STERILE: Brand: MEDLINE

## (undated) DEVICE — TUBING SUCT 12FR MAL ALUM SHFT FN CAP VENT UNIV CONN W/ OBT

## (undated) DEVICE — DECANTER BAG 9": Brand: MEDLINE INDUSTRIES, INC.

## (undated) DEVICE — GOWN,SIRUS,NONRNF,SETINSLV,XL,20/CS: Brand: MEDLINE

## (undated) DEVICE — SOLUTION IRRIG 3000ML 0.9% SOD CHL USP UROMATIC PLAS CONT

## (undated) DEVICE — GLOVE SURG SZ 8 L12IN THK75MIL DK GRN LTX FREE

## (undated) DEVICE — BLANKET WRM W29.9XL79.1IN UP BODY FORC AIR MISTRAL-AIR

## (undated) DEVICE — SHEET, ORTHO, SPLIT, STERILE: Brand: MEDLINE

## (undated) DEVICE — SUTURE ETHIBOND EXCEL SUT 30 INCHES75CM 2 GRN

## (undated) DEVICE — STOCKINETTE,IMPERVIOUS,12X48,STERILE: Brand: MEDLINE

## (undated) DEVICE — DISPOSABLE TOURNIQUET CUFF SINGLE BLADDER, SINGLE PORT AND QUICK CONNECT CONNECTOR: Brand: COLOR CUFF

## (undated) DEVICE — 450 ML BOTTLE OF 0.05% CHLORHEXIDINE GLUCONATE IN 99.95% STERILE WATER FOR IRRIGATION, USP AND APPLICATOR.: Brand: IRRISEPT ANTIMICROBIAL WOUND LAVAGE

## (undated) DEVICE — HANDPIECE SET WITH HIGH FLOW TIP AND SUCTION TUBE: Brand: INTERPULSE

## (undated) DEVICE — PIN DRL DIA1/8IN QUIK REL FOR VANGUARD UNIV INSTR TOT KNEE 32486265] ZIMMER BIOMET ORTHOPEDICS]

## (undated) DEVICE — 3M™ IOBAN™ 2 ANTIMICROBIAL INCISE DRAPE 6650EZ: Brand: IOBAN™ 2

## (undated) DEVICE — SUTURE FIBERWIRE SZ 2 L38IN NONABSORBABLE BLU L36.6MM 1/2 AR7202

## (undated) DEVICE — GLOVE ORTHO 7 1/2   MSG9475

## (undated) DEVICE — BANDAGE COBAN 4 IN COMPR W4INXL5YD FOAM COHESIVE QUIK STK SELF ADH SFT

## (undated) DEVICE — 1010 S-DRAPE TOWEL DRAPE 10/BX: Brand: STERI-DRAPE™

## (undated) DEVICE — SOLUTION IV 1000ML 0.9% SOD CHL PH 5 INJ USP VIAFLX PLAS

## (undated) DEVICE — CLOTH PRE OP W9XL10.5IN 2% CHG FRAGRANCE RNS FREE READYPREP

## (undated) DEVICE — GLOVE ORANGE PI 7   MSG9070

## (undated) DEVICE — BLADE SAGITAL 18X90X1.27MM

## (undated) DEVICE — DRESSING TRNSPAR W5XL4.5IN FLM SHT SEMIPERMEABLE WIND

## (undated) DEVICE — SUTURE MCRYL SZ 3 0 L18IN ABSRB UD PS 2 3 8 CIR REV CUT NDL MCP497G

## (undated) DEVICE — POUCH INSTR W6.75XL11.5IN FRST 2 PKT ADH FOR ORTH AND

## (undated) DEVICE — TOWEL,OR,DSP,ST,NATURAL,DLX,4/PK,20PK/CS: Brand: MEDLINE

## (undated) DEVICE — SUTURE PROL SZ 3-0 L18IN NONABSORBABLE BLU L30MM PS-1 3/8 8663G